# Patient Record
Sex: FEMALE | Race: WHITE | NOT HISPANIC OR LATINO | Employment: UNEMPLOYED | URBAN - NONMETROPOLITAN AREA
[De-identification: names, ages, dates, MRNs, and addresses within clinical notes are randomized per-mention and may not be internally consistent; named-entity substitution may affect disease eponyms.]

---

## 2017-07-26 ENCOUNTER — TRANSCRIBE ORDERS (OUTPATIENT)
Dept: ADMINISTRATIVE | Facility: HOSPITAL | Age: 37
End: 2017-07-26

## 2017-07-26 DIAGNOSIS — E04.1 NONTOXIC SINGLE THYROID NODULE: Primary | ICD-10-CM

## 2017-08-07 ENCOUNTER — HOSPITAL ENCOUNTER (OUTPATIENT)
Dept: ULTRASOUND IMAGING | Facility: HOSPITAL | Age: 37
Discharge: HOME | End: 2017-08-07
Admitting: NURSE PRACTITIONER

## 2017-08-07 DIAGNOSIS — E04.1 NONTOXIC SINGLE THYROID NODULE: ICD-10-CM

## 2017-08-07 PROCEDURE — 76536 US EXAM OF HEAD AND NECK: CPT | Performed by: RADIOLOGY

## 2017-08-07 PROCEDURE — 76536 US EXAM OF HEAD AND NECK: CPT

## 2018-01-26 ENCOUNTER — TRANSCRIBE ORDERS (OUTPATIENT)
Dept: ADMINISTRATIVE | Facility: HOSPITAL | Age: 38
End: 2018-01-26

## 2018-01-26 DIAGNOSIS — R10.12 ABDOMINAL PAIN, LEFT UPPER QUADRANT: Primary | ICD-10-CM

## 2018-01-26 DIAGNOSIS — R10.12 LEFT UPPER QUADRANT ABDOMINAL PAIN OF UNKNOWN ETIOLOGY: ICD-10-CM

## 2018-02-05 ENCOUNTER — HOSPITAL ENCOUNTER (OUTPATIENT)
Dept: ULTRASOUND IMAGING | Facility: HOSPITAL | Age: 38
Discharge: HOME | End: 2018-02-05
Admitting: NURSE PRACTITIONER

## 2018-02-05 DIAGNOSIS — R10.12 LEFT UPPER QUADRANT ABDOMINAL PAIN OF UNKNOWN ETIOLOGY: ICD-10-CM

## 2018-02-05 PROCEDURE — 76705 ECHO EXAM OF ABDOMEN: CPT | Performed by: RADIOLOGY

## 2018-02-05 PROCEDURE — 76700 US EXAM ABDOM COMPLETE: CPT

## 2018-02-09 ENCOUNTER — TRANSCRIBE ORDERS (OUTPATIENT)
Dept: ADMINISTRATIVE | Facility: HOSPITAL | Age: 38
End: 2018-02-09

## 2018-02-09 DIAGNOSIS — R10.12 LEFT UPPER QUADRANT ABDOMINAL PAIN OF UNKNOWN ETIOLOGY: Primary | ICD-10-CM

## 2018-02-16 ENCOUNTER — HOSPITAL ENCOUNTER (OUTPATIENT)
Dept: CT IMAGING | Facility: HOSPITAL | Age: 38
Discharge: HOME | End: 2018-02-16
Admitting: NURSE PRACTITIONER

## 2018-02-16 DIAGNOSIS — R10.12 LEFT UPPER QUADRANT ABDOMINAL PAIN OF UNKNOWN ETIOLOGY: ICD-10-CM

## 2018-02-16 LAB — CREAT BLDA-MCNC: 0.5 MG/DL (ref 0.6–1.3)

## 2018-02-16 PROCEDURE — 74160 CT ABDOMEN W/CONTRAST: CPT

## 2018-02-16 PROCEDURE — 82565 ASSAY OF CREATININE: CPT

## 2018-02-16 PROCEDURE — 74160 CT ABDOMEN W/CONTRAST: CPT | Performed by: RADIOLOGY

## 2018-02-16 PROCEDURE — 0 IOPAMIDOL 61 % SOLUTION: Performed by: NURSE PRACTITIONER

## 2018-02-16 RX ADMIN — IOPAMIDOL 100 ML: 612 INJECTION, SOLUTION INTRAVENOUS at 12:15

## 2018-06-20 ENCOUNTER — TRANSCRIBE ORDERS (OUTPATIENT)
Dept: ADMINISTRATIVE | Facility: HOSPITAL | Age: 38
End: 2018-06-20

## 2018-06-20 DIAGNOSIS — R74.8 ELEVATED LIVER ENZYMES: Primary | ICD-10-CM

## 2018-07-11 ENCOUNTER — APPOINTMENT (OUTPATIENT)
Dept: ULTRASOUND IMAGING | Facility: HOSPITAL | Age: 38
End: 2018-07-11

## 2018-07-19 ENCOUNTER — TRANSCRIBE ORDERS (OUTPATIENT)
Dept: ADMINISTRATIVE | Facility: HOSPITAL | Age: 38
End: 2018-07-19

## 2018-07-19 DIAGNOSIS — E04.2 MULTINODULAR NON-TOXIC GOITER: Primary | ICD-10-CM

## 2018-08-01 ENCOUNTER — HOSPITAL ENCOUNTER (OUTPATIENT)
Dept: ULTRASOUND IMAGING | Facility: HOSPITAL | Age: 38
Discharge: HOME | End: 2018-08-01

## 2018-08-01 ENCOUNTER — HOSPITAL ENCOUNTER (OUTPATIENT)
Dept: ULTRASOUND IMAGING | Facility: HOSPITAL | Age: 38
Discharge: HOME | End: 2018-08-01
Admitting: NURSE PRACTITIONER

## 2018-08-01 DIAGNOSIS — R74.8 ELEVATED LIVER ENZYMES: ICD-10-CM

## 2018-08-01 DIAGNOSIS — E04.2 MULTINODULAR NON-TOXIC GOITER: ICD-10-CM

## 2018-08-01 PROCEDURE — 76700 US EXAM ABDOM COMPLETE: CPT | Performed by: RADIOLOGY

## 2018-08-01 PROCEDURE — 76700 US EXAM ABDOM COMPLETE: CPT

## 2018-08-01 PROCEDURE — 76536 US EXAM OF HEAD AND NECK: CPT

## 2018-08-01 PROCEDURE — 76536 US EXAM OF HEAD AND NECK: CPT | Performed by: RADIOLOGY

## 2018-09-18 ENCOUNTER — OFFICE VISIT (OUTPATIENT)
Dept: FAMILY MEDICINE CLINIC | Facility: CLINIC | Age: 38
End: 2018-09-18

## 2018-09-18 VITALS
OXYGEN SATURATION: 96 % | HEIGHT: 55 IN | WEIGHT: 293 LBS | BODY MASS INDEX: 67.81 KG/M2 | DIASTOLIC BLOOD PRESSURE: 90 MMHG | TEMPERATURE: 98.5 F | SYSTOLIC BLOOD PRESSURE: 140 MMHG | HEART RATE: 79 BPM

## 2018-09-18 DIAGNOSIS — R12 HEARTBURN: ICD-10-CM

## 2018-09-18 DIAGNOSIS — F33.1 MODERATE EPISODE OF RECURRENT MAJOR DEPRESSIVE DISORDER (HCC): Primary | ICD-10-CM

## 2018-09-18 DIAGNOSIS — E04.9 GOITER: ICD-10-CM

## 2018-09-18 DIAGNOSIS — I10 ESSENTIAL HYPERTENSION: ICD-10-CM

## 2018-09-18 DIAGNOSIS — K21.00 GASTROESOPHAGEAL REFLUX DISEASE WITH ESOPHAGITIS: ICD-10-CM

## 2018-09-18 DIAGNOSIS — J30.1 CHRONIC SEASONAL ALLERGIC RHINITIS DUE TO POLLEN: ICD-10-CM

## 2018-09-18 DIAGNOSIS — E28.2 PCOS (POLYCYSTIC OVARIAN SYNDROME): ICD-10-CM

## 2018-09-18 PROBLEM — K21.9 GASTROESOPHAGEAL REFLUX DISEASE WITHOUT ESOPHAGITIS: Status: ACTIVE | Noted: 2018-09-18

## 2018-09-18 LAB
ALBUMIN SERPL-MCNC: 4.4 G/DL (ref 3.5–5)
ALBUMIN/GLOB SERPL: 1.6 G/DL (ref 1.5–2.5)
ALP SERPL-CCNC: 103 U/L (ref 35–104)
ALT SERPL W P-5'-P-CCNC: 73 U/L (ref 10–36)
ANION GAP SERPL CALCULATED.3IONS-SCNC: 6.7 MMOL/L (ref 3.6–11.2)
AST SERPL-CCNC: 43 U/L (ref 10–30)
BILIRUB SERPL-MCNC: 0.5 MG/DL (ref 0.2–1.8)
BUN BLD-MCNC: 10 MG/DL (ref 7–21)
BUN/CREAT SERPL: 16.9 (ref 7–25)
CALCIUM SPEC-SCNC: 8.9 MG/DL (ref 7.7–10)
CHLORIDE SERPL-SCNC: 108 MMOL/L (ref 99–112)
CHOLEST SERPL-MCNC: 173 MG/DL (ref 0–200)
CO2 SERPL-SCNC: 28.3 MMOL/L (ref 24.3–31.9)
CREAT BLD-MCNC: 0.59 MG/DL (ref 0.43–1.29)
GFR SERPL CREATININE-BSD FRML MDRD: 113 ML/MIN/1.73
GLOBULIN UR ELPH-MCNC: 2.8 GM/DL
GLUCOSE BLD-MCNC: 106 MG/DL (ref 70–110)
HBA1C MFR BLD: 6.4 % (ref 4.5–5.7)
HDLC SERPL-MCNC: 47 MG/DL (ref 60–100)
LDLC SERPL CALC-MCNC: 90 MG/DL (ref 0–100)
LDLC/HDLC SERPL: 1.92 {RATIO}
OSMOLALITY SERPL CALC.SUM OF ELEC: 284.4 MOSM/KG (ref 273–305)
POTASSIUM BLD-SCNC: 3.7 MMOL/L (ref 3.5–5.3)
PROT SERPL-MCNC: 7.2 G/DL (ref 6–8)
SODIUM BLD-SCNC: 143 MMOL/L (ref 135–153)
T3FREE SERPL-MCNC: 3.7 PG/ML (ref 2.3–4.2)
T4 FREE SERPL-MCNC: 1.17 NG/DL (ref 0.89–1.76)
TRIGL SERPL-MCNC: 179 MG/DL (ref 0–150)
TSH SERPL DL<=0.05 MIU/L-ACNC: 1.12 MIU/ML (ref 0.55–4.78)
VLDLC SERPL-MCNC: 35.8 MG/DL

## 2018-09-18 PROCEDURE — 84481 FREE ASSAY (FT-3): CPT | Performed by: PHYSICIAN ASSISTANT

## 2018-09-18 PROCEDURE — 83036 HEMOGLOBIN GLYCOSYLATED A1C: CPT | Performed by: PHYSICIAN ASSISTANT

## 2018-09-18 PROCEDURE — 99204 OFFICE O/P NEW MOD 45 MIN: CPT | Performed by: PHYSICIAN ASSISTANT

## 2018-09-18 PROCEDURE — 84443 ASSAY THYROID STIM HORMONE: CPT | Performed by: PHYSICIAN ASSISTANT

## 2018-09-18 PROCEDURE — 36415 COLL VENOUS BLD VENIPUNCTURE: CPT | Performed by: PHYSICIAN ASSISTANT

## 2018-09-18 PROCEDURE — 83013 H PYLORI (C-13) BREATH: CPT | Performed by: PHYSICIAN ASSISTANT

## 2018-09-18 PROCEDURE — 84439 ASSAY OF FREE THYROXINE: CPT | Performed by: PHYSICIAN ASSISTANT

## 2018-09-18 PROCEDURE — 80053 COMPREHEN METABOLIC PANEL: CPT | Performed by: PHYSICIAN ASSISTANT

## 2018-09-18 PROCEDURE — 90686 IIV4 VACC NO PRSV 0.5 ML IM: CPT | Performed by: PHYSICIAN ASSISTANT

## 2018-09-18 PROCEDURE — 90471 IMMUNIZATION ADMIN: CPT | Performed by: PHYSICIAN ASSISTANT

## 2018-09-18 PROCEDURE — 80061 LIPID PANEL: CPT | Performed by: PHYSICIAN ASSISTANT

## 2018-09-18 RX ORDER — LOSARTAN POTASSIUM 50 MG/1
50 TABLET ORAL DAILY
Qty: 30 TABLET | Refills: 5 | Status: SHIPPED | OUTPATIENT
Start: 2018-09-18 | End: 2019-01-17 | Stop reason: SDUPTHER

## 2018-09-18 RX ORDER — LOSARTAN POTASSIUM 25 MG/1
25 TABLET ORAL DAILY
COMMUNITY
End: 2018-09-18

## 2018-09-18 RX ORDER — FLUTICASONE PROPIONATE 50 MCG
SPRAY, SUSPENSION (ML) NASAL
COMMUNITY
Start: 2018-06-19 | End: 2018-09-18 | Stop reason: SDUPTHER

## 2018-09-18 RX ORDER — METFORMIN HYDROCHLORIDE EXTENDED-RELEASE TABLETS 500 MG/1
TABLET, FILM COATED, EXTENDED RELEASE ORAL
COMMUNITY
Start: 2018-06-19 | End: 2018-09-18 | Stop reason: SDUPTHER

## 2018-09-18 RX ORDER — METFORMIN HYDROCHLORIDE EXTENDED-RELEASE TABLETS 500 MG/1
500 TABLET, FILM COATED, EXTENDED RELEASE ORAL 2 TIMES DAILY WITH MEALS
Qty: 60 TABLET | Refills: 5 | Status: SHIPPED | OUTPATIENT
Start: 2018-09-18 | End: 2019-01-17 | Stop reason: SDUPTHER

## 2018-09-18 RX ORDER — FLUTICASONE PROPIONATE 50 MCG
2 SPRAY, SUSPENSION (ML) NASAL DAILY
Qty: 18.2 ML | Refills: 5 | Status: SHIPPED | OUTPATIENT
Start: 2018-09-18 | End: 2019-01-17 | Stop reason: SDUPTHER

## 2018-09-18 RX ORDER — IBUPROFEN 800 MG/1
1 TABLET ORAL EVERY 8 HOURS
COMMUNITY
Start: 2018-07-20 | End: 2018-09-18 | Stop reason: SDUPTHER

## 2018-09-18 RX ORDER — SERTRALINE HYDROCHLORIDE 25 MG/1
25 TABLET, FILM COATED ORAL DAILY
COMMUNITY
End: 2018-09-18

## 2018-09-18 RX ORDER — PANTOPRAZOLE SODIUM 20 MG/1
20 TABLET, DELAYED RELEASE ORAL DAILY
Qty: 30 TABLET | Refills: 5 | Status: SHIPPED | OUTPATIENT
Start: 2018-09-18 | End: 2019-01-17 | Stop reason: SDUPTHER

## 2018-09-18 RX ORDER — IBUPROFEN 800 MG/1
800 TABLET ORAL EVERY 8 HOURS
Qty: 90 TABLET | Refills: 3 | Status: SHIPPED | OUTPATIENT
Start: 2018-09-18 | End: 2019-01-17 | Stop reason: SDUPTHER

## 2018-09-18 RX ORDER — PANTOPRAZOLE SODIUM 20 MG/1
20 TABLET, DELAYED RELEASE ORAL DAILY
COMMUNITY
End: 2018-09-18 | Stop reason: SDUPTHER

## 2018-09-18 RX ORDER — FLUOXETINE HYDROCHLORIDE 20 MG/1
20 CAPSULE ORAL DAILY
Qty: 30 CAPSULE | Refills: 5 | Status: SHIPPED | OUTPATIENT
Start: 2018-09-18 | End: 2019-01-17 | Stop reason: SDUPTHER

## 2018-09-20 LAB
UREA BREATH TEST QL: NEGATIVE
UREA BREATH TEST QL: NORMAL

## 2018-09-23 NOTE — PROGRESS NOTES
"Sravanthi Alegria is a 38 y.o. female.     CC- depression    History of Present Illness     Depression-   Not at goal with zoloft.  She complains of sadness and labile moods.  X 6 months.    Hypertension-  Uncontrolled with cozaar 50mg qd.  She has associated headaches.    Chronic allergic rhinitis- stable with flonase     GERD/ flatulence- uncontrolled.  She complains of belching persistently x 3 months.  She has not taken her pantoprazaole in 3 weeks so she can be tested for h pylori.  She has had h pylori twice int he  Past    Goiter- stable     The following portions of the patient's history were reviewed and updated as appropriate: allergies, current medications, past family history, past medical history, past social history, past surgical history and problem list.    Review of Systems   Constitutional: Negative for activity change, appetite change and fever.   HENT: Negative for ear pain, sinus pressure and sore throat.    Eyes: Negative for pain and visual disturbance.   Respiratory: Negative for cough and chest tightness.    Cardiovascular: Negative for chest pain and palpitations.   Gastrointestinal: Negative for abdominal pain, constipation, diarrhea, nausea and vomiting.        Belching, heartburn   Endocrine: Negative for polydipsia and polyuria.   Genitourinary: Negative for dysuria and frequency.   Musculoskeletal: Negative for back pain and myalgias.   Skin: Negative for color change and rash.   Allergic/Immunologic: Negative for food allergies and immunocompromised state.   Neurological: Negative for dizziness, syncope and headaches.   Hematological: Negative for adenopathy. Does not bruise/bleed easily.   Psychiatric/Behavioral: Positive for dysphoric mood. Negative for hallucinations, self-injury, sleep disturbance and suicidal ideas. The patient is not nervous/anxious.        /90   Pulse 79   Temp 98.5 °F (36.9 °C) (Oral)   Ht 66 cm (25.98\")   Wt (!) 162 kg (358 lb)   LMP  " (Approximate) Comment: post menopause  SpO2 96%   .80 kg/m²   Hospital Outpatient Visit on 02/16/2018   Component Date Value Ref Range Status   • Creatinine 02/16/2018 0.50* 0.60 - 1.30 mg/dL Final       Physical Exam   Constitutional: She is oriented to person, place, and time. She appears well-developed and well-nourished.   HENT:   Head: Normocephalic and atraumatic.   Nose: Nose normal.   Mouth/Throat: Oropharynx is clear and moist.   Eyes: Pupils are equal, round, and reactive to light. Conjunctivae and EOM are normal.   Neck: Normal range of motion. Neck supple. No tracheal deviation present. No thyromegaly present.   Cardiovascular: Normal rate, regular rhythm, normal heart sounds and intact distal pulses.    No murmur heard.  Pulmonary/Chest: Effort normal and breath sounds normal. No respiratory distress. She has no wheezes.   Abdominal: Soft. Bowel sounds are normal. There is no tenderness. There is no guarding.   Musculoskeletal: Normal range of motion. She exhibits no edema or tenderness.   Lymphadenopathy:     She has no cervical adenopathy.   Neurological: She is alert and oriented to person, place, and time.   Skin: Skin is warm and dry. No rash noted.   Psychiatric: She has a normal mood and affect. Her behavior is normal.   Nursing note and vitals reviewed.      Assessment/Plan     Diagnoses and all orders for this visit:    Moderate episode of recurrent major depressive disorder (CMS/HCC)  Comments:  discontinue zoloft  start prozac  Orders:  -     FLUoxetine (PROzac) 20 MG capsule; Take 1 capsule by mouth Daily.    Essential hypertension  Comments:  increase Cozaar 100mg qd  Orders:  -     losartan (COZAAR) 50 MG tablet; Take 1 tablet by mouth Daily.  -     Comprehensive Metabolic Panel; Future  -     Lipid Panel; Future  -     Comprehensive Metabolic Panel  -     Lipid Panel  -     Osmolality, Calculated; Future  -     Osmolality, Calculated    Chronic seasonal allergic rhinitis due to  pollen  -     fluticasone (FLONASE) 50 MCG/ACT nasal spray; 2 sprays into the nostril(s) as directed by provider Daily.    Gastroesophageal reflux disease with esophagitis  -     pantoprazole (PROTONIX) 20 MG EC tablet; Take 1 tablet by mouth Daily.    PCOS (polycystic ovarian syndrome)  -     metFORMIN (FORTAMET) 500 MG (OSM) 24 hr tablet; Take 1 tablet by mouth 2 (Two) Times a Day With Meals.  -     Hemoglobin A1c; Future  -     Hemoglobin A1c    Goiter  -     TSH; Future  -     T4, Free; Future  -     T3, Free; Future  -     TSH  -     T4, Free  -     T3, Free    Heartburn  -     H. Pylori Breath Test - Breath, Lung  -     H. pylori Breath Test - Breath,; Future  -     H. pylori Breath Test - Breath,    Other orders  -     ibuprofen (ADVIL,MOTRIN) 800 MG tablet; Take 1 tablet by mouth Every 8 (Eight) Hours.  -     Fluarix/Fluzone/Afluria Quad>6 Months                 This document has been electronically signed by:  Rosa Quigley PA-C

## 2018-10-16 ENCOUNTER — OFFICE VISIT (OUTPATIENT)
Dept: FAMILY MEDICINE CLINIC | Facility: CLINIC | Age: 38
End: 2018-10-16

## 2018-10-16 VITALS
HEIGHT: 55 IN | HEART RATE: 76 BPM | WEIGHT: 293 LBS | TEMPERATURE: 97.4 F | BODY MASS INDEX: 67.81 KG/M2 | DIASTOLIC BLOOD PRESSURE: 70 MMHG | SYSTOLIC BLOOD PRESSURE: 120 MMHG | OXYGEN SATURATION: 99 %

## 2018-10-16 DIAGNOSIS — K76.0 NAFL (NONALCOHOLIC FATTY LIVER): ICD-10-CM

## 2018-10-16 DIAGNOSIS — E78.2 MIXED HYPERLIPIDEMIA: ICD-10-CM

## 2018-10-16 DIAGNOSIS — I10 ESSENTIAL HYPERTENSION: Primary | ICD-10-CM

## 2018-10-16 DIAGNOSIS — R73.03 PREDIABETES: ICD-10-CM

## 2018-10-16 PROCEDURE — 99214 OFFICE O/P EST MOD 30 MIN: CPT | Performed by: PHYSICIAN ASSISTANT

## 2018-10-16 NOTE — PROGRESS NOTES
"Sravanthi Alegria is a 38 y.o. female.     Chief complaint-hypertension    History of Present Illness     Hypertension-  Improved with losartan 50 mg daily    Fatty liver disease-  She continues to have elevated liver function tests which were reviewed with her today.  These have been stable and she states that she had an ultrasound in the past that revealed fatty liver changes.  She reports that she does not drink alcohol or use Tylenol regularly.    Prediabetes-  Recent lab work showed hemoglobin A1c of 6.4.  She is ready taking metformin for PCO S.  Stable    Hyperlipidemia-  She continues to have elevated triglycerides. Not at goal    The following portions of the patient's history were reviewed and updated as appropriate: allergies, current medications, past family history, past medical history, past social history, past surgical history and problem list.    Review of Systems   Constitutional: Negative for activity change, appetite change and fever.   HENT: Negative for ear pain, sinus pressure and sore throat.    Eyes: Negative for pain and visual disturbance.   Respiratory: Negative for cough and chest tightness.    Cardiovascular: Negative for chest pain and palpitations.   Gastrointestinal: Negative for abdominal pain, constipation, diarrhea, nausea and vomiting.   Endocrine: Negative for polydipsia and polyuria.   Genitourinary: Negative for dysuria and frequency.   Musculoskeletal: Negative for back pain and myalgias.   Skin: Negative for color change and rash.   Allergic/Immunologic: Negative for food allergies and immunocompromised state.   Neurological: Negative for dizziness, syncope and headaches.   Hematological: Negative for adenopathy. Does not bruise/bleed easily.   Psychiatric/Behavioral: Negative for hallucinations and suicidal ideas. The patient is not nervous/anxious.        /70   Pulse 76   Temp 97.4 °F (36.3 °C) (Oral)   Ht 66 cm (25.98\")   Wt (!) 161 kg (355 lb)   LMP  " (LMP Unknown)   SpO2 99%   .67 kg/m²   Office Visit on 09/18/2018   Component Date Value Ref Range Status   • H. pylori Breath Test 09/18/2018 Comment  Negative Final   • Glucose 09/18/2018 106  70 - 110 mg/dL Final   • BUN 09/18/2018 10  7 - 21 mg/dL Final   • Creatinine 09/18/2018 0.59  0.43 - 1.29 mg/dL Final   • Sodium 09/18/2018 143  135 - 153 mmol/L Final   • Potassium 09/18/2018 3.7  3.5 - 5.3 mmol/L Final   • Chloride 09/18/2018 108  99 - 112 mmol/L Final   • CO2 09/18/2018 28.3  24.3 - 31.9 mmol/L Final   • Calcium 09/18/2018 8.9  7.7 - 10.0 mg/dL Final   • Total Protein 09/18/2018 7.2  6.0 - 8.0 g/dL Final   • Albumin 09/18/2018 4.40  3.50 - 5.00 g/dL Final   • ALT (SGPT) 09/18/2018 73* 10 - 36 U/L Final   • AST (SGOT) 09/18/2018 43* 10 - 30 U/L Final   • Alkaline Phosphatase 09/18/2018 103  35 - 104 U/L Final   • Total Bilirubin 09/18/2018 0.5  0.2 - 1.8 mg/dL Final   • eGFR Non  Amer 09/18/2018 113  >60 mL/min/1.73 Final   • Globulin 09/18/2018 2.8  gm/dL Final   • A/G Ratio 09/18/2018 1.6  1.5 - 2.5 g/dL Final   • BUN/Creatinine Ratio 09/18/2018 16.9  7.0 - 25.0 Final   • Anion Gap 09/18/2018 6.7  3.6 - 11.2 mmol/L Final   • Total Cholesterol 09/18/2018 173  0 - 200 mg/dL Final   • Triglycerides 09/18/2018 179* 0 - 150 mg/dL Final   • HDL Cholesterol 09/18/2018 47* 60 - 100 mg/dL Final   • LDL Cholesterol  09/18/2018 90  0 - 100 mg/dL Final   • VLDL Cholesterol 09/18/2018 35.8  mg/dL Final   • LDL/HDL Ratio 09/18/2018 1.92   Final   • TSH 09/18/2018 1.121  0.550 - 4.780 mIU/mL Final   • Hemoglobin A1C 09/18/2018 6.40* 4.50 - 5.70 % Final   • Free T4 09/18/2018 1.17  0.89 - 1.76 ng/dL Final   • T3, Free 09/18/2018 3.70  2.30 - 4.20 pg/mL Final   • Osmolality Calc 09/18/2018 284.4  273.0 - 305.0 mOsm/kg Final   • H. pylori Breath Test 09/18/2018 Negative  Negative Final       Physical Exam   Constitutional: She is oriented to person, place, and time. She appears well-developed and  well-nourished.   HENT:   Head: Normocephalic and atraumatic.   Nose: Nose normal.   Mouth/Throat: Oropharynx is clear and moist.   Eyes: Pupils are equal, round, and reactive to light. Conjunctivae and EOM are normal.   Neck: Normal range of motion. Neck supple. No tracheal deviation present. No thyromegaly present.   Cardiovascular: Normal rate, regular rhythm, normal heart sounds and intact distal pulses.    No murmur heard.  Pulmonary/Chest: Effort normal and breath sounds normal. No respiratory distress. She has no wheezes.   Abdominal: Soft. Bowel sounds are normal. There is no tenderness. There is no guarding.   Musculoskeletal: Normal range of motion. She exhibits no edema or tenderness.   Lymphadenopathy:     She has no cervical adenopathy.   Neurological: She is alert and oriented to person, place, and time.   Skin: Skin is warm and dry. No rash noted.   Psychiatric: She has a normal mood and affect. Her behavior is normal.   Nursing note and vitals reviewed.      Assessment/Plan     Diagnoses and all orders for this visit:    Essential hypertension    NAFL (nonalcoholic fatty liver)    Prediabetes    Mixed hyperlipidemia  Comments:  She was advised to follow low carb/low cholesterol diet.                 This document has been electronically signed by:  Rosa Quigley PA-C

## 2018-11-26 ENCOUNTER — OFFICE VISIT (OUTPATIENT)
Dept: FAMILY MEDICINE CLINIC | Facility: CLINIC | Age: 38
End: 2018-11-26

## 2018-11-26 VITALS
BODY MASS INDEX: 57.52 KG/M2 | HEART RATE: 92 BPM | TEMPERATURE: 97.7 F | SYSTOLIC BLOOD PRESSURE: 130 MMHG | WEIGHT: 293 LBS | OXYGEN SATURATION: 97 % | DIASTOLIC BLOOD PRESSURE: 82 MMHG | HEIGHT: 60 IN

## 2018-11-26 DIAGNOSIS — J30.1 CHRONIC SEASONAL ALLERGIC RHINITIS DUE TO POLLEN: ICD-10-CM

## 2018-11-26 DIAGNOSIS — I10 ESSENTIAL HYPERTENSION: ICD-10-CM

## 2018-11-26 DIAGNOSIS — J40 BRONCHITIS: Primary | ICD-10-CM

## 2018-11-26 DIAGNOSIS — E78.2 MIXED HYPERLIPIDEMIA: ICD-10-CM

## 2018-11-26 DIAGNOSIS — F33.1 MODERATE EPISODE OF RECURRENT MAJOR DEPRESSIVE DISORDER (HCC): ICD-10-CM

## 2018-11-26 PROCEDURE — 99214 OFFICE O/P EST MOD 30 MIN: CPT | Performed by: PHYSICIAN ASSISTANT

## 2018-11-26 RX ORDER — ALBUTEROL SULFATE 90 UG/1
2 AEROSOL, METERED RESPIRATORY (INHALATION) EVERY 4 HOURS PRN
Qty: 1 INHALER | Refills: 5 | Status: SHIPPED | OUTPATIENT
Start: 2018-11-26 | End: 2019-01-17 | Stop reason: SDUPTHER

## 2018-11-26 RX ORDER — DOXYCYCLINE HYCLATE 100 MG/1
100 CAPSULE ORAL 2 TIMES DAILY
Qty: 20 CAPSULE | Refills: 0 | Status: SHIPPED | OUTPATIENT
Start: 2018-11-26 | End: 2018-12-06

## 2018-11-26 NOTE — PROGRESS NOTES
"Sravanthi Alegria is a 38 y.o. female.       Chief Complaint -  cough    History of Present Illness -      Cough-  She complains of productive cough, wheezing, sore throat, earache, and fever 101F.  Onset 3 days ago.  Some relief with tylenol.    Hypertension- controlled with losartan    Hyperlipidemia- stable with diet    Chronic allergic rhinitis - uncontrolled due to acute illness    Depression- improved with prozac    The following portions of the patient's history were reviewed and updated as appropriate: allergies, current medications, past family history, past medical history, past social history, past surgical history and problem list.    Review of Systems   Constitutional: Positive for chills, fatigue and fever. Negative for activity change and appetite change.   HENT: Positive for congestion, ear pain and sore throat. Negative for sinus pressure.    Eyes: Negative for pain and visual disturbance.   Respiratory: Positive for cough and wheezing. Negative for chest tightness.    Cardiovascular: Negative for chest pain and palpitations.   Gastrointestinal: Negative for abdominal pain, constipation, diarrhea, nausea and vomiting.   Endocrine: Negative for polydipsia and polyuria.   Genitourinary: Negative for dysuria and frequency.   Musculoskeletal: Negative for back pain and myalgias.   Skin: Negative for color change and rash.   Allergic/Immunologic: Negative for food allergies and immunocompromised state.   Neurological: Negative for dizziness, syncope and headaches.   Hematological: Negative for adenopathy. Does not bruise/bleed easily.   Psychiatric/Behavioral: Negative for hallucinations and suicidal ideas. The patient is not nervous/anxious.        /82   Pulse 92   Temp 97.7 °F (36.5 °C) (Oral)   Ht 152.4 cm (60\")   Wt (!) 161 kg (355 lb)   LMP  (LMP Unknown)   SpO2 97%   BMI 69.33 kg/m²   Office Visit on 09/18/2018   Component Date Value Ref Range Status   • H. pylori Breath Test " 09/18/2018 Comment  Negative Final   • Glucose 09/18/2018 106  70 - 110 mg/dL Final   • BUN 09/18/2018 10  7 - 21 mg/dL Final   • Creatinine 09/18/2018 0.59  0.43 - 1.29 mg/dL Final   • Sodium 09/18/2018 143  135 - 153 mmol/L Final   • Potassium 09/18/2018 3.7  3.5 - 5.3 mmol/L Final   • Chloride 09/18/2018 108  99 - 112 mmol/L Final   • CO2 09/18/2018 28.3  24.3 - 31.9 mmol/L Final   • Calcium 09/18/2018 8.9  7.7 - 10.0 mg/dL Final   • Total Protein 09/18/2018 7.2  6.0 - 8.0 g/dL Final   • Albumin 09/18/2018 4.40  3.50 - 5.00 g/dL Final   • ALT (SGPT) 09/18/2018 73* 10 - 36 U/L Final   • AST (SGOT) 09/18/2018 43* 10 - 30 U/L Final   • Alkaline Phosphatase 09/18/2018 103  35 - 104 U/L Final   • Total Bilirubin 09/18/2018 0.5  0.2 - 1.8 mg/dL Final   • eGFR Non  Amer 09/18/2018 113  >60 mL/min/1.73 Final   • Globulin 09/18/2018 2.8  gm/dL Final   • A/G Ratio 09/18/2018 1.6  1.5 - 2.5 g/dL Final   • BUN/Creatinine Ratio 09/18/2018 16.9  7.0 - 25.0 Final   • Anion Gap 09/18/2018 6.7  3.6 - 11.2 mmol/L Final   • Total Cholesterol 09/18/2018 173  0 - 200 mg/dL Final   • Triglycerides 09/18/2018 179* 0 - 150 mg/dL Final   • HDL Cholesterol 09/18/2018 47* 60 - 100 mg/dL Final   • LDL Cholesterol  09/18/2018 90  0 - 100 mg/dL Final   • VLDL Cholesterol 09/18/2018 35.8  mg/dL Final   • LDL/HDL Ratio 09/18/2018 1.92   Final   • TSH 09/18/2018 1.121  0.550 - 4.780 mIU/mL Final   • Hemoglobin A1C 09/18/2018 6.40* 4.50 - 5.70 % Final   • Free T4 09/18/2018 1.17  0.89 - 1.76 ng/dL Final   • T3, Free 09/18/2018 3.70  2.30 - 4.20 pg/mL Final   • Osmolality Calc 09/18/2018 284.4  273.0 - 305.0 mOsm/kg Final   • H. pylori Breath Test 09/18/2018 Negative  Negative Final       Physical Exam   Constitutional: She is oriented to person, place, and time. She appears well-developed and well-nourished. No distress.   Obese pleasant lady   HENT:   Head: Normocephalic and atraumatic.   Nose: Nose normal.   Oropharynx erythematous  without exudates.   Eyes: Conjunctivae and EOM are normal. Pupils are equal, round, and reactive to light.   Neck: Normal range of motion. Neck supple. No tracheal deviation present. No thyromegaly present.   Cardiovascular: Normal rate, regular rhythm, normal heart sounds and intact distal pulses.   No murmur heard.  Pulmonary/Chest: Effort normal. No respiratory distress. She has wheezes (bilaterally).   Abdominal: Soft. Bowel sounds are normal. There is no tenderness. There is no guarding.   Musculoskeletal: Normal range of motion. She exhibits no edema or tenderness.   Lymphadenopathy:     She has no cervical adenopathy.   Neurological: She is alert and oriented to person, place, and time.   Skin: Skin is warm and dry. No rash noted. She is not diaphoretic.   Psychiatric: She has a normal mood and affect. Her behavior is normal.   Nursing note and vitals reviewed.      Assessment/Plan     Diagnoses and all orders for this visit:    Bronchitis  -     doxycycline (VIBRAMYCIN) 100 MG capsule; Take 1 capsule by mouth 2 (Two) Times a Day for 10 days.  -     albuterol (PROVENTIL HFA;VENTOLIN HFA) 108 (90 Base) MCG/ACT inhaler; Inhale 2 puffs Every 4 (Four) Hours As Needed for Shortness of Air.    Essential hypertension    Mixed hyperlipidemia    Chronic seasonal allergic rhinitis due to pollen    Moderate episode of recurrent major depressive disorder (CMS/[facility])                 This document has been electronically signed by:  Rosa Quigley PA-C

## 2018-12-05 ENCOUNTER — HOSPITAL ENCOUNTER (EMERGENCY)
Facility: HOSPITAL | Age: 38
Discharge: HOME | End: 2018-12-05
Attending: FAMILY MEDICINE | Admitting: FAMILY MEDICINE

## 2018-12-05 ENCOUNTER — APPOINTMENT (OUTPATIENT)
Dept: GENERAL RADIOLOGY | Facility: HOSPITAL | Age: 38
End: 2018-12-05

## 2018-12-05 VITALS
HEIGHT: 66 IN | OXYGEN SATURATION: 96 % | TEMPERATURE: 99 F | HEART RATE: 82 BPM | SYSTOLIC BLOOD PRESSURE: 110 MMHG | DIASTOLIC BLOOD PRESSURE: 53 MMHG | BODY MASS INDEX: 47.09 KG/M2 | RESPIRATION RATE: 18 BRPM | WEIGHT: 293 LBS

## 2018-12-05 DIAGNOSIS — J02.0 STREP THROAT: Primary | ICD-10-CM

## 2018-12-05 LAB
ALBUMIN SERPL-MCNC: 3.5 G/DL (ref 3.5–5)
ALBUMIN/GLOB SERPL: 1.3 G/DL (ref 1.5–2.5)
ALP SERPL-CCNC: 96 U/L (ref 35–104)
ALT SERPL W P-5'-P-CCNC: 225 U/L (ref 10–36)
ANION GAP SERPL CALCULATED.3IONS-SCNC: 3.9 MMOL/L (ref 3.6–11.2)
AST SERPL-CCNC: 146 U/L (ref 10–30)
B-HCG UR QL: NEGATIVE
BASOPHILS # BLD AUTO: 0.04 10*3/MM3 (ref 0–0.3)
BASOPHILS NFR BLD AUTO: 1.1 % (ref 0–2)
BILIRUB SERPL-MCNC: 0.5 MG/DL (ref 0.2–1.8)
BILIRUB UR QL STRIP: NEGATIVE
BUN BLD-MCNC: 10 MG/DL (ref 7–21)
BUN/CREAT SERPL: 15.4 (ref 7–25)
CALCIUM SPEC-SCNC: 7.9 MG/DL (ref 7.7–10)
CHLORIDE SERPL-SCNC: 103 MMOL/L (ref 99–112)
CLARITY UR: CLEAR
CO2 SERPL-SCNC: 28.1 MMOL/L (ref 24.3–31.9)
COLOR UR: YELLOW
CREAT BLD-MCNC: 0.65 MG/DL (ref 0.43–1.29)
DEPRECATED RDW RBC AUTO: 43.7 FL (ref 37–54)
EOSINOPHIL # BLD AUTO: 0.13 10*3/MM3 (ref 0–0.7)
EOSINOPHIL NFR BLD AUTO: 3.6 % (ref 0–5)
ERYTHROCYTE [DISTWIDTH] IN BLOOD BY AUTOMATED COUNT: 13.4 % (ref 11.5–14.5)
FLUAV AG NPH QL: NEGATIVE
FLUBV AG NPH QL IA: NEGATIVE
GFR SERPL CREATININE-BSD FRML MDRD: 101 ML/MIN/1.73
GLOBULIN UR ELPH-MCNC: 2.6 GM/DL
GLUCOSE BLD-MCNC: 145 MG/DL (ref 70–110)
GLUCOSE UR STRIP-MCNC: NEGATIVE MG/DL
HCT VFR BLD AUTO: 40.5 % (ref 37–47)
HGB BLD-MCNC: 13.5 G/DL (ref 12–16)
HGB UR QL STRIP.AUTO: NEGATIVE
IMM GRANULOCYTES # BLD: 0.01 10*3/MM3 (ref 0–0.03)
IMM GRANULOCYTES NFR BLD: 0.3 % (ref 0–0.5)
KETONES UR QL STRIP: NEGATIVE
LEUKOCYTE ESTERASE UR QL STRIP.AUTO: NEGATIVE
LYMPHOCYTES # BLD AUTO: 1.01 10*3/MM3 (ref 1–3)
LYMPHOCYTES NFR BLD AUTO: 28.1 % (ref 21–51)
MCH RBC QN AUTO: 30.3 PG (ref 27–33)
MCHC RBC AUTO-ENTMCNC: 33.3 G/DL (ref 33–37)
MCV RBC AUTO: 91 FL (ref 80–94)
MONOCYTES # BLD AUTO: 0.51 10*3/MM3 (ref 0.1–0.9)
MONOCYTES NFR BLD AUTO: 14.2 % (ref 0–10)
NEUTROPHILS # BLD AUTO: 1.89 10*3/MM3 (ref 1.4–6.5)
NEUTROPHILS NFR BLD AUTO: 52.7 % (ref 30–70)
NITRITE UR QL STRIP: NEGATIVE
OSMOLALITY SERPL CALC.SUM OF ELEC: 271.7 MOSM/KG (ref 273–305)
PH UR STRIP.AUTO: 7 [PH] (ref 5–8)
PLATELET # BLD AUTO: 133 10*3/MM3 (ref 130–400)
PMV BLD AUTO: 9.7 FL (ref 6–10)
POTASSIUM BLD-SCNC: 3.8 MMOL/L (ref 3.5–5.3)
PROT SERPL-MCNC: 6.1 G/DL (ref 6–8)
PROT UR QL STRIP: NEGATIVE
RBC # BLD AUTO: 4.45 10*6/MM3 (ref 4.2–5.4)
SODIUM BLD-SCNC: 135 MMOL/L (ref 135–153)
SP GR UR STRIP: 1.02 (ref 1–1.03)
UROBILINOGEN UR QL STRIP: NORMAL
WBC NRBC COR # BLD: 3.59 10*3/MM3 (ref 4.5–12.5)

## 2018-12-05 PROCEDURE — 85025 COMPLETE CBC W/AUTO DIFF WBC: CPT | Performed by: PHYSICIAN ASSISTANT

## 2018-12-05 PROCEDURE — 71046 X-RAY EXAM CHEST 2 VIEWS: CPT

## 2018-12-05 PROCEDURE — 81003 URINALYSIS AUTO W/O SCOPE: CPT | Performed by: PHYSICIAN ASSISTANT

## 2018-12-05 PROCEDURE — 80053 COMPREHEN METABOLIC PANEL: CPT | Performed by: PHYSICIAN ASSISTANT

## 2018-12-05 PROCEDURE — 81025 URINE PREGNANCY TEST: CPT | Performed by: PHYSICIAN ASSISTANT

## 2018-12-05 PROCEDURE — 96374 THER/PROPH/DIAG INJ IV PUSH: CPT

## 2018-12-05 PROCEDURE — 25010000002 METHYLPREDNISOLONE PER 125 MG: Performed by: PHYSICIAN ASSISTANT

## 2018-12-05 PROCEDURE — 71046 X-RAY EXAM CHEST 2 VIEWS: CPT | Performed by: RADIOLOGY

## 2018-12-05 PROCEDURE — 87804 INFLUENZA ASSAY W/OPTIC: CPT | Performed by: PHYSICIAN ASSISTANT

## 2018-12-05 PROCEDURE — 99284 EMERGENCY DEPT VISIT MOD MDM: CPT

## 2018-12-05 PROCEDURE — 94799 UNLISTED PULMONARY SVC/PX: CPT

## 2018-12-05 PROCEDURE — 94640 AIRWAY INHALATION TREATMENT: CPT

## 2018-12-05 RX ORDER — ACETAMINOPHEN 500 MG
1000 TABLET ORAL ONCE
Status: COMPLETED | OUTPATIENT
Start: 2018-12-05 | End: 2018-12-05

## 2018-12-05 RX ORDER — CETIRIZINE HYDROCHLORIDE 10 MG/1
10 TABLET ORAL DAILY
Qty: 30 TABLET | Refills: 0 | Status: SHIPPED | OUTPATIENT
Start: 2018-12-05 | End: 2019-01-17 | Stop reason: SDUPTHER

## 2018-12-05 RX ORDER — METHYLPREDNISOLONE SODIUM SUCCINATE 125 MG/2ML
125 INJECTION, POWDER, LYOPHILIZED, FOR SOLUTION INTRAMUSCULAR; INTRAVENOUS ONCE
Status: COMPLETED | OUTPATIENT
Start: 2018-12-05 | End: 2018-12-05

## 2018-12-05 RX ORDER — IPRATROPIUM BROMIDE AND ALBUTEROL SULFATE 2.5; .5 MG/3ML; MG/3ML
3 SOLUTION RESPIRATORY (INHALATION) ONCE
Status: COMPLETED | OUTPATIENT
Start: 2018-12-05 | End: 2018-12-05

## 2018-12-05 RX ORDER — SODIUM CHLORIDE 0.9 % (FLUSH) 0.9 %
10 SYRINGE (ML) INJECTION AS NEEDED
Status: DISCONTINUED | OUTPATIENT
Start: 2018-12-05 | End: 2018-12-06 | Stop reason: HOSPADM

## 2018-12-05 RX ADMIN — SODIUM CHLORIDE 1000 ML: 9 INJECTION, SOLUTION INTRAVENOUS at 21:47

## 2018-12-05 RX ADMIN — ACETAMINOPHEN 1000 MG: 500 TABLET ORAL at 21:46

## 2018-12-05 RX ADMIN — METHYLPREDNISOLONE SODIUM SUCCINATE 125 MG: 125 INJECTION, POWDER, FOR SOLUTION INTRAMUSCULAR; INTRAVENOUS at 21:47

## 2018-12-05 RX ADMIN — IPRATROPIUM BROMIDE AND ALBUTEROL SULFATE 3 ML: .5; 3 SOLUTION RESPIRATORY (INHALATION) at 21:01

## 2018-12-06 NOTE — ED PROVIDER NOTES
Subjective   39 y/o female presents to the ED with generalized body aches starting 2 days ago. Patient states last week she was tx for bronchitis with Doxycycline and 3 days ago she was tx for strep throat and started on Amoxicillin. Patient states she has finished the Doxy and is still taking the Amoxicillin. Patient states that tonight she started running a fever and now thinks she has the flu.          History provided by:  Patient   used: No        Review of Systems   Constitutional: Positive for fatigue and fever.   HENT: Positive for sore throat.    Eyes: Negative.    Respiratory: Negative.    Cardiovascular: Negative.    Gastrointestinal: Negative.    Endocrine: Negative.    Genitourinary: Negative.    Musculoskeletal: Positive for myalgias.   Skin: Negative.    Allergic/Immunologic: Negative.    Neurological: Negative.    Hematological: Negative.    Psychiatric/Behavioral: Negative.    All other systems reviewed and are negative.      Past Medical History:   Diagnosis Date   • Hypertension    • Thyroid disease        Allergies   Allergen Reactions   • Ultram [Tramadol] Swelling       Past Surgical History:   Procedure Laterality Date   •  SECTION     • TUBAL ABDOMINAL LIGATION         Family History   Problem Relation Age of Onset   • No Known Problems Mother    • No Known Problems Father        Social History     Socioeconomic History   • Marital status:      Spouse name: bea   • Number of children: 2   • Years of education: 12   • Highest education level: Not on file   Social Needs   • Financial resource strain: Not hard at all   • Food insecurity - worry: Never true   • Food insecurity - inability: Never true   • Transportation needs - medical: No   • Transportation needs - non-medical: No   Occupational History   • Occupation: unemployed   Tobacco Use   • Smoking status: Former Smoker   • Smokeless tobacco: Never Used   Substance and Sexual Activity   • Alcohol use:  No   • Drug use: No   • Sexual activity: Defer           Objective   Physical Exam   Constitutional: She is oriented to person, place, and time. She appears well-developed and well-nourished.   HENT:   Head: Normocephalic and atraumatic.   Right Ear: External ear normal.   Left Ear: External ear normal.   Nose: Nose normal.   Mouth/Throat: Oropharynx is clear and moist.   Eyes: Conjunctivae and EOM are normal. Pupils are equal, round, and reactive to light.   Neck: Normal range of motion. Neck supple.   Cardiovascular: Normal rate, regular rhythm, normal heart sounds and intact distal pulses.   Pulmonary/Chest: Effort normal and breath sounds normal.   Abdominal: Soft. Bowel sounds are normal.   Musculoskeletal: Normal range of motion.   Neurological: She is alert and oriented to person, place, and time.   Skin: Skin is warm and dry.   Nursing note and vitals reviewed.      Procedures           ED Course  ED Course as of Dec 05 2213   Wed Dec 05, 2018   2134 Negative per Dr. Paige  XR Chest 2 View [ML]   2210 Temp improved with Tylenol. Reviewed labs and imaging with patient. Patient instructed to continue her abx and follow upwith her PCP    [ML]      ED Course User Index  [ML] Kristin Rehman PA                  [facility]      Final diagnoses:   Strep throat            Kristin Rehman PA  12/05/18 [id number]

## 2018-12-14 ENCOUNTER — OFFICE VISIT (OUTPATIENT)
Dept: FAMILY MEDICINE CLINIC | Facility: CLINIC | Age: 38
End: 2018-12-14

## 2018-12-14 VITALS
DIASTOLIC BLOOD PRESSURE: 80 MMHG | WEIGHT: 293 LBS | HEIGHT: 66 IN | TEMPERATURE: 96.1 F | BODY MASS INDEX: 47.09 KG/M2 | HEART RATE: 107 BPM | SYSTOLIC BLOOD PRESSURE: 128 MMHG | OXYGEN SATURATION: 96 %

## 2018-12-14 DIAGNOSIS — B15.9 VIRAL HEPATITIS A WITHOUT HEPATIC COMA: Primary | ICD-10-CM

## 2018-12-14 LAB
ALBUMIN SERPL-MCNC: 3.6 G/DL (ref 3.5–5)
ALBUMIN/GLOB SERPL: 1 G/DL (ref 1.5–2.5)
ALP SERPL-CCNC: 180 U/L (ref 35–104)
ALT SERPL W P-5'-P-CCNC: 3869 U/L (ref 10–36)
ANION GAP SERPL CALCULATED.3IONS-SCNC: 5.7 MMOL/L (ref 3.6–11.2)
AST SERPL-CCNC: 2059 U/L (ref 10–30)
BILIRUB SERPL-MCNC: 5.1 MG/DL (ref 0.2–1.8)
BILIRUB SERPL-MCNC: 5.1 MG/DL (ref 0.2–1.8)
BUN BLD-MCNC: 10 MG/DL (ref 7–21)
BUN/CREAT SERPL: 13.5 (ref 7–25)
CALCIUM SPEC-SCNC: 8.1 MG/DL (ref 7.7–10)
CHLORIDE SERPL-SCNC: 101 MMOL/L (ref 99–112)
CO2 SERPL-SCNC: 27.3 MMOL/L (ref 24.3–31.9)
CREAT BLD-MCNC: 0.74 MG/DL (ref 0.43–1.29)
GFR SERPL CREATININE-BSD FRML MDRD: 87 ML/MIN/1.73
GLOBULIN UR ELPH-MCNC: 3.7 GM/DL
GLUCOSE BLD-MCNC: 114 MG/DL (ref 70–110)
INR PPP: 1.69 (ref 0.9–1.1)
OSMOLALITY SERPL CALC.SUM OF ELEC: 268.1 MOSM/KG (ref 273–305)
POTASSIUM BLD-SCNC: 3.8 MMOL/L (ref 3.5–5.3)
PROT SERPL-MCNC: 7.3 G/DL (ref 6–8)
PROTHROMBIN TIME: 20.1 SECONDS (ref 11–15.4)
SODIUM BLD-SCNC: 134 MMOL/L (ref 135–153)

## 2018-12-14 PROCEDURE — 96372 THER/PROPH/DIAG INJ SC/IM: CPT | Performed by: PHYSICIAN ASSISTANT

## 2018-12-14 PROCEDURE — 85610 PROTHROMBIN TIME: CPT | Performed by: PHYSICIAN ASSISTANT

## 2018-12-14 PROCEDURE — 80053 COMPREHEN METABOLIC PANEL: CPT | Performed by: PHYSICIAN ASSISTANT

## 2018-12-14 PROCEDURE — 99214 OFFICE O/P EST MOD 30 MIN: CPT | Performed by: PHYSICIAN ASSISTANT

## 2018-12-14 PROCEDURE — 82247 BILIRUBIN TOTAL: CPT | Performed by: PHYSICIAN ASSISTANT

## 2018-12-14 RX ORDER — ONDANSETRON 4 MG/1
4 TABLET, FILM COATED ORAL EVERY 8 HOURS PRN
Qty: 90 TABLET | Refills: 3 | Status: SHIPPED | OUTPATIENT
Start: 2018-12-14 | End: 2019-01-17 | Stop reason: SDUPTHER

## 2018-12-14 RX ORDER — KETOROLAC TROMETHAMINE 30 MG/ML
60 INJECTION, SOLUTION INTRAMUSCULAR; INTRAVENOUS ONCE
Status: COMPLETED | OUTPATIENT
Start: 2018-12-14 | End: 2018-12-14

## 2018-12-14 RX ADMIN — KETOROLAC TROMETHAMINE 60 MG: 30 INJECTION, SOLUTION INTRAMUSCULAR; INTRAVENOUS at 14:54

## 2018-12-14 NOTE — PATIENT INSTRUCTIONS
Hepatitis A  Hepatitis A is a liver infection caused by a virus. Most cases of hepatitis A are fairly mild. Hepatitis A can spread from person to person by:  · Drinking or eating unclean water or food.  · Having sex with someone who is infected.  · Coming into contact with the poop (feces) of a person who is infected. You may then pass the virus from your hands to your mouth.    Follow these instructions at home:  · Rest. Make sure you:  ? Get enough sleep. Do not stay up late.  ? Keep the same bedtime hours on weekends and weekdays.  ? Take naps or rest breaks when you feel tired.  · Wash your hands:  ? After using the bathroom.  ? Before touching food or water.  · Do not take any medicines or supplements unless your doctor says it is okay.  · Tell your doctor about all of the people you live with or with whom you have close contact. Your doctor may suggest that they get the hepatitis A vaccine.  · Follow your doctor's instructions on how to avoid spreading the virus.  · Do not drink alcohol until your doctor says it is okay.  · Ask your doctor when you may go back to school or work.  Contact a doctor if:  You have a fever.  Get help right away if:  · You cannot eat or drink.  · You feel sick to your stomach (nauseous) or throw up (vomit).  · You get confused.  · Your yellowing of the skin and the whites of your eyes (jaundice) gets worse.  · You are very sleepy or have trouble waking up.  This information is not intended to replace advice given to you by your health care provider. Make sure you discuss any questions you have with your health care provider.  Document Released: 01/20/2012 Document Revised: 05/25/2017 Document Reviewed: 03/25/2015  [employer] Interactive Patient Education © 2017 [employer]

## 2018-12-15 DIAGNOSIS — B15.9 VIRAL HEPATITIS A WITHOUT HEPATIC COMA: Primary | ICD-10-CM

## 2018-12-16 NOTE — PROGRESS NOTES
"Sravanthi Alegria is a 38 y.o. female.       Chief Complaint -  nausea    History of Present Illness -      Nausea-  She complains of nausea and vomiting with associated fever and epigastric pain x 3 weeks.  She went to urgent care and labwork confirmed hepatitis A IgM positive.  She and her sister in law ate at [employer] in [place] beside [place] and they both now have Hep A.  She reports moderate sharp epigastric pain without radiation.  She has been drinking more water to stay hydrated.  Some relief of nausea with zofran from urgent care Rx.    The following portions of the patient's history were reviewed and updated as appropriate: allergies, current medications, past family history, past medical history, past social history, past surgical history and problem list.    Review of Systems   Constitutional: Positive for fatigue and fever. Negative for activity change and appetite change.   HENT: Negative for ear pain, sinus pressure and sore throat.    Eyes: Negative for pain and visual disturbance.   Respiratory: Negative for cough and chest tightness.    Cardiovascular: Negative for chest pain and palpitations.   Gastrointestinal: Positive for abdominal pain, nausea and vomiting. Negative for constipation and diarrhea.   Endocrine: Negative for polydipsia and polyuria.   Genitourinary: Negative for dysuria and frequency.   Musculoskeletal: Negative for back pain and myalgias.   Skin: Negative for color change and rash.   Allergic/Immunologic: Negative for food allergies and immunocompromised state.   Neurological: Negative for dizziness, syncope and headaches.   Hematological: Negative for adenopathy. Does not bruise/bleed easily.   Psychiatric/Behavioral: Negative for hallucinations and suicidal ideas. The patient is not nervous/anxious.        /80 (BP Location: Right arm, Patient Position: Sitting, Cuff Size: Adult)   Pulse 107   Temp 96.1 °F (35.6 °C) (Temporal)   Ht 167.6 cm (66\")   Wt (!) 157 " kg (347 lb 3.2 oz)   LMP  (LMP Unknown)   SpO2 96%   BMI 56.04 kg/m²   Admission on 12/05/2018, Discharged on 12/05/2018   Component Date Value Ref Range Status   • Glucose 12/05/2018 145* 70 - 110 mg/dL Final   • BUN 12/05/2018 10  7 - 21 mg/dL Final   • Creatinine 12/05/2018 0.65  0.43 - 1.29 mg/dL Final   • Sodium 12/05/2018 135  135 - 153 mmol/L Final   • Potassium 12/05/2018 3.8  3.5 - 5.3 mmol/L Final   • Chloride 12/05/2018 103  99 - 112 mmol/L Final   • CO2 12/05/2018 28.1  24.3 - 31.9 mmol/L Final   • Calcium 12/05/2018 7.9  7.7 - 10.0 mg/dL Final   • Total Protein 12/05/2018 6.1  6.0 - 8.0 g/dL Final   • Albumin 12/05/2018 3.50  3.50 - 5.00 g/dL Final   • ALT (SGPT) 12/05/2018 225* 10 - 36 U/L Final   • AST (SGOT) 12/05/2018 146* 10 - 30 U/L Final   • Alkaline Phosphatase 12/05/2018 96  35 - 104 U/L Final   • Total Bilirubin 12/05/2018 0.5  0.2 - 1.8 mg/dL Final   • eGFR Non African Amer 12/05/2018 101  >60 mL/min/1.73 Final   • Globulin 12/05/2018 2.6  gm/dL Final   • A/G Ratio 12/05/2018 1.3* 1.5 - 2.5 g/dL Final   • BUN/Creatinine Ratio 12/05/2018 15.4  7.0 - 25.0 Final   • Anion Gap 12/05/2018 3.9  3.6 - 11.2 mmol/L Final   • Color, UA 12/05/2018 Yellow  Yellow, Straw Final   • Appearance, UA 12/05/2018 Clear  Clear Final   • pH, UA 12/05/2018 7.0  5.0 - 8.0 Final   • Specific Gravity, UA 12/05/2018 1.017  1.005 - 1.030 Final   • Glucose, UA 12/05/2018 Negative  Negative Final   • Ketones, UA 12/05/2018 Negative  Negative Final   • Bilirubin, UA 12/05/2018 Negative  Negative Final   • Blood, UA 12/05/2018 Negative  Negative Final   • Protein, UA 12/05/2018 Negative  Negative Final   • Leuk Esterase, UA 12/05/2018 Negative  Negative Final   • Nitrite, UA 12/05/2018 Negative  Negative Final   • Urobilinogen, UA 12/05/2018 1.0 E.U./dL  0.2 - 1.0 E.U./dL Final   • HCG, Urine QL 12/05/2018 Negative  Negative Final   • Influenza A Ag, EIA 12/05/2018 Negative  Negative Final   • Influenza B Ag, EIA  12/05/2018 Negative  Negative Final   • WBC 12/05/2018 3.59* 4.50 - 12.50 10*3/mm3 Final   • RBC 12/05/2018 4.45  4.20 - 5.40 10*6/mm3 Final   • Hemoglobin 12/05/2018 13.5  12.0 - 16.0 g/dL Final   • Hematocrit 12/05/2018 40.5  37.0 - 47.0 % Final   • MCV 12/05/2018 91.0  80.0 - 94.0 fL Final   • MCH 12/05/2018 30.3  27.0 - 33.0 pg Final   • MCHC 12/05/2018 33.3  33.0 - 37.0 g/dL Final   • RDW 12/05/2018 13.4  11.5 - 14.5 % Final   • RDW-SD 12/05/2018 43.7  37.0 - 54.0 fl Final   • MPV 12/05/2018 9.7  6.0 - 10.0 fL Final   • Platelets 12/05/2018 133  130 - 400 10*3/mm3 Final   • Neutrophil % 12/05/2018 52.7  30.0 - 70.0 % Final   • Lymphocyte % 12/05/2018 28.1  21.0 - 51.0 % Final   • Monocyte % 12/05/2018 14.2* 0.0 - 10.0 % Final   • Eosinophil % 12/05/2018 3.6  0.0 - 5.0 % Final   • Basophil % 12/05/2018 1.1  0.0 - 2.0 % Final   • Immature Grans % 12/05/2018 0.3  0.0 - 0.5 % Final   • Neutrophils, Absolute 12/05/2018 1.89  1.40 - 6.50 10*3/mm3 Final   • Lymphocytes, Absolute 12/05/2018 1.01  1.00 - 3.00 10*3/mm3 Final   • Monocytes, Absolute 12/05/2018 0.51  0.10 - 0.90 10*3/mm3 Final   • Eosinophils, Absolute 12/05/2018 0.13  0.00 - 0.70 10*3/mm3 Final   • Basophils, Absolute 12/05/2018 0.04  0.00 - 0.30 10*3/mm3 Final   • Immature Grans, Absolute 12/05/2018 0.01  0.00 - 0.03 10*3/mm3 Final   • Osmolality Calc 12/05/2018 271.7* 273.0 - 305.0 mOsm/kg Final       Physical Exam   Constitutional: She is oriented to person, place, and time. She appears well-developed and well-nourished.   HENT:   Head: Normocephalic and atraumatic.   Nose: Nose normal.   Mouth/Throat: Oropharynx is clear and moist.   Eyes: Conjunctivae and EOM are normal. Pupils are equal, round, and reactive to light.   Neck: Normal range of motion. Neck supple. No tracheal deviation present. No thyromegaly present.   Cardiovascular: Normal rate, regular rhythm, normal heart sounds and intact distal pulses.   No murmur heard.  Pulmonary/Chest: Effort  normal and breath sounds normal. No respiratory distress. She has no wheezes.   Abdominal: Soft. Bowel sounds are normal. There is tenderness. There is no guarding.   Epigastric tenderness   Musculoskeletal: Normal range of motion. She exhibits no edema or tenderness.   Lymphadenopathy:     She has no cervical adenopathy.   Neurological: She is alert and oriented to person, place, and time.   Skin: Skin is warm and dry. No rash noted.   Psychiatric: She has a normal mood and affect. Her behavior is normal.   Nursing note and vitals reviewed.      Assessment/Plan     Diagnoses and all orders for this visit:    Viral hepatitis A without hepatic coma  -     Comprehensive metabolic panel; Future  -     Bilirubin, Total; Future  -     Protime-INR; Future  -     ondansetron (ZOFRAN) 4 MG tablet; Take 1 tablet by mouth Every 8 (Eight) Hours As Needed for Nausea or Vomiting.  -     ketorolac (TORADOL) injection 60 mg; Inject 2 mL into the appropriate muscle as directed by prescriber 1 (One) Time.  -     Comprehensive metabolic panel  -     Bilirubin, Total  -     Protime-INR  -     Osmolality, Calculated; Future  -     Osmolality, Calculated    Symptomatic care advised.    15 minutes of 25 minute face to face visit spent counseling pt on Hep A, treatment options, and prognosis.           This document has been electronically signed by:  Rosa Quigley PA-C

## 2018-12-17 ENCOUNTER — TELEPHONE (OUTPATIENT)
Dept: FAMILY MEDICINE CLINIC | Facility: CLINIC | Age: 38
End: 2018-12-17

## 2018-12-17 ENCOUNTER — LAB (OUTPATIENT)
Dept: FAMILY MEDICINE CLINIC | Facility: CLINIC | Age: 38
End: 2018-12-17

## 2018-12-17 DIAGNOSIS — B15.9 VIRAL HEPATITIS A WITHOUT HEPATIC COMA: Primary | ICD-10-CM

## 2018-12-17 DIAGNOSIS — B15.9 VIRAL HEPATITIS A WITHOUT HEPATIC COMA: ICD-10-CM

## 2018-12-17 LAB
ALBUMIN SERPL-MCNC: 2.8 G/DL (ref 3.5–5)
ALBUMIN/GLOB SERPL: 0.8 G/DL (ref 1.5–2.5)
ALP SERPL-CCNC: 165 U/L (ref 35–104)
ALT SERPL W P-5'-P-CCNC: 3606 U/L (ref 10–36)
ANION GAP SERPL CALCULATED.3IONS-SCNC: 0.7 MMOL/L (ref 3.6–11.2)
AST SERPL-CCNC: 1748 U/L (ref 10–30)
BILIRUB CONJ SERPL-MCNC: 7.2 MG/DL (ref 0–0.2)
BILIRUB INDIRECT SERPL-MCNC: 2.9 MG/DL
BILIRUB SERPL-MCNC: 10.1 MG/DL (ref 0.2–1.8)
BILIRUB SERPL-MCNC: 10.1 MG/DL (ref 0.2–1.8)
BUN BLD-MCNC: 7 MG/DL (ref 7–21)
BUN/CREAT SERPL: 11.3 (ref 7–25)
CALCIUM SPEC-SCNC: 7.9 MG/DL (ref 7.7–10)
CHLORIDE SERPL-SCNC: 103 MMOL/L (ref 99–112)
CO2 SERPL-SCNC: 28.3 MMOL/L (ref 24.3–31.9)
CREAT BLD-MCNC: 0.62 MG/DL (ref 0.43–1.29)
GFR SERPL CREATININE-BSD FRML MDRD: 107 ML/MIN/1.73
GLOBULIN UR ELPH-MCNC: 3.7 GM/DL
GLUCOSE BLD-MCNC: 108 MG/DL (ref 70–110)
INR PPP: 2.04 (ref 0.9–1.1)
OSMOLALITY SERPL CALC.SUM OF ELEC: 263 MOSM/KG (ref 273–305)
POTASSIUM BLD-SCNC: 4.5 MMOL/L (ref 3.5–5.3)
PROT SERPL-MCNC: 6.5 G/DL (ref 6–8)
PROTHROMBIN TIME: 23.3 SECONDS (ref 11–15.4)
SODIUM BLD-SCNC: 132 MMOL/L (ref 135–153)

## 2018-12-17 PROCEDURE — 80053 COMPREHEN METABOLIC PANEL: CPT | Performed by: PHYSICIAN ASSISTANT

## 2018-12-17 PROCEDURE — 36415 COLL VENOUS BLD VENIPUNCTURE: CPT | Performed by: PHYSICIAN ASSISTANT

## 2018-12-17 PROCEDURE — 85610 PROTHROMBIN TIME: CPT | Performed by: PHYSICIAN ASSISTANT

## 2018-12-17 PROCEDURE — 82247 BILIRUBIN TOTAL: CPT | Performed by: PHYSICIAN ASSISTANT

## 2018-12-17 PROCEDURE — 82248 BILIRUBIN DIRECT: CPT | Performed by: PHYSICIAN ASSISTANT

## 2018-12-17 NOTE — TELEPHONE ENCOUNTER
I called neville informed her that liver enzymes has gone down some but nicol wants her to come back in Thursday for more labs.

## 2018-12-17 NOTE — TELEPHONE ENCOUNTER
---I called neville ask her to come in to day and have more labs drawn.                -- Message from KALI Armstrong sent at 12/15/2018  9:41 AM EST -----  Have her repeat labs on Monday as they are abnormal.  Symptomatic care is still advised but labs need to be monitored.

## 2018-12-19 ENCOUNTER — TELEPHONE (OUTPATIENT)
Dept: FAMILY MEDICINE CLINIC | Facility: CLINIC | Age: 38
End: 2018-12-19

## 2018-12-19 NOTE — TELEPHONE ENCOUNTER
I called neville informed her liver enzyme are improved, come in for recheck labs on thursday        ----- Message from KALI Armstrong sent at 12/17/2018  5:20 PM EST -----  Please inform her that her liver enzyme are slightly improved.  Recheck labs on Thursday.

## 2018-12-20 ENCOUNTER — LAB (OUTPATIENT)
Dept: FAMILY MEDICINE CLINIC | Facility: CLINIC | Age: 38
End: 2018-12-20

## 2018-12-20 DIAGNOSIS — B15.9 VIRAL HEPATITIS A WITHOUT HEPATIC COMA: Primary | ICD-10-CM

## 2018-12-20 DIAGNOSIS — B15.9 VIRAL HEPATITIS A WITHOUT HEPATIC COMA: ICD-10-CM

## 2018-12-20 LAB
ALBUMIN SERPL-MCNC: 2.6 G/DL (ref 3.5–5)
ALBUMIN/GLOB SERPL: 0.6 G/DL (ref 1.5–2.5)
ALP SERPL-CCNC: 130 U/L (ref 35–104)
ALT SERPL W P-5'-P-CCNC: 1039 U/L (ref 10–36)
ANION GAP SERPL CALCULATED.3IONS-SCNC: 5.7 MMOL/L (ref 3.6–11.2)
AST SERPL-CCNC: 305 U/L (ref 10–30)
BILIRUB CONJ SERPL-MCNC: 9.2 MG/DL (ref 0–0.2)
BILIRUB INDIRECT SERPL-MCNC: 3.8 MG/DL
BILIRUB SERPL-MCNC: 13 MG/DL (ref 0.2–1.8)
BILIRUB SERPL-MCNC: 13 MG/DL (ref 0.2–1.8)
BUN BLD-MCNC: 9 MG/DL (ref 7–21)
BUN/CREAT SERPL: 13.6 (ref 7–25)
CALCIUM SPEC-SCNC: 8 MG/DL (ref 7.7–10)
CHLORIDE SERPL-SCNC: 102 MMOL/L (ref 99–112)
CO2 SERPL-SCNC: 25.3 MMOL/L (ref 24.3–31.9)
CREAT BLD-MCNC: 0.66 MG/DL (ref 0.43–1.29)
GFR SERPL CREATININE-BSD FRML MDRD: 100 ML/MIN/1.73
GLOBULIN UR ELPH-MCNC: 4.1 GM/DL
GLUCOSE BLD-MCNC: 121 MG/DL (ref 70–110)
INR PPP: 1.41 (ref 0.9–1.1)
OSMOLALITY SERPL CALC.SUM OF ELEC: 266.3 MOSM/KG (ref 273–305)
POTASSIUM BLD-SCNC: 4.6 MMOL/L (ref 3.5–5.3)
PROT SERPL-MCNC: 6.7 G/DL (ref 6–8)
PROTHROMBIN TIME: 17.5 SECONDS (ref 11–15.4)
SODIUM BLD-SCNC: 133 MMOL/L (ref 135–153)

## 2018-12-20 PROCEDURE — 82247 BILIRUBIN TOTAL: CPT | Performed by: PHYSICIAN ASSISTANT

## 2018-12-20 PROCEDURE — 80053 COMPREHEN METABOLIC PANEL: CPT | Performed by: PHYSICIAN ASSISTANT

## 2018-12-20 PROCEDURE — 36415 COLL VENOUS BLD VENIPUNCTURE: CPT | Performed by: PHYSICIAN ASSISTANT

## 2018-12-20 PROCEDURE — 82248 BILIRUBIN DIRECT: CPT | Performed by: PHYSICIAN ASSISTANT

## 2018-12-20 PROCEDURE — 85610 PROTHROMBIN TIME: CPT | Performed by: PHYSICIAN ASSISTANT

## 2019-01-02 ENCOUNTER — LAB (OUTPATIENT)
Dept: FAMILY MEDICINE CLINIC | Facility: CLINIC | Age: 39
End: 2019-01-02

## 2019-01-02 DIAGNOSIS — B15.9 VIRAL HEPATITIS A WITHOUT HEPATIC COMA: ICD-10-CM

## 2019-01-02 LAB
ALBUMIN SERPL-MCNC: 3.1 G/DL (ref 3.5–5)
ALBUMIN/GLOB SERPL: 0.9 G/DL (ref 1.5–2.5)
ALP SERPL-CCNC: 113 U/L (ref 35–104)
ALT SERPL W P-5'-P-CCNC: 155 U/L (ref 10–36)
ANION GAP SERPL CALCULATED.3IONS-SCNC: 5.1 MMOL/L (ref 3.6–11.2)
AST SERPL-CCNC: 169 U/L (ref 10–30)
BILIRUB CONJ SERPL-MCNC: 2.5 MG/DL (ref 0–0.2)
BILIRUB INDIRECT SERPL-MCNC: 1 MG/DL
BILIRUB SERPL-MCNC: 3.5 MG/DL (ref 0.2–1.8)
BILIRUB SERPL-MCNC: 3.5 MG/DL (ref 0.2–1.8)
BUN BLD-MCNC: 10 MG/DL (ref 7–21)
BUN/CREAT SERPL: 18.2 (ref 7–25)
CALCIUM SPEC-SCNC: 8.1 MG/DL (ref 7.7–10)
CHLORIDE SERPL-SCNC: 105 MMOL/L (ref 99–112)
CO2 SERPL-SCNC: 24.9 MMOL/L (ref 24.3–31.9)
CREAT BLD-MCNC: 0.55 MG/DL (ref 0.43–1.29)
GFR SERPL CREATININE-BSD FRML MDRD: 123 ML/MIN/1.73
GLOBULIN UR ELPH-MCNC: 3.5 GM/DL
GLUCOSE BLD-MCNC: 114 MG/DL (ref 70–110)
INR PPP: 1.05 (ref 0.9–1.1)
OSMOLALITY SERPL CALC.SUM OF ELEC: 270 MOSM/KG (ref 273–305)
POTASSIUM BLD-SCNC: 4.2 MMOL/L (ref 3.5–5.3)
PROT SERPL-MCNC: 6.6 G/DL (ref 6–8)
PROTHROMBIN TIME: 13.9 SECONDS (ref 11–15.4)
SODIUM BLD-SCNC: 135 MMOL/L (ref 135–153)

## 2019-01-02 PROCEDURE — 80053 COMPREHEN METABOLIC PANEL: CPT | Performed by: PHYSICIAN ASSISTANT

## 2019-01-02 PROCEDURE — 82247 BILIRUBIN TOTAL: CPT | Performed by: PHYSICIAN ASSISTANT

## 2019-01-02 PROCEDURE — 36415 COLL VENOUS BLD VENIPUNCTURE: CPT | Performed by: PHYSICIAN ASSISTANT

## 2019-01-02 PROCEDURE — 82248 BILIRUBIN DIRECT: CPT | Performed by: PHYSICIAN ASSISTANT

## 2019-01-02 PROCEDURE — 85610 PROTHROMBIN TIME: CPT | Performed by: PHYSICIAN ASSISTANT

## 2019-01-03 ENCOUNTER — TELEPHONE (OUTPATIENT)
Dept: FAMILY MEDICINE CLINIC | Facility: CLINIC | Age: 39
End: 2019-01-03

## 2019-01-09 ENCOUNTER — TELEPHONE (OUTPATIENT)
Dept: FAMILY MEDICINE CLINIC | Facility: CLINIC | Age: 39
End: 2019-01-09

## 2019-01-09 NOTE — TELEPHONE ENCOUNTER
I called anika informed her labs has improved . Anika said she was feeling better.                          ----- Message from KALI Armstrong sent at 1/7/2019  7:11 PM EST -----  Inform the patient that her lab work is much improved.  Please ask her how she feels and confirm that her symptoms from the hepatitis A infection are improving.

## 2019-01-17 ENCOUNTER — OFFICE VISIT (OUTPATIENT)
Dept: FAMILY MEDICINE CLINIC | Facility: CLINIC | Age: 39
End: 2019-01-17

## 2019-01-17 VITALS
HEART RATE: 104 BPM | HEIGHT: 66 IN | DIASTOLIC BLOOD PRESSURE: 80 MMHG | WEIGHT: 293 LBS | OXYGEN SATURATION: 98 % | BODY MASS INDEX: 47.09 KG/M2 | SYSTOLIC BLOOD PRESSURE: 120 MMHG | TEMPERATURE: 98.5 F

## 2019-01-17 DIAGNOSIS — I10 ESSENTIAL HYPERTENSION: ICD-10-CM

## 2019-01-17 DIAGNOSIS — R06.02 SHORTNESS OF BREATH: ICD-10-CM

## 2019-01-17 DIAGNOSIS — K21.00 GASTROESOPHAGEAL REFLUX DISEASE WITH ESOPHAGITIS: ICD-10-CM

## 2019-01-17 DIAGNOSIS — F33.1 MODERATE EPISODE OF RECURRENT MAJOR DEPRESSIVE DISORDER (HCC): ICD-10-CM

## 2019-01-17 DIAGNOSIS — J30.1 CHRONIC SEASONAL ALLERGIC RHINITIS DUE TO POLLEN: ICD-10-CM

## 2019-01-17 DIAGNOSIS — B15.9 VIRAL HEPATITIS A WITHOUT HEPATIC COMA: Primary | ICD-10-CM

## 2019-01-17 DIAGNOSIS — E28.2 PCOS (POLYCYSTIC OVARIAN SYNDROME): ICD-10-CM

## 2019-01-17 PROCEDURE — 99214 OFFICE O/P EST MOD 30 MIN: CPT | Performed by: PHYSICIAN ASSISTANT

## 2019-01-17 RX ORDER — ONDANSETRON 4 MG/1
4 TABLET, FILM COATED ORAL EVERY 8 HOURS PRN
Qty: 90 TABLET | Refills: 3 | Status: SHIPPED | OUTPATIENT
Start: 2019-01-17 | End: 2019-12-16

## 2019-01-17 RX ORDER — PANTOPRAZOLE SODIUM 20 MG/1
20 TABLET, DELAYED RELEASE ORAL DAILY
Qty: 30 TABLET | Refills: 5 | Status: SHIPPED | OUTPATIENT
Start: 2019-01-17 | End: 2019-05-31 | Stop reason: SDUPTHER

## 2019-01-17 RX ORDER — FLUTICASONE PROPIONATE 50 MCG
2 SPRAY, SUSPENSION (ML) NASAL DAILY
Qty: 18.2 ML | Refills: 5 | Status: SHIPPED | OUTPATIENT
Start: 2019-01-17 | End: 2019-05-31 | Stop reason: SDUPTHER

## 2019-01-17 RX ORDER — CETIRIZINE HYDROCHLORIDE 10 MG/1
10 TABLET ORAL DAILY
Qty: 30 TABLET | Refills: 0 | Status: SHIPPED | OUTPATIENT
Start: 2019-01-17 | End: 2019-05-31 | Stop reason: SDUPTHER

## 2019-01-17 RX ORDER — FLUOXETINE HYDROCHLORIDE 20 MG/1
20 CAPSULE ORAL DAILY
Qty: 30 CAPSULE | Refills: 5 | Status: SHIPPED | OUTPATIENT
Start: 2019-01-17 | End: 2019-04-18

## 2019-01-17 RX ORDER — LOSARTAN POTASSIUM 50 MG/1
50 TABLET ORAL DAILY
Qty: 30 TABLET | Refills: 5 | Status: SHIPPED | OUTPATIENT
Start: 2019-01-17 | End: 2019-03-21

## 2019-01-17 RX ORDER — METFORMIN HYDROCHLORIDE EXTENDED-RELEASE TABLETS 500 MG/1
500 TABLET, FILM COATED, EXTENDED RELEASE ORAL 2 TIMES DAILY WITH MEALS
Qty: 60 TABLET | Refills: 5 | Status: SHIPPED | OUTPATIENT
Start: 2019-01-17 | End: 2019-05-31 | Stop reason: SDUPTHER

## 2019-01-17 RX ORDER — IBUPROFEN 800 MG/1
800 TABLET ORAL EVERY 8 HOURS
Qty: 90 TABLET | Refills: 3 | Status: SHIPPED | OUTPATIENT
Start: 2019-01-17 | End: 2019-03-04

## 2019-01-17 RX ORDER — ALBUTEROL SULFATE 90 UG/1
2 AEROSOL, METERED RESPIRATORY (INHALATION) EVERY 4 HOURS PRN
Qty: 1 INHALER | Refills: 5 | Status: SHIPPED | OUTPATIENT
Start: 2019-01-17 | End: 2019-11-27 | Stop reason: SDUPTHER

## 2019-01-20 NOTE — PROGRESS NOTES
"Sravanthi Alegria is a 38 y.o. female.       Chief Complaint - Hepatitis A    History of Present Illness -      Hepatitis A-   Improved with stabilization of lliver enzymes, bilirubin, and aptt.  Symptoms much improved with some nausea but resolution of abdominal pain and jaundice.  Lab Results   Component Value Date     (H) 01/02/2019     Depression-   Stable with prozac.  She denies SI or HI.    Shortness of breath- exertional asthma suspected.  Controlled with albuterol inhaler prn    Allergic rhinitis- stable with zyrtec    Hypertension- stable with losartan    PCOS- stable with metformin    GERD- improved with protonix    The following portions of the patient's history were reviewed and updated as appropriate: allergies, current medications, past family history, past medical history, past social history, past surgical history and problem list.    Review of Systems   Constitutional: Negative for activity change, appetite change and fever.   HENT: Negative for ear pain, sinus pressure and sore throat.    Eyes: Negative for pain and visual disturbance.   Respiratory: Negative for cough and chest tightness.    Cardiovascular: Negative for chest pain and palpitations.   Gastrointestinal: Positive for nausea. Negative for abdominal pain, constipation, diarrhea and vomiting.   Endocrine: Negative for polydipsia and polyuria.   Genitourinary: Negative for dysuria, frequency and menstrual problem.   Musculoskeletal: Negative for back pain and myalgias.   Skin: Negative for color change and rash.   Allergic/Immunologic: Negative for food allergies and immunocompromised state.   Neurological: Negative for dizziness, syncope and headaches.   Hematological: Negative for adenopathy. Does not bruise/bleed easily.   Psychiatric/Behavioral: Negative for hallucinations and suicidal ideas. The patient is not nervous/anxious.        /80   Pulse 104   Temp 98.5 °F (36.9 °C) (Oral)   Ht 167.6 cm (65.98\")   " Wt (!) 159 kg (350 lb)   LMP  (LMP Unknown)   SpO2 98%   BMI 56.52 kg/m²   Lab on 01/02/2019   Component Date Value Ref Range Status   • Glucose 01/02/2019 114* 70 - 110 mg/dL Final   • BUN 01/02/2019 10  7 - 21 mg/dL Final   • Creatinine 01/02/2019 0.55  0.43 - 1.29 mg/dL Final   • Sodium 01/02/2019 135  135 - 153 mmol/L Final   • Potassium 01/02/2019 4.2  3.5 - 5.3 mmol/L Final   • Chloride 01/02/2019 105  99 - 112 mmol/L Final   • CO2 01/02/2019 24.9  24.3 - 31.9 mmol/L Final   • Calcium 01/02/2019 8.1  7.7 - 10.0 mg/dL Final   • Total Protein 01/02/2019 6.6  6.0 - 8.0 g/dL Final   • Albumin 01/02/2019 3.10* 3.50 - 5.00 g/dL Final   • ALT (SGPT) 01/02/2019 155* 10 - 36 U/L Final   • AST (SGOT) 01/02/2019 169* 10 - 30 U/L Final   • Alkaline Phosphatase 01/02/2019 113* 35 - 104 U/L Final   • Total Bilirubin 01/02/2019 3.5* 0.2 - 1.8 mg/dL Final   • eGFR Non African Amer 01/02/2019 123  >60 mL/min/1.73 Final   • Globulin 01/02/2019 3.5  gm/dL Final   • A/G Ratio 01/02/2019 0.9* 1.5 - 2.5 g/dL Final   • BUN/Creatinine Ratio 01/02/2019 18.2  7.0 - 25.0 Final   • Anion Gap 01/02/2019 5.1  3.6 - 11.2 mmol/L Final   • Protime 01/02/2019 13.9  11.0 - 15.4 Seconds Final   • INR 01/02/2019 1.05  0.90 - 1.10 Final   • Total Bilirubin 01/02/2019 3.5* 0.2 - 1.8 mg/dL Final   • Bilirubin, Direct 01/02/2019 2.5* 0.0 - 0.2 mg/dL Final   • Bilirubin, Indirect 01/02/2019 1.0  mg/dL Final   • Osmolality Calc 01/02/2019 270.0* 273.0 - 305.0 mOsm/kg Final       Physical Exam   Constitutional: She is oriented to person, place, and time. She appears well-developed and well-nourished.   HENT:   Head: Normocephalic and atraumatic.   Nose: Nose normal.   Mouth/Throat: Oropharynx is clear and moist.   Eyes: Conjunctivae and EOM are normal. Pupils are equal, round, and reactive to light.   Neck: Normal range of motion. Neck supple. No tracheal deviation present. No thyromegaly present.   Cardiovascular: Normal rate, regular rhythm, normal  heart sounds and intact distal pulses.   No murmur heard.  Pulmonary/Chest: Effort normal and breath sounds normal. No respiratory distress. She has no wheezes.   Abdominal: Soft. Bowel sounds are normal. There is no tenderness. There is no guarding.   Liver enlargement noted approx 2 cm below rib border Nontender   Musculoskeletal: Normal range of motion. She exhibits no edema or tenderness.   Lymphadenopathy:     She has no cervical adenopathy.   Neurological: She is alert and oriented to person, place, and time.   Skin: Skin is warm and dry. No rash noted.   Psychiatric: She has a normal mood and affect. Her behavior is normal.   Nursing note and vitals reviewed.      Assessment/Plan     Diagnoses and all orders for this visit:    Viral hepatitis A without hepatic coma  -     ondansetron (ZOFRAN) 4 MG tablet; Take 1 tablet by mouth Every 8 (Eight) Hours As Needed for Nausea or Vomiting.    Shortness of breath  -     albuterol sulfate  (90 Base) MCG/ACT inhaler; Inhale 2 puffs Every 4 (Four) Hours As Needed for Shortness of Air.    Moderate episode of recurrent major depressive disorder (CMS/HCC)  Comments:  continue prozac  Orders:  -     FLUoxetine (PROzac) 20 MG capsule; Take 1 capsule by mouth Daily.    Chronic seasonal allergic rhinitis due to pollen  -     fluticasone (FLONASE) 50 MCG/ACT nasal spray; 2 sprays into the nostril(s) as directed by provider Daily.    Essential hypertension  -     Comprehensive Metabolic Panel; Future  -     Lipid Panel; Future  -     losartan (COZAAR) 50 MG tablet; Take 1 tablet by mouth Daily.    PCOS (polycystic ovarian syndrome)  -     metFORMIN (FORTAMET) 500 MG (OSM) 24 hr tablet; Take 1 tablet by mouth 2 (Two) Times a Day With Meals.    Gastroesophageal reflux disease with esophagitis  -     pantoprazole (PROTONIX) 20 MG EC tablet; Take 1 tablet by mouth Daily.    Other orders  -     cetirizine (zyrTEC) 10 MG tablet; Take 1 tablet by mouth Daily.  -     ibuprofen  (ADVIL,MOTRIN) 800 MG tablet; Take 1 tablet by mouth Every 8 (Eight) Hours.                 This document has been electronically signed by:  Rosa Quigley PA-C

## 2019-01-29 ENCOUNTER — TELEPHONE (OUTPATIENT)
Dept: FAMILY MEDICINE CLINIC | Facility: CLINIC | Age: 39
End: 2019-01-29

## 2019-03-04 RX ORDER — IBUPROFEN 800 MG/1
TABLET ORAL
Qty: 90 TABLET | Refills: 0 | Status: SHIPPED | OUTPATIENT
Start: 2019-03-04 | End: 2019-04-03 | Stop reason: SDUPTHER

## 2019-03-21 DIAGNOSIS — I10 ESSENTIAL HYPERTENSION: ICD-10-CM

## 2019-03-21 DIAGNOSIS — F33.1 MODERATE EPISODE OF RECURRENT MAJOR DEPRESSIVE DISORDER (HCC): ICD-10-CM

## 2019-03-21 RX ORDER — FLUOXETINE HYDROCHLORIDE 20 MG/1
20 CAPSULE ORAL DAILY
Qty: 30 CAPSULE | Refills: 0 | Status: SHIPPED | OUTPATIENT
Start: 2019-03-21 | End: 2019-04-18

## 2019-03-21 RX ORDER — LOSARTAN POTASSIUM 50 MG/1
50 TABLET ORAL DAILY
Qty: 30 TABLET | Refills: 0 | Status: SHIPPED | OUTPATIENT
Start: 2019-03-21 | End: 2019-05-31 | Stop reason: SDUPTHER

## 2019-04-03 RX ORDER — IBUPROFEN 800 MG/1
TABLET ORAL
Qty: 90 TABLET | Refills: 0 | Status: SHIPPED | OUTPATIENT
Start: 2019-04-03 | End: 2019-12-16 | Stop reason: SDUPTHER

## 2019-04-12 ENCOUNTER — LAB (OUTPATIENT)
Dept: FAMILY MEDICINE CLINIC | Facility: CLINIC | Age: 39
End: 2019-04-12

## 2019-04-12 DIAGNOSIS — I10 ESSENTIAL HYPERTENSION: ICD-10-CM

## 2019-04-12 DIAGNOSIS — B15.9 VIRAL HEPATITIS A WITHOUT HEPATIC COMA: ICD-10-CM

## 2019-04-12 LAB
ALBUMIN SERPL-MCNC: 4 G/DL (ref 3.5–5.2)
ALBUMIN/GLOB SERPL: 1.1 G/DL
ALP SERPL-CCNC: 79 U/L (ref 39–117)
ALT SERPL W P-5'-P-CCNC: 45 U/L (ref 1–33)
ANION GAP SERPL CALCULATED.3IONS-SCNC: 10.9 MMOL/L
AST SERPL-CCNC: 41 U/L (ref 1–32)
BASOPHILS # BLD AUTO: 0.04 10*3/MM3 (ref 0–0.2)
BASOPHILS NFR BLD AUTO: 0.6 % (ref 0–1.5)
BILIRUB SERPL-MCNC: 0.7 MG/DL (ref 0.2–1.2)
BUN BLD-MCNC: 9 MG/DL (ref 6–20)
BUN/CREAT SERPL: 13.8 (ref 7–25)
CALCIUM SPEC-SCNC: 8.6 MG/DL (ref 8.6–10.5)
CHLORIDE SERPL-SCNC: 100 MMOL/L (ref 98–107)
CHOLEST SERPL-MCNC: 169 MG/DL (ref 0–200)
CO2 SERPL-SCNC: 26.1 MMOL/L (ref 22–29)
CREAT BLD-MCNC: 0.65 MG/DL (ref 0.57–1)
DEPRECATED RDW RBC AUTO: 43.7 FL (ref 37–54)
EOSINOPHIL # BLD AUTO: 0.12 10*3/MM3 (ref 0–0.4)
EOSINOPHIL NFR BLD AUTO: 1.7 % (ref 0.3–6.2)
ERYTHROCYTE [DISTWIDTH] IN BLOOD BY AUTOMATED COUNT: 12.5 % (ref 12.3–15.4)
GFR SERPL CREATININE-BSD FRML MDRD: 101 ML/MIN/1.73
GLOBULIN UR ELPH-MCNC: 3.7 GM/DL
GLUCOSE BLD-MCNC: 121 MG/DL (ref 65–99)
HCT VFR BLD AUTO: 42.6 % (ref 34–46.6)
HDLC SERPL-MCNC: 44 MG/DL (ref 40–60)
HGB BLD-MCNC: 14 G/DL (ref 12–15.9)
IMM GRANULOCYTES # BLD AUTO: 0.02 10*3/MM3 (ref 0–0.05)
IMM GRANULOCYTES NFR BLD AUTO: 0.3 % (ref 0–0.5)
LDLC SERPL CALC-MCNC: 98 MG/DL (ref 0–100)
LDLC/HDLC SERPL: 2.22 {RATIO}
LYMPHOCYTES # BLD AUTO: 2.9 10*3/MM3 (ref 0.7–3.1)
LYMPHOCYTES NFR BLD AUTO: 41.5 % (ref 19.6–45.3)
MCH RBC QN AUTO: 31.5 PG (ref 26.6–33)
MCHC RBC AUTO-ENTMCNC: 32.9 G/DL (ref 31.5–35.7)
MCV RBC AUTO: 95.9 FL (ref 79–97)
MONOCYTES # BLD AUTO: 0.6 10*3/MM3 (ref 0.1–0.9)
MONOCYTES NFR BLD AUTO: 8.6 % (ref 5–12)
NEUTROPHILS # BLD AUTO: 3.31 10*3/MM3 (ref 1.4–7)
NEUTROPHILS NFR BLD AUTO: 47.3 % (ref 42.7–76)
NRBC BLD AUTO-RTO: 0 /100 WBC (ref 0–0)
PLATELET # BLD AUTO: 179 10*3/MM3 (ref 140–450)
PMV BLD AUTO: 9.7 FL (ref 6–12)
POTASSIUM BLD-SCNC: 4.2 MMOL/L (ref 3.5–5.2)
PROT SERPL-MCNC: 7.7 G/DL (ref 6–8.5)
RBC # BLD AUTO: 4.44 10*6/MM3 (ref 3.77–5.28)
SODIUM BLD-SCNC: 137 MMOL/L (ref 136–145)
TRIGL SERPL-MCNC: 136 MG/DL (ref 0–150)
VLDLC SERPL-MCNC: 27.2 MG/DL (ref 5–40)
WBC NRBC COR # BLD: 6.99 10*3/MM3 (ref 3.4–10.8)

## 2019-04-12 PROCEDURE — 80061 LIPID PANEL: CPT | Performed by: PHYSICIAN ASSISTANT

## 2019-04-12 PROCEDURE — 85025 COMPLETE CBC W/AUTO DIFF WBC: CPT | Performed by: PHYSICIAN ASSISTANT

## 2019-04-12 PROCEDURE — 80053 COMPREHEN METABOLIC PANEL: CPT | Performed by: PHYSICIAN ASSISTANT

## 2019-04-18 ENCOUNTER — OFFICE VISIT (OUTPATIENT)
Dept: FAMILY MEDICINE CLINIC | Facility: CLINIC | Age: 39
End: 2019-04-18

## 2019-04-18 VITALS
WEIGHT: 293 LBS | HEIGHT: 66 IN | HEART RATE: 93 BPM | DIASTOLIC BLOOD PRESSURE: 74 MMHG | TEMPERATURE: 98.7 F | OXYGEN SATURATION: 95 % | SYSTOLIC BLOOD PRESSURE: 120 MMHG | BODY MASS INDEX: 47.09 KG/M2

## 2019-04-18 DIAGNOSIS — L65.9 HAIR LOSS: Primary | ICD-10-CM

## 2019-04-18 DIAGNOSIS — E78.2 MIXED HYPERLIPIDEMIA: ICD-10-CM

## 2019-04-18 DIAGNOSIS — F33.1 MODERATE EPISODE OF RECURRENT MAJOR DEPRESSIVE DISORDER (HCC): ICD-10-CM

## 2019-04-18 DIAGNOSIS — I10 ESSENTIAL HYPERTENSION: ICD-10-CM

## 2019-04-18 PROCEDURE — 99214 OFFICE O/P EST MOD 30 MIN: CPT | Performed by: PHYSICIAN ASSISTANT

## 2019-04-18 RX ORDER — FLUOXETINE HYDROCHLORIDE 40 MG/1
40 CAPSULE ORAL DAILY
Qty: 30 CAPSULE | Refills: 5 | Status: SHIPPED | OUTPATIENT
Start: 2019-04-18 | End: 2019-05-31

## 2019-04-18 RX ORDER — KETOCONAZOLE 20 MG/ML
SHAMPOO TOPICAL 2 TIMES WEEKLY
Qty: 120 ML | Refills: 5 | Status: SHIPPED | OUTPATIENT
Start: 2019-04-18 | End: 2019-07-20 | Stop reason: SDUPTHER

## 2019-04-18 NOTE — PROGRESS NOTES
"Subjective   Anika Alegria is a 38 y.o. female.       Chief Complaint -  Hair loss    History of Present Illness -      Hair loss-  She complains of hair loss x 3 weeks.  Worse with stress.    Hyperlipidemia-improved with low cholesterol diet  Lab Results   Component Value Date    CHOL 169 04/12/2019    TRIG 136 04/12/2019    HDL 44 04/12/2019    LDL 98 04/12/2019     Hypertension-  Stable with losartan    Depression- worse with family stress after  had a stroke despite use of prozac 20mg qd    The following portions of the patient's history were reviewed and updated as appropriate: allergies, current medications, past family history, past medical history, past social history, past surgical history and problem list.    Review of Systems   Constitutional: Negative for activity change, appetite change and fever.   HENT: Negative for ear pain, sinus pressure and sore throat.    Eyes: Negative for pain and visual disturbance.   Respiratory: Negative for cough and chest tightness.    Cardiovascular: Negative for chest pain and palpitations.   Gastrointestinal: Negative for abdominal pain, constipation, diarrhea, nausea and vomiting.   Endocrine: Negative for polydipsia and polyuria.   Genitourinary: Negative for dysuria and frequency.   Musculoskeletal: Negative for back pain and myalgias.   Skin: Negative for color change and rash.        Hair loss   Allergic/Immunologic: Negative for food allergies and immunocompromised state.   Neurological: Negative for dizziness, syncope and headaches.   Hematological: Negative for adenopathy. Does not bruise/bleed easily.   Psychiatric/Behavioral: Positive for hallucinations. Negative for sleep disturbance and suicidal ideas. The patient is nervous/anxious.        /74   Pulse 93   Temp 98.7 °F (37.1 °C) (Oral)   Ht 167.6 cm (65.98\")   Wt (!) 161 kg (355 lb)   LMP  (LMP Unknown)   SpO2 95%   BMI 57.33 kg/m²   Lab on 04/12/2019   Component Date Value Ref Range " Status   • Glucose 04/12/2019 121* 65 - 99 mg/dL Final   • BUN 04/12/2019 9  6 - 20 mg/dL Final   • Creatinine 04/12/2019 0.65  0.57 - 1.00 mg/dL Final   • Sodium 04/12/2019 137  136 - 145 mmol/L Final   • Potassium 04/12/2019 4.2  3.5 - 5.2 mmol/L Final   • Chloride 04/12/2019 100  98 - 107 mmol/L Final   • CO2 04/12/2019 26.1  22.0 - 29.0 mmol/L Final   • Calcium 04/12/2019 8.6  8.6 - 10.5 mg/dL Final   • Total Protein 04/12/2019 7.7  6.0 - 8.5 g/dL Final   • Albumin 04/12/2019 4.00  3.50 - 5.20 g/dL Final   • ALT (SGPT) 04/12/2019 45* 1 - 33 U/L Final   • AST (SGOT) 04/12/2019 41* 1 - 32 U/L Final   • Alkaline Phosphatase 04/12/2019 79  39 - 117 U/L Final   • Total Bilirubin 04/12/2019 0.7  0.2 - 1.2 mg/dL Final   • eGFR Non African Amer 04/12/2019 101  >60 mL/min/1.73 Final   • Globulin 04/12/2019 3.7  gm/dL Final   • A/G Ratio 04/12/2019 1.1  g/dL Final   • BUN/Creatinine Ratio 04/12/2019 13.8  7.0 - 25.0 Final   • Anion Gap 04/12/2019 10.9  mmol/L Final   • Total Cholesterol 04/12/2019 169  0 - 200 mg/dL Final   • Triglycerides 04/12/2019 136  0 - 150 mg/dL Final   • HDL Cholesterol 04/12/2019 44  40 - 60 mg/dL Final   • LDL Cholesterol  04/12/2019 98  0 - 100 mg/dL Final   • VLDL Cholesterol 04/12/2019 27.2  5 - 40 mg/dL Final   • LDL/HDL Ratio 04/12/2019 2.22   Final   • WBC 04/12/2019 6.99  3.40 - 10.80 10*3/mm3 Final   • RBC 04/12/2019 4.44  3.77 - 5.28 10*6/mm3 Final   • Hemoglobin 04/12/2019 14.0  12.0 - 15.9 g/dL Final   • Hematocrit 04/12/2019 42.6  34.0 - 46.6 % Final   • MCV 04/12/2019 95.9  79.0 - 97.0 fL Final   • MCH 04/12/2019 31.5  26.6 - 33.0 pg Final   • MCHC 04/12/2019 32.9  31.5 - 35.7 g/dL Final   • RDW 04/12/2019 12.5  12.3 - 15.4 % Final   • RDW-SD 04/12/2019 43.7  37.0 - 54.0 fl Final   • MPV 04/12/2019 9.7  6.0 - 12.0 fL Final   • Platelets 04/12/2019 179  140 - 450 10*3/mm3 Final   • Neutrophil % 04/12/2019 47.3  42.7 - 76.0 % Final   • Lymphocyte % 04/12/2019 41.5  19.6 - 45.3 % Final    • Monocyte % 04/12/2019 8.6  5.0 - 12.0 % Final   • Eosinophil % 04/12/2019 1.7  0.3 - 6.2 % Final   • Basophil % 04/12/2019 0.6  0.0 - 1.5 % Final   • Immature Grans % 04/12/2019 0.3  0.0 - 0.5 % Final   • Neutrophils, Absolute 04/12/2019 3.31  1.40 - 7.00 10*3/mm3 Final   • Lymphocytes, Absolute 04/12/2019 2.90  0.70 - 3.10 10*3/mm3 Final   • Monocytes, Absolute 04/12/2019 0.60  0.10 - 0.90 10*3/mm3 Final   • Eosinophils, Absolute 04/12/2019 0.12  0.00 - 0.40 10*3/mm3 Final   • Basophils, Absolute 04/12/2019 0.04  0.00 - 0.20 10*3/mm3 Final   • Immature Grans, Absolute 04/12/2019 0.02  0.00 - 0.05 10*3/mm3 Final   • nRBC 04/12/2019 0.0  0.0 - 0.0 /100 WBC Final       Physical Exam   Constitutional: She is oriented to person, place, and time. She appears well-developed and well-nourished.   HENT:   Head: Normocephalic and atraumatic.   Nose: Nose normal.   Mouth/Throat: Oropharynx is clear and moist.   Eyes: Conjunctivae and EOM are normal. Pupils are equal, round, and reactive to light.   Neck: Normal range of motion. Neck supple. No tracheal deviation present. No thyromegaly present.   Cardiovascular: Normal rate, regular rhythm, normal heart sounds and intact distal pulses.   No murmur heard.  Pulmonary/Chest: Effort normal and breath sounds normal. No respiratory distress. She has no wheezes.   Abdominal: Soft. Bowel sounds are normal. There is no tenderness. There is no guarding.   Musculoskeletal: Normal range of motion. She exhibits no edema or tenderness.   Lymphadenopathy:     She has no cervical adenopathy.   Neurological: She is alert and oriented to person, place, and time.   Skin: Skin is warm and dry. No rash noted.   Hair thinning on top   Psychiatric: She has a normal mood and affect. Her behavior is normal.   Nursing note and vitals reviewed.      Assessment/Plan     Diagnoses and all orders for this visit:    Hair loss  -     ketoconazole (NIZORAL) 2 % shampoo; Apply  topically to the  appropriate area as directed 2 (Two) Times a Week.    Mixed hyperlipidemia  Comments:  continue low cholesterol diet    Essential hypertension  Comments:  continue losartan    Moderate episode of recurrent major depressive disorder (CMS/HCC)  Comments:  increase prozac 40mg qd  Orders:  -     FLUoxetine (PROzac) 40 MG capsule; Take 1 capsule by mouth Daily.                 This document has been electronically signed by:  Rosa Quigley PA-C

## 2019-05-31 ENCOUNTER — OFFICE VISIT (OUTPATIENT)
Dept: FAMILY MEDICINE CLINIC | Facility: CLINIC | Age: 39
End: 2019-05-31

## 2019-05-31 VITALS
HEART RATE: 90 BPM | WEIGHT: 293 LBS | OXYGEN SATURATION: 96 % | SYSTOLIC BLOOD PRESSURE: 124 MMHG | HEIGHT: 66 IN | TEMPERATURE: 98.3 F | BODY MASS INDEX: 47.09 KG/M2 | DIASTOLIC BLOOD PRESSURE: 80 MMHG

## 2019-05-31 DIAGNOSIS — E28.2 PCOS (POLYCYSTIC OVARIAN SYNDROME): ICD-10-CM

## 2019-05-31 DIAGNOSIS — F41.1 GAD (GENERALIZED ANXIETY DISORDER): Primary | ICD-10-CM

## 2019-05-31 DIAGNOSIS — K21.00 GASTROESOPHAGEAL REFLUX DISEASE WITH ESOPHAGITIS: ICD-10-CM

## 2019-05-31 DIAGNOSIS — J30.1 CHRONIC SEASONAL ALLERGIC RHINITIS DUE TO POLLEN: ICD-10-CM

## 2019-05-31 DIAGNOSIS — J30.1 ALLERGIC RHINITIS DUE TO POLLEN, UNSPECIFIED SEASONALITY: ICD-10-CM

## 2019-05-31 DIAGNOSIS — I10 ESSENTIAL HYPERTENSION: ICD-10-CM

## 2019-05-31 PROCEDURE — 99214 OFFICE O/P EST MOD 30 MIN: CPT | Performed by: PHYSICIAN ASSISTANT

## 2019-05-31 RX ORDER — LOSARTAN POTASSIUM 50 MG/1
50 TABLET ORAL DAILY
Qty: 30 TABLET | Refills: 5 | Status: SHIPPED | OUTPATIENT
Start: 2019-05-31 | End: 2019-05-31

## 2019-05-31 RX ORDER — FLUTICASONE PROPIONATE 50 MCG
2 SPRAY, SUSPENSION (ML) NASAL DAILY
Qty: 18.2 ML | Refills: 5 | Status: SHIPPED | OUTPATIENT
Start: 2019-05-31 | End: 2019-12-09 | Stop reason: SDUPTHER

## 2019-05-31 RX ORDER — METFORMIN HYDROCHLORIDE EXTENDED-RELEASE TABLETS 500 MG/1
500 TABLET, FILM COATED, EXTENDED RELEASE ORAL 2 TIMES DAILY WITH MEALS
Qty: 60 TABLET | Refills: 5 | Status: SHIPPED | OUTPATIENT
Start: 2019-05-31 | End: 2019-06-10

## 2019-05-31 RX ORDER — CETIRIZINE HYDROCHLORIDE 10 MG/1
10 TABLET ORAL DAILY
Qty: 30 TABLET | Refills: 5 | Status: SHIPPED | OUTPATIENT
Start: 2019-05-31 | End: 2019-12-16 | Stop reason: SDUPTHER

## 2019-05-31 RX ORDER — PANTOPRAZOLE SODIUM 20 MG/1
20 TABLET, DELAYED RELEASE ORAL DAILY
Qty: 30 TABLET | Refills: 5 | Status: SHIPPED | OUTPATIENT
Start: 2019-05-31 | End: 2019-07-11 | Stop reason: SDUPTHER

## 2019-05-31 RX ORDER — VENLAFAXINE HYDROCHLORIDE 75 MG/1
75 CAPSULE, EXTENDED RELEASE ORAL DAILY
Qty: 30 CAPSULE | Refills: 5 | Status: SHIPPED | OUTPATIENT
Start: 2019-05-31 | End: 2019-11-20 | Stop reason: SDUPTHER

## 2019-05-31 RX ORDER — LOSARTAN POTASSIUM 100 MG/1
100 TABLET ORAL DAILY
Qty: 30 TABLET | Refills: 5 | Status: SHIPPED | OUTPATIENT
Start: 2019-05-31 | End: 2019-12-16 | Stop reason: SDUPTHER

## 2019-05-31 NOTE — PROGRESS NOTES
"Sravanthi Alegria is a 38 y.o. female.       Chief Complaint -anxiety    History of Present Illness -      Anxiety-  She complains of uncontrolled anxiety.  She states that with her increased stress at home due to family health issues.  She reports that she has had some panic episodes with feelings of being overwhelmed and shortness of breath.  Not at goal with Prozac 40 mg daily.  She denies any SI or HI.    Chronic allergic rhinitis-stable with Flonase and Zyrtec    Hypertension-controlled with Cozaar    PCOS-stable with metformin    Gastroesophageal reflux disease-stable with pantoprazole    The following portions of the patient's history were reviewed and updated as appropriate: allergies, current medications, past family history, past medical history, past social history, past surgical history and problem list.    Review of Systems   Constitutional: Negative for activity change, appetite change and fever.   HENT: Negative for ear pain, sinus pressure and sore throat.    Eyes: Negative for pain and visual disturbance.   Respiratory: Negative for cough and chest tightness.    Cardiovascular: Negative for chest pain and palpitations.   Gastrointestinal: Negative for abdominal pain, constipation, diarrhea, nausea and vomiting.   Endocrine: Negative for polydipsia and polyuria.   Genitourinary: Negative for dysuria and frequency.   Musculoskeletal: Negative for back pain and myalgias.   Skin: Negative for color change and rash.   Allergic/Immunologic: Negative for food allergies and immunocompromised state.   Neurological: Negative for dizziness, syncope and headaches.   Hematological: Negative for adenopathy. Does not bruise/bleed easily.   Psychiatric/Behavioral: Positive for dysphoric mood. Negative for hallucinations, self-injury, sleep disturbance and suicidal ideas. The patient is nervous/anxious.        /80   Pulse 90   Temp 98.3 °F (36.8 °C) (Tympanic)   Ht 167.6 cm (65.98\")   Wt (!) 159 " kg (351 lb)   LMP  (LMP Unknown)   SpO2 96%   BMI 56.69 kg/m²   Lab on 04/12/2019   Component Date Value Ref Range Status   • Glucose 04/12/2019 121* 65 - 99 mg/dL Final   • BUN 04/12/2019 9  6 - 20 mg/dL Final   • Creatinine 04/12/2019 0.65  0.57 - 1.00 mg/dL Final   • Sodium 04/12/2019 137  136 - 145 mmol/L Final   • Potassium 04/12/2019 4.2  3.5 - 5.2 mmol/L Final   • Chloride 04/12/2019 100  98 - 107 mmol/L Final   • CO2 04/12/2019 26.1  22.0 - 29.0 mmol/L Final   • Calcium 04/12/2019 8.6  8.6 - 10.5 mg/dL Final   • Total Protein 04/12/2019 7.7  6.0 - 8.5 g/dL Final   • Albumin 04/12/2019 4.00  3.50 - 5.20 g/dL Final   • ALT (SGPT) 04/12/2019 45* 1 - 33 U/L Final   • AST (SGOT) 04/12/2019 41* 1 - 32 U/L Final   • Alkaline Phosphatase 04/12/2019 79  39 - 117 U/L Final   • Total Bilirubin 04/12/2019 0.7  0.2 - 1.2 mg/dL Final   • eGFR Non African Amer 04/12/2019 101  >60 mL/min/1.73 Final   • Globulin 04/12/2019 3.7  gm/dL Final   • A/G Ratio 04/12/2019 1.1  g/dL Final   • BUN/Creatinine Ratio 04/12/2019 13.8  7.0 - 25.0 Final   • Anion Gap 04/12/2019 10.9  mmol/L Final   • Total Cholesterol 04/12/2019 169  0 - 200 mg/dL Final   • Triglycerides 04/12/2019 136  0 - 150 mg/dL Final   • HDL Cholesterol 04/12/2019 44  40 - 60 mg/dL Final   • LDL Cholesterol  04/12/2019 98  0 - 100 mg/dL Final   • VLDL Cholesterol 04/12/2019 27.2  5 - 40 mg/dL Final   • LDL/HDL Ratio 04/12/2019 2.22   Final   • WBC 04/12/2019 6.99  3.40 - 10.80 10*3/mm3 Final   • RBC 04/12/2019 4.44  3.77 - 5.28 10*6/mm3 Final   • Hemoglobin 04/12/2019 14.0  12.0 - 15.9 g/dL Final   • Hematocrit 04/12/2019 42.6  34.0 - 46.6 % Final   • MCV 04/12/2019 95.9  79.0 - 97.0 fL Final   • MCH 04/12/2019 31.5  26.6 - 33.0 pg Final   • MCHC 04/12/2019 32.9  31.5 - 35.7 g/dL Final   • RDW 04/12/2019 12.5  12.3 - 15.4 % Final   • RDW-SD 04/12/2019 43.7  37.0 - 54.0 fl Final   • MPV 04/12/2019 9.7  6.0 - 12.0 fL Final   • Platelets 04/12/2019 179  140 - 450  10*3/mm3 Final   • Neutrophil % 04/12/2019 47.3  42.7 - 76.0 % Final   • Lymphocyte % 04/12/2019 41.5  19.6 - 45.3 % Final   • Monocyte % 04/12/2019 8.6  5.0 - 12.0 % Final   • Eosinophil % 04/12/2019 1.7  0.3 - 6.2 % Final   • Basophil % 04/12/2019 0.6  0.0 - 1.5 % Final   • Immature Grans % 04/12/2019 0.3  0.0 - 0.5 % Final   • Neutrophils, Absolute 04/12/2019 3.31  1.40 - 7.00 10*3/mm3 Final   • Lymphocytes, Absolute 04/12/2019 2.90  0.70 - 3.10 10*3/mm3 Final   • Monocytes, Absolute 04/12/2019 0.60  0.10 - 0.90 10*3/mm3 Final   • Eosinophils, Absolute 04/12/2019 0.12  0.00 - 0.40 10*3/mm3 Final   • Basophils, Absolute 04/12/2019 0.04  0.00 - 0.20 10*3/mm3 Final   • Immature Grans, Absolute 04/12/2019 0.02  0.00 - 0.05 10*3/mm3 Final   • nRBC 04/12/2019 0.0  0.0 - 0.0 /100 WBC Final       Physical Exam   Constitutional: She is oriented to person, place, and time. She appears well-developed and well-nourished.   HENT:   Head: Normocephalic and atraumatic.   Nose: Nose normal.   Mouth/Throat: Oropharynx is clear and moist.   Eyes: Conjunctivae and EOM are normal. Pupils are equal, round, and reactive to light.   Neck: Normal range of motion. Neck supple. No tracheal deviation present. No thyromegaly present.   Cardiovascular: Normal rate, regular rhythm, normal heart sounds and intact distal pulses.   No murmur heard.  Pulmonary/Chest: Effort normal and breath sounds normal. No respiratory distress. She has no wheezes.   Abdominal: Soft. Bowel sounds are normal. There is no tenderness. There is no guarding.   Musculoskeletal: Normal range of motion. She exhibits no edema or tenderness.   Lymphadenopathy:     She has no cervical adenopathy.   Neurological: She is alert and oriented to person, place, and time.   Skin: Skin is warm and dry. No rash noted.   Psychiatric: She has a normal mood and affect. Her behavior is normal.   Nursing note and vitals reviewed.      Assessment/Plan     Diagnoses and all orders for  this visit:    FLORI (generalized anxiety disorder)  Comments:  discontinue prozac since not working to prevent panic attacks  start effexor xr 75mg qd  Orders:  -     venlafaxine XR (EFFEXOR XR) 75 MG 24 hr capsule; Take 1 capsule by mouth Daily.    Chronic seasonal allergic rhinitis due to pollen  -     fluticasone (FLONASE) 50 MCG/ACT nasal spray; 2 sprays into the nostril(s) as directed by provider Daily.    Essential hypertension  Comments:  Continue Cozaar 100mg qd  Orders:  -     Discontinue: losartan (COZAAR) 50 MG tablet; Take 1 tablet by mouth Daily.  -     losartan (COZAAR) 100 MG tablet; Take 1 tablet by mouth Daily.    PCOS (polycystic ovarian syndrome)  -     metFORMIN (FORTAMET) 500 MG (OSM) 24 hr tablet; Take 1 tablet by mouth 2 (Two) Times a Day With Meals.    Gastroesophageal reflux disease with esophagitis  -     pantoprazole (PROTONIX) 20 MG EC tablet; Take 1 tablet by mouth Daily.    Allergic rhinitis due to pollen, unspecified seasonality  -     cetirizine (zyrTEC) 10 MG tablet; Take 1 tablet by mouth Daily.                 This document has been electronically signed by:  Rosa Quigley PA-C

## 2019-06-10 RX ORDER — METFORMIN HYDROCHLORIDE 500 MG/1
500 TABLET, EXTENDED RELEASE ORAL
Qty: 60 TABLET | Refills: 5 | Status: SHIPPED | OUTPATIENT
Start: 2019-06-10 | End: 2019-12-16

## 2019-06-28 ENCOUNTER — OFFICE VISIT (OUTPATIENT)
Dept: FAMILY MEDICINE CLINIC | Facility: CLINIC | Age: 39
End: 2019-06-28

## 2019-06-28 VITALS
TEMPERATURE: 98.3 F | BODY MASS INDEX: 47.09 KG/M2 | OXYGEN SATURATION: 98 % | WEIGHT: 293 LBS | HEART RATE: 67 BPM | SYSTOLIC BLOOD PRESSURE: 110 MMHG | DIASTOLIC BLOOD PRESSURE: 74 MMHG | HEIGHT: 66 IN

## 2019-06-28 DIAGNOSIS — K21.9 GASTROESOPHAGEAL REFLUX DISEASE WITHOUT ESOPHAGITIS: ICD-10-CM

## 2019-06-28 DIAGNOSIS — F33.1 MODERATE EPISODE OF RECURRENT MAJOR DEPRESSIVE DISORDER (HCC): Primary | ICD-10-CM

## 2019-06-28 DIAGNOSIS — I10 ESSENTIAL HYPERTENSION: ICD-10-CM

## 2019-06-28 PROCEDURE — 99214 OFFICE O/P EST MOD 30 MIN: CPT | Performed by: PHYSICIAN ASSISTANT

## 2019-06-28 NOTE — PROGRESS NOTES
"Sravanthi Alegria is a 39 y.o. female.       Chief Complaint -depression    History of Present Illness -      Depression-  She reports a significant improvement and irritability and depressive symptoms since starting Effexor.  She denies any unwanted side effects with the use of Effexor.  She denies any SI or HI.    Hypertension-controlled with losartan    Gastroesophageal reflux disease-controlled with pantoprazole    The following portions of the patient's history were reviewed and updated as appropriate: allergies, current medications, past family history, past medical history, past social history, past surgical history and problem list.    Review of Systems   Constitutional: Negative for activity change, appetite change and fever.   HENT: Negative for ear pain, sinus pressure and sore throat.    Eyes: Negative for pain and visual disturbance.   Respiratory: Negative for cough and chest tightness.    Cardiovascular: Negative for chest pain and palpitations.   Gastrointestinal: Negative for abdominal pain, constipation, diarrhea, nausea and vomiting.   Endocrine: Negative for polydipsia and polyuria.   Genitourinary: Negative for dysuria and frequency.   Musculoskeletal: Negative for back pain and myalgias.   Skin: Negative for color change and rash.   Allergic/Immunologic: Negative for food allergies and immunocompromised state.   Neurological: Negative for dizziness, syncope and headaches.   Hematological: Negative for adenopathy. Does not bruise/bleed easily.   Psychiatric/Behavioral: Positive for dysphoric mood. Negative for hallucinations, self-injury, sleep disturbance and suicidal ideas. The patient is not nervous/anxious.        /74   Pulse 67   Temp 98.3 °F (36.8 °C) (Oral)   Ht 167.6 cm (65.98\")   Wt (!) 160 kg (352 lb)   LMP  (LMP Unknown)   SpO2 98%   BMI 56.84 kg/m²   Lab on 04/12/2019   Component Date Value Ref Range Status   • Glucose 04/12/2019 121* 65 - 99 mg/dL Final   • BUN " 04/12/2019 9  6 - 20 mg/dL Final   • Creatinine 04/12/2019 0.65  0.57 - 1.00 mg/dL Final   • Sodium 04/12/2019 137  136 - 145 mmol/L Final   • Potassium 04/12/2019 4.2  3.5 - 5.2 mmol/L Final   • Chloride 04/12/2019 100  98 - 107 mmol/L Final   • CO2 04/12/2019 26.1  22.0 - 29.0 mmol/L Final   • Calcium 04/12/2019 8.6  8.6 - 10.5 mg/dL Final   • Total Protein 04/12/2019 7.7  6.0 - 8.5 g/dL Final   • Albumin 04/12/2019 4.00  3.50 - 5.20 g/dL Final   • ALT (SGPT) 04/12/2019 45* 1 - 33 U/L Final   • AST (SGOT) 04/12/2019 41* 1 - 32 U/L Final   • Alkaline Phosphatase 04/12/2019 79  39 - 117 U/L Final   • Total Bilirubin 04/12/2019 0.7  0.2 - 1.2 mg/dL Final   • eGFR Non African Amer 04/12/2019 101  >60 mL/min/1.73 Final   • Globulin 04/12/2019 3.7  gm/dL Final   • A/G Ratio 04/12/2019 1.1  g/dL Final   • BUN/Creatinine Ratio 04/12/2019 13.8  7.0 - 25.0 Final   • Anion Gap 04/12/2019 10.9  mmol/L Final   • Total Cholesterol 04/12/2019 169  0 - 200 mg/dL Final   • Triglycerides 04/12/2019 136  0 - 150 mg/dL Final   • HDL Cholesterol 04/12/2019 44  40 - 60 mg/dL Final   • LDL Cholesterol  04/12/2019 98  0 - 100 mg/dL Final   • VLDL Cholesterol 04/12/2019 27.2  5 - 40 mg/dL Final   • LDL/HDL Ratio 04/12/2019 2.22   Final   • WBC 04/12/2019 6.99  3.40 - 10.80 10*3/mm3 Final   • RBC 04/12/2019 4.44  3.77 - 5.28 10*6/mm3 Final   • Hemoglobin 04/12/2019 14.0  12.0 - 15.9 g/dL Final   • Hematocrit 04/12/2019 42.6  34.0 - 46.6 % Final   • MCV 04/12/2019 95.9  79.0 - 97.0 fL Final   • MCH 04/12/2019 31.5  26.6 - 33.0 pg Final   • MCHC 04/12/2019 32.9  31.5 - 35.7 g/dL Final   • RDW 04/12/2019 12.5  12.3 - 15.4 % Final   • RDW-SD 04/12/2019 43.7  37.0 - 54.0 fl Final   • MPV 04/12/2019 9.7  6.0 - 12.0 fL Final   • Platelets 04/12/2019 179  140 - 450 10*3/mm3 Final   • Neutrophil % 04/12/2019 47.3  42.7 - 76.0 % Final   • Lymphocyte % 04/12/2019 41.5  19.6 - 45.3 % Final   • Monocyte % 04/12/2019 8.6  5.0 - 12.0 % Final   •  Eosinophil % 04/12/2019 1.7  0.3 - 6.2 % Final   • Basophil % 04/12/2019 0.6  0.0 - 1.5 % Final   • Immature Grans % 04/12/2019 0.3  0.0 - 0.5 % Final   • Neutrophils, Absolute 04/12/2019 3.31  1.40 - 7.00 10*3/mm3 Final   • Lymphocytes, Absolute 04/12/2019 2.90  0.70 - 3.10 10*3/mm3 Final   • Monocytes, Absolute 04/12/2019 0.60  0.10 - 0.90 10*3/mm3 Final   • Eosinophils, Absolute 04/12/2019 0.12  0.00 - 0.40 10*3/mm3 Final   • Basophils, Absolute 04/12/2019 0.04  0.00 - 0.20 10*3/mm3 Final   • Immature Grans, Absolute 04/12/2019 0.02  0.00 - 0.05 10*3/mm3 Final   • nRBC 04/12/2019 0.0  0.0 - 0.0 /100 WBC Final       Physical Exam   Constitutional: She is oriented to person, place, and time. She appears well-developed and well-nourished.   HENT:   Head: Normocephalic and atraumatic.   Nose: Nose normal.   Mouth/Throat: Oropharynx is clear and moist.   Eyes: Conjunctivae and EOM are normal. Pupils are equal, round, and reactive to light.   Neck: Normal range of motion. Neck supple. No tracheal deviation present. No thyromegaly present.   Cardiovascular: Normal rate, regular rhythm, normal heart sounds and intact distal pulses.   No murmur heard.  Pulmonary/Chest: Effort normal and breath sounds normal. No respiratory distress. She has no wheezes.   Abdominal: Soft. Bowel sounds are normal. There is no tenderness. There is no guarding.   Musculoskeletal: Normal range of motion. She exhibits no edema or tenderness.   Lymphadenopathy:     She has no cervical adenopathy.   Neurological: She is alert and oriented to person, place, and time.   Skin: Skin is warm and dry. No rash noted.   Psychiatric: She has a normal mood and affect. Her behavior is normal.   Nursing note and vitals reviewed.      Assessment/Plan     Diagnoses and all orders for this visit:    Moderate episode of recurrent major depressive disorder (CMS/[facility])  Comments:  Continue Effexor and advised coping techniques for dealing with stress including deep  breathing    Essential hypertension  Comments:  Continue losartan    Gastroesophageal reflux disease without esophagitis  Comments:  Continue pantoprazole    15 minutes of total 25-minute face-to-face office visit spent counseling patient on coping mechanisms and dealing with stress             This document has been electronically signed by:  Rosa Quigley PA-C

## 2019-07-11 DIAGNOSIS — I10 ESSENTIAL HYPERTENSION: ICD-10-CM

## 2019-07-11 DIAGNOSIS — K21.00 GASTROESOPHAGEAL REFLUX DISEASE WITH ESOPHAGITIS: ICD-10-CM

## 2019-07-11 RX ORDER — PANTOPRAZOLE SODIUM 20 MG/1
20 TABLET, DELAYED RELEASE ORAL DAILY
Qty: 30 TABLET | Refills: 0 | Status: SHIPPED | OUTPATIENT
Start: 2019-07-11 | End: 2019-12-16 | Stop reason: SDUPTHER

## 2019-07-11 RX ORDER — LOSARTAN POTASSIUM 50 MG/1
50 TABLET ORAL DAILY
Qty: 30 TABLET | Refills: 0 | OUTPATIENT
Start: 2019-07-11

## 2019-07-20 DIAGNOSIS — L65.9 HAIR LOSS: ICD-10-CM

## 2019-07-22 RX ORDER — KETOCONAZOLE 20 MG/ML
SHAMPOO TOPICAL
Qty: 120 ML | Refills: 0 | Status: SHIPPED | OUTPATIENT
Start: 2019-07-22 | End: 2019-08-06 | Stop reason: SDUPTHER

## 2019-08-06 DIAGNOSIS — L65.9 HAIR LOSS: ICD-10-CM

## 2019-08-06 RX ORDER — KETOCONAZOLE 20 MG/ML
SHAMPOO TOPICAL
Qty: 120 ML | Refills: 0 | Status: SHIPPED | OUTPATIENT
Start: 2019-08-06 | End: 2019-08-15 | Stop reason: SDUPTHER

## 2019-08-15 ENCOUNTER — OFFICE VISIT (OUTPATIENT)
Dept: FAMILY MEDICINE CLINIC | Facility: CLINIC | Age: 39
End: 2019-08-15

## 2019-08-15 VITALS
DIASTOLIC BLOOD PRESSURE: 78 MMHG | HEIGHT: 66 IN | OXYGEN SATURATION: 95 % | BODY MASS INDEX: 47.09 KG/M2 | HEART RATE: 66 BPM | WEIGHT: 293 LBS | SYSTOLIC BLOOD PRESSURE: 126 MMHG | TEMPERATURE: 98.4 F

## 2019-08-15 DIAGNOSIS — R20.0 NUMBNESS AND TINGLING IN RIGHT HAND: ICD-10-CM

## 2019-08-15 DIAGNOSIS — E78.2 MIXED HYPERLIPIDEMIA: ICD-10-CM

## 2019-08-15 DIAGNOSIS — I10 ESSENTIAL HYPERTENSION: ICD-10-CM

## 2019-08-15 DIAGNOSIS — M54.31 RIGHT SIDED SCIATICA: Primary | ICD-10-CM

## 2019-08-15 DIAGNOSIS — L65.9 HAIR LOSS: ICD-10-CM

## 2019-08-15 DIAGNOSIS — R20.2 NUMBNESS AND TINGLING IN RIGHT HAND: ICD-10-CM

## 2019-08-15 PROCEDURE — 96372 THER/PROPH/DIAG INJ SC/IM: CPT | Performed by: PHYSICIAN ASSISTANT

## 2019-08-15 PROCEDURE — 99214 OFFICE O/P EST MOD 30 MIN: CPT | Performed by: PHYSICIAN ASSISTANT

## 2019-08-15 RX ORDER — METHYLPREDNISOLONE ACETATE 80 MG/ML
80 INJECTION, SUSPENSION INTRA-ARTICULAR; INTRALESIONAL; INTRAMUSCULAR; SOFT TISSUE ONCE
Status: COMPLETED | OUTPATIENT
Start: 2019-08-15 | End: 2019-08-15

## 2019-08-15 RX ORDER — KETOCONAZOLE 20 MG/ML
SHAMPOO TOPICAL 2 TIMES WEEKLY
Qty: 120 ML | Refills: 5 | Status: SHIPPED | OUTPATIENT
Start: 2019-08-15 | End: 2019-12-16 | Stop reason: SDUPTHER

## 2019-08-15 RX ORDER — KETOROLAC TROMETHAMINE 30 MG/ML
60 INJECTION, SOLUTION INTRAMUSCULAR; INTRAVENOUS ONCE
Status: COMPLETED | OUTPATIENT
Start: 2019-08-15 | End: 2019-08-15

## 2019-08-15 RX ADMIN — METHYLPREDNISOLONE ACETATE 80 MG: 80 INJECTION, SUSPENSION INTRA-ARTICULAR; INTRALESIONAL; INTRAMUSCULAR; SOFT TISSUE at 12:06

## 2019-08-15 RX ADMIN — KETOROLAC TROMETHAMINE 60 MG: 30 INJECTION, SOLUTION INTRAMUSCULAR; INTRAVENOUS at 12:05

## 2019-08-18 NOTE — PROGRESS NOTES
Sravanthi Alegria is a 39 y.o. female.       Chief Complaint -back pain    History of Present Illness -      Back pain-  She complains of right sided lower back pain that radiates to the right mid buttock and hip for the past week.  Worse with sitting.  Some relief with ibuprofen.  No known injury.    Alopecia-  She complains of hair loss that is worse with stress.  Onset greater than 1 year.  Some relief with ketoconazole shampoo.    Numbness in right hand-  She complains of intermittent numbness and tingling in the right first second and third digits for the past 2 weeks.  No known injury.    Hypertension-well-controlled with losartan    Hyperlipidemia- stable with diet  Lab Results   Component Value Date    CHOL 169 04/12/2019    CHOL 173 09/18/2018     Lab Results   Component Value Date    TRIG 136 04/12/2019    TRIG 179 (H) 09/18/2018     Lab Results   Component Value Date    HDL 44 04/12/2019    HDL 47 (L) 09/18/2018     Lab Results   Component Value Date    LDL 98 04/12/2019    LDL 90 09/18/2018         The following portions of the patient's history were reviewed and updated as appropriate: allergies, current medications, past family history, past medical history, past social history, past surgical history and problem list.    Review of Systems   Constitutional: Negative for activity change, appetite change and fever.   HENT: Negative for ear pain, sinus pressure and sore throat.    Eyes: Negative for pain and visual disturbance.   Respiratory: Negative for cough and chest tightness.    Cardiovascular: Negative for chest pain and palpitations.   Gastrointestinal: Negative for abdominal pain, constipation, diarrhea, nausea and vomiting.   Endocrine: Negative for polydipsia and polyuria.   Genitourinary: Negative for dysuria and frequency.   Musculoskeletal: Positive for back pain. Negative for myalgias.        Buttock and leg pain   Skin: Negative for color change and rash.   Allergic/Immunologic:  "Negative for food allergies and immunocompromised state.   Neurological: Positive for numbness (right hand numbness). Negative for dizziness, syncope and headaches.   Hematological: Negative for adenopathy. Does not bruise/bleed easily.   Psychiatric/Behavioral: Negative for hallucinations and suicidal ideas. The patient is not nervous/anxious.        /78   Pulse 66   Temp 98.4 °F (36.9 °C) (Oral)   Ht 167.6 cm (66\")   Wt (!) 160 kg (353 lb)   LMP  (LMP Unknown)   SpO2 95%   BMI 56.98 kg/m²   Lab on 04/12/2019   Component Date Value Ref Range Status   • Glucose 04/12/2019 121* 65 - 99 mg/dL Final   • BUN 04/12/2019 9  6 - 20 mg/dL Final   • Creatinine 04/12/2019 0.65  0.57 - 1.00 mg/dL Final   • Sodium 04/12/2019 137  136 - 145 mmol/L Final   • Potassium 04/12/2019 4.2  3.5 - 5.2 mmol/L Final   • Chloride 04/12/2019 100  98 - 107 mmol/L Final   • CO2 04/12/2019 26.1  22.0 - 29.0 mmol/L Final   • Calcium 04/12/2019 8.6  8.6 - 10.5 mg/dL Final   • Total Protein 04/12/2019 7.7  6.0 - 8.5 g/dL Final   • Albumin 04/12/2019 4.00  3.50 - 5.20 g/dL Final   • ALT (SGPT) 04/12/2019 45* 1 - 33 U/L Final   • AST (SGOT) 04/12/2019 41* 1 - 32 U/L Final   • Alkaline Phosphatase 04/12/2019 79  39 - 117 U/L Final   • Total Bilirubin 04/12/2019 0.7  0.2 - 1.2 mg/dL Final   • eGFR Non African Amer 04/12/2019 101  >60 mL/min/1.73 Final   • Globulin 04/12/2019 3.7  gm/dL Final   • A/G Ratio 04/12/2019 1.1  g/dL Final   • BUN/Creatinine Ratio 04/12/2019 13.8  7.0 - 25.0 Final   • Anion Gap 04/12/2019 10.9  mmol/L Final   • Total Cholesterol 04/12/2019 169  0 - 200 mg/dL Final   • Triglycerides 04/12/2019 136  0 - 150 mg/dL Final   • HDL Cholesterol 04/12/2019 44  40 - 60 mg/dL Final   • LDL Cholesterol  04/12/2019 98  0 - 100 mg/dL Final   • VLDL Cholesterol 04/12/2019 27.2  5 - 40 mg/dL Final   • LDL/HDL Ratio 04/12/2019 2.22   Final   • WBC 04/12/2019 6.99  3.40 - 10.80 10*3/mm3 Final   • RBC 04/12/2019 4.44  3.77 - 5.28 " 10*6/mm3 Final   • Hemoglobin 04/12/2019 14.0  12.0 - 15.9 g/dL Final   • Hematocrit 04/12/2019 42.6  34.0 - 46.6 % Final   • MCV 04/12/2019 95.9  79.0 - 97.0 fL Final   • MCH 04/12/2019 31.5  26.6 - 33.0 pg Final   • MCHC 04/12/2019 32.9  31.5 - 35.7 g/dL Final   • RDW 04/12/2019 12.5  12.3 - 15.4 % Final   • RDW-SD 04/12/2019 43.7  37.0 - 54.0 fl Final   • MPV 04/12/2019 9.7  6.0 - 12.0 fL Final   • Platelets 04/12/2019 179  140 - 450 10*3/mm3 Final   • Neutrophil % 04/12/2019 47.3  42.7 - 76.0 % Final   • Lymphocyte % 04/12/2019 41.5  19.6 - 45.3 % Final   • Monocyte % 04/12/2019 8.6  5.0 - 12.0 % Final   • Eosinophil % 04/12/2019 1.7  0.3 - 6.2 % Final   • Basophil % 04/12/2019 0.6  0.0 - 1.5 % Final   • Immature Grans % 04/12/2019 0.3  0.0 - 0.5 % Final   • Neutrophils, Absolute 04/12/2019 3.31  1.40 - 7.00 10*3/mm3 Final   • Lymphocytes, Absolute 04/12/2019 2.90  0.70 - 3.10 10*3/mm3 Final   • Monocytes, Absolute 04/12/2019 0.60  0.10 - 0.90 10*3/mm3 Final   • Eosinophils, Absolute 04/12/2019 0.12  0.00 - 0.40 10*3/mm3 Final   • Basophils, Absolute 04/12/2019 0.04  0.00 - 0.20 10*3/mm3 Final   • Immature Grans, Absolute 04/12/2019 0.02  0.00 - 0.05 10*3/mm3 Final   • nRBC 04/12/2019 0.0  0.0 - 0.0 /100 WBC Final       Physical Exam   Constitutional: She is oriented to person, place, and time. She appears well-developed and well-nourished.   HENT:   Head: Normocephalic and atraumatic.   Nose: Nose normal.   Mouth/Throat: Oropharynx is clear and moist.   Eyes: Conjunctivae and EOM are normal. Pupils are equal, round, and reactive to light.   Neck: Normal range of motion. Neck supple. No tracheal deviation present. No thyromegaly present.   Cardiovascular: Normal rate, regular rhythm, normal heart sounds and intact distal pulses.   No murmur heard.  Pulmonary/Chest: Effort normal and breath sounds normal. No respiratory distress. She has no wheezes.   Abdominal: Soft. Bowel sounds are normal. There is no  tenderness. There is no guarding.   Musculoskeletal: Normal range of motion. She exhibits tenderness. She exhibits no edema.   Tenderness noted right mid buttock to deep palpation   Lymphadenopathy:     She has no cervical adenopathy.   Neurological: She is alert and oriented to person, place, and time.   Negative Phalen sign   Skin: Skin is warm and dry. No rash noted.   Psychiatric: She has a normal mood and affect. Her behavior is normal.   Nursing note and vitals reviewed.      Assessment/Plan     Diagnoses and all orders for this visit:    Right sided sciatica  -     methylPREDNISolone acetate (DEPO-medrol) injection 80 mg  -     ketorolac (TORADOL) injection 60 mg    Hair loss  -     ketoconazole (NIZORAL) 2 % shampoo; Apply  topically to the appropriate area as directed 2 (Two) Times a Week. to affected area    Numbness and tingling in right hand  -     EMG & Nerve Conduction Test; Future    Essential hypertension  Comments:  Continue losartan    Mixed hyperlipidemia  Comments:  She was advised to continue low-cholesterol diet               This document has been electronically signed by:  Rosa Quigley PA-C

## 2019-08-23 DIAGNOSIS — L65.9 HAIR LOSS: ICD-10-CM

## 2019-08-23 RX ORDER — KETOCONAZOLE 20 MG/ML
SHAMPOO TOPICAL
Qty: 120 ML | Refills: 0 | Status: SHIPPED | OUTPATIENT
Start: 2019-08-23 | End: 2019-09-09 | Stop reason: SDUPTHER

## 2019-09-03 RX ORDER — AZITHROMYCIN 250 MG/1
TABLET, FILM COATED ORAL
Qty: 6 TABLET | Refills: 0 | Status: SHIPPED | OUTPATIENT
Start: 2019-09-03 | End: 2019-10-11

## 2019-09-09 DIAGNOSIS — L65.9 HAIR LOSS: ICD-10-CM

## 2019-09-09 RX ORDER — KETOCONAZOLE 20 MG/ML
SHAMPOO TOPICAL
Qty: 120 ML | Refills: 0 | Status: SHIPPED | OUTPATIENT
Start: 2019-09-09 | End: 2019-09-22 | Stop reason: SDUPTHER

## 2019-09-10 ENCOUNTER — TELEPHONE (OUTPATIENT)
Dept: FAMILY MEDICINE CLINIC | Facility: CLINIC | Age: 39
End: 2019-09-10

## 2019-09-10 DIAGNOSIS — G56.03 BILATERAL CARPAL TUNNEL SYNDROME: Primary | ICD-10-CM

## 2019-09-10 NOTE — TELEPHONE ENCOUNTER
Pt is aware of this information.   ----- Message from KALI Armstrong sent at 9/10/2019  8:35 AM EDT -----   Please inform the patient that her EMG study was positive for bilateral carpal tunnel syndrome.  Referral will be made to orthopedic to further evaluate and treat this issue.

## 2019-09-16 ENCOUNTER — OFFICE VISIT (OUTPATIENT)
Dept: FAMILY MEDICINE CLINIC | Facility: CLINIC | Age: 39
End: 2019-09-16

## 2019-09-16 VITALS
WEIGHT: 293 LBS | SYSTOLIC BLOOD PRESSURE: 110 MMHG | HEART RATE: 70 BPM | TEMPERATURE: 97.6 F | OXYGEN SATURATION: 96 % | DIASTOLIC BLOOD PRESSURE: 68 MMHG | BODY MASS INDEX: 47.09 KG/M2 | HEIGHT: 66 IN

## 2019-09-16 DIAGNOSIS — G56.01 CARPAL TUNNEL SYNDROME, RIGHT: ICD-10-CM

## 2019-09-16 DIAGNOSIS — R73.9 HYPERGLYCEMIA: ICD-10-CM

## 2019-09-16 DIAGNOSIS — R73.03 PREDIABETES: ICD-10-CM

## 2019-09-16 DIAGNOSIS — L91.8 SKIN TAG: ICD-10-CM

## 2019-09-16 DIAGNOSIS — R09.89 CHEST CONGESTION: Primary | ICD-10-CM

## 2019-09-16 DIAGNOSIS — K21.9 GASTROESOPHAGEAL REFLUX DISEASE WITHOUT ESOPHAGITIS: ICD-10-CM

## 2019-09-16 DIAGNOSIS — F33.1 MODERATE EPISODE OF RECURRENT MAJOR DEPRESSIVE DISORDER (HCC): ICD-10-CM

## 2019-09-16 DIAGNOSIS — I10 ESSENTIAL HYPERTENSION: ICD-10-CM

## 2019-09-16 PROCEDURE — 90674 CCIIV4 VAC NO PRSV 0.5 ML IM: CPT | Performed by: PHYSICIAN ASSISTANT

## 2019-09-16 PROCEDURE — 90471 IMMUNIZATION ADMIN: CPT | Performed by: PHYSICIAN ASSISTANT

## 2019-09-16 PROCEDURE — 11200 RMVL SKIN TAGS UP TO&INC 15: CPT | Performed by: PHYSICIAN ASSISTANT

## 2019-09-16 PROCEDURE — 99214 OFFICE O/P EST MOD 30 MIN: CPT | Performed by: PHYSICIAN ASSISTANT

## 2019-09-16 NOTE — PROGRESS NOTES
Sravanthi Alegria is a 39 y.o. female.       Chief Complaint -chest congestion    History of Present Illness -      Chest congestion-  She complains of continued chest congestion feeling like she cannot take a deep breath.  Onset 1 month.    Skin tag-  She complains of multiple skin tags on her left and right neck that are inflamed and irritated by her necklaces and shirts causing bleeding.  Onset greater than 1 month.    Right hand numbness-  She complains of right hand numbness.  Nerve conduction study revealed carpal tunnel syndrome.  She has some difficulty using the cock-up wrist splint due to body habitus causing improper fit. Not at goal    8/21/2019 EMG/nerve conduction study revealed evidence of bilateral carpal tunnel syndrome affecting both motor and the sensory portions with no myelopathy found in the nerves tested.      Prediabetes-currently stable with metformin  Lab Results   Component Value Date    HGBA1C 6.40 (H) 09/18/2018     Depression- controlled with Effexor.  She denies any SI or HI.    Gastroesophageal reflux disease-stable with pantoprazole    Hypertension-controlled with losartan    The following portions of the patient's history were reviewed and updated as appropriate: allergies, current medications, past family history, past medical history, past social history, past surgical history and problem list.    Review of Systems   Constitutional: Negative for activity change, appetite change and fever.   HENT: Positive for congestion. Negative for ear pain, sinus pressure and sore throat.    Eyes: Negative for pain and visual disturbance.   Respiratory: Positive for cough (dry). Negative for chest tightness.    Cardiovascular: Negative for chest pain and palpitations.   Gastrointestinal: Negative for abdominal pain, constipation, diarrhea, nausea and vomiting.   Endocrine: Negative for polydipsia and polyuria.   Genitourinary: Negative for dysuria and frequency.   Musculoskeletal:  "Negative for back pain and myalgias.   Skin: Negative for color change and rash.        Skin tag   Allergic/Immunologic: Negative for food allergies and immunocompromised state.   Neurological: Positive for numbness. Negative for dizziness, syncope and headaches.   Hematological: Negative for adenopathy. Does not bruise/bleed easily.   Psychiatric/Behavioral: Positive for dysphoric mood. Negative for hallucinations, self-injury, sleep disturbance and suicidal ideas. The patient is not nervous/anxious.        /68 (BP Location: Left arm, Patient Position: Sitting, Cuff Size: Adult)   Pulse 70   Temp 97.6 °F (36.4 °C) (Temporal)   Ht 167.6 cm (65.98\")   Wt (!) 158 kg (348 lb)   LMP  (LMP Unknown)   SpO2 96%   BMI 56.20 kg/m²   Lab on 04/12/2019   Component Date Value Ref Range Status   • Glucose 04/12/2019 121* 65 - 99 mg/dL Final   • BUN 04/12/2019 9  6 - 20 mg/dL Final   • Creatinine 04/12/2019 0.65  0.57 - 1.00 mg/dL Final   • Sodium 04/12/2019 137  136 - 145 mmol/L Final   • Potassium 04/12/2019 4.2  3.5 - 5.2 mmol/L Final   • Chloride 04/12/2019 100  98 - 107 mmol/L Final   • CO2 04/12/2019 26.1  22.0 - 29.0 mmol/L Final   • Calcium 04/12/2019 8.6  8.6 - 10.5 mg/dL Final   • Total Protein 04/12/2019 7.7  6.0 - 8.5 g/dL Final   • Albumin 04/12/2019 4.00  3.50 - 5.20 g/dL Final   • ALT (SGPT) 04/12/2019 45* 1 - 33 U/L Final   • AST (SGOT) 04/12/2019 41* 1 - 32 U/L Final   • Alkaline Phosphatase 04/12/2019 79  39 - 117 U/L Final   • Total Bilirubin 04/12/2019 0.7  0.2 - 1.2 mg/dL Final   • eGFR Non African Amer 04/12/2019 101  >60 mL/min/1.73 Final   • Globulin 04/12/2019 3.7  gm/dL Final   • A/G Ratio 04/12/2019 1.1  g/dL Final   • BUN/Creatinine Ratio 04/12/2019 13.8  7.0 - 25.0 Final   • Anion Gap 04/12/2019 10.9  mmol/L Final   • Total Cholesterol 04/12/2019 169  0 - 200 mg/dL Final   • Triglycerides 04/12/2019 136  0 - 150 mg/dL Final   • HDL Cholesterol 04/12/2019 44  40 - 60 mg/dL Final   • LDL " Cholesterol  04/12/2019 98  0 - 100 mg/dL Final   • VLDL Cholesterol 04/12/2019 27.2  5 - 40 mg/dL Final   • LDL/HDL Ratio 04/12/2019 2.22   Final   • WBC 04/12/2019 6.99  3.40 - 10.80 10*3/mm3 Final   • RBC 04/12/2019 4.44  3.77 - 5.28 10*6/mm3 Final   • Hemoglobin 04/12/2019 14.0  12.0 - 15.9 g/dL Final   • Hematocrit 04/12/2019 42.6  34.0 - 46.6 % Final   • MCV 04/12/2019 95.9  79.0 - 97.0 fL Final   • MCH 04/12/2019 31.5  26.6 - 33.0 pg Final   • MCHC 04/12/2019 32.9  31.5 - 35.7 g/dL Final   • RDW 04/12/2019 12.5  12.3 - 15.4 % Final   • RDW-SD 04/12/2019 43.7  37.0 - 54.0 fl Final   • MPV 04/12/2019 9.7  6.0 - 12.0 fL Final   • Platelets 04/12/2019 179  140 - 450 10*3/mm3 Final   • Neutrophil % 04/12/2019 47.3  42.7 - 76.0 % Final   • Lymphocyte % 04/12/2019 41.5  19.6 - 45.3 % Final   • Monocyte % 04/12/2019 8.6  5.0 - 12.0 % Final   • Eosinophil % 04/12/2019 1.7  0.3 - 6.2 % Final   • Basophil % 04/12/2019 0.6  0.0 - 1.5 % Final   • Immature Grans % 04/12/2019 0.3  0.0 - 0.5 % Final   • Neutrophils, Absolute 04/12/2019 3.31  1.40 - 7.00 10*3/mm3 Final   • Lymphocytes, Absolute 04/12/2019 2.90  0.70 - 3.10 10*3/mm3 Final   • Monocytes, Absolute 04/12/2019 0.60  0.10 - 0.90 10*3/mm3 Final   • Eosinophils, Absolute 04/12/2019 0.12  0.00 - 0.40 10*3/mm3 Final   • Basophils, Absolute 04/12/2019 0.04  0.00 - 0.20 10*3/mm3 Final   • Immature Grans, Absolute 04/12/2019 0.02  0.00 - 0.05 10*3/mm3 Final   • nRBC 04/12/2019 0.0  0.0 - 0.0 /100 WBC Final       Physical Exam   Constitutional: She is oriented to person, place, and time. She appears well-developed and well-nourished.   HENT:   Head: Normocephalic and atraumatic.   Nose: Nose normal.   Mouth/Throat: Oropharynx is clear and moist.   Eyes: Conjunctivae and EOM are normal. Pupils are equal, round, and reactive to light.   Neck: Normal range of motion. Neck supple. No tracheal deviation present. No thyromegaly present.   Cardiovascular: Normal rate, regular  rhythm, normal heart sounds and intact distal pulses.   No murmur heard.  Pulmonary/Chest: Effort normal. No respiratory distress. She has no wheezes.   Bronchovesicular breath sounds noted bilaterally   Abdominal: Soft. Bowel sounds are normal. There is no tenderness. There is no guarding.   Musculoskeletal: Normal range of motion. She exhibits no edema or tenderness.   Lymphadenopathy:     She has no cervical adenopathy.   Neurological: She is alert and oriented to person, place, and time.   Positive Phalen sign   Skin: Skin is warm and dry. No rash noted.   Multiple skin tags noted on left neck with dried blood surrounding and inflammation   Psychiatric: She has a normal mood and affect. Her behavior is normal.   Nursing note and vitals reviewed.      Assessment/Plan     Diagnoses and all orders for this visit:    Chest congestion  Comments:  Use Breo for the next 2 weeks and advised to rinse mouth after use  Orders:  -     Fluticasone Furoate-Vilanterol (BREO ELLIPTA) 100-25 MCG/INH inhaler; 1 puff every morning    Carpal tunnel syndrome, right  Comments:  advised use of cock up wrist split  Referral to orthopedic for further evaluation and treatment    Prediabetes  Comments:  Plan to continue to monitor and advised low carbohydrate diet  Continue metformin    Moderate episode of recurrent major depressive disorder (CMS/HCC)  Comments:  Continue Effexor    Gastroesophageal reflux disease without esophagitis  Comments:  Continue pantoprazole    Essential hypertension  Comments:  Continue losartan  Orders:  -     Comprehensive Metabolic Panel; Future  -     Lipid Panel; Future    Hyperglycemia  -     Hemoglobin A1c; Future    Skin tag  Comments:  Bleeding and inflamed today on left neck  Excision today    Other orders  -     Flucelvax Quad=>4Years (PFS)      Procedure: Excision of skin tags left neck  Provider: Rosa Quigley PA-C  Indication: Inflamed and bleeding skin tags left neck  Description: The left neck was  prepped and draped in sterile fashion.  Excision of 8 skin tags with inflammation.  Pressure was held and sterile dressings were placed.  No complications  Estimate blood loss: Minimal  Patient tolerated procedure well.             This document has been electronically signed by:  Rosa Quigley PA-C

## 2019-09-22 DIAGNOSIS — L65.9 HAIR LOSS: ICD-10-CM

## 2019-09-24 RX ORDER — KETOCONAZOLE 20 MG/ML
SHAMPOO TOPICAL
Qty: 120 ML | Refills: 0 | Status: SHIPPED | OUTPATIENT
Start: 2019-09-24 | End: 2019-10-10 | Stop reason: SDUPTHER

## 2019-10-02 ENCOUNTER — HOSPITAL ENCOUNTER (OUTPATIENT)
Dept: GENERAL RADIOLOGY | Facility: HOSPITAL | Age: 39
Discharge: HOME | End: 2019-10-02
Admitting: ORTHOPAEDIC SURGERY

## 2019-10-02 ENCOUNTER — OFFICE VISIT (OUTPATIENT)
Dept: ORTHOPEDIC SURGERY | Facility: CLINIC | Age: 39
End: 2019-10-02

## 2019-10-02 VITALS — HEART RATE: 80 BPM | DIASTOLIC BLOOD PRESSURE: 87 MMHG | SYSTOLIC BLOOD PRESSURE: 135 MMHG

## 2019-10-02 DIAGNOSIS — G56.01 CARPAL TUNNEL SYNDROME OF RIGHT WRIST: Primary | ICD-10-CM

## 2019-10-02 DIAGNOSIS — R20.2 NUMBNESS AND TINGLING IN RIGHT HAND: ICD-10-CM

## 2019-10-02 DIAGNOSIS — R94.131 ABNORMAL EMG: ICD-10-CM

## 2019-10-02 DIAGNOSIS — R20.0 NUMBNESS AND TINGLING IN RIGHT HAND: ICD-10-CM

## 2019-10-02 PROCEDURE — 72050 X-RAY EXAM NECK SPINE 4/5VWS: CPT | Performed by: RADIOLOGY

## 2019-10-02 PROCEDURE — 99204 OFFICE O/P NEW MOD 45 MIN: CPT | Performed by: ORTHOPAEDIC SURGERY

## 2019-10-02 PROCEDURE — 72040 X-RAY EXAM NECK SPINE 2-3 VW: CPT

## 2019-10-02 NOTE — PROGRESS NOTES
History and Physical      Patient: Anika Alegria  YOB: 1980  Date of Encounter: 10/02/2019      Chief Complaint:   Chief Complaint   Patient presents with   • Left Wrist - Pain, Numbness, Initial Evaluation   • Right Wrist - Pain, Numbness, Initial Evaluation         HPI:   Anika Alegria, 39 y.o. female, seen today for evaluation of bilateral hand pain right much worse than the left.  She reports ongoing symptoms greater than 2 months with numbness in her first second and third fingers bilaterally.  She reports that she frequently drops things she has significant night pain and numbness that she has worn night braces without much improvement.  Right hand is much more symptomatic than the left and she has only mild numbness in the right hand but has similar pain.  He has had EMG nerve conduction studies completed.  She is nondiabetic and does not have thyroid disease.      Active Problem List:  Patient Active Problem List   Diagnosis   • Moderate episode of recurrent major depressive disorder (CMS/HCC)   • Gastroesophageal reflux disease without esophagitis   • PCOS (polycystic ovarian syndrome)   • Essential hypertension   • Chronic seasonal allergic rhinitis due to pollen   • Goiter   • NAFL (nonalcoholic fatty liver)   • Prediabetes   • Mixed hyperlipidemia   • Carpal tunnel syndrome of right wrist         Past Medical History:  Past Medical History:   Diagnosis Date   • Hepatitis A    • Hypertension    • Thyroid disease          Past Surgical History:  Past Surgical History:   Procedure Laterality Date   •  SECTION     • TUBAL ABDOMINAL LIGATION           Family History:  Family History   Problem Relation Age of Onset   • Diabetes Mother    • Hypertension Mother    • No Known Problems Father    • Gout Sister    • Osteoarthritis Maternal Grandmother    • Osteoporosis Maternal Grandmother    • Heart disease Maternal Grandmother    • Diabetes Maternal Grandmother    • Hypertension Maternal  Grandmother    • Heart disease Maternal Grandfather    • Diabetes Maternal Grandfather    • Hypertension Maternal Grandfather          Social History:  Social History     Socioeconomic History   • Marital status:      Spouse name: bea   • Number of children: 2   • Years of education: 12   • Highest education level: Not on file   Occupational History   • Occupation: unemployed   Social Needs   • Financial resource strain: Not hard at all   • Food insecurity:     Worry: Never true     Inability: Never true   • Transportation needs:     Medical: No     Non-medical: No   Tobacco Use   • Smoking status: Former Smoker   • Smokeless tobacco: Never Used   Substance and Sexual Activity   • Alcohol use: No   • Drug use: No   • Sexual activity: Defer   Lifestyle   • Physical activity:     Days per week: 0 days     Minutes per session: 0 min   • Stress: Only a little   Relationships   • Social connections:     Talks on phone: More than three times a week     Gets together: More than three times a week     Attends Jain service: More than 4 times per year     Active member of club or organization: Yes     Attends meetings of clubs or organizations: 1 to 4 times per year     Relationship status:      There is no height or weight on file to calculate BMI.      Medications:  Current Outpatient Medications   Medication Sig Dispense Refill   • albuterol sulfate  (90 Base) MCG/ACT inhaler Inhale 2 puffs Every 4 (Four) Hours As Needed for Shortness of Air. 1 inhaler 5   • cetirizine (zyrTEC) 10 MG tablet Take 1 tablet by mouth Daily. 30 tablet 5   • fluticasone (FLONASE) 50 MCG/ACT nasal spray 2 sprays into the nostril(s) as directed by provider Daily. 18.2 mL 5   • Fluticasone Furoate-Vilanterol (BREO ELLIPTA) 100-25 MCG/INH inhaler 1 puff every morning 1 each 5   • ibuprofen (ADVIL,MOTRIN) 800 MG tablet TAKE 1 TABLET BY MOUTH EVERY 8 HOURS 90 tablet 0   • ketoconazole (NIZORAL) 2 % shampoo APPLY  EXTERNALLY TO THE AFFECTED AREA TWICE DAILY 120 mL 0   • losartan (COZAAR) 100 MG tablet Take 1 tablet by mouth Daily. 30 tablet 5   • metFORMIN ER (GLUCOPHAGE-XR) 500 MG 24 hr tablet Take 1 tablet by mouth Daily With Breakfast. 60 tablet 5   • ondansetron (ZOFRAN) 4 MG tablet Take 1 tablet by mouth Every 8 (Eight) Hours As Needed for Nausea or Vomiting. 90 tablet 3   • pantoprazole (PROTONIX) 20 MG EC tablet TAKE 1 TABLET BY MOUTH DAILY 30 tablet 0   • venlafaxine XR (EFFEXOR XR) 75 MG 24 hr capsule Take 1 capsule by mouth Daily. 30 capsule 5   • azithromycin (ZITHROMAX) 250 MG tablet Take 2 tablets the first day, then 1 tablet daily for 4 days. 6 tablet 0   • ketoconazole (NIZORAL) 2 % shampoo Apply  topically to the appropriate area as directed 2 (Two) Times a Week. to affected area 120 mL 5     No current facility-administered medications for this visit.          Allergies:  Allergies   Allergen Reactions   • Ultram [Tramadol] Swelling         Review of Systems:   Review of Systems   Constitutional: Positive for activity change and fatigue.   HENT: Negative.    Eyes: Negative.    Respiratory: Negative.    Cardiovascular: Negative.    Gastrointestinal: Negative.    Endocrine: Positive for cold intolerance and heat intolerance.   Genitourinary: Negative.    Musculoskeletal: Positive for arthralgias, back pain and joint swelling.   Skin: Negative.    Allergic/Immunologic: Negative.    Neurological: Positive for numbness.   Hematological: Negative.    Psychiatric/Behavioral: Positive for dysphoric mood. The patient is nervous/anxious.          Physical Exam:   Physical Exam   Constitutional: She is oriented to person, place, and time. No distress.   HENT:   Head: Normocephalic and atraumatic.   Right Ear: External ear normal.   Left Ear: External ear normal.   Eyes: Conjunctivae and EOM are normal. Right eye exhibits no discharge. Left eye exhibits no discharge.   Neck: Normal range of motion. Neck supple.    Cardiovascular: Normal rate, regular rhythm and normal heart sounds.   No murmur heard.  Pulmonary/Chest: Effort normal and breath sounds normal. No respiratory distress. She has no wheezes.   Abdominal: Soft. She exhibits no distension. There is no guarding.   Neurological: She is alert and oriented to person, place, and time.   Skin: Skin is warm and dry. Capillary refill takes less than 2 seconds. She is not diaphoretic.   Psychiatric: She has a normal mood and affect. Her behavior is normal. Judgment and thought content normal.   Nursing note and vitals reviewed.    GENERAL: 39 y.o. female, alert and oriented X 3 in no acute distress.   Visit Vitals  /87   Pulse 80   LMP  (LMP Unknown)       Musculoskeletal:   Right hand evaluation reveals normal thenar tone.  She has symmetrical  and pinch strength.  She demonstrates decreased sensation thumb index and middle fingers with positive Phalen sign negative Tinel sign.      Left hand evaluation reveals symmetrical thenar muscle compared to the right with slight decreased sensation to the index and thumb.      Cervical evaluation reveals full mobility without discomfort.    Radiology/Labs:   EMG nerve conduction studies obtained by report describes abnormal study with notes of bilateral carpal tunnel syndrome affecting both motor and sensory portions however cervical radiculopathy cannot be excluded and recommend clinical correlation with needle EMG nerve root screen of the upper extremities and cervical spine neuro imaging.    Cervical spine radiographs AP lateral by report and by review are negative.  Xr Spine Cervical 2 Or 3 View    Result Date: 10/2/2019  No acute or destructive cervical abnormality.  This report was finalized on 10/2/2019 1:24 PM by Dr. Evan Boateng MD.        Assessment & Plan:   39 y.o. female clinical presentation today consistent with carpal tunnel syndrome right much worse than the left.  Find no cervical findings on clinical  exam or plain radiographs to suggest a cervical source of her pain.  We discussed her options today she does not wish to repeat her nerve conduction studies.  I think it is reasonable to proceed with carpal tunnel release right hand given clinical findings today.  Surgery is scheduled [facility] October 15, 2019.  She will hold her ibuprofen.      ICD-10-CM ICD-9-CM   1. Carpal tunnel syndrome of right wrist G56.01 354.0   2. Abnormal EMG R94.131 794.17   3. Numbness and tingling in right hand R20.0 782.0    R20.2            Cc:   Rosa Quigley PA              This document has been electronically signed by Rony Cruz MD   October 3, 2019 2:06 PM

## 2019-10-02 NOTE — PROGRESS NOTES
New Patient Visit      Patient: Anika Alegria  YOB: 1980  Date of Encounter: 10/02/2019        Chief Complaint:   No chief complaint on file.        HPI:   Anika Alegria, 39 y.o. female, referred by Rosa Quigley PA      Active Problem List:  Patient Active Problem List   Diagnosis   • Moderate episode of recurrent major depressive disorder (CMS/HCC)   • Gastroesophageal reflux disease without esophagitis   • PCOS (polycystic ovarian syndrome)   • Essential hypertension   • Chronic seasonal allergic rhinitis due to pollen   • Goiter   • NAFL (nonalcoholic fatty liver)   • Prediabetes   • Mixed hyperlipidemia         Past Medical History:  Past Medical History:   Diagnosis Date   • Hypertension    • Thyroid disease          Past Surgical History:  Past Surgical History:   Procedure Laterality Date   •  SECTION     • TUBAL ABDOMINAL LIGATION           Family History:  Family History   Problem Relation Age of Onset   • No Known Problems Mother    • No Known Problems Father          Social History:  Social History     Socioeconomic History   • Marital status:      Spouse name: bea   • Number of children: 2   • Years of education: 12   • Highest education level: Not on file   Occupational History   • Occupation: unemployed   Social Needs   • Financial resource strain: Not hard at all   • Food insecurity:     Worry: Never true     Inability: Never true   • Transportation needs:     Medical: No     Non-medical: No   Tobacco Use   • Smoking status: Former Smoker   • Smokeless tobacco: Never Used   Substance and Sexual Activity   • Alcohol use: No   • Drug use: No   • Sexual activity: Defer   Lifestyle   • Physical activity:     Days per week: 0 days     Minutes per session: 0 min   • Stress: Only a little   Relationships   • Social connections:     Talks on phone: More than three times a week     Gets together: More than three times a week     Attends Jainism service: More than 4  times per year     Active member of club or organization: Yes     Attends meetings of clubs or organizations: 1 to 4 times per year     Relationship status:      There is no height or weight on file to calculate BMI.      Medications:  Current Outpatient Medications   Medication Sig Dispense Refill   • albuterol sulfate  (90 Base) MCG/ACT inhaler Inhale 2 puffs Every 4 (Four) Hours As Needed for Shortness of Air. 1 inhaler 5   • azithromycin (ZITHROMAX) 250 MG tablet Take 2 tablets the first day, then 1 tablet daily for 4 days. 6 tablet 0   • cetirizine (zyrTEC) 10 MG tablet Take 1 tablet by mouth Daily. 30 tablet 5   • fluticasone (FLONASE) 50 MCG/ACT nasal spray 2 sprays into the nostril(s) as directed by provider Daily. 18.2 mL 5   • Fluticasone Furoate-Vilanterol (BREO ELLIPTA) 100-25 MCG/INH inhaler 1 puff every morning 1 each 5   • ibuprofen (ADVIL,MOTRIN) 800 MG tablet TAKE 1 TABLET BY MOUTH EVERY 8 HOURS 90 tablet 0   • ketoconazole (NIZORAL) 2 % shampoo Apply  topically to the appropriate area as directed 2 (Two) Times a Week. to affected area 120 mL 5   • ketoconazole (NIZORAL) 2 % shampoo APPLY EXTERNALLY TO THE AFFECTED AREA TWICE DAILY 120 mL 0   • losartan (COZAAR) 100 MG tablet Take 1 tablet by mouth Daily. 30 tablet 5   • metFORMIN ER (GLUCOPHAGE-XR) 500 MG 24 hr tablet Take 1 tablet by mouth Daily With Breakfast. 60 tablet 5   • ondansetron (ZOFRAN) 4 MG tablet Take 1 tablet by mouth Every 8 (Eight) Hours As Needed for Nausea or Vomiting. 90 tablet 3   • pantoprazole (PROTONIX) 20 MG EC tablet TAKE 1 TABLET BY MOUTH DAILY 30 tablet 0   • venlafaxine XR (EFFEXOR XR) 75 MG 24 hr capsule Take 1 capsule by mouth Daily. 30 capsule 5     No current facility-administered medications for this visit.          Allergies:  Allergies   Allergen Reactions   • Ultram [Tramadol] Swelling         Review of Systems:   Review of Systems      Physical Exam:   Physical Exam  GENERAL: 39 y.o. female, alert  and oriented X 3 in no acute distress.   Visit Vitals  LMP  (LMP Unknown)     Musculoskeletal:       Radiology/Labs:         Assessment & Plan:   39 y.o. female     No diagnosis found.        Cc:   Rosa Quigley PA                This document has been electronically signed by Rony Cruz MD   October 2, 2019 10:24 AM

## 2019-10-02 NOTE — H&P (VIEW-ONLY)
History and Physical      Patient: Anika Alegria  YOB: 1980  Date of Encounter: 10/02/2019      Chief Complaint:   Chief Complaint   Patient presents with   • Left Wrist - Pain, Numbness, Initial Evaluation   • Right Wrist - Pain, Numbness, Initial Evaluation         HPI:   Anika Alegria, 39 y.o. female, seen today for evaluation of bilateral hand pain right much worse than the left.  She reports ongoing symptoms greater than 2 months with numbness in her first second and third fingers bilaterally.  She reports that she frequently drops things she has significant night pain and numbness that she has worn night braces without much improvement.  Right hand is much more symptomatic than the left and she has only mild numbness in the right hand but has similar pain.  He has had EMG nerve conduction studies completed.  She is nondiabetic and does not have thyroid disease.      Active Problem List:  Patient Active Problem List   Diagnosis   • Moderate episode of recurrent major depressive disorder (CMS/HCC)   • Gastroesophageal reflux disease without esophagitis   • PCOS (polycystic ovarian syndrome)   • Essential hypertension   • Chronic seasonal allergic rhinitis due to pollen   • Goiter   • NAFL (nonalcoholic fatty liver)   • Prediabetes   • Mixed hyperlipidemia   • Carpal tunnel syndrome of right wrist         Past Medical History:  Past Medical History:   Diagnosis Date   • Hepatitis A    • Hypertension    • Thyroid disease          Past Surgical History:  Past Surgical History:   Procedure Laterality Date   •  SECTION     • TUBAL ABDOMINAL LIGATION           Family History:  Family History   Problem Relation Age of Onset   • Diabetes Mother    • Hypertension Mother    • No Known Problems Father    • Gout Sister    • Osteoarthritis Maternal Grandmother    • Osteoporosis Maternal Grandmother    • Heart disease Maternal Grandmother    • Diabetes Maternal Grandmother    • Hypertension Maternal  Grandmother    • Heart disease Maternal Grandfather    • Diabetes Maternal Grandfather    • Hypertension Maternal Grandfather          Social History:  Social History     Socioeconomic History   • Marital status:      Spouse name: bea   • Number of children: 2   • Years of education: 12   • Highest education level: Not on file   Occupational History   • Occupation: unemployed   Social Needs   • Financial resource strain: Not hard at all   • Food insecurity:     Worry: Never true     Inability: Never true   • Transportation needs:     Medical: No     Non-medical: No   Tobacco Use   • Smoking status: Former Smoker   • Smokeless tobacco: Never Used   Substance and Sexual Activity   • Alcohol use: No   • Drug use: No   • Sexual activity: Defer   Lifestyle   • Physical activity:     Days per week: 0 days     Minutes per session: 0 min   • Stress: Only a little   Relationships   • Social connections:     Talks on phone: More than three times a week     Gets together: More than three times a week     Attends [employer] service: More than 4 times per year     Active member of club or organization: Yes     Attends meetings of clubs or organizations: 1 to 4 times per year     Relationship status:      There is no height or weight on file to calculate BMI.      Medications:  Current Outpatient Medications   Medication Sig Dispense Refill   • albuterol sulfate  (90 Base) MCG/ACT inhaler Inhale 2 puffs Every 4 (Four) Hours As Needed for Shortness of Air. 1 inhaler 5   • cetirizine (zyrTEC) 10 MG tablet Take 1 tablet by mouth Daily. 30 tablet 5   • fluticasone (FLONASE) 50 MCG/ACT nasal spray 2 sprays into the nostril(s) as directed by provider Daily. 18.2 mL 5   • Fluticasone Furoate-Vilanterol (BREO ELLIPTA) 100-25 MCG/INH inhaler 1 puff every morning 1 each 5   • ibuprofen (ADVIL,MOTRIN) 800 MG tablet TAKE 1 TABLET BY MOUTH EVERY 8 HOURS 90 tablet 0   • ketoconazole (NIZORAL) 2 % shampoo APPLY  EXTERNALLY TO THE AFFECTED AREA TWICE DAILY 120 mL 0   • losartan (COZAAR) 100 MG tablet Take 1 tablet by mouth Daily. 30 tablet 5   • metFORMIN ER (GLUCOPHAGE-XR) 500 MG 24 hr tablet Take 1 tablet by mouth Daily With Breakfast. 60 tablet 5   • ondansetron (ZOFRAN) 4 MG tablet Take 1 tablet by mouth Every 8 (Eight) Hours As Needed for Nausea or Vomiting. 90 tablet 3   • pantoprazole (PROTONIX) 20 MG EC tablet TAKE 1 TABLET BY MOUTH DAILY 30 tablet 0   • venlafaxine XR (EFFEXOR XR) 75 MG 24 hr capsule Take 1 capsule by mouth Daily. 30 capsule 5   • azithromycin (ZITHROMAX) 250 MG tablet Take 2 tablets the first day, then 1 tablet daily for 4 days. 6 tablet 0   • ketoconazole (NIZORAL) 2 % shampoo Apply  topically to the appropriate area as directed 2 (Two) Times a Week. to affected area 120 mL 5     No current facility-administered medications for this visit.          Allergies:  Allergies   Allergen Reactions   • Ultram [Tramadol] Swelling         Review of Systems:   Review of Systems   Constitutional: Positive for activity change and fatigue.   HENT: Negative.    Eyes: Negative.    Respiratory: Negative.    Cardiovascular: Negative.    Gastrointestinal: Negative.    Endocrine: Positive for cold intolerance and heat intolerance.   Genitourinary: Negative.    Musculoskeletal: Positive for arthralgias, back pain and joint swelling.   Skin: Negative.    Allergic/Immunologic: Negative.    Neurological: Positive for numbness.   Hematological: Negative.    Psychiatric/Behavioral: Positive for dysphoric mood. The patient is nervous/anxious.          Physical Exam:   Physical Exam   Constitutional: She is oriented to person, place, and time. No distress.   HENT:   Head: Normocephalic and atraumatic.   Right Ear: External ear normal.   Left Ear: External ear normal.   Eyes: Conjunctivae and EOM are normal. Right eye exhibits no discharge. Left eye exhibits no discharge.   Neck: Normal range of motion. Neck supple.      Cardiovascular: Normal rate, regular rhythm and normal heart sounds.   No murmur heard.  Pulmonary/Chest: Effort normal and breath sounds normal. No respiratory distress. She has no wheezes.   Abdominal: Soft. She exhibits no distension. There is no guarding.   Neurological: She is alert and oriented to person, place, and time.   Skin: Skin is warm and dry. Capillary refill takes less than 2 seconds. She is not diaphoretic.   Psychiatric: She has a normal mood and affect. Her behavior is normal. Judgment and thought content normal.   Nursing note and vitals reviewed.    GENERAL: 39 y.o. female, alert and oriented X 3 in no acute distress.   Visit Vitals  /87   Pulse 80   LMP  (LMP Unknown)       Musculoskeletal:   Right hand evaluation reveals normal thenar tone.  She has symmetrical  and pinch strength.  She demonstrates decreased sensation thumb index and middle fingers with positive Phalen sign negative Tinel sign.      Left hand evaluation reveals symmetrical thenar muscle compared to the right with slight decreased sensation to the index and thumb.      Cervical evaluation reveals full mobility without discomfort.    Radiology/Labs:   EMG nerve conduction studies obtained by report describes abnormal study with notes of bilateral carpal tunnel syndrome affecting both motor and sensory portions however cervical radiculopathy cannot be excluded and recommend clinical correlation with needle EMG nerve root screen of the upper extremities and cervical spine neuro imaging.    Cervical spine radiographs AP lateral by report and by review are negative.  Xr Spine Cervical 2 Or 3 View    Result Date: 10/2/2019  No acute or destructive cervical abnormality.  This report was finalized on 10/2/2019 1:24 PM by Dr. Evan Boateng MD.        Assessment & Plan:   39 y.o. female clinical presentation today consistent with carpal tunnel syndrome right much worse than the left.  Find no cervical findings on clinical  exam or plain radiographs to suggest a cervical source of her pain.  We discussed her options today she does not wish to repeat her nerve conduction studies.  I think it is reasonable to proceed with carpal tunnel release right hand given clinical findings today.  Surgery is scheduled [facility] October 15, 2019.  She will hold her ibuprofen.      ICD-10-CM ICD-9-CM   1. Carpal tunnel syndrome of right wrist G56.01 354.0   2. Abnormal EMG R94.131 794.17   3. Numbness and tingling in right hand R20.0 782.0    R20.2            Cc:   Rosa Quigley PA              This document has been electronically signed by Rony Cruz MD   October 3, 2019 2:06 PM

## 2019-10-03 ENCOUNTER — TELEPHONE (OUTPATIENT)
Dept: ORTHOPEDIC SURGERY | Facility: CLINIC | Age: 39
End: 2019-10-03

## 2019-10-03 PROBLEM — G56.01 CARPAL TUNNEL SYNDROME OF RIGHT WRIST: Status: ACTIVE | Noted: 2019-10-03

## 2019-10-10 DIAGNOSIS — L65.9 HAIR LOSS: ICD-10-CM

## 2019-10-10 RX ORDER — KETOCONAZOLE 20 MG/ML
SHAMPOO TOPICAL
Qty: 120 ML | Refills: 0 | Status: SHIPPED | OUTPATIENT
Start: 2019-10-10 | End: 2019-10-11 | Stop reason: SDUPTHER

## 2019-10-11 ENCOUNTER — APPOINTMENT (OUTPATIENT)
Dept: PREADMISSION TESTING | Facility: HOSPITAL | Age: 39
End: 2019-10-11

## 2019-10-11 DIAGNOSIS — G56.01 CARPAL TUNNEL SYNDROME OF RIGHT WRIST: ICD-10-CM

## 2019-10-11 LAB
ANION GAP SERPL CALCULATED.3IONS-SCNC: 11.7 MMOL/L (ref 5–15)
BUN BLD-MCNC: 8 MG/DL (ref 6–20)
BUN/CREAT SERPL: 13.8 (ref 7–25)
CALCIUM SPEC-SCNC: 8.6 MG/DL (ref 8.6–10.5)
CHLORIDE SERPL-SCNC: 101 MMOL/L (ref 98–107)
CO2 SERPL-SCNC: 25.3 MMOL/L (ref 22–29)
CREAT BLD-MCNC: 0.58 MG/DL (ref 0.57–1)
DEPRECATED RDW RBC AUTO: 45.1 FL (ref 37–54)
ERYTHROCYTE [DISTWIDTH] IN BLOOD BY AUTOMATED COUNT: 13.2 % (ref 12.3–15.4)
GFR SERPL CREATININE-BSD FRML MDRD: 115 ML/MIN/1.73
GLUCOSE BLD-MCNC: 119 MG/DL (ref 65–99)
HCT VFR BLD AUTO: 41.3 % (ref 34–46.6)
HGB BLD-MCNC: 13.6 G/DL (ref 12–15.9)
MCH RBC QN AUTO: 30.8 PG (ref 26.6–33)
MCHC RBC AUTO-ENTMCNC: 32.9 G/DL (ref 31.5–35.7)
MCV RBC AUTO: 93.4 FL (ref 79–97)
PLATELET # BLD AUTO: 198 10*3/MM3 (ref 140–450)
PMV BLD AUTO: 10 FL (ref 6–12)
POTASSIUM BLD-SCNC: 4 MMOL/L (ref 3.5–5.2)
RBC # BLD AUTO: 4.42 10*6/MM3 (ref 3.77–5.28)
SODIUM BLD-SCNC: 138 MMOL/L (ref 136–145)
WBC NRBC COR # BLD: 8.19 10*3/MM3 (ref 3.4–10.8)

## 2019-10-11 PROCEDURE — 36415 COLL VENOUS BLD VENIPUNCTURE: CPT

## 2019-10-11 PROCEDURE — 80048 BASIC METABOLIC PNL TOTAL CA: CPT | Performed by: ORTHOPAEDIC SURGERY

## 2019-10-11 PROCEDURE — 85027 COMPLETE CBC AUTOMATED: CPT | Performed by: ORTHOPAEDIC SURGERY

## 2019-10-11 NOTE — DISCHARGE INSTRUCTIONS
TAKE the following medications the morning of surgery:    All heart or blood pressure medications    Please discontinue all blood thinners and anticoagulants (except aspirin) prior to surgery as per your surgeon and cardiologist instructions.  Aspirin may be continued up to the day prior to surgery.    HOLD all diabetic medications the morning of surgery as order by physician.    Please follow instructions on use of prep cloths provided by nurse. Return instruction sheet to pre-op nurse on day of surgery.    Arrival time for surgery on 10/15/2019 is 0800 am.    General Instructions:  • Do NOT eat or drink after midnight 10/14/2019 which includes water, mints, or gum.  • You may brush your teeth. Dental appliances that are removable must be taken out day of surgery.  • Do NOT smoke, chew tobacco, or drink alcohol within 24 hours prior to surgery.  • Bring medications in original bottles, any inhalers and if applicable your C-PAP/BI-PAP machine  • Bring any papers given to you in the doctor’s office  • Wear clean, comfortable clothes and socks  • Do NOT wear contact lenses or make-up or dark nail polish.  Bring a case for your glasses if applicable.  • Bring crutches or walker if applicable  • Leave all other valuables and jewelry at home  • If you were given a blood bank armband, continue to wear it until discharged.    Preventing a Surgical Site Infection:  • Shower the night before surgery (unless instructed otherwise) using a fresh bar of anti-bacterial soap (such as Dial) and clean washcloth.  Dry with a clean towel and dress in clean clothing.  • For 2 to 3 days before surgery, avoid shaving with a razor near where you will have surgery because the razor can irritate skin and make it easier to develop an infection.  Ask your surgeon if you will be receiving antibiotics prior to surgery.  • Make sure you, your family, and all healthcare providers clean their hands with soap and water or an alcohol-based hand   before caring for you or your wound.  • If at all possible, quit smoking as many days before surgery as you can.    Day of Surgery:  Upon arrival, a pre-op nurse and anesthesiologist will review your health history, obtain vital signs, and answer questions you may have.  The only belongings needed at this time will be your home medications and if applicable you C-PAP/BI-PAP machine.  If you are staying overnight, your family can leave the rest of your belongings in the car and bring them to your room later.  A pre-op nurse will start an IV and you may receive medication in preparation for surgery.  Due to patient privacy and limited space, only one member of your family will be able to accompany you in the pre-op area.  While you are in surgery your family should notify the waiting room  if they leave the waiting room area and provide a contact number.  Please be aware that surgery does come with discomfort.  We want to make every effort to control your discomfort so please discuss any uncontrolled symptoms with your nurse.  Your doctor will most likely have prescribed pain medications.  If you are going home after surgery you will receive individualized written care instructions before being discharged.  A responsible adult must drive you to and from the hospital on the day of surgery and stay with you for 24 hours.  If you are staying overnight following surgery, you will be transported to your hospital room following the recovery period.

## 2019-10-14 ENCOUNTER — TELEPHONE (OUTPATIENT)
Dept: ORTHOPEDIC SURGERY | Facility: CLINIC | Age: 39
End: 2019-10-14

## 2019-10-14 NOTE — TELEPHONE ENCOUNTER
Notified patient of her arrival time for surgery 10/15/19 @ 11:00 am     Patient verbalized understanding.

## 2019-10-15 ENCOUNTER — ANESTHESIA (OUTPATIENT)
Dept: PERIOP | Facility: HOSPITAL | Age: 39
End: 2019-10-15

## 2019-10-15 ENCOUNTER — ANESTHESIA EVENT (OUTPATIENT)
Dept: PERIOP | Facility: HOSPITAL | Age: 39
End: 2019-10-15

## 2019-10-15 ENCOUNTER — HOSPITAL ENCOUNTER (OUTPATIENT)
Facility: HOSPITAL | Age: 39
Setting detail: HOSPITAL OUTPATIENT SURGERY
Discharge: HOME | End: 2019-10-15
Attending: ORTHOPAEDIC SURGERY | Admitting: ORTHOPAEDIC SURGERY

## 2019-10-15 VITALS
SYSTOLIC BLOOD PRESSURE: 110 MMHG | WEIGHT: 293 LBS | TEMPERATURE: 97.6 F | RESPIRATION RATE: 16 BRPM | BODY MASS INDEX: 47.09 KG/M2 | OXYGEN SATURATION: 96 % | DIASTOLIC BLOOD PRESSURE: 74 MMHG | HEIGHT: 66 IN | HEART RATE: 68 BPM

## 2019-10-15 DIAGNOSIS — G56.01 CARPAL TUNNEL SYNDROME OF RIGHT WRIST: ICD-10-CM

## 2019-10-15 LAB
B-HCG UR QL: NEGATIVE
INTERNAL NEGATIVE CONTROL: NEGATIVE
INTERNAL POSITIVE CONTROL: POSITIVE
Lab: NORMAL

## 2019-10-15 PROCEDURE — 25010000003 LIDOCAINE 1 % SOLUTION: Performed by: ORTHOPAEDIC SURGERY

## 2019-10-15 PROCEDURE — 25010000002 MIDAZOLAM PER 1 MG: Performed by: NURSE ANESTHETIST, CERTIFIED REGISTERED

## 2019-10-15 PROCEDURE — 25010000002 PROPOFOL 10 MG/ML EMULSION: Performed by: NURSE ANESTHETIST, CERTIFIED REGISTERED

## 2019-10-15 PROCEDURE — 25010000002 ONDANSETRON PER 1 MG: Performed by: NURSE ANESTHETIST, CERTIFIED REGISTERED

## 2019-10-15 PROCEDURE — 64721 CARPAL TUNNEL SURGERY: CPT | Performed by: ORTHOPAEDIC SURGERY

## 2019-10-15 PROCEDURE — 25010000002 FENTANYL CITRATE (PF) 100 MCG/2ML SOLUTION: Performed by: NURSE ANESTHETIST, CERTIFIED REGISTERED

## 2019-10-15 PROCEDURE — 94799 UNLISTED PULMONARY SVC/PX: CPT

## 2019-10-15 PROCEDURE — 81025 URINE PREGNANCY TEST: CPT | Performed by: ANESTHESIOLOGY

## 2019-10-15 RX ORDER — MIDAZOLAM HYDROCHLORIDE 1 MG/ML
INJECTION INTRAMUSCULAR; INTRAVENOUS AS NEEDED
Status: DISCONTINUED | OUTPATIENT
Start: 2019-10-15 | End: 2019-10-15 | Stop reason: SURG

## 2019-10-15 RX ORDER — MIDAZOLAM HYDROCHLORIDE 1 MG/ML
2 INJECTION INTRAMUSCULAR; INTRAVENOUS
Status: DISCONTINUED | OUTPATIENT
Start: 2019-10-15 | End: 2019-10-15 | Stop reason: HOSPADM

## 2019-10-15 RX ORDER — SODIUM CHLORIDE, SODIUM LACTATE, POTASSIUM CHLORIDE, CALCIUM CHLORIDE 600; 310; 30; 20 MG/100ML; MG/100ML; MG/100ML; MG/100ML
125 INJECTION, SOLUTION INTRAVENOUS CONTINUOUS
Status: DISCONTINUED | OUTPATIENT
Start: 2019-10-15 | End: 2019-10-15 | Stop reason: HOSPADM

## 2019-10-15 RX ORDER — FAMOTIDINE 10 MG/ML
INJECTION, SOLUTION INTRAVENOUS AS NEEDED
Status: DISCONTINUED | OUTPATIENT
Start: 2019-10-15 | End: 2019-10-15 | Stop reason: SURG

## 2019-10-15 RX ORDER — MIDAZOLAM HYDROCHLORIDE 1 MG/ML
1 INJECTION INTRAMUSCULAR; INTRAVENOUS
Status: DISCONTINUED | OUTPATIENT
Start: 2019-10-15 | End: 2019-10-15 | Stop reason: HOSPADM

## 2019-10-15 RX ORDER — MAGNESIUM HYDROXIDE 1200 MG/15ML
LIQUID ORAL AS NEEDED
Status: DISCONTINUED | OUTPATIENT
Start: 2019-10-15 | End: 2019-10-15 | Stop reason: HOSPADM

## 2019-10-15 RX ORDER — FENTANYL CITRATE 50 UG/ML
50 INJECTION, SOLUTION INTRAMUSCULAR; INTRAVENOUS
Status: DISCONTINUED | OUTPATIENT
Start: 2019-10-15 | End: 2019-10-15 | Stop reason: HOSPADM

## 2019-10-15 RX ORDER — ONDANSETRON 2 MG/ML
INJECTION INTRAMUSCULAR; INTRAVENOUS AS NEEDED
Status: DISCONTINUED | OUTPATIENT
Start: 2019-10-15 | End: 2019-10-15 | Stop reason: SURG

## 2019-10-15 RX ORDER — SODIUM CHLORIDE 0.9 % (FLUSH) 0.9 %
3-10 SYRINGE (ML) INJECTION AS NEEDED
Status: DISCONTINUED | OUTPATIENT
Start: 2019-10-15 | End: 2019-10-15 | Stop reason: HOSPADM

## 2019-10-15 RX ORDER — BUPIVACAINE HYDROCHLORIDE 5 MG/ML
INJECTION, SOLUTION PERINEURAL AS NEEDED
Status: DISCONTINUED | OUTPATIENT
Start: 2019-10-15 | End: 2019-10-15 | Stop reason: HOSPADM

## 2019-10-15 RX ORDER — LIDOCAINE HYDROCHLORIDE 20 MG/ML
INJECTION, SOLUTION INFILTRATION; PERINEURAL AS NEEDED
Status: DISCONTINUED | OUTPATIENT
Start: 2019-10-15 | End: 2019-10-15 | Stop reason: SURG

## 2019-10-15 RX ORDER — PROPOFOL 10 MG/ML
VIAL (ML) INTRAVENOUS AS NEEDED
Status: DISCONTINUED | OUTPATIENT
Start: 2019-10-15 | End: 2019-10-15 | Stop reason: SURG

## 2019-10-15 RX ORDER — ONDANSETRON 2 MG/ML
4 INJECTION INTRAMUSCULAR; INTRAVENOUS AS NEEDED
Status: DISCONTINUED | OUTPATIENT
Start: 2019-10-15 | End: 2019-10-15 | Stop reason: HOSPADM

## 2019-10-15 RX ORDER — SODIUM CHLORIDE 0.9 % (FLUSH) 0.9 %
3 SYRINGE (ML) INJECTION EVERY 12 HOURS SCHEDULED
Status: DISCONTINUED | OUTPATIENT
Start: 2019-10-15 | End: 2019-10-15 | Stop reason: HOSPADM

## 2019-10-15 RX ORDER — MEPERIDINE HYDROCHLORIDE 25 MG/ML
12.5 INJECTION INTRAMUSCULAR; INTRAVENOUS; SUBCUTANEOUS
Status: DISCONTINUED | OUTPATIENT
Start: 2019-10-15 | End: 2019-10-15 | Stop reason: HOSPADM

## 2019-10-15 RX ORDER — FENTANYL CITRATE 50 UG/ML
INJECTION, SOLUTION INTRAMUSCULAR; INTRAVENOUS AS NEEDED
Status: DISCONTINUED | OUTPATIENT
Start: 2019-10-15 | End: 2019-10-15 | Stop reason: SURG

## 2019-10-15 RX ORDER — LIDOCAINE HYDROCHLORIDE 10 MG/ML
INJECTION, SOLUTION INFILTRATION; PERINEURAL AS NEEDED
Status: DISCONTINUED | OUTPATIENT
Start: 2019-10-15 | End: 2019-10-15 | Stop reason: HOSPADM

## 2019-10-15 RX ORDER — OXYCODONE HYDROCHLORIDE AND ACETAMINOPHEN 5; 325 MG/1; MG/1
1-2 TABLET ORAL EVERY 4 HOURS PRN
Qty: 20 TABLET | Refills: 0 | Status: SHIPPED | OUTPATIENT
Start: 2019-10-15 | End: 2019-12-16

## 2019-10-15 RX ORDER — IPRATROPIUM BROMIDE AND ALBUTEROL SULFATE 2.5; .5 MG/3ML; MG/3ML
3 SOLUTION RESPIRATORY (INHALATION) ONCE AS NEEDED
Status: DISCONTINUED | OUTPATIENT
Start: 2019-10-15 | End: 2019-10-15 | Stop reason: HOSPADM

## 2019-10-15 RX ADMIN — ONDANSETRON 4 MG: 2 INJECTION INTRAMUSCULAR; INTRAVENOUS at 14:18

## 2019-10-15 RX ADMIN — FAMOTIDINE 20 MG: 10 INJECTION INTRAVENOUS at 14:18

## 2019-10-15 RX ADMIN — FENTANYL CITRATE 50 MCG: 50 INJECTION INTRAMUSCULAR; INTRAVENOUS at 14:40

## 2019-10-15 RX ADMIN — PROPOFOL 30 MG: 10 INJECTION, EMULSION INTRAVENOUS at 14:23

## 2019-10-15 RX ADMIN — MIDAZOLAM HYDROCHLORIDE 2 MG: 1 INJECTION, SOLUTION INTRAMUSCULAR; INTRAVENOUS at 14:18

## 2019-10-15 RX ADMIN — SODIUM CHLORIDE, POTASSIUM CHLORIDE, SODIUM LACTATE AND CALCIUM CHLORIDE: 600; 310; 30; 20 INJECTION, SOLUTION INTRAVENOUS at 14:52

## 2019-10-15 RX ADMIN — LIDOCAINE HYDROCHLORIDE 60 MG: 20 INJECTION, SOLUTION INFILTRATION; PERINEURAL at 14:18

## 2019-10-15 RX ADMIN — PROPOFOL 150 MCG/KG/MIN: 10 INJECTION, EMULSION INTRAVENOUS at 14:23

## 2019-10-15 RX ADMIN — FENTANYL CITRATE 50 MCG: 50 INJECTION INTRAMUSCULAR; INTRAVENOUS at 14:52

## 2019-10-15 RX ADMIN — SODIUM CHLORIDE, POTASSIUM CHLORIDE, SODIUM LACTATE AND CALCIUM CHLORIDE 125 ML/HR: 600; 310; 30; 20 INJECTION, SOLUTION INTRAVENOUS at 13:32

## 2019-10-15 NOTE — ANESTHESIA POSTPROCEDURE EVALUATION
Patient: Anika Alegria    Procedure Summary     Date:  10/15/19 Room / Location:  [facility] OR 03 /  [facility] OR    Anesthesia Start:  1418 Anesthesia Stop:  1458    Procedure:  CARPAL TUNNEL RELEASE (Right Wrist) Diagnosis:       Carpal tunnel syndrome of right wrist      (Carpal tunnel syndrome of right wrist [G56.01])    Surgeon:  Rony Cruz MD Provider:  Hilario Isabel MD    Anesthesia Type:  MAC ASA Status:  3          Anesthesia Type: MAC  Last vitals  BP   109/72 (10/15/19 1521)   Temp   97.6 °F (36.4 °C) (10/15/19 1521)   Pulse   70 (10/15/19 1521)   Resp   16 (10/15/19 1521)     SpO2   96 % (10/15/19 1521)     Post Anesthesia Care and Evaluation    Patient location during evaluation: PHASE II  Patient participation: complete - patient participated  Level of consciousness: awake and alert  Pain score: 1  Pain management: adequate  Airway patency: patent  Anesthetic complications: No anesthetic complications  PONV Status: controlled  Cardiovascular status: acceptable  Respiratory status: acceptable  Hydration status: acceptable

## 2019-10-15 NOTE — ANESTHESIA PREPROCEDURE EVALUATION
Anesthesia Evaluation     history of anesthetic complications:  NPO Solid Status: > 8 hours  NPO Liquid Status: > 8 hours           Airway   Mallampati: II  TM distance: >3 FB  Neck ROM: full  No difficulty expected  Dental - normal exam     Pulmonary - normal exam   Cardiovascular - normal exam    (+) hypertension, hyperlipidemia,       Neuro/Psych  (+) psychiatric history,     GI/Hepatic/Renal/Endo    (+)  GERD,  hepatitis A, liver disease,     Musculoskeletal     Abdominal  - normal exam   Substance History      OB/GYN          Other                        Anesthesia Plan    ASA 3     MAC     intravenous induction   Anesthetic plan, all risks, benefits, and alternatives have been provided, discussed and informed consent has been obtained with: patient.

## 2019-10-15 NOTE — OP NOTE
CARPAL TUNNEL RELEASE  Procedure Note    Anika Alegria  10/15/2019    Pre-op Diagnosis:   Carpal tunnel syndrome of right wrist [G56.01]    Post-op Diagnosis:     Post-Op Diagnosis Codes:     * Carpal tunnel syndrome of right wrist [G56.01]    Procedure(s):  CARPAL TUNNEL RELEASE right    Surgeon(s):  Rony Cruz MD    Anesthesia: General/local    Operative technique: With patient in the operating theatre under IV sedation with the right hand and tourniquet the right upper extremity sterilely prepped and draped in usual manner tourniquet inflated to 200 mmHg.  Palmar aspect of right hand was infiltrated with 5 cc of 0.5 Marcaine.  Longitudinal incision then made from the distal wrist crease extending distally 5 cm in length.  With palmar fascia exposed and divided exposing underlying transverse carpal ligament which was then released from distal to proximal protecting underlying median nerve.  Once transverse carpal ligament completely released tourniquet was deflated median nerve noted to be severely hyperemic.  With tourniquet deflated hemostasis was obtained with electrocautery the wound closed in a single layer 3-0 nylon vertical mattress suture sterile dressing applied patient taken to recovery in stable condition.    Staff:   Circulator: Ramiro Umana RN  Scrub Person: Amisha Tse  Assistant: Trung Goodman    Estimated Blood Loss: None    Specimens:   none             * No orders in the log *    Implants/Grafts: none      Drains: None      Complications: none    Tourniquet time: 8  min    Rony Cruz MD     Date: 10/15/2019  Time: 2:59 PM    Cc: Rosa Quigley PA

## 2019-10-24 DIAGNOSIS — E28.2 PCOS (POLYCYSTIC OVARIAN SYNDROME): ICD-10-CM

## 2019-10-25 RX ORDER — METFORMIN HYDROCHLORIDE 500 MG/1
TABLET, EXTENDED RELEASE ORAL
Qty: 60 TABLET | Refills: 0 | Status: SHIPPED | OUTPATIENT
Start: 2019-10-25 | End: 2019-12-16

## 2019-10-27 DIAGNOSIS — F33.1 MODERATE EPISODE OF RECURRENT MAJOR DEPRESSIVE DISORDER (HCC): ICD-10-CM

## 2019-10-28 ENCOUNTER — OFFICE VISIT (OUTPATIENT)
Dept: ORTHOPEDIC SURGERY | Facility: CLINIC | Age: 39
End: 2019-10-28

## 2019-10-28 VITALS — BODY MASS INDEX: 47.09 KG/M2 | HEIGHT: 66 IN | WEIGHT: 293 LBS

## 2019-10-28 DIAGNOSIS — Z09 POSTOP CHECK: Primary | ICD-10-CM

## 2019-10-28 PROCEDURE — 99024 POSTOP FOLLOW-UP VISIT: CPT | Performed by: ORTHOPAEDIC SURGERY

## 2019-10-28 RX ORDER — FLUOXETINE HYDROCHLORIDE 40 MG/1
40 CAPSULE ORAL DAILY
Qty: 30 CAPSULE | Refills: 0 | Status: SHIPPED | OUTPATIENT
Start: 2019-10-28 | End: 2019-11-21

## 2019-11-20 DIAGNOSIS — F41.1 GAD (GENERALIZED ANXIETY DISORDER): ICD-10-CM

## 2019-11-21 RX ORDER — VENLAFAXINE HYDROCHLORIDE 75 MG/1
75 CAPSULE, EXTENDED RELEASE ORAL DAILY
Qty: 30 CAPSULE | Refills: 0 | Status: SHIPPED | OUTPATIENT
Start: 2019-11-21 | End: 2019-12-16 | Stop reason: SDUPTHER

## 2019-11-26 DIAGNOSIS — J40 BRONCHITIS: ICD-10-CM

## 2019-11-26 RX ORDER — ALBUTEROL SULFATE 90 UG/1
AEROSOL, METERED RESPIRATORY (INHALATION)
Qty: 18 G | Refills: 0 | Status: SHIPPED | OUTPATIENT
Start: 2019-11-26 | End: 2019-12-16 | Stop reason: SDUPTHER

## 2019-11-27 DIAGNOSIS — R06.02 SHORTNESS OF BREATH: ICD-10-CM

## 2019-12-02 RX ORDER — ALBUTEROL SULFATE 90 UG/1
AEROSOL, METERED RESPIRATORY (INHALATION)
Qty: 18 G | Refills: 0 | Status: SHIPPED | OUTPATIENT
Start: 2019-12-02 | End: 2019-12-16

## 2019-12-09 ENCOUNTER — LAB (OUTPATIENT)
Dept: FAMILY MEDICINE CLINIC | Facility: CLINIC | Age: 39
End: 2019-12-09

## 2019-12-09 DIAGNOSIS — F33.1 MODERATE EPISODE OF RECURRENT MAJOR DEPRESSIVE DISORDER (HCC): Primary | ICD-10-CM

## 2019-12-09 DIAGNOSIS — K21.9 GASTROESOPHAGEAL REFLUX DISEASE WITHOUT ESOPHAGITIS: ICD-10-CM

## 2019-12-09 DIAGNOSIS — I10 ESSENTIAL HYPERTENSION: ICD-10-CM

## 2019-12-09 DIAGNOSIS — E28.2 PCOS (POLYCYSTIC OVARIAN SYNDROME): ICD-10-CM

## 2019-12-09 DIAGNOSIS — R73.9 HYPERGLYCEMIA: ICD-10-CM

## 2019-12-09 DIAGNOSIS — L65.9 HAIR LOSS: ICD-10-CM

## 2019-12-09 DIAGNOSIS — R73.03 PREDIABETES: ICD-10-CM

## 2019-12-09 DIAGNOSIS — J30.1 CHRONIC SEASONAL ALLERGIC RHINITIS DUE TO POLLEN: ICD-10-CM

## 2019-12-09 LAB
ALBUMIN SERPL-MCNC: 3.8 G/DL (ref 3.5–5.2)
ALBUMIN/GLOB SERPL: 1 G/DL
ALP SERPL-CCNC: 89 U/L (ref 39–117)
ALT SERPL W P-5'-P-CCNC: 54 U/L (ref 1–33)
ANION GAP SERPL CALCULATED.3IONS-SCNC: 11.8 MMOL/L (ref 5–15)
AST SERPL-CCNC: 42 U/L (ref 1–32)
BASOPHILS # BLD AUTO: 0.05 10*3/MM3 (ref 0–0.2)
BASOPHILS NFR BLD AUTO: 0.6 % (ref 0–1.5)
BILIRUB SERPL-MCNC: 0.5 MG/DL (ref 0.2–1.2)
BUN BLD-MCNC: 8 MG/DL (ref 6–20)
BUN/CREAT SERPL: 13.8 (ref 7–25)
CALCIUM SPEC-SCNC: 8.9 MG/DL (ref 8.6–10.5)
CHLORIDE SERPL-SCNC: 100 MMOL/L (ref 98–107)
CHOLEST SERPL-MCNC: 169 MG/DL (ref 0–200)
CO2 SERPL-SCNC: 26.2 MMOL/L (ref 22–29)
CREAT BLD-MCNC: 0.58 MG/DL (ref 0.57–1)
DEPRECATED RDW RBC AUTO: 42.4 FL (ref 37–54)
EOSINOPHIL # BLD AUTO: 0.13 10*3/MM3 (ref 0–0.4)
EOSINOPHIL NFR BLD AUTO: 1.7 % (ref 0.3–6.2)
ERYTHROCYTE [DISTWIDTH] IN BLOOD BY AUTOMATED COUNT: 12.8 % (ref 12.3–15.4)
GFR SERPL CREATININE-BSD FRML MDRD: 115 ML/MIN/1.73
GLOBULIN UR ELPH-MCNC: 3.8 GM/DL
GLUCOSE BLD-MCNC: 123 MG/DL (ref 65–99)
HBA1C MFR BLD: 6.51 % (ref 4.8–5.6)
HCT VFR BLD AUTO: 39.1 % (ref 34–46.6)
HDLC SERPL-MCNC: 43 MG/DL (ref 40–60)
HGB BLD-MCNC: 13.3 G/DL (ref 12–15.9)
IMM GRANULOCYTES # BLD AUTO: 0.04 10*3/MM3 (ref 0–0.05)
IMM GRANULOCYTES NFR BLD AUTO: 0.5 % (ref 0–0.5)
LDLC SERPL CALC-MCNC: 81 MG/DL (ref 0–100)
LDLC/HDLC SERPL: 1.87 {RATIO}
LYMPHOCYTES # BLD AUTO: 3.41 10*3/MM3 (ref 0.7–3.1)
LYMPHOCYTES NFR BLD AUTO: 44.1 % (ref 19.6–45.3)
MCH RBC QN AUTO: 30.9 PG (ref 26.6–33)
MCHC RBC AUTO-ENTMCNC: 34 G/DL (ref 31.5–35.7)
MCV RBC AUTO: 90.7 FL (ref 79–97)
MONOCYTES # BLD AUTO: 0.68 10*3/MM3 (ref 0.1–0.9)
MONOCYTES NFR BLD AUTO: 8.8 % (ref 5–12)
NEUTROPHILS # BLD AUTO: 3.43 10*3/MM3 (ref 1.7–7)
NEUTROPHILS NFR BLD AUTO: 44.3 % (ref 42.7–76)
NRBC BLD AUTO-RTO: 0 /100 WBC (ref 0–0.2)
PLATELET # BLD AUTO: 196 10*3/MM3 (ref 140–450)
PMV BLD AUTO: 10.1 FL (ref 6–12)
POTASSIUM BLD-SCNC: 4.5 MMOL/L (ref 3.5–5.2)
PROT SERPL-MCNC: 7.6 G/DL (ref 6–8.5)
RBC # BLD AUTO: 4.31 10*6/MM3 (ref 3.77–5.28)
SODIUM BLD-SCNC: 138 MMOL/L (ref 136–145)
TRIGL SERPL-MCNC: 227 MG/DL (ref 0–150)
VLDLC SERPL-MCNC: 45.4 MG/DL (ref 5–40)
WBC NRBC COR # BLD: 7.74 10*3/MM3 (ref 3.4–10.8)

## 2019-12-09 PROCEDURE — 36415 COLL VENOUS BLD VENIPUNCTURE: CPT | Performed by: PHYSICIAN ASSISTANT

## 2019-12-09 PROCEDURE — 83036 HEMOGLOBIN GLYCOSYLATED A1C: CPT | Performed by: PHYSICIAN ASSISTANT

## 2019-12-09 PROCEDURE — 85025 COMPLETE CBC W/AUTO DIFF WBC: CPT | Performed by: PHYSICIAN ASSISTANT

## 2019-12-09 PROCEDURE — 80061 LIPID PANEL: CPT | Performed by: PHYSICIAN ASSISTANT

## 2019-12-09 PROCEDURE — 80053 COMPREHEN METABOLIC PANEL: CPT | Performed by: PHYSICIAN ASSISTANT

## 2019-12-09 RX ORDER — FLUTICASONE PROPIONATE 50 MCG
SPRAY, SUSPENSION (ML) NASAL
Qty: 16 ML | Refills: 0 | Status: SHIPPED | OUTPATIENT
Start: 2019-12-09 | End: 2019-12-16 | Stop reason: SDUPTHER

## 2019-12-15 DIAGNOSIS — J40 BRONCHITIS: ICD-10-CM

## 2019-12-16 ENCOUNTER — OFFICE VISIT (OUTPATIENT)
Dept: FAMILY MEDICINE CLINIC | Facility: CLINIC | Age: 39
End: 2019-12-16

## 2019-12-16 VITALS
SYSTOLIC BLOOD PRESSURE: 124 MMHG | WEIGHT: 293 LBS | TEMPERATURE: 98.8 F | DIASTOLIC BLOOD PRESSURE: 74 MMHG | HEART RATE: 90 BPM | OXYGEN SATURATION: 96 % | HEIGHT: 66 IN | BODY MASS INDEX: 47.09 KG/M2

## 2019-12-16 DIAGNOSIS — E28.2 PCOS (POLYCYSTIC OVARIAN SYNDROME): ICD-10-CM

## 2019-12-16 DIAGNOSIS — Z12.31 ENCOUNTER FOR SCREENING MAMMOGRAM FOR BREAST CANCER: ICD-10-CM

## 2019-12-16 DIAGNOSIS — J30.1 CHRONIC SEASONAL ALLERGIC RHINITIS DUE TO POLLEN: ICD-10-CM

## 2019-12-16 DIAGNOSIS — F41.1 GAD (GENERALIZED ANXIETY DISORDER): ICD-10-CM

## 2019-12-16 DIAGNOSIS — K21.00 GASTROESOPHAGEAL REFLUX DISEASE WITH ESOPHAGITIS: ICD-10-CM

## 2019-12-16 DIAGNOSIS — I10 ESSENTIAL HYPERTENSION: Primary | ICD-10-CM

## 2019-12-16 DIAGNOSIS — J30.1 ALLERGIC RHINITIS DUE TO POLLEN, UNSPECIFIED SEASONALITY: ICD-10-CM

## 2019-12-16 DIAGNOSIS — R06.02 SHORTNESS OF BREATH: ICD-10-CM

## 2019-12-16 DIAGNOSIS — L65.9 HAIR LOSS: ICD-10-CM

## 2019-12-16 PROCEDURE — 99214 OFFICE O/P EST MOD 30 MIN: CPT | Performed by: PHYSICIAN ASSISTANT

## 2019-12-16 RX ORDER — IBUPROFEN 800 MG/1
800 TABLET ORAL EVERY 8 HOURS
Qty: 90 TABLET | Refills: 1 | Status: SHIPPED | OUTPATIENT
Start: 2019-12-16 | End: 2020-04-30 | Stop reason: SDUPTHER

## 2019-12-16 RX ORDER — VENLAFAXINE HYDROCHLORIDE 75 MG/1
75 CAPSULE, EXTENDED RELEASE ORAL DAILY
Qty: 30 CAPSULE | Refills: 0 | Status: SHIPPED | OUTPATIENT
Start: 2019-12-16 | End: 2020-01-13

## 2019-12-16 RX ORDER — METFORMIN HYDROCHLORIDE 500 MG/1
500 TABLET, EXTENDED RELEASE ORAL 2 TIMES DAILY WITH MEALS
Qty: 60 TABLET | Refills: 5 | Status: SHIPPED | OUTPATIENT
Start: 2019-12-16 | End: 2020-04-30

## 2019-12-16 RX ORDER — ALBUTEROL SULFATE 90 UG/1
AEROSOL, METERED RESPIRATORY (INHALATION)
Qty: 18 G | Refills: 0 | Status: SHIPPED | OUTPATIENT
Start: 2019-12-16 | End: 2020-04-30 | Stop reason: SDUPTHER

## 2019-12-16 RX ORDER — CETIRIZINE HYDROCHLORIDE 10 MG/1
10 TABLET ORAL DAILY
Qty: 30 TABLET | Refills: 5 | Status: SHIPPED | OUTPATIENT
Start: 2019-12-16 | End: 2020-04-30 | Stop reason: SDUPTHER

## 2019-12-16 RX ORDER — PANTOPRAZOLE SODIUM 20 MG/1
20 TABLET, DELAYED RELEASE ORAL DAILY
Qty: 30 TABLET | Refills: 0 | Status: SHIPPED | OUTPATIENT
Start: 2019-12-16 | End: 2020-03-09

## 2019-12-16 RX ORDER — FLUTICASONE PROPIONATE 50 MCG
2 SPRAY, SUSPENSION (ML) NASAL DAILY
Qty: 16 ML | Refills: 5 | Status: SHIPPED | OUTPATIENT
Start: 2019-12-16 | End: 2020-04-30 | Stop reason: SDUPTHER

## 2019-12-16 RX ORDER — LOSARTAN POTASSIUM 100 MG/1
100 TABLET ORAL DAILY
Qty: 30 TABLET | Refills: 5 | Status: SHIPPED | OUTPATIENT
Start: 2019-12-16 | End: 2020-05-18

## 2019-12-16 RX ORDER — ALBUTEROL SULFATE 90 UG/1
2 AEROSOL, METERED RESPIRATORY (INHALATION) EVERY 4 HOURS PRN
Qty: 18 G | Refills: 5 | Status: SHIPPED | OUTPATIENT
Start: 2019-12-16 | End: 2020-03-11

## 2019-12-16 RX ORDER — KETOCONAZOLE 20 MG/ML
SHAMPOO TOPICAL 2 TIMES WEEKLY
Qty: 120 ML | Refills: 5 | Status: SHIPPED | OUTPATIENT
Start: 2019-12-16 | End: 2020-07-14 | Stop reason: SDUPTHER

## 2019-12-16 NOTE — PROGRESS NOTES
"Sravanthi Alegria is a 39 y.o. female.       Chief Complaint -hypertension    History of Present Illness -      Hypertension-  Controlled with losartan    Allergic rhinitis-stable with Zyrtec and Flonase    Alopecia-  She complains of hair loss due to stress.  Significant improvement since she started using ketoconazole shampoo as well as taking Effexor to reduce stress levels    PCOS-stable with metformin    Gastroesophageal reflux disease-stable with Protonix    Generalized anxiety disorder-  Improved with Effexor.  She states she has not had any panic attacks since she started taking Effexor.      The following portions of the patient's history were reviewed and updated as appropriate: allergies, current medications, past family history, past medical history, past social history, past surgical history and problem list.    Review of Systems   Constitutional: Negative for activity change, appetite change, fatigue and fever.   HENT: Negative for ear pain, sinus pressure and sore throat.    Eyes: Negative for pain and visual disturbance.   Respiratory: Negative for cough and chest tightness.    Cardiovascular: Negative for chest pain and palpitations.   Gastrointestinal: Negative for abdominal pain, constipation, diarrhea, nausea and vomiting.   Endocrine: Negative for polydipsia and polyuria.   Genitourinary: Negative for dysuria and frequency.   Musculoskeletal: Negative for back pain and myalgias.   Skin: Negative for color change and rash.        Hair loss   Allergic/Immunologic: Negative for food allergies and immunocompromised state.   Neurological: Negative for dizziness, syncope and headaches.   Hematological: Negative for adenopathy. Does not bruise/bleed easily.   Psychiatric/Behavioral: Negative for hallucinations and suicidal ideas. The patient is nervous/anxious.        /74   Pulse 90   Temp 98.8 °F (37.1 °C) (Oral)   Ht 167.6 cm (65.98\")   Wt (!) 167 kg (369 lb)   LMP  (LMP Unknown)  "  SpO2 96%   BMI 59.59 kg/m²   Lab on 12/09/2019   Component Date Value Ref Range Status   • Glucose 12/09/2019 123* 65 - 99 mg/dL Final   • BUN 12/09/2019 8  6 - 20 mg/dL Final   • Creatinine 12/09/2019 0.58  0.57 - 1.00 mg/dL Final   • Sodium 12/09/2019 138  136 - 145 mmol/L Final   • Potassium 12/09/2019 4.5  3.5 - 5.2 mmol/L Final   • Chloride 12/09/2019 100  98 - 107 mmol/L Final   • CO2 12/09/2019 26.2  22.0 - 29.0 mmol/L Final   • Calcium 12/09/2019 8.9  8.6 - 10.5 mg/dL Final   • Total Protein 12/09/2019 7.6  6.0 - 8.5 g/dL Final   • Albumin 12/09/2019 3.80  3.50 - 5.20 g/dL Final   • ALT (SGPT) 12/09/2019 54* 1 - 33 U/L Final   • AST (SGOT) 12/09/2019 42* 1 - 32 U/L Final   • Alkaline Phosphatase 12/09/2019 89  39 - 117 U/L Final   • Total Bilirubin 12/09/2019 0.5  0.2 - 1.2 mg/dL Final   • eGFR Non African Amer 12/09/2019 115  >60 mL/min/1.73 Final   • Globulin 12/09/2019 3.8  gm/dL Final   • A/G Ratio 12/09/2019 1.0  g/dL Final   • BUN/Creatinine Ratio 12/09/2019 13.8  7.0 - 25.0 Final   • Anion Gap 12/09/2019 11.8  5.0 - 15.0 mmol/L Final   • Total Cholesterol 12/09/2019 169  0 - 200 mg/dL Final   • Triglycerides 12/09/2019 227* 0 - 150 mg/dL Final   • HDL Cholesterol 12/09/2019 43  40 - 60 mg/dL Final   • LDL Cholesterol  12/09/2019 81  0 - 100 mg/dL Final   • VLDL Cholesterol 12/09/2019 45.4* 5 - 40 mg/dL Final   • LDL/HDL Ratio 12/09/2019 1.87   Final   • Hemoglobin A1C 12/09/2019 6.51* 4.80 - 5.60 % Final   • WBC 12/09/2019 7.74  3.40 - 10.80 10*3/mm3 Final   • RBC 12/09/2019 4.31  3.77 - 5.28 10*6/mm3 Final   • Hemoglobin 12/09/2019 13.3  12.0 - 15.9 g/dL Final   • Hematocrit 12/09/2019 39.1  34.0 - 46.6 % Final   • MCV 12/09/2019 90.7  79.0 - 97.0 fL Final   • MCH 12/09/2019 30.9  26.6 - 33.0 pg Final   • MCHC 12/09/2019 34.0  31.5 - 35.7 g/dL Final   • RDW 12/09/2019 12.8  12.3 - 15.4 % Final   • RDW-SD 12/09/2019 42.4  37.0 - 54.0 fl Final   • MPV 12/09/2019 10.1  6.0 - 12.0 fL Final   •  Platelets 12/09/2019 196  140 - 450 10*3/mm3 Final   • Neutrophil % 12/09/2019 44.3  42.7 - 76.0 % Final   • Lymphocyte % 12/09/2019 44.1  19.6 - 45.3 % Final   • Monocyte % 12/09/2019 8.8  5.0 - 12.0 % Final   • Eosinophil % 12/09/2019 1.7  0.3 - 6.2 % Final   • Basophil % 12/09/2019 0.6  0.0 - 1.5 % Final   • Immature Grans % 12/09/2019 0.5  0.0 - 0.5 % Final   • Neutrophils, Absolute 12/09/2019 3.43  1.70 - 7.00 10*3/mm3 Final   • Lymphocytes, Absolute 12/09/2019 3.41* 0.70 - 3.10 10*3/mm3 Final   • Monocytes, Absolute 12/09/2019 0.68  0.10 - 0.90 10*3/mm3 Final   • Eosinophils, Absolute 12/09/2019 0.13  0.00 - 0.40 10*3/mm3 Final   • Basophils, Absolute 12/09/2019 0.05  0.00 - 0.20 10*3/mm3 Final   • Immature Grans, Absolute 12/09/2019 0.04  0.00 - 0.05 10*3/mm3 Final   • nRBC 12/09/2019 0.0  0.0 - 0.2 /100 WBC Final       Physical Exam   Constitutional: She is oriented to person, place, and time. No distress.   Obese pleasant lady   HENT:   Head: Normocephalic and atraumatic.   Nose: Nose normal.   Mouth/Throat: Oropharynx is clear and moist.   Eyes: Pupils are equal, round, and reactive to light. Conjunctivae and EOM are normal.   Neck: Normal range of motion. Neck supple. No tracheal deviation present. No thyromegaly present.   Cardiovascular: Normal rate, regular rhythm, normal heart sounds and intact distal pulses.   No murmur heard.  Pulmonary/Chest: Effort normal and breath sounds normal. No respiratory distress. She has no wheezes.   Abdominal: Soft. Bowel sounds are normal. There is no tenderness. There is no guarding.   Musculoskeletal: Normal range of motion. She exhibits no edema or tenderness.   Lymphadenopathy:     She has no cervical adenopathy.   Neurological: She is alert and oriented to person, place, and time.   Skin: Skin is warm and dry. No rash noted. She is not diaphoretic.   Psychiatric: She has a normal mood and affect. Her behavior is normal.   Nursing note and vitals  reviewed.      Assessment/Plan     Diagnoses and all orders for this visit:    Essential hypertension  Comments:  Continue Cozaar 100mg qd  Orders:  -     losartan (COZAAR) 100 MG tablet; Take 1 tablet by mouth Daily.    Allergic rhinitis due to pollen, unspecified seasonality  Comments:  Continue Zyrtec and Flonase  Orders:  -     cetirizine (zyrTEC) 10 MG tablet; Take 1 tablet by mouth Daily.    Chronic seasonal allergic rhinitis due to pollen  Comments:  Continue Zyrtec and Flonase  Orders:  -     fluticasone (FLONASE) 50 MCG/ACT nasal spray; 2 sprays into the nostril(s) as directed by provider Daily.    Hair loss  -     ketoconazole (NIZORAL) 2 % shampoo; Apply  topically to the appropriate area as directed 2 (Two) Times a Week. to affected area    PCOS (polycystic ovarian syndrome)  -     metFORMIN ER (GLUCOPHAGE-XR) 500 MG 24 hr tablet; Take 1 tablet by mouth 2 (Two) Times a Day With Meals.    Gastroesophageal reflux disease with esophagitis  -     pantoprazole (PROTONIX) 20 MG EC tablet; Take 1 tablet by mouth Daily.    FLORI (generalized anxiety disorder)  Comments:  discontinue prozac since not working to prevent panic attacks  continue effexor xr 75mg qd  Orders:  -     venlafaxine XR (EFFEXOR-XR) 75 MG 24 hr capsule; Take 1 capsule by mouth Daily.    Shortness of breath  -     albuterol sulfate HFA (VENTOLIN HFA) 108 (90 Base) MCG/ACT inhaler; Inhale 2 puffs Every 4 (Four) Hours As Needed for Shortness of Air.    Encounter for screening mammogram for breast cancer  -     Mammo Screening Digital Tomosynthesis Bilateral With CAD; Future    Other orders  -     ibuprofen (ADVIL,MOTRIN) 800 MG tablet; Take 1 tablet by mouth Every 8 (Eight) Hours.                 This document has been electronically signed by:  Rosa Quigley PA-C

## 2019-12-16 NOTE — PATIENT INSTRUCTIONS
"Carbohydrate Counting for Diabetes Mellitus, Adult    Carbohydrate counting is a method of keeping track of how many carbohydrates you eat. Eating carbohydrates naturally increases the amount of sugar (glucose) in the blood. Counting how many carbohydrates you eat helps keep your blood glucose within normal limits, which helps you manage your diabetes (diabetes mellitus).  It is important to know how many carbohydrates you can safely have in each meal. This is different for every person. A diet and nutrition specialist (registered dietitian) can help you make a meal plan and calculate how many carbohydrates you should have at each meal and snack.  Carbohydrates are found in the following foods:  · Grains, such as breads and cereals.  · Dried beans and soy products.  · Starchy vegetables, such as potatoes, peas, and corn.  · Fruit and fruit juices.  · Milk and yogurt.  · Sweets and snack foods, such as cake, cookies, candy, chips, and soft drinks.  How do I count carbohydrates?  There are two ways to count carbohydrates in food. You can use either of the methods or a combination of both.  Reading \"Nutrition Facts\" on packaged food  The \"Nutrition Facts\" list is included on the labels of almost all packaged foods and beverages in the [place] It includes:  · The serving size.  · Information about nutrients in each serving, including the grams (g) of carbohydrate per serving.  To use the “Nutrition Facts\":  · Decide how many servings you will have.  · Multiply the number of servings by the number of carbohydrates per serving.  · The resulting number is the total amount of carbohydrates that you will be having.  Learning standard serving sizes of other foods  When you eat carbohydrate foods that are not packaged or do not include \"Nutrition Facts\" on the label, you need to measure the servings in order to count the amount of carbohydrates:  · Measure the foods that you will eat with a food scale or measuring cup, if " needed.  · Decide how many standard-size servings you will eat.  · Multiply the number of servings by 15. Most carbohydrate-rich foods have about 15 g of carbohydrates per serving.  ? For example, if you eat 8 oz (170 g) of strawberries, you will have eaten 2 servings and 30 g of carbohydrates (2 servings x 15 g = 30 g).  · For foods that have more than one food mixed, such as soups and casseroles, you must count the carbohydrates in each food that is included.  The following list contains standard serving sizes of common carbohydrate-rich foods. Each of these servings has about 15 g of carbohydrates:  · ½ hamburger bun or ½ English muffin.  · ½ oz (15 mL) syrup.  · ½ oz (14 g) jelly.  · 1 slice of bread.  · 1 six-inch tortilla.  · 3 oz (85 g) cooked rice or pasta.  · 4 oz (113 g) cooked dried beans.  · 4 oz (113 g) starchy vegetable, such as peas, corn, or potatoes.  · 4 oz (113 g) hot cereal.  · 4 oz (113 g) mashed potatoes or ¼ of a large baked potato.  · 4 oz (113 g) canned or frozen fruit.  · 4 oz (120 mL) fruit juice.  · 4-6 crackers.  · 6 chicken nuggets.  · 6 oz (170 g) unsweetened dry cereal.  · 6 oz (170 g) plain fat-free yogurt or yogurt sweetened with artificial sweeteners.  · 8 oz (240 mL) milk.  · 8 oz (170 g) fresh fruit or one small piece of fruit.  · 24 oz (680 g) popped popcorn.  Example of carbohydrate counting  Sample meal  · 3 oz (85 g) chicken breast.  · 6 oz (170 g) brown rice.  · 4 oz (113 g) corn.  · 8 oz (240 mL) milk.  · 8 oz (170 g) strawberries with sugar-free whipped topping.  Carbohydrate calculation  1. Identify the foods that contain carbohydrates:  ? Rice.  ? Corn.  ? Milk.  ? Strawberries.  2. Calculate how many servings you have of each food:  ? 2 servings rice.  ? 1 serving corn.  ? 1 serving milk.  ? 1 serving strawberries.  3. Multiply each number of servings by 15 g:  ? 2 servings rice x 15 g = 30 g.  ? 1 serving corn x 15 g = 15 g.  ? 1 serving milk x 15 g = 15 g.  ? 1  serving strawberries x 15 g = 15 g.  4. Add together all of the amounts to find the total grams of carbohydrates eaten:  ? 30 g + 15 g + 15 g + 15 g = 75 g of carbohydrates total.  Summary  · Carbohydrate counting is a method of keeping track of how many carbohydrates you eat.  · Eating carbohydrates naturally increases the amount of sugar (glucose) in the blood.  · Counting how many carbohydrates you eat helps keep your blood glucose within normal limits, which helps you manage your diabetes.  · A diet and nutrition specialist (registered dietitian) can help you make a meal plan and calculate how many carbohydrates you should have at each meal and snack.  This information is not intended to replace advice given to you by your health care provider. Make sure you discuss any questions you have with your health care provider.  Document Released: 12/18/2006 Document Revised: 06/27/2018 Document Reviewed: 05/31/2017  [employer] Interactive Patient Education © 2019 [employer]

## 2019-12-17 DIAGNOSIS — F33.1 MODERATE EPISODE OF RECURRENT MAJOR DEPRESSIVE DISORDER (HCC): ICD-10-CM

## 2019-12-19 RX ORDER — FLUOXETINE HYDROCHLORIDE 40 MG/1
40 CAPSULE ORAL DAILY
Qty: 30 CAPSULE | Refills: 0 | Status: SHIPPED | OUTPATIENT
Start: 2019-12-19 | End: 2020-01-13

## 2019-12-20 ENCOUNTER — TELEPHONE (OUTPATIENT)
Dept: FAMILY MEDICINE CLINIC | Facility: CLINIC | Age: 39
End: 2019-12-20

## 2019-12-20 NOTE — TELEPHONE ENCOUNTER
I called neville informed her she has abnormal labs and advise her to go on low car diet          ----- Message from KALI Armstrong sent at 12/13/2019  1:57 PM EST -----  Inform patient that lab work was abnormal showing a hemoglobin A1c of 6.5 which is consistent with diabetes mellitus that is controlled.  Her triglycerides were also elevated.  I recommend that she start a low carbohydrate diet which should help both issues

## 2020-01-12 DIAGNOSIS — F41.1 GAD (GENERALIZED ANXIETY DISORDER): ICD-10-CM

## 2020-01-13 RX ORDER — VENLAFAXINE HYDROCHLORIDE 75 MG/1
75 CAPSULE, EXTENDED RELEASE ORAL DAILY
Qty: 30 CAPSULE | Refills: 0 | Status: SHIPPED | OUTPATIENT
Start: 2020-01-13 | End: 2020-02-17

## 2020-01-14 ENCOUNTER — APPOINTMENT (OUTPATIENT)
Dept: MAMMOGRAPHY | Facility: HOSPITAL | Age: 40
End: 2020-01-14

## 2020-01-20 DIAGNOSIS — F33.1 MODERATE EPISODE OF RECURRENT MAJOR DEPRESSIVE DISORDER (HCC): ICD-10-CM

## 2020-01-20 RX ORDER — FLUOXETINE HYDROCHLORIDE 40 MG/1
40 CAPSULE ORAL DAILY
Qty: 30 CAPSULE | Refills: 0 | OUTPATIENT
Start: 2020-01-20

## 2020-01-20 NOTE — TELEPHONE ENCOUNTER
Please check with the patient and see if she is taking Effexor or fluoxetine.  Effexor is currently on her medication list.

## 2020-01-21 NOTE — TELEPHONE ENCOUNTER
Patient stated that she does not take the medicine anymore. That the pharmacy keeps filling it anyway. Ill call [facility] and have them DC it.

## 2020-02-11 ENCOUNTER — HOSPITAL ENCOUNTER (OUTPATIENT)
Dept: MAMMOGRAPHY | Facility: HOSPITAL | Age: 40
Discharge: HOME | End: 2020-02-11
Admitting: PHYSICIAN ASSISTANT

## 2020-02-11 DIAGNOSIS — Z12.31 ENCOUNTER FOR SCREENING MAMMOGRAM FOR BREAST CANCER: ICD-10-CM

## 2020-02-11 PROCEDURE — 77067 SCR MAMMO BI INCL CAD: CPT

## 2020-02-11 PROCEDURE — 77067 SCR MAMMO BI INCL CAD: CPT | Performed by: RADIOLOGY

## 2020-02-11 PROCEDURE — 77063 BREAST TOMOSYNTHESIS BI: CPT

## 2020-02-11 PROCEDURE — 77063 BREAST TOMOSYNTHESIS BI: CPT | Performed by: RADIOLOGY

## 2020-02-15 DIAGNOSIS — F41.1 GAD (GENERALIZED ANXIETY DISORDER): ICD-10-CM

## 2020-02-17 RX ORDER — VENLAFAXINE HYDROCHLORIDE 75 MG/1
75 CAPSULE, EXTENDED RELEASE ORAL DAILY
Qty: 30 CAPSULE | Refills: 0 | Status: SHIPPED | OUTPATIENT
Start: 2020-02-17 | End: 2020-03-09

## 2020-02-18 ENCOUNTER — TELEPHONE (OUTPATIENT)
Dept: FAMILY MEDICINE CLINIC | Facility: CLINIC | Age: 40
End: 2020-02-18

## 2020-02-18 NOTE — TELEPHONE ENCOUNTER
Anika(  In office) informed  Her mammogram was normal                      ----- Message from KALI Armstrong sent at 2/17/2020 11:14 AM EST -----  Inform the patient that her mammogram was benign

## 2020-03-06 PROCEDURE — 99284 EMERGENCY DEPT VISIT MOD MDM: CPT

## 2020-03-07 ENCOUNTER — HOSPITAL ENCOUNTER (EMERGENCY)
Facility: HOSPITAL | Age: 40
Discharge: HOME | End: 2020-03-07
Attending: EMERGENCY MEDICINE | Admitting: EMERGENCY MEDICINE

## 2020-03-07 ENCOUNTER — APPOINTMENT (OUTPATIENT)
Dept: GENERAL RADIOLOGY | Facility: HOSPITAL | Age: 40
End: 2020-03-07

## 2020-03-07 VITALS
DIASTOLIC BLOOD PRESSURE: 60 MMHG | RESPIRATION RATE: 20 BRPM | TEMPERATURE: 97.8 F | WEIGHT: 293 LBS | OXYGEN SATURATION: 96 % | HEART RATE: 90 BPM | HEIGHT: 66 IN | SYSTOLIC BLOOD PRESSURE: 148 MMHG | BODY MASS INDEX: 47.09 KG/M2

## 2020-03-07 DIAGNOSIS — J20.8 ACUTE BACTERIAL BRONCHITIS: Primary | ICD-10-CM

## 2020-03-07 DIAGNOSIS — B96.89 ACUTE BACTERIAL BRONCHITIS: Primary | ICD-10-CM

## 2020-03-07 DIAGNOSIS — R73.9 HYPERGLYCEMIA: ICD-10-CM

## 2020-03-07 LAB
ALBUMIN SERPL-MCNC: 3.58 G/DL (ref 3.5–5.2)
ALBUMIN/GLOB SERPL: 1 G/DL
ALP SERPL-CCNC: 107 U/L (ref 39–117)
ALT SERPL W P-5'-P-CCNC: 61 U/L (ref 1–33)
ANION GAP SERPL CALCULATED.3IONS-SCNC: 11.3 MMOL/L (ref 5–15)
AST SERPL-CCNC: 51 U/L (ref 1–32)
BASOPHILS # BLD AUTO: 0.04 10*3/MM3 (ref 0–0.2)
BASOPHILS NFR BLD AUTO: 0.5 % (ref 0–1.5)
BILIRUB SERPL-MCNC: 0.3 MG/DL (ref 0.2–1.2)
BUN BLD-MCNC: 7 MG/DL (ref 6–20)
BUN/CREAT SERPL: 11.7 (ref 7–25)
CALCIUM SPEC-SCNC: 8.6 MG/DL (ref 8.6–10.5)
CHLORIDE SERPL-SCNC: 100 MMOL/L (ref 98–107)
CO2 SERPL-SCNC: 25.7 MMOL/L (ref 22–29)
CREAT BLD-MCNC: 0.6 MG/DL (ref 0.57–1)
DEPRECATED RDW RBC AUTO: 45.7 FL (ref 37–54)
EOSINOPHIL # BLD AUTO: 0.26 10*3/MM3 (ref 0–0.4)
EOSINOPHIL NFR BLD AUTO: 3 % (ref 0.3–6.2)
ERYTHROCYTE [DISTWIDTH] IN BLOOD BY AUTOMATED COUNT: 13.2 % (ref 12.3–15.4)
FLUAV AG NPH QL: NEGATIVE
FLUBV AG NPH QL IA: NEGATIVE
GFR SERPL CREATININE-BSD FRML MDRD: 111 ML/MIN/1.73
GLOBULIN UR ELPH-MCNC: 3.6 GM/DL
GLUCOSE BLD-MCNC: 229 MG/DL (ref 65–99)
HCG SERPL QL: NEGATIVE
HCT VFR BLD AUTO: 40.3 % (ref 34–46.6)
HGB BLD-MCNC: 13.3 G/DL (ref 12–15.9)
IMM GRANULOCYTES # BLD AUTO: 0.03 10*3/MM3 (ref 0–0.05)
IMM GRANULOCYTES NFR BLD AUTO: 0.3 % (ref 0–0.5)
LYMPHOCYTES # BLD AUTO: 3.24 10*3/MM3 (ref 0.7–3.1)
LYMPHOCYTES NFR BLD AUTO: 37.7 % (ref 19.6–45.3)
MCH RBC QN AUTO: 30.9 PG (ref 26.6–33)
MCHC RBC AUTO-ENTMCNC: 33 G/DL (ref 31.5–35.7)
MCV RBC AUTO: 93.7 FL (ref 79–97)
MONOCYTES # BLD AUTO: 0.77 10*3/MM3 (ref 0.1–0.9)
MONOCYTES NFR BLD AUTO: 9 % (ref 5–12)
NEUTROPHILS # BLD AUTO: 4.25 10*3/MM3 (ref 1.7–7)
NEUTROPHILS NFR BLD AUTO: 49.5 % (ref 42.7–76)
NRBC BLD AUTO-RTO: 0 /100 WBC (ref 0–0.2)
PLATELET # BLD AUTO: 162 10*3/MM3 (ref 140–450)
PMV BLD AUTO: 9.6 FL (ref 6–12)
POTASSIUM BLD-SCNC: 3.8 MMOL/L (ref 3.5–5.2)
PROT SERPL-MCNC: 7.2 G/DL (ref 6–8.5)
RBC # BLD AUTO: 4.3 10*6/MM3 (ref 3.77–5.28)
SODIUM BLD-SCNC: 137 MMOL/L (ref 136–145)
WBC NRBC COR # BLD: 8.59 10*3/MM3 (ref 3.4–10.8)

## 2020-03-07 PROCEDURE — 94799 UNLISTED PULMONARY SVC/PX: CPT

## 2020-03-07 PROCEDURE — 25010000002 METHYLPREDNISOLONE PER 125 MG: Performed by: EMERGENCY MEDICINE

## 2020-03-07 PROCEDURE — 80053 COMPREHEN METABOLIC PANEL: CPT | Performed by: EMERGENCY MEDICINE

## 2020-03-07 PROCEDURE — 96372 THER/PROPH/DIAG INJ SC/IM: CPT

## 2020-03-07 PROCEDURE — 94640 AIRWAY INHALATION TREATMENT: CPT

## 2020-03-07 PROCEDURE — 87804 INFLUENZA ASSAY W/OPTIC: CPT | Performed by: EMERGENCY MEDICINE

## 2020-03-07 PROCEDURE — 85025 COMPLETE CBC W/AUTO DIFF WBC: CPT | Performed by: EMERGENCY MEDICINE

## 2020-03-07 PROCEDURE — 36415 COLL VENOUS BLD VENIPUNCTURE: CPT

## 2020-03-07 PROCEDURE — 84703 CHORIONIC GONADOTROPIN ASSAY: CPT | Performed by: EMERGENCY MEDICINE

## 2020-03-07 PROCEDURE — 71045 X-RAY EXAM CHEST 1 VIEW: CPT

## 2020-03-07 RX ORDER — AMOXICILLIN AND CLAVULANATE POTASSIUM 875; 125 MG/1; MG/1
1 TABLET, FILM COATED ORAL 2 TIMES DAILY
Qty: 20 TABLET | Refills: 0 | Status: SHIPPED | OUTPATIENT
Start: 2020-03-07 | End: 2020-03-17

## 2020-03-07 RX ORDER — AMOXICILLIN AND CLAVULANATE POTASSIUM 875; 125 MG/1; MG/1
1 TABLET, FILM COATED ORAL ONCE
Status: COMPLETED | OUTPATIENT
Start: 2020-03-07 | End: 2020-03-07

## 2020-03-07 RX ORDER — IPRATROPIUM BROMIDE AND ALBUTEROL SULFATE 2.5; .5 MG/3ML; MG/3ML
3 SOLUTION RESPIRATORY (INHALATION) ONCE
Status: COMPLETED | OUTPATIENT
Start: 2020-03-07 | End: 2020-03-07

## 2020-03-07 RX ORDER — METHYLPREDNISOLONE SODIUM SUCCINATE 125 MG/2ML
80 INJECTION, POWDER, LYOPHILIZED, FOR SOLUTION INTRAMUSCULAR; INTRAVENOUS ONCE
Status: COMPLETED | OUTPATIENT
Start: 2020-03-07 | End: 2020-03-07

## 2020-03-07 RX ADMIN — IPRATROPIUM BROMIDE AND ALBUTEROL SULFATE 3 ML: .5; 3 SOLUTION RESPIRATORY (INHALATION) at 00:58

## 2020-03-07 RX ADMIN — METHYLPREDNISOLONE SODIUM SUCCINATE 80 MG: 125 INJECTION, POWDER, FOR SOLUTION INTRAMUSCULAR; INTRAVENOUS at 01:04

## 2020-03-07 RX ADMIN — AMOXICILLIN AND CLAVULANATE POTASSIUM 1 TABLET: 875; 125 TABLET, FILM COATED ORAL at 01:57

## 2020-03-07 RX ADMIN — HYDROCODONE POLISTIREX AND CHLORPHENIRAMINE POLISTIREX 5 ML: 10; 8 SUSPENSION, EXTENDED RELEASE ORAL at 01:04

## 2020-03-07 NOTE — ED PROVIDER NOTES
Subjective   39-year-old female in the emergency department reporting a productive cough for the last 7 to 10 days.  Aggressively getting worse, so came to the emergency department for further evaluation.  Denies nausea, vomiting, diarrhea, fever, or chills.  Denies chest pain or shortness of breath.  Patient has significant past medical history of anxiety, depression, GERD, goiter, hep A, hypertension, and thyroid disease.h.      History provided by:  Patient   used: No    Cough   Cough characteristics:  Productive  Sputum characteristics:  Green and yellow  Severity:  Mild  Onset quality:  Gradual  Timing:  Constant  Progression:  Worsening  Chronicity:  New  Smoker: no    Context: upper respiratory infection    Context: not animal exposure, not exposure to allergens, not fumes, not occupational exposure, not sick contacts, not smoke exposure, not weather changes and not with activity    Relieved by:  Nothing  Worsened by:  Nothing  Ineffective treatments:  None tried  Associated symptoms: sinus congestion    Associated symptoms: no chest pain, no chills, no diaphoresis, no ear fullness, no ear pain, no eye discharge, no fever, no headaches, no myalgias, no rash, no rhinorrhea, no shortness of breath, no sore throat, no weight loss and no wheezing    Risk factors: no chemical exposure, no recent infection and no recent travel        Review of Systems   Constitutional: Negative for chills, diaphoresis, fever and weight loss.   HENT: Negative for ear pain, rhinorrhea and sore throat.    Eyes: Negative for discharge.   Respiratory: Positive for cough. Negative for shortness of breath and wheezing.    Cardiovascular: Negative for chest pain.   Musculoskeletal: Negative for myalgias.   Skin: Negative for rash.   Neurological: Negative for headaches.   All other systems reviewed and are negative.      Past Medical History:   Diagnosis Date   • Anxiety and depression    • GERD (gastroesophageal reflux  disease)    • Goiter    • Goiter    • Hepatitis A    • Hypertension    • PONV (postoperative nausea and vomiting)    • Thyroid disease        Allergies   Allergen Reactions   • Ultram [Tramadol] Swelling       Past Surgical History:   Procedure Laterality Date   • ABDOMINAL SURGERY     • CARPAL TUNNEL RELEASE Right 10/15/2019    Procedure: CARPAL TUNNEL RELEASE;  Surgeon: oRny Cruz MD;  Location: [facility];  Service: Orthopedics   •  SECTION      x2   • TUBAL ABDOMINAL LIGATION         Family History   Problem Relation Age of Onset   • Diabetes Mother    • Hypertension Mother    • No Known Problems Father    • Gout Sister    • Osteoarthritis Maternal Grandmother    • Osteoporosis Maternal Grandmother    • Heart disease Maternal Grandmother    • Diabetes Maternal Grandmother    • Hypertension Maternal Grandmother    • Heart disease Maternal Grandfather    • Diabetes Maternal Grandfather    • Hypertension Maternal Grandfather    • Breast cancer Neg Hx        Social History     Socioeconomic History   • Marital status:      Spouse name: bea   • Number of children: 2   • Years of education: 12   • Highest education level: Not on file   Occupational History   • Occupation: unemployed   Social Needs   • Financial resource strain: Not hard at all   • Food insecurity:     Worry: Never true     Inability: Never true   • Transportation needs:     Medical: No     Non-medical: No   Tobacco Use   • Smoking status: Former Smoker   • Smokeless tobacco: Never Used   Substance and Sexual Activity   • Alcohol use: No   • Drug use: No   • Sexual activity: Defer   Lifestyle   • Physical activity:     Days per week: 0 days     Minutes per session: 0 min   • Stress: Only a little   Relationships   • Social connections:     Talks on phone: More than three times a week     Gets together: More than three times a week     Attends Islam service: More than 4 times per year     Active member of club or  organization: Yes     Attends meetings of clubs or organizations: 1 to 4 times per year     Relationship status:            Objective   Physical Exam   Constitutional: She is oriented to person, place, and time. She appears well-developed and well-nourished.  Non-toxic appearance. No distress.   HENT:   Head: Normocephalic and atraumatic.   Right Ear: External ear normal.   Left Ear: External ear normal.   Nose: Nose normal.   Mouth/Throat: Oropharynx is clear and moist and mucous membranes are normal. No oropharyngeal exudate. No tonsillar exudate.   Eyes: Pupils are equal, round, and reactive to light. Conjunctivae, EOM and lids are normal.   Neck: Normal range of motion and full passive range of motion without pain. Neck supple. No thyromegaly present.   Cardiovascular: Normal rate, regular rhythm, S1 normal, S2 normal, normal heart sounds, intact distal pulses and normal pulses.   Pulmonary/Chest: Effort normal. No tachypnea. No respiratory distress. She has decreased breath sounds. She has no wheezes. She has rhonchi. She has no rales. She exhibits no tenderness.   Abdominal: Soft. Normal appearance and bowel sounds are normal. She exhibits no distension. There is no tenderness. There is no rebound and no guarding.   Musculoskeletal: Normal range of motion. She exhibits no edema, tenderness or deformity.   Lymphadenopathy:     She has no cervical adenopathy.   Neurological: She is alert and oriented to person, place, and time. She has normal strength. No cranial nerve deficit or sensory deficit. GCS eye subscore is 4. GCS verbal subscore is 5. GCS motor subscore is 6.   Skin: Skin is warm, dry and intact. No rash noted. She is not diaphoretic. No erythema. No pallor.   Psychiatric: She has a normal mood and affect. Her speech is normal and behavior is normal. Judgment and thought content normal. Cognition and memory are normal.   Nursing note and vitals reviewed.      Procedures           ED Course  ED  Course as of Mar 07 0425   Sat Mar 07, 2020   0148 IMPRESSION:  1. Negative chest.   XR Chest 1 View [ES]   0424 Patient reports feeling better with treatment in the emergency department has requested to be discharged home at this time.  Will return to the emergency department with any worsening symptoms.    [ES]      ED Course User Index  [ES] Cruz Guardado MD                                           MDM  Number of Diagnoses or Management Options  Acute bacterial bronchitis: new and requires workup  Hyperglycemia:      Amount and/or Complexity of Data Reviewed  Clinical lab tests: reviewed and ordered  Tests in the radiology section of CPT®: reviewed and ordered  Tests in the medicine section of CPT®: ordered and reviewed  Review and summarize past medical records: yes  Independent visualization of images, tracings, or specimens: yes    Risk of Complications, Morbidity, and/or Mortality  Presenting problems: moderate  Diagnostic procedures: moderate  Management options: moderate    Patient Progress  Patient progress: stable      Final diagnoses:   Acute bacterial bronchitis   Hyperglycemia            Cruz Guardado MD  03/07/20 [id number]

## 2020-03-08 DIAGNOSIS — K21.00 GASTROESOPHAGEAL REFLUX DISEASE WITH ESOPHAGITIS: ICD-10-CM

## 2020-03-09 DIAGNOSIS — F41.1 GAD (GENERALIZED ANXIETY DISORDER): ICD-10-CM

## 2020-03-09 RX ORDER — PANTOPRAZOLE SODIUM 20 MG/1
20 TABLET, DELAYED RELEASE ORAL DAILY
Qty: 30 TABLET | Refills: 0 | Status: SHIPPED | OUTPATIENT
Start: 2020-03-09 | End: 2020-04-22

## 2020-03-09 RX ORDER — VENLAFAXINE HYDROCHLORIDE 75 MG/1
75 CAPSULE, EXTENDED RELEASE ORAL DAILY
Qty: 30 CAPSULE | Refills: 0 | Status: SHIPPED | OUTPATIENT
Start: 2020-03-09 | End: 2020-04-22

## 2020-03-10 DIAGNOSIS — R06.02 SHORTNESS OF BREATH: ICD-10-CM

## 2020-03-11 RX ORDER — ALBUTEROL SULFATE 90 UG/1
AEROSOL, METERED RESPIRATORY (INHALATION)
Qty: 18 G | Refills: 5 | Status: SHIPPED | OUTPATIENT
Start: 2020-03-11 | End: 2020-04-28 | Stop reason: SDUPTHER

## 2020-03-16 ENCOUNTER — OFFICE VISIT (OUTPATIENT)
Dept: FAMILY MEDICINE CLINIC | Facility: CLINIC | Age: 40
End: 2020-03-16

## 2020-03-16 VITALS
TEMPERATURE: 98.2 F | DIASTOLIC BLOOD PRESSURE: 80 MMHG | HEIGHT: 66 IN | SYSTOLIC BLOOD PRESSURE: 110 MMHG | OXYGEN SATURATION: 97 % | BODY MASS INDEX: 47.09 KG/M2 | HEART RATE: 100 BPM | WEIGHT: 293 LBS

## 2020-03-16 DIAGNOSIS — I10 ESSENTIAL HYPERTENSION: ICD-10-CM

## 2020-03-16 DIAGNOSIS — J18.9 COMMUNITY ACQUIRED PNEUMONIA OF LEFT LOWER LOBE OF LUNG: Primary | ICD-10-CM

## 2020-03-16 DIAGNOSIS — K21.9 GASTROESOPHAGEAL REFLUX DISEASE WITHOUT ESOPHAGITIS: ICD-10-CM

## 2020-03-16 DIAGNOSIS — E78.2 MIXED HYPERLIPIDEMIA: ICD-10-CM

## 2020-03-16 PROCEDURE — 99214 OFFICE O/P EST MOD 30 MIN: CPT | Performed by: PHYSICIAN ASSISTANT

## 2020-03-16 RX ORDER — GUAIFENESIN AND CODEINE PHOSPHATE 100; 10 MG/5ML; MG/5ML
5 SOLUTION ORAL 3 TIMES DAILY PRN
Qty: 118 ML | Refills: 0 | Status: SHIPPED | OUTPATIENT
Start: 2020-03-16 | End: 2020-06-08

## 2020-03-16 RX ORDER — PREDNISONE 20 MG/1
TABLET ORAL
Qty: 10 TABLET | Refills: 0 | Status: SHIPPED | OUTPATIENT
Start: 2020-03-16 | End: 2020-03-26

## 2020-03-16 RX ORDER — LEVOFLOXACIN 500 MG/1
500 TABLET, FILM COATED ORAL DAILY
Qty: 10 TABLET | Refills: 0 | Status: SHIPPED | OUTPATIENT
Start: 2020-03-16 | End: 2020-03-26

## 2020-03-16 NOTE — PROGRESS NOTES
Sravanthi Alegria is a 39 y.o. female.       Chief Complaint -cough    History of Present Illness -      Cough-  She complains of productive cough and fever approximately 2 weeks ago.  She states that she went to the emergency room and was diagnosed with bronchitis.  She was given prescription for Tussionex and Augmentin as well as steroids.  She reports that her cough has worsened and she now has associated shortness of breath and wheezing.  She also complains of left lung pain.    Hypertension-controlled with losartan    Hyperlipidemia- currently stable with diet  Lab Results   Component Value Date    CHOL 169 12/09/2019    CHOL 169 04/12/2019    CHOL 173 09/18/2018     Lab Results   Component Value Date    TRIG 227 (H) 12/09/2019    TRIG 136 04/12/2019    TRIG 179 (H) 09/18/2018     Lab Results   Component Value Date    HDL 43 12/09/2019    HDL 44 04/12/2019    HDL 47 (L) 09/18/2018     Lab Results   Component Value Date    LDL 81 12/09/2019    LDL 98 04/12/2019    LDL 90 09/18/2018     Gastroesophageal reflux disease-stable with Protonix    The following portions of the patient's history were reviewed and updated as appropriate: allergies, current medications, past family history, past medical history, past social history, past surgical history and problem list.    Review of Systems   Constitutional: Positive for activity change, appetite change, chills, fatigue and fever.   HENT: Positive for congestion and sore throat. Negative for ear pain and sinus pressure.    Eyes: Negative for pain and visual disturbance.   Respiratory: Positive for cough, chest tightness, shortness of breath and wheezing.    Cardiovascular: Negative for chest pain and palpitations.   Gastrointestinal: Negative for abdominal pain, constipation, diarrhea, nausea and vomiting.   Endocrine: Negative for polydipsia and polyuria.   Genitourinary: Negative for dysuria and frequency.   Musculoskeletal: Negative for back pain and  "myalgias.   Skin: Negative for color change and rash.   Allergic/Immunologic: Negative for food allergies and immunocompromised state.   Neurological: Negative for dizziness, syncope and headaches.   Hematological: Negative for adenopathy. Does not bruise/bleed easily.   Psychiatric/Behavioral: Negative for hallucinations and suicidal ideas. The patient is not nervous/anxious.        /80 (BP Location: Right arm, Patient Position: Sitting, Cuff Size: Adult)   Pulse 100   Temp 98.2 °F (36.8 °C)   Ht 167.6 cm (66\")   Wt (!) 168 kg (371 lb 3.2 oz)   LMP  (LMP Unknown)   SpO2 97%   BMI 59.91 kg/m²   Admission on 03/07/2020, Discharged on 03/07/2020   Component Date Value Ref Range Status   • Glucose 03/07/2020 229* 65 - 99 mg/dL Final   • BUN 03/07/2020 7  6 - 20 mg/dL Final   • Creatinine 03/07/2020 0.60  0.57 - 1.00 mg/dL Final   • Sodium 03/07/2020 137  136 - 145 mmol/L Final   • Potassium 03/07/2020 3.8  3.5 - 5.2 mmol/L Final   • Chloride 03/07/2020 100  98 - 107 mmol/L Final   • CO2 03/07/2020 25.7  22.0 - 29.0 mmol/L Final   • Calcium 03/07/2020 8.6  8.6 - 10.5 mg/dL Final   • Total Protein 03/07/2020 7.2  6.0 - 8.5 g/dL Final   • Albumin 03/07/2020 3.58  3.50 - 5.20 g/dL Final   • ALT (SGPT) 03/07/2020 61* 1 - 33 U/L Final   • AST (SGOT) 03/07/2020 51* 1 - 32 U/L Final   • Alkaline Phosphatase 03/07/2020 107  39 - 117 U/L Final   • Total Bilirubin 03/07/2020 0.3  0.2 - 1.2 mg/dL Final   • eGFR Non African Amer 03/07/2020 111  >60 mL/min/1.73 Final   • Globulin 03/07/2020 3.6  gm/dL Final   • A/G Ratio 03/07/2020 1.0  g/dL Final   • BUN/Creatinine Ratio 03/07/2020 11.7  7.0 - 25.0 Final   • Anion Gap 03/07/2020 11.3  5.0 - 15.0 mmol/L Final   • Influenza A Ag, EIA 03/07/2020 Negative  Negative Final   • Influenza B Ag, EIA 03/07/2020 Negative  Negative Final   • HCG Qualitative 03/07/2020 Negative  Negative Final   • WBC 03/07/2020 8.59  3.40 - 10.80 10*3/mm3 Final   • RBC 03/07/2020 4.30  3.77 - 5.28 " 10*6/mm3 Final   • Hemoglobin 03/07/2020 13.3  12.0 - 15.9 g/dL Final   • Hematocrit 03/07/2020 40.3  34.0 - 46.6 % Final   • MCV 03/07/2020 93.7  79.0 - 97.0 fL Final   • MCH 03/07/2020 30.9  26.6 - 33.0 pg Final   • MCHC 03/07/2020 33.0  31.5 - 35.7 g/dL Final   • RDW 03/07/2020 13.2  12.3 - 15.4 % Final   • RDW-SD 03/07/2020 45.7  37.0 - 54.0 fl Final   • MPV 03/07/2020 9.6  6.0 - 12.0 fL Final   • Platelets 03/07/2020 162  140 - 450 10*3/mm3 Final   • Neutrophil % 03/07/2020 49.5  42.7 - 76.0 % Final   • Lymphocyte % 03/07/2020 37.7  19.6 - 45.3 % Final   • Monocyte % 03/07/2020 9.0  5.0 - 12.0 % Final   • Eosinophil % 03/07/2020 3.0  0.3 - 6.2 % Final   • Basophil % 03/07/2020 0.5  0.0 - 1.5 % Final   • Immature Grans % 03/07/2020 0.3  0.0 - 0.5 % Final   • Neutrophils, Absolute 03/07/2020 4.25  1.70 - 7.00 10*3/mm3 Final   • Lymphocytes, Absolute 03/07/2020 3.24* 0.70 - 3.10 10*3/mm3 Final   • Monocytes, Absolute 03/07/2020 0.77  0.10 - 0.90 10*3/mm3 Final   • Eosinophils, Absolute 03/07/2020 0.26  0.00 - 0.40 10*3/mm3 Final   • Basophils, Absolute 03/07/2020 0.04  0.00 - 0.20 10*3/mm3 Final   • Immature Grans, Absolute 03/07/2020 0.03  0.00 - 0.05 10*3/mm3 Final   • nRBC 03/07/2020 0.0  0.0 - 0.2 /100 WBC Final       Physical Exam   Constitutional: She is oriented to person, place, and time. She appears well-developed and well-nourished.   HENT:   Head: Normocephalic and atraumatic.   Nose: Nose normal.   Oropharynx erythematous without exudates.  TMs appear normal bilaterally without bulging or erythema.   Eyes: Pupils are equal, round, and reactive to light. Conjunctivae and EOM are normal.   Neck: Normal range of motion. Neck supple. No tracheal deviation present. No thyromegaly present.   Cardiovascular: Normal rate, regular rhythm, normal heart sounds and intact distal pulses.   No murmur heard.  Pulmonary/Chest: Effort normal. No respiratory distress. She has wheezes (Left lower lobe wheezing).    Abdominal: Soft. Bowel sounds are normal. There is no tenderness. There is no guarding.   Musculoskeletal: Normal range of motion. She exhibits no edema or tenderness.   Lymphadenopathy:     She has no cervical adenopathy.   Neurological: She is alert and oriented to person, place, and time.   Skin: Skin is warm and dry. No rash noted.   Psychiatric: She has a normal mood and affect. Her behavior is normal.   Nursing note and vitals reviewed.      Assessment/Plan     Diagnoses and all orders for this visit:    Community acquired pneumonia of left lower lobe of lung (CMS/[facility])  Comments:  Plan to treat empirically for community-acquired pneumonia  Discontinue Augmentin since not efficacious  Start Levaquin and prednisone  Orders:  -     predniSONE (DELTASONE) 20 MG tablet; 2 daily  -     levoFLOXacin (LEVAQUIN) 500 MG tablet; Take 1 tablet by mouth Daily for 10 days.    Essential hypertension  Comments:  Continue losartan    Mixed hyperlipidemia  Comments:  Advised low-cholesterol diet    Gastroesophageal reflux disease without esophagitis  Comments:  Continue Protonix      Community-acquired pneumonia left lower lobe lung  Advised regarding symptomatic treatment.  Suggested OTC remedies.  Antibiotic as per orders.  Encouraged to report if any worse or if any new symptoms.  Call in 5 days if symptoms aren't resolving.        This document has been electronically signed by:  Rosa Quigley PA-C

## 2020-03-17 PROBLEM — E04.9 GOITER: Status: RESOLVED | Noted: 2018-09-18 | Resolved: 2020-03-17

## 2020-03-17 PROBLEM — Z87.42 HISTORY OF OTHER GENITAL SYSTEM AND OBSTETRIC DISORDERS: Status: RESOLVED | Noted: 2020-03-17 | Resolved: 2020-03-17

## 2020-03-17 PROBLEM — E55.9 VITAMIN D DEFICIENCY: Status: ACTIVE | Noted: 2018-06-20

## 2020-03-17 PROBLEM — R74.8 ELEVATED LIVER ENZYMES: Status: ACTIVE | Noted: 2018-06-20

## 2020-03-17 PROBLEM — F32.A MODERATE DEPRESSIVE DISORDER: Status: ACTIVE | Noted: 2018-06-19

## 2020-03-17 PROBLEM — F32.A MODERATE DEPRESSIVE DISORDER: Status: RESOLVED | Noted: 2018-06-19 | Resolved: 2020-03-17

## 2020-03-17 PROBLEM — Z87.59 HISTORY OF OTHER GENITAL SYSTEM AND OBSTETRIC DISORDERS: Status: ACTIVE | Noted: 2020-03-17

## 2020-03-17 PROBLEM — R74.8 ELEVATED LIVER ENZYMES: Status: RESOLVED | Noted: 2018-06-20 | Resolved: 2020-03-17

## 2020-03-17 PROBLEM — Z87.59 HISTORY OF OTHER GENITAL SYSTEM AND OBSTETRIC DISORDERS: Status: RESOLVED | Noted: 2020-03-17 | Resolved: 2020-03-17

## 2020-03-17 PROBLEM — Z87.42 HISTORY OF OTHER GENITAL SYSTEM AND OBSTETRIC DISORDERS: Status: ACTIVE | Noted: 2020-03-17

## 2020-04-21 DIAGNOSIS — K21.00 GASTROESOPHAGEAL REFLUX DISEASE WITH ESOPHAGITIS: ICD-10-CM

## 2020-04-21 DIAGNOSIS — F41.1 GAD (GENERALIZED ANXIETY DISORDER): ICD-10-CM

## 2020-04-22 ENCOUNTER — E-VISIT (OUTPATIENT)
Dept: FAMILY MEDICINE CLINIC | Facility: CLINIC | Age: 40
End: 2020-04-22

## 2020-04-22 RX ORDER — VENLAFAXINE HYDROCHLORIDE 75 MG/1
75 CAPSULE, EXTENDED RELEASE ORAL DAILY
Qty: 30 CAPSULE | Refills: 0 | Status: SHIPPED | OUTPATIENT
Start: 2020-04-22 | End: 2020-05-18

## 2020-04-22 RX ORDER — PANTOPRAZOLE SODIUM 20 MG/1
20 TABLET, DELAYED RELEASE ORAL DAILY
Qty: 30 TABLET | Refills: 0 | Status: SHIPPED | OUTPATIENT
Start: 2020-04-22 | End: 2020-05-18

## 2020-04-23 ENCOUNTER — OFFICE VISIT (OUTPATIENT)
Dept: FAMILY MEDICINE CLINIC | Facility: CLINIC | Age: 40
End: 2020-04-23

## 2020-04-23 DIAGNOSIS — N30.90 CYSTITIS: Primary | ICD-10-CM

## 2020-04-23 PROCEDURE — 99441 PR PHYS/QHP TELEPHONE EVALUATION 5-10 MIN: CPT | Performed by: PHYSICIAN ASSISTANT

## 2020-04-23 RX ORDER — NITROFURANTOIN 25; 75 MG/1; MG/1
100 CAPSULE ORAL 2 TIMES DAILY
Qty: 14 CAPSULE | Refills: 0 | Status: SHIPPED | OUTPATIENT
Start: 2020-04-23 | End: 2020-04-28

## 2020-04-23 RX ORDER — PHENAZOPYRIDINE HYDROCHLORIDE 100 MG/1
100 TABLET, FILM COATED ORAL 3 TIMES DAILY PRN
Qty: 21 TABLET | Refills: 0 | Status: SHIPPED | OUTPATIENT
Start: 2020-04-23 | End: 2020-06-08

## 2020-04-23 NOTE — PROGRESS NOTES
Subjective   Anika Alegria is a 39 y.o. female.     Telephone visit    You have chosen to receive care through a telephone visit. Do you consent to use a telephone visit for your medical care today? Yes    This visit has been rescheduled as a phone visit to comply with patient safety concerns in accordance with CDC recommendations. Total time of discussion was 5 minutes.      Chief Complaint -dysuria    History of Present Illness -       Dysuria-  She complains of moderate burning pain with urination.  She has associated urinary frequency and urgency with low back pain.  Onset 2 days ago.  Minimal relief with increasing her water intake and decreasing her pop intake.    The following portions of the patient's history were reviewed and updated as appropriate: allergies, current medications, past family history, past medical history, past social history, past surgical history and problem list.    Review of Systems   Constitutional: Negative for activity change, appetite change, fatigue and fever.   HENT: Negative for ear pain, sinus pressure and sore throat.    Eyes: Negative for pain and visual disturbance.   Respiratory: Negative for cough and chest tightness.    Cardiovascular: Negative for chest pain and palpitations.   Gastrointestinal: Negative for abdominal pain, constipation, diarrhea, nausea and vomiting.   Endocrine: Negative for polydipsia and polyuria.   Genitourinary: Positive for dysuria, frequency and urgency.   Musculoskeletal: Negative for back pain and myalgias.   Skin: Negative for color change and rash.   Allergic/Immunologic: Negative for food allergies and immunocompromised state.   Neurological: Negative for dizziness, syncope and headaches.   Hematological: Negative for adenopathy. Does not bruise/bleed easily.   Psychiatric/Behavioral: Negative for hallucinations and suicidal ideas. The patient is not nervous/anxious.        LMP  (LMP Unknown)     Physical Exam   Psychiatric: She has a normal  mood and affect. Her behavior is normal. Thought content normal.       Assessment/Plan     Diagnoses and all orders for this visit:    Cystitis  -     nitrofurantoin, macrocrystal-monohydrate, (Macrobid) 100 MG capsule; Take 1 capsule by mouth 2 (Two) Times a Day.  -     phenazopyridine (Pyridium) 100 MG tablet; Take 1 tablet by mouth 3 (Three) Times a Day As Needed for Bladder Spasms.    Start Macrobid and Pyridium  She was advised that if symptoms do not resolve within 5 days she should contact the office back for further instruction.          This document has been electronically signed by:  Rosa Quigley PA-C

## 2020-04-28 ENCOUNTER — OFFICE VISIT (OUTPATIENT)
Dept: FAMILY MEDICINE CLINIC | Facility: CLINIC | Age: 40
End: 2020-04-28

## 2020-04-28 DIAGNOSIS — E78.2 MIXED HYPERLIPIDEMIA: ICD-10-CM

## 2020-04-28 DIAGNOSIS — E11.65 TYPE 2 DIABETES MELLITUS WITH HYPERGLYCEMIA, WITHOUT LONG-TERM CURRENT USE OF INSULIN (HCC): Primary | ICD-10-CM

## 2020-04-28 DIAGNOSIS — I10 ESSENTIAL HYPERTENSION: ICD-10-CM

## 2020-04-28 PROCEDURE — 99214 OFFICE O/P EST MOD 30 MIN: CPT | Performed by: PHYSICIAN ASSISTANT

## 2020-04-28 RX ORDER — METFORMIN HYDROCHLORIDE EXTENDED-RELEASE TABLETS 1000 MG/1
1000 TABLET, FILM COATED, EXTENDED RELEASE ORAL 2 TIMES DAILY WITH MEALS
Qty: 60 TABLET | Refills: 5 | Status: SHIPPED | OUTPATIENT
Start: 2020-04-28 | End: 2020-04-30

## 2020-04-30 DIAGNOSIS — J30.1 CHRONIC SEASONAL ALLERGIC RHINITIS DUE TO POLLEN: ICD-10-CM

## 2020-04-30 DIAGNOSIS — J30.1 ALLERGIC RHINITIS DUE TO POLLEN, UNSPECIFIED SEASONALITY: ICD-10-CM

## 2020-04-30 DIAGNOSIS — J40 BRONCHITIS: ICD-10-CM

## 2020-04-30 RX ORDER — CETIRIZINE HYDROCHLORIDE 10 MG/1
10 TABLET ORAL DAILY
Qty: 30 TABLET | Refills: 5 | Status: SHIPPED | OUTPATIENT
Start: 2020-04-30 | End: 2020-07-14 | Stop reason: SDUPTHER

## 2020-04-30 RX ORDER — ALBUTEROL SULFATE 90 UG/1
2 AEROSOL, METERED RESPIRATORY (INHALATION) EVERY 4 HOURS PRN
Qty: 18 G | Refills: 0 | Status: SHIPPED | OUTPATIENT
Start: 2020-04-30 | End: 2020-05-26

## 2020-04-30 RX ORDER — FLUTICASONE PROPIONATE 50 MCG
2 SPRAY, SUSPENSION (ML) NASAL DAILY
Qty: 16 ML | Refills: 5 | Status: SHIPPED | OUTPATIENT
Start: 2020-04-30 | End: 2020-07-14 | Stop reason: SDUPTHER

## 2020-04-30 RX ORDER — IBUPROFEN 800 MG/1
800 TABLET ORAL EVERY 8 HOURS
Qty: 90 TABLET | Refills: 1 | Status: SHIPPED | OUTPATIENT
Start: 2020-04-30 | End: 2020-07-14 | Stop reason: SDUPTHER

## 2020-04-30 NOTE — PROGRESS NOTES
Subjective   Anika Alegria is a 39 y.o. female.     Telephone visit    You have chosen to receive care through a telephone visit. Do you consent to use a telephone visit for your medical care today? Yes    This visit has been rescheduled as a phone visit to comply with patient safety concerns in accordance with CDC recommendations. Total time of discussion was 21 minutes.      Chief Complaint -hyperglycemia    History of Present Illness -      Hyperglycemia-  She complains of hyperglycemia due to uncontrolled diabetes mellitus.  She reports blood glucose of 326 and again 376 within the last few days.  Her fasting blood glucose this morning was 290.  Not at goal with Glucophage 500 mg twice daily.    Hypertension-stable with losartan    Hyperlipidemia-  Currently stable with diet.  Elevated triglycerides may be linked to uncontrolled diabetes mellitus.  Lab Results   Component Value Date    CHOL 169 12/09/2019    CHOL 169 04/12/2019    CHOL 173 09/18/2018     Lab Results   Component Value Date    TRIG 227 (H) 12/09/2019    TRIG 136 04/12/2019    TRIG 179 (H) 09/18/2018     Lab Results   Component Value Date    HDL 43 12/09/2019    HDL 44 04/12/2019    HDL 47 (L) 09/18/2018     Lab Results   Component Value Date    LDL 81 12/09/2019    LDL 98 04/12/2019    LDL 90 09/18/2018         The following portions of the patient's history were reviewed and updated as appropriate: allergies, current medications, past family history, past medical history, past social history, past surgical history and problem list.    Review of Systems   Constitutional: Positive for fatigue. Negative for activity change, appetite change and fever.   HENT: Negative for ear pain, sinus pressure and sore throat.    Eyes: Negative for pain and visual disturbance.   Respiratory: Negative for cough and chest tightness.    Cardiovascular: Negative for chest pain and palpitations.   Gastrointestinal: Negative for abdominal pain, constipation, diarrhea,  nausea and vomiting.   Endocrine: Negative for polydipsia and polyuria.   Genitourinary: Negative for dysuria and frequency.   Musculoskeletal: Negative for back pain and myalgias.   Skin: Negative for color change and rash.   Allergic/Immunologic: Negative for food allergies and immunocompromised state.   Neurological: Negative for dizziness, syncope and headaches.   Hematological: Negative for adenopathy. Does not bruise/bleed easily.   Psychiatric/Behavioral: Negative for hallucinations and suicidal ideas. The patient is not nervous/anxious.        LMP  (LMP Unknown)   Admission on 03/07/2020, Discharged on 03/07/2020   Component Date Value Ref Range Status   • Glucose 03/07/2020 229* 65 - 99 mg/dL Final   • BUN 03/07/2020 7  6 - 20 mg/dL Final   • Creatinine 03/07/2020 0.60  0.57 - 1.00 mg/dL Final   • Sodium 03/07/2020 137  136 - 145 mmol/L Final   • Potassium 03/07/2020 3.8  3.5 - 5.2 mmol/L Final   • Chloride 03/07/2020 100  98 - 107 mmol/L Final   • CO2 03/07/2020 25.7  22.0 - 29.0 mmol/L Final   • Calcium 03/07/2020 8.6  8.6 - 10.5 mg/dL Final   • Total Protein 03/07/2020 7.2  6.0 - 8.5 g/dL Final   • Albumin 03/07/2020 3.58  3.50 - 5.20 g/dL Final   • ALT (SGPT) 03/07/2020 61* 1 - 33 U/L Final   • AST (SGOT) 03/07/2020 51* 1 - 32 U/L Final   • Alkaline Phosphatase 03/07/2020 107  39 - 117 U/L Final   • Total Bilirubin 03/07/2020 0.3  0.2 - 1.2 mg/dL Final   • eGFR Non African Amer 03/07/2020 111  >60 mL/min/1.73 Final   • Globulin 03/07/2020 3.6  gm/dL Final   • A/G Ratio 03/07/2020 1.0  g/dL Final   • BUN/Creatinine Ratio 03/07/2020 11.7  7.0 - 25.0 Final   • Anion Gap 03/07/2020 11.3  5.0 - 15.0 mmol/L Final   • Influenza A Ag, EIA 03/07/2020 Negative  Negative Final   • Influenza B Ag, EIA 03/07/2020 Negative  Negative Final   • HCG Qualitative 03/07/2020 Negative  Negative Final   • WBC 03/07/2020 8.59  3.40 - 10.80 10*3/mm3 Final   • RBC 03/07/2020 4.30  3.77 - 5.28 10*6/mm3 Final   • Hemoglobin  03/07/2020 13.3  12.0 - 15.9 g/dL Final   • Hematocrit 03/07/2020 40.3  34.0 - 46.6 % Final   • MCV 03/07/2020 93.7  79.0 - 97.0 fL Final   • MCH 03/07/2020 30.9  26.6 - 33.0 pg Final   • MCHC 03/07/2020 33.0  31.5 - 35.7 g/dL Final   • RDW 03/07/2020 13.2  12.3 - 15.4 % Final   • RDW-SD 03/07/2020 45.7  37.0 - 54.0 fl Final   • MPV 03/07/2020 9.6  6.0 - 12.0 fL Final   • Platelets 03/07/2020 162  140 - 450 10*3/mm3 Final   • Neutrophil % 03/07/2020 49.5  42.7 - 76.0 % Final   • Lymphocyte % 03/07/2020 37.7  19.6 - 45.3 % Final   • Monocyte % 03/07/2020 9.0  5.0 - 12.0 % Final   • Eosinophil % 03/07/2020 3.0  0.3 - 6.2 % Final   • Basophil % 03/07/2020 0.5  0.0 - 1.5 % Final   • Immature Grans % 03/07/2020 0.3  0.0 - 0.5 % Final   • Neutrophils, Absolute 03/07/2020 4.25  1.70 - 7.00 10*3/mm3 Final   • Lymphocytes, Absolute 03/07/2020 3.24* 0.70 - 3.10 10*3/mm3 Final   • Monocytes, Absolute 03/07/2020 0.77  0.10 - 0.90 10*3/mm3 Final   • Eosinophils, Absolute 03/07/2020 0.26  0.00 - 0.40 10*3/mm3 Final   • Basophils, Absolute 03/07/2020 0.04  0.00 - 0.20 10*3/mm3 Final   • Immature Grans, Absolute 03/07/2020 0.03  0.00 - 0.05 10*3/mm3 Final   • nRBC 03/07/2020 0.0  0.0 - 0.2 /100 WBC Final       Physical Exam   Psychiatric: She has a normal mood and affect. Her behavior is normal. Thought content normal.       Assessment/Plan     Diagnoses and all orders for this visit:    Type 2 diabetes mellitus with hyperglycemia, without long-term current use of insulin (CMS/[facility])  Comments:  Increase metformin 1000 mg twice daily  Continue fasting and 2-hour PP blood glucose readings  Plan to follow-up in 1 month's time or sooner if needed  Orders:  -     metFORMIN (FORTAMET) 1000 MG (OSM) 24 hr tablet; Take 1 tablet by mouth 2 (Two) Times a Day With Meals.    Essential hypertension  Comments:  Continue losartan    Mixed hyperlipidemia  Comments:  Advised low carbohydrate diet    We had a long discussion regarding her diabetes  mellitus and hyperglycemia.  We discussed dietary changes that are needed as well as treatment options.  She was advised that if blood glucose remains elevated that she should contact the office back within a week for further instruction.        This document has been electronically signed by:  Rosa Quigley PA-C

## 2020-05-08 ENCOUNTER — OFFICE VISIT (OUTPATIENT)
Dept: FAMILY MEDICINE CLINIC | Facility: CLINIC | Age: 40
End: 2020-05-08

## 2020-05-08 VITALS
DIASTOLIC BLOOD PRESSURE: 80 MMHG | HEIGHT: 66 IN | SYSTOLIC BLOOD PRESSURE: 120 MMHG | WEIGHT: 293 LBS | BODY MASS INDEX: 47.09 KG/M2 | TEMPERATURE: 98.3 F | HEART RATE: 80 BPM | OXYGEN SATURATION: 96 %

## 2020-05-08 DIAGNOSIS — I10 ESSENTIAL HYPERTENSION: ICD-10-CM

## 2020-05-08 DIAGNOSIS — E11.65 TYPE 2 DIABETES MELLITUS WITH HYPERGLYCEMIA, WITHOUT LONG-TERM CURRENT USE OF INSULIN (HCC): Primary | ICD-10-CM

## 2020-05-08 DIAGNOSIS — E78.2 MIXED HYPERLIPIDEMIA: ICD-10-CM

## 2020-05-08 PROBLEM — R73.03 PREDIABETES: Status: RESOLVED | Noted: 2018-10-16 | Resolved: 2020-05-08

## 2020-05-08 PROCEDURE — 99214 OFFICE O/P EST MOD 30 MIN: CPT | Performed by: PHYSICIAN ASSISTANT

## 2020-05-08 NOTE — PROGRESS NOTES
Sravanthi Alegria is a 39 y.o. female.       Chief Complaint -  diabetes    History of Present Illness -      Diabetes-  Not at goal. Home fasting -240 and 2 hour -360.   She is taking metformin.  She does report associated fatigue due to her hyperglycemia.  Lab Results   Component Value Date    HGBA1C 6.51 (H) 12/09/2019     Hypertension-controlled with losartan 100 mg daily    Hyperlipidemia-stable with diet at this time.  Triglycerides may be elevated due to hyperglycemia.  Patient states that she has stopped drinking pop recently which should help.  Lab Results   Component Value Date    CHOL 169 12/09/2019    CHOL 169 04/12/2019    CHOL 173 09/18/2018     Lab Results   Component Value Date    TRIG 227 (H) 12/09/2019    TRIG 136 04/12/2019    TRIG 179 (H) 09/18/2018     Lab Results   Component Value Date    HDL 43 12/09/2019    HDL 44 04/12/2019    HDL 47 (L) 09/18/2018     Lab Results   Component Value Date    LDL 81 12/09/2019    LDL 98 04/12/2019    LDL 90 09/18/2018         The following portions of the patient's history were reviewed and updated as appropriate: allergies, current medications, past family history, past medical history, past social history, past surgical history and problem list.    Review of Systems   Constitutional: Positive for appetite change and fatigue. Negative for activity change and fever.   HENT: Negative for ear pain, sinus pressure and sore throat.    Eyes: Negative for pain and visual disturbance.   Respiratory: Negative for cough and chest tightness.    Cardiovascular: Negative for chest pain and palpitations.   Gastrointestinal: Negative for abdominal pain, constipation, diarrhea, nausea and vomiting.   Endocrine: Negative for polydipsia and polyuria.   Genitourinary: Negative for dysuria and frequency.   Musculoskeletal: Negative for back pain and myalgias.   Skin: Negative for color change and rash.   Allergic/Immunologic: Negative for food allergies and  "immunocompromised state.   Neurological: Negative for dizziness, syncope and headaches.   Hematological: Negative for adenopathy. Does not bruise/bleed easily.   Psychiatric/Behavioral: Negative for hallucinations and suicidal ideas. The patient is not nervous/anxious.        /80   Pulse 80   Temp 98.3 °F (36.8 °C) (Temporal)   Ht 167.6 cm (66\")   Wt (!) 167 kg (369 lb)   LMP  (LMP Unknown)   SpO2 96%   BMI 59.56 kg/m²   Admission on 03/07/2020, Discharged on 03/07/2020   Component Date Value Ref Range Status   • Glucose 03/07/2020 229* 65 - 99 mg/dL Final   • BUN 03/07/2020 7  6 - 20 mg/dL Final   • Creatinine 03/07/2020 0.60  0.57 - 1.00 mg/dL Final   • Sodium 03/07/2020 137  136 - 145 mmol/L Final   • Potassium 03/07/2020 3.8  3.5 - 5.2 mmol/L Final   • Chloride 03/07/2020 100  98 - 107 mmol/L Final   • CO2 03/07/2020 25.7  22.0 - 29.0 mmol/L Final   • Calcium 03/07/2020 8.6  8.6 - 10.5 mg/dL Final   • Total Protein 03/07/2020 7.2  6.0 - 8.5 g/dL Final   • Albumin 03/07/2020 3.58  3.50 - 5.20 g/dL Final   • ALT (SGPT) 03/07/2020 61* 1 - 33 U/L Final   • AST (SGOT) 03/07/2020 51* 1 - 32 U/L Final   • Alkaline Phosphatase 03/07/2020 107  39 - 117 U/L Final   • Total Bilirubin 03/07/2020 0.3  0.2 - 1.2 mg/dL Final   • eGFR Non African Amer 03/07/2020 111  >60 mL/min/1.73 Final   • Globulin 03/07/2020 3.6  gm/dL Final   • A/G Ratio 03/07/2020 1.0  g/dL Final   • BUN/Creatinine Ratio 03/07/2020 11.7  7.0 - 25.0 Final   • Anion Gap 03/07/2020 11.3  5.0 - 15.0 mmol/L Final   • Influenza A Ag, EIA 03/07/2020 Negative  Negative Final   • Influenza B Ag, EIA 03/07/2020 Negative  Negative Final   • HCG Qualitative 03/07/2020 Negative  Negative Final   • WBC 03/07/2020 8.59  3.40 - 10.80 10*3/mm3 Final   • RBC 03/07/2020 4.30  3.77 - 5.28 10*6/mm3 Final   • Hemoglobin 03/07/2020 13.3  12.0 - 15.9 g/dL Final   • Hematocrit 03/07/2020 40.3  34.0 - 46.6 % Final   • MCV 03/07/2020 93.7  79.0 - 97.0 fL Final   • MCH " 03/07/2020 30.9  26.6 - 33.0 pg Final   • MCHC 03/07/2020 33.0  31.5 - 35.7 g/dL Final   • RDW 03/07/2020 13.2  12.3 - 15.4 % Final   • RDW-SD 03/07/2020 45.7  37.0 - 54.0 fl Final   • MPV 03/07/2020 9.6  6.0 - 12.0 fL Final   • Platelets 03/07/2020 162  140 - 450 10*3/mm3 Final   • Neutrophil % 03/07/2020 49.5  42.7 - 76.0 % Final   • Lymphocyte % 03/07/2020 37.7  19.6 - 45.3 % Final   • Monocyte % 03/07/2020 9.0  5.0 - 12.0 % Final   • Eosinophil % 03/07/2020 3.0  0.3 - 6.2 % Final   • Basophil % 03/07/2020 0.5  0.0 - 1.5 % Final   • Immature Grans % 03/07/2020 0.3  0.0 - 0.5 % Final   • Neutrophils, Absolute 03/07/2020 4.25  1.70 - 7.00 10*3/mm3 Final   • Lymphocytes, Absolute 03/07/2020 3.24* 0.70 - 3.10 10*3/mm3 Final   • Monocytes, Absolute 03/07/2020 0.77  0.10 - 0.90 10*3/mm3 Final   • Eosinophils, Absolute 03/07/2020 0.26  0.00 - 0.40 10*3/mm3 Final   • Basophils, Absolute 03/07/2020 0.04  0.00 - 0.20 10*3/mm3 Final   • Immature Grans, Absolute 03/07/2020 0.03  0.00 - 0.05 10*3/mm3 Final   • nRBC 03/07/2020 0.0  0.0 - 0.2 /100 WBC Final       Physical Exam   Constitutional: She is oriented to person, place, and time. No distress.   Morbidly obese pleasant lady   HENT:   Head: Normocephalic and atraumatic.   Nose: Nose normal.   Mouth/Throat: Oropharynx is clear and moist.   Eyes: Pupils are equal, round, and reactive to light. Conjunctivae and EOM are normal.   Neck: Normal range of motion. Neck supple. No tracheal deviation present. No thyromegaly present.   Cardiovascular: Normal rate, regular rhythm, normal heart sounds and intact distal pulses.   No murmur heard.  Pulmonary/Chest: Effort normal and breath sounds normal. No respiratory distress. She has no wheezes.   Abdominal: Soft. Bowel sounds are normal. There is no tenderness. There is no guarding.   Musculoskeletal: Normal range of motion. She exhibits no edema or tenderness.   Lymphadenopathy:     She has no cervical adenopathy.   Neurological:  She is alert and oriented to person, place, and time.   Skin: Skin is warm and dry. No rash noted. She is not diaphoretic.   Psychiatric: She has a normal mood and affect. Her behavior is normal.   Nursing note and vitals reviewed.      Assessment/Plan     Diagnoses and all orders for this visit:    Type 2 diabetes mellitus with hyperglycemia, without long-term current use of insulin (CMS/[facility])  Comments:  Start for Farxiga 5 mg daily x2 weeks then increase to 10 mg daily  Samples given to patient for first month  Continue metformin 1000 mg twice daily    Orders:  -     Dapagliflozin Propanediol (Farxiga) 10 MG tablet; Take 10 mg by mouth Every Morning.  -     Blood Glucose Monitoring Suppl misc; 1 each 3 (Three) Times a Day.  -     glucose blood test strip; Use as instructed  -     Lancets 30G misc; 1 each Daily.    Essential hypertension  Comments:  Decrease losartan 50 mg daily as the new diabetic medication may slightly decrease blood pressure    Mixed hyperlipidemia  Comments:  Advised low carbohydrate diet      Diabetes mellitus-  We had a long discussion regarding her diabetes with regards to diet and medication.  She was advised to do fasting and 2-hour postprandial blood glucose log daily and also check her blood pressure/pulse regularly.  I will follow-up with her in 2 weeks time and review these records or sooner if needed.      This document has been electronically signed by:  Rosa Quigley PA-C

## 2020-05-12 ENCOUNTER — PRIOR AUTHORIZATION (OUTPATIENT)
Dept: FAMILY MEDICINE CLINIC | Facility: CLINIC | Age: 40
End: 2020-05-12

## 2020-05-17 DIAGNOSIS — F41.1 GAD (GENERALIZED ANXIETY DISORDER): ICD-10-CM

## 2020-05-17 DIAGNOSIS — K21.00 GASTROESOPHAGEAL REFLUX DISEASE WITH ESOPHAGITIS: ICD-10-CM

## 2020-05-17 DIAGNOSIS — I10 ESSENTIAL HYPERTENSION: ICD-10-CM

## 2020-05-18 ENCOUNTER — OFFICE VISIT (OUTPATIENT)
Dept: FAMILY MEDICINE CLINIC | Facility: CLINIC | Age: 40
End: 2020-05-18

## 2020-05-18 DIAGNOSIS — K08.89 TOOTHACHE: Primary | ICD-10-CM

## 2020-05-18 PROCEDURE — 99212 OFFICE O/P EST SF 10 MIN: CPT | Performed by: PHYSICIAN ASSISTANT

## 2020-05-18 RX ORDER — PANTOPRAZOLE SODIUM 20 MG/1
20 TABLET, DELAYED RELEASE ORAL DAILY
Qty: 30 TABLET | Refills: 0 | Status: SHIPPED | OUTPATIENT
Start: 2020-05-18 | End: 2020-06-08

## 2020-05-18 RX ORDER — ERGOCALCIFEROL 1.25 MG/1
1 CAPSULE ORAL
COMMUNITY
Start: 2018-06-20 | End: 2020-06-08 | Stop reason: SDUPTHER

## 2020-05-18 RX ORDER — VENLAFAXINE HYDROCHLORIDE 75 MG/1
75 CAPSULE, EXTENDED RELEASE ORAL DAILY
Qty: 30 CAPSULE | Refills: 0 | Status: SHIPPED | OUTPATIENT
Start: 2020-05-18 | End: 2020-06-08 | Stop reason: SDUPTHER

## 2020-05-18 RX ORDER — LOSARTAN POTASSIUM 100 MG/1
TABLET ORAL
Qty: 52 TABLET | Refills: 5 | Status: SHIPPED | OUTPATIENT
Start: 2020-05-18 | End: 2020-09-17 | Stop reason: SDUPTHER

## 2020-05-18 RX ORDER — HYDROCODONE BITARTRATE AND ACETAMINOPHEN 5; 325 MG/1; MG/1
1 TABLET ORAL EVERY 6 HOURS PRN
Qty: 12 TABLET | Refills: 0 | Status: SHIPPED | OUTPATIENT
Start: 2020-05-18 | End: 2020-06-08

## 2020-05-18 NOTE — PROGRESS NOTES
Subjective   Anika Alegria is a 39 y.o. female.     Telephone visit    You have chosen to receive care through a telephone visit. Do you consent to use a telephone visit for your medical care today? Yes    This visit has been rescheduled as a phone visit to comply with patient safety concerns in accordance with CDC recommendations. Total time of discussion was 12 minutes.      Chief Complaint -mouth pain    History of Present Illness -      Mouth pain-  She complains of pain in her mouth in the form of a toothache.  Symptoms began approximately 2 days ago on Saturday.  Patient states that she has been taking Tylenol and ibuprofen as well as using Orajel with minimal relief.  She called her dentist in [facility] in [place] and has an appointment tomorrow.  Pain is described as moderate to severe aching.    The following portions of the patient's history were reviewed and updated as appropriate: allergies, current medications, past family history, past medical history, past social history, past surgical history and problem list.    Review of Systems   Constitutional: Negative for activity change, appetite change, fatigue and fever.   HENT: Negative for ear pain, sinus pressure and sore throat.         Mouth pain/tooth pain   Eyes: Negative for pain and visual disturbance.   Respiratory: Negative for cough and chest tightness.    Cardiovascular: Negative for chest pain and palpitations.   Gastrointestinal: Negative for abdominal pain, constipation, diarrhea, nausea and vomiting.   Endocrine: Negative for polydipsia and polyuria.   Genitourinary: Negative for dysuria and frequency.   Musculoskeletal: Negative for back pain and myalgias.   Skin: Negative for color change and rash.   Allergic/Immunologic: Negative for food allergies and immunocompromised state.   Neurological: Negative for dizziness, syncope and headaches.   Hematological: Negative for adenopathy. Does not bruise/bleed easily.   Psychiatric/Behavioral:  Negative for hallucinations and suicidal ideas. The patient is not nervous/anxious.        LMP  (LMP Unknown)   Admission on 03/07/2020, Discharged on 03/07/2020   Component Date Value Ref Range Status   • Glucose 03/07/2020 229* 65 - 99 mg/dL Final   • BUN 03/07/2020 7  6 - 20 mg/dL Final   • Creatinine 03/07/2020 0.60  0.57 - 1.00 mg/dL Final   • Sodium 03/07/2020 137  136 - 145 mmol/L Final   • Potassium 03/07/2020 3.8  3.5 - 5.2 mmol/L Final   • Chloride 03/07/2020 100  98 - 107 mmol/L Final   • CO2 03/07/2020 25.7  22.0 - 29.0 mmol/L Final   • Calcium 03/07/2020 8.6  8.6 - 10.5 mg/dL Final   • Total Protein 03/07/2020 7.2  6.0 - 8.5 g/dL Final   • Albumin 03/07/2020 3.58  3.50 - 5.20 g/dL Final   • ALT (SGPT) 03/07/2020 61* 1 - 33 U/L Final   • AST (SGOT) 03/07/2020 51* 1 - 32 U/L Final   • Alkaline Phosphatase 03/07/2020 107  39 - 117 U/L Final   • Total Bilirubin 03/07/2020 0.3  0.2 - 1.2 mg/dL Final   • eGFR Non African Amer 03/07/2020 111  >60 mL/min/1.73 Final   • Globulin 03/07/2020 3.6  gm/dL Final   • A/G Ratio 03/07/2020 1.0  g/dL Final   • BUN/Creatinine Ratio 03/07/2020 11.7  7.0 - 25.0 Final   • Anion Gap 03/07/2020 11.3  5.0 - 15.0 mmol/L Final   • Influenza A Ag, EIA 03/07/2020 Negative  Negative Final   • Influenza B Ag, EIA 03/07/2020 Negative  Negative Final   • HCG Qualitative 03/07/2020 Negative  Negative Final   • WBC 03/07/2020 8.59  3.40 - 10.80 10*3/mm3 Final   • RBC 03/07/2020 4.30  3.77 - 5.28 10*6/mm3 Final   • Hemoglobin 03/07/2020 13.3  12.0 - 15.9 g/dL Final   • Hematocrit 03/07/2020 40.3  34.0 - 46.6 % Final   • MCV 03/07/2020 93.7  79.0 - 97.0 fL Final   • MCH 03/07/2020 30.9  26.6 - 33.0 pg Final   • MCHC 03/07/2020 33.0  31.5 - 35.7 g/dL Final   • RDW 03/07/2020 13.2  12.3 - 15.4 % Final   • RDW-SD 03/07/2020 45.7  37.0 - 54.0 fl Final   • MPV 03/07/2020 9.6  6.0 - 12.0 fL Final   • Platelets 03/07/2020 162  140 - 450 10*3/mm3 Final   • Neutrophil % 03/07/2020 49.5  42.7 - 76.0  % Final   • Lymphocyte % 03/07/2020 37.7  19.6 - 45.3 % Final   • Monocyte % 03/07/2020 9.0  5.0 - 12.0 % Final   • Eosinophil % 03/07/2020 3.0  0.3 - 6.2 % Final   • Basophil % 03/07/2020 0.5  0.0 - 1.5 % Final   • Immature Grans % 03/07/2020 0.3  0.0 - 0.5 % Final   • Neutrophils, Absolute 03/07/2020 4.25  1.70 - 7.00 10*3/mm3 Final   • Lymphocytes, Absolute 03/07/2020 3.24* 0.70 - 3.10 10*3/mm3 Final   • Monocytes, Absolute 03/07/2020 0.77  0.10 - 0.90 10*3/mm3 Final   • Eosinophils, Absolute 03/07/2020 0.26  0.00 - 0.40 10*3/mm3 Final   • Basophils, Absolute 03/07/2020 0.04  0.00 - 0.20 10*3/mm3 Final   • Immature Grans, Absolute 03/07/2020 0.03  0.00 - 0.05 10*3/mm3 Final   • nRBC 03/07/2020 0.0  0.0 - 0.2 /100 WBC Final       Physical Exam   Psychiatric: She has a normal mood and affect. Her behavior is normal. Thought content normal.       Assessment/Plan     Diagnoses and all orders for this visit:    Toothache  Comments:  RX Norco 5 mg prn pain #12   Dao requested  Patient advised to keep her appointment with dentist tomorrow to treat the underlying issue            This document has been electronically signed by:  Rosa Quigley PA-C

## 2020-05-25 DIAGNOSIS — J40 BRONCHITIS: ICD-10-CM

## 2020-05-26 RX ORDER — ALBUTEROL SULFATE 90 UG/1
AEROSOL, METERED RESPIRATORY (INHALATION)
Qty: 18 G | Refills: 0 | Status: SHIPPED | OUTPATIENT
Start: 2020-05-26 | End: 2020-09-17 | Stop reason: SDUPTHER

## 2020-06-08 ENCOUNTER — OFFICE VISIT (OUTPATIENT)
Dept: FAMILY MEDICINE CLINIC | Facility: CLINIC | Age: 40
End: 2020-06-08

## 2020-06-08 DIAGNOSIS — K21.00 GASTROESOPHAGEAL REFLUX DISEASE WITH ESOPHAGITIS: ICD-10-CM

## 2020-06-08 DIAGNOSIS — E11.65 TYPE 2 DIABETES MELLITUS WITH HYPERGLYCEMIA, WITHOUT LONG-TERM CURRENT USE OF INSULIN (HCC): ICD-10-CM

## 2020-06-08 DIAGNOSIS — F41.1 GAD (GENERALIZED ANXIETY DISORDER): ICD-10-CM

## 2020-06-08 DIAGNOSIS — E78.2 MIXED HYPERLIPIDEMIA: ICD-10-CM

## 2020-06-08 DIAGNOSIS — I10 ESSENTIAL HYPERTENSION: ICD-10-CM

## 2020-06-08 DIAGNOSIS — E55.9 VITAMIN D DEFICIENCY: ICD-10-CM

## 2020-06-08 PROCEDURE — 99443 PR PHYS/QHP TELEPHONE EVALUATION 21-30 MIN: CPT | Performed by: PHYSICIAN ASSISTANT

## 2020-06-08 RX ORDER — ERGOCALCIFEROL 1.25 MG/1
50000 CAPSULE ORAL
Qty: 4 CAPSULE | Refills: 5 | Status: SHIPPED | OUTPATIENT
Start: 2020-06-08 | End: 2020-09-17 | Stop reason: SDUPTHER

## 2020-06-08 RX ORDER — VENLAFAXINE HYDROCHLORIDE 75 MG/1
75 CAPSULE, EXTENDED RELEASE ORAL DAILY
Qty: 30 CAPSULE | Refills: 5 | Status: SHIPPED | OUTPATIENT
Start: 2020-06-08 | End: 2020-09-17

## 2020-06-08 RX ORDER — PANTOPRAZOLE SODIUM 40 MG/1
40 TABLET, DELAYED RELEASE ORAL DAILY
Qty: 30 TABLET | Refills: 5 | Status: SHIPPED | OUTPATIENT
Start: 2020-06-08 | End: 2020-06-12

## 2020-06-08 NOTE — PROGRESS NOTES
Subjective   Anika Alegria is a 39 y.o. female.     Telephone visit    You have chosen to receive care through a telephone visit. Do you consent to use a telephone visit for your medical care today? Yes    This visit has been rescheduled as a phone visit to comply with patient safety concerns in accordance with CDC recommendations. Total time of discussion was 21 minutes.      Chief Complaint -heartburn    History of Present Illness -      Heartburn-  She complains of a moderate substernal burning pain with eating due to acid reflux.  Minimal relief with Protonix 20 mg daily.  She states that she has been eating more and different types of food due to the coronavirus epidemic which could account for the increased acid.  Onset greater than 3 months.    Anxiety-  Stable with Effexor.  She denies any SI or HI.    Vitamin D deficiency-  Not at goal with low vitamin D by recent lab work    Hypertension- controlled with losartan    Hyperlipidemia-stable with diet  Lab Results   Component Value Date    CHOL 169 12/09/2019    CHOL 169 04/12/2019    CHOL 173 09/18/2018     Lab Results   Component Value Date    TRIG 227 (H) 12/09/2019    TRIG 136 04/12/2019    TRIG 179 (H) 09/18/2018     Lab Results   Component Value Date    HDL 43 12/09/2019    HDL 44 04/12/2019    HDL 47 (L) 09/18/2018     Lab Results   Component Value Date    LDL 81 12/09/2019    LDL 98 04/12/2019    LDL 90 09/18/2018     Diabetes mellitus type 2-  Improved since starting Farxiga.  She continues to use metformin and has stopped drinking soda pop.  She reports that her fasting blood glucose almost always stays below 120 now and 2-hour postprandial blood glucose does not go over 150.  Lab Results   Component Value Date    HGBA1C 6.51 (H) 12/09/2019         The following portions of the patient's history were reviewed and updated as appropriate: allergies, current medications, past family history, past medical history, past social history, past surgical  history and problem list.    Review of Systems   Constitutional: Positive for activity change and appetite change. Negative for fatigue and fever.   HENT: Negative for ear pain, sinus pressure and sore throat.    Eyes: Negative for pain and visual disturbance.   Respiratory: Negative for cough and chest tightness.    Cardiovascular: Negative for chest pain and palpitations.   Gastrointestinal: Negative for abdominal pain, constipation, diarrhea, nausea and vomiting.        Heartburn   Endocrine: Negative for polydipsia and polyuria.   Genitourinary: Negative for dysuria and frequency.   Musculoskeletal: Negative for back pain and myalgias.   Skin: Negative for color change and rash.   Allergic/Immunologic: Negative for food allergies and immunocompromised state.   Neurological: Negative for dizziness, syncope and headaches.   Hematological: Negative for adenopathy. Does not bruise/bleed easily.   Psychiatric/Behavioral: Negative for hallucinations and suicidal ideas. The patient is not nervous/anxious.        LMP  (LMP Unknown)   Admission on 03/07/2020, Discharged on 03/07/2020   Component Date Value Ref Range Status   • Glucose 03/07/2020 229* 65 - 99 mg/dL Final   • BUN 03/07/2020 7  6 - 20 mg/dL Final   • Creatinine 03/07/2020 0.60  0.57 - 1.00 mg/dL Final   • Sodium 03/07/2020 137  136 - 145 mmol/L Final   • Potassium 03/07/2020 3.8  3.5 - 5.2 mmol/L Final   • Chloride 03/07/2020 100  98 - 107 mmol/L Final   • CO2 03/07/2020 25.7  22.0 - 29.0 mmol/L Final   • Calcium 03/07/2020 8.6  8.6 - 10.5 mg/dL Final   • Total Protein 03/07/2020 7.2  6.0 - 8.5 g/dL Final   • Albumin 03/07/2020 3.58  3.50 - 5.20 g/dL Final   • ALT (SGPT) 03/07/2020 61* 1 - 33 U/L Final   • AST (SGOT) 03/07/2020 51* 1 - 32 U/L Final   • Alkaline Phosphatase 03/07/2020 107  39 - 117 U/L Final   • Total Bilirubin 03/07/2020 0.3  0.2 - 1.2 mg/dL Final   • eGFR Non African Amer 03/07/2020 111  >60 mL/min/1.73 Final   • Globulin 03/07/2020 3.6   gm/dL Final   • A/G Ratio 03/07/2020 1.0  g/dL Final   • BUN/Creatinine Ratio 03/07/2020 11.7  7.0 - 25.0 Final   • Anion Gap 03/07/2020 11.3  5.0 - 15.0 mmol/L Final   • Influenza A Ag, EIA 03/07/2020 Negative  Negative Final   • Influenza B Ag, EIA 03/07/2020 Negative  Negative Final   • HCG Qualitative 03/07/2020 Negative  Negative Final   • WBC 03/07/2020 8.59  3.40 - 10.80 10*3/mm3 Final   • RBC 03/07/2020 4.30  3.77 - 5.28 10*6/mm3 Final   • Hemoglobin 03/07/2020 13.3  12.0 - 15.9 g/dL Final   • Hematocrit 03/07/2020 40.3  34.0 - 46.6 % Final   • MCV 03/07/2020 93.7  79.0 - 97.0 fL Final   • MCH 03/07/2020 30.9  26.6 - 33.0 pg Final   • MCHC 03/07/2020 33.0  31.5 - 35.7 g/dL Final   • RDW 03/07/2020 13.2  12.3 - 15.4 % Final   • RDW-SD 03/07/2020 45.7  37.0 - 54.0 fl Final   • MPV 03/07/2020 9.6  6.0 - 12.0 fL Final   • Platelets 03/07/2020 162  140 - 450 10*3/mm3 Final   • Neutrophil % 03/07/2020 49.5  42.7 - 76.0 % Final   • Lymphocyte % 03/07/2020 37.7  19.6 - 45.3 % Final   • Monocyte % 03/07/2020 9.0  5.0 - 12.0 % Final   • Eosinophil % 03/07/2020 3.0  0.3 - 6.2 % Final   • Basophil % 03/07/2020 0.5  0.0 - 1.5 % Final   • Immature Grans % 03/07/2020 0.3  0.0 - 0.5 % Final   • Neutrophils, Absolute 03/07/2020 4.25  1.70 - 7.00 10*3/mm3 Final   • Lymphocytes, Absolute 03/07/2020 3.24* 0.70 - 3.10 10*3/mm3 Final   • Monocytes, Absolute 03/07/2020 0.77  0.10 - 0.90 10*3/mm3 Final   • Eosinophils, Absolute 03/07/2020 0.26  0.00 - 0.40 10*3/mm3 Final   • Basophils, Absolute 03/07/2020 0.04  0.00 - 0.20 10*3/mm3 Final   • Immature Grans, Absolute 03/07/2020 0.03  0.00 - 0.05 10*3/mm3 Final   • nRBC 03/07/2020 0.0  0.0 - 0.2 /100 WBC Final       Physical Exam   Psychiatric: She has a normal mood and affect. Her behavior is normal. Thought content normal.       Assessment/Plan     Diagnoses and all orders for this visit:    Gastroesophageal reflux disease with esophagitis  Comments:  Increase Protonix 40 mg  daily  Advised tips for acid reflux/GERD and advised to avoid trigger foods  Orders:  -     pantoprazole (PROTONIX) 40 MG EC tablet; Take 1 tablet by mouth Daily.    FLORI (generalized anxiety disorder)  Comments:  discontinue prozac since not working to prevent panic attacks  continue effexor xr 75mg qd  Orders:  -     venlafaxine XR (EFFEXOR-XR) 75 MG 24 hr capsule; Take 1 capsule by mouth Daily.    Vitamin D deficiency  Comments:  Start vitamin D weekly  Plan to recheck labs in 3 months  Orders:  -     vitamin D (ERGOCALCIFEROL) 1.25 MG (65759 UT) capsule capsule; Take 1 capsule by mouth Every 7 (Seven) Days.  -     Vitamin D 25 Hydroxy; Future    Essential hypertension  -     Comprehensive Metabolic Panel; Future    Mixed hyperlipidemia  -     Lipid Panel; Future    Type 2 diabetes mellitus with hyperglycemia, without long-term current use of insulin (CMS/[facility])  Comments:  We had a discussion regarding her medication and blood glucose control.  Continue Farxiga and metformin  Advised low carbohydrate diabetic diet  Orders:  -     Hemoglobin A1c; Future  -     MicroAlbumin, Urine, Random - Urine, Clean Catch; Future            This document has been electronically signed by:  Rosa Quigley PA-C

## 2020-06-08 NOTE — PATIENT INSTRUCTIONS
"Fat and Cholesterol Restricted Eating Plan  Getting too much fat and cholesterol in your diet may cause health problems. Choosing the right foods helps keep your fat and cholesterol at normal levels. This can keep you from getting certain diseases.  Your doctor may recommend an eating plan that includes:  · Total fat: ______% or less of total calories a day.  · Saturated fat: ______% or less of total calories a day.  · Cholesterol: less than _________mg a day.  · Fiber: ______g a day.  What are tips for following this plan?  Meal planning  · At meals, divide your plate into four equal parts:  ? Fill one-half of your plate with vegetables and green salads.  ? Fill one-fourth of your plate with whole grains.  ? Fill one-fourth of your plate with low-fat (lean) protein foods.  · Eat fish that is high in omega-3 fats at least two times a week. This includes mackerel, tuna, sardines, and salmon.  · Eat foods that are high in fiber, such as whole grains, beans, apples, broccoli, carrots, peas, and barley.  General tips    · Work with your doctor to lose weight if you need to.  · Avoid:  ? Foods with added sugar.  ? Fried foods.  ? Foods with partially hydrogenated oils.  · Limit alcohol intake to no more than 1 drink a day for nonpregnant women and 2 drinks a day for men. One drink equals 12 oz of beer, 5 oz of wine, or 1½ oz of hard liquor.  Reading food labels  · Check food labels for:  ? Trans fats.  ? Partially hydrogenated oils.  ? Saturated fat (g) in each serving.  ? Cholesterol (mg) in each serving.  ? Fiber (g) in each serving.  · Choose foods with healthy fats, such as:  ? Monounsaturated fats.  ? Polyunsaturated fats.  ? Omega-3 fats.  · Choose grain products that have whole grains. Look for the word \"whole\" as the first word in the ingredient list.  Cooking  · Cook foods using low-fat methods. These include baking, boiling, grilling, and broiling.  · Eat more home-cooked foods. Eat at restaurants and buffets " less often.  · Avoid cooking using saturated fats, such as butter, cream, palm oil, palm kernel oil, and coconut oil.  Recommended foods    Fruits  · All fresh, canned (in natural juice), or frozen fruits.  Vegetables  · Fresh or frozen vegetables (raw, steamed, roasted, or grilled). Green salads.  Grains  · Whole grains, such as whole wheat or whole grain breads, crackers, cereals, and pasta. Unsweetened oatmeal, bulgur, barley, quinoa, or brown rice. Corn or whole wheat flour tortillas.  Meats and other protein foods  · Ground beef (85% or leaner), grass-fed beef, or beef trimmed of fat. Skinless chicken or turkey. Ground chicken or turkey. Pork trimmed of fat. All fish and seafood. Egg whites. Dried beans, peas, or lentils. Unsalted nuts or seeds. Unsalted canned beans. Nut butters without added sugar or oil.  Dairy  · Low-fat or nonfat dairy products, such as skim or 1% milk, 2% or reduced-fat cheeses, low-fat and fat-free ricotta or cottage cheese, or plain low-fat and nonfat yogurt.  Fats and oils  · Tub margarine without trans fats. Light or reduced-fat mayonnaise and salad dressings. Avocado. Olive, canola, sesame, or safflower oils.  The items listed above may not be a complete list of foods and beverages you can eat. Contact a dietitian for more information.  Foods to avoid  Fruits  · Canned fruit in heavy syrup. Fruit in cream or butter sauce. Fried fruit.  Vegetables  · Vegetables cooked in cheese, cream, or butter sauce. Fried vegetables.  Grains  · White bread. White pasta. White rice. Cornbread. Bagels, pastries, and croissants. Crackers and snack foods that contain trans fat and hydrogenated oils.  Meats and other protein foods  · Fatty cuts of meat. Ribs, chicken wings, klein, sausage, bologna, salami, chitterlings, fatback, hot dogs, bratwurst, and packaged lunch meats. Liver and organ meats. Whole eggs and egg yolks. Chicken and turkey with skin. Fried meat.  Dairy  · Whole or 2% milk, cream,  half-and-half, and cream cheese. Whole milk cheeses. Whole-fat or sweetened yogurt. Full-fat cheeses. Nondairy creamers and whipped toppings. Processed cheese, cheese spreads, and cheese curds.  Beverages  · Alcohol. Sugar-sweetened drinks such as sodas, lemonade, and fruit drinks.  Fats and oils  · Butter, stick margarine, lard, shortening, ghee, or klein fat. Coconut, palm kernel, and palm oils.  Sweets and desserts  · Corn syrup, sugars, honey, and molasses. Candy. Jam and jelly. Syrup. Sweetened cereals. Cookies, pies, cakes, donuts, muffins, and ice cream.  The items listed above may not be a complete list of foods and beverages you should avoid. Contact a dietitian for more information.  Summary  · Choosing the right foods helps keep your fat and cholesterol at normal levels. This can keep you from getting certain diseases.  · At meals, fill one-half of your plate with vegetables and green salads.  · Eat high-fiber foods, like whole grains, beans, apples, carrots, peas, and barley.  · Limit added sugar, saturated fats, alcohol, and fried foods.  This information is not intended to replace advice given to you by your health care provider. Make sure you discuss any questions you have with your health care provider.  Document Released: 06/18/2013 Document Revised: 08/21/2019 Document Reviewed: 09/04/2018  Elsevier Patient Education © 2020 [employer]      Carbohydrate Counting for Diabetes Mellitus, Adult    Carbohydrate counting is a method of keeping track of how many carbohydrates you eat. Eating carbohydrates naturally increases the amount of sugar (glucose) in the blood. Counting how many carbohydrates you eat helps keep your blood glucose within normal limits, which helps you manage your diabetes (diabetes mellitus).  It is important to know how many carbohydrates you can safely have in each meal. This is different for every person. A diet and nutrition specialist (registered dietitian) can help you make  "a meal plan and calculate how many carbohydrates you should have at each meal and snack.  Carbohydrates are found in the following foods:  · Grains, such as breads and cereals.  · Dried beans and soy products.  · Starchy vegetables, such as potatoes, peas, and corn.  · Fruit and fruit juices.  · Milk and yogurt.  · Sweets and snack foods, such as cake, cookies, candy, chips, and soft drinks.  How do I count carbohydrates?  There are two ways to count carbohydrates in food. You can use either of the methods or a combination of both.  Reading \"Nutrition Facts\" on packaged food  The \"Nutrition Facts\" list is included on the labels of almost all packaged foods and beverages in the [place] It includes:  · The serving size.  · Information about nutrients in each serving, including the grams (g) of carbohydrate per serving.  To use the “Nutrition Facts\":  · Decide how many servings you will have.  · Multiply the number of servings by the number of carbohydrates per serving.  · The resulting number is the total amount of carbohydrates that you will be having.  Learning standard serving sizes of other foods  When you eat carbohydrate foods that are not packaged or do not include \"Nutrition Facts\" on the label, you need to measure the servings in order to count the amount of carbohydrates:  · Measure the foods that you will eat with a food scale or measuring cup, if needed.  · Decide how many standard-size servings you will eat.  · Multiply the number of servings by 15. Most carbohydrate-rich foods have about 15 g of carbohydrates per serving.  ? For example, if you eat 8 oz (170 g) of strawberries, you will have eaten 2 servings and 30 g of carbohydrates (2 servings x 15 g = 30 g).  · For foods that have more than one food mixed, such as soups and casseroles, you must count the carbohydrates in each food that is included.  The following list contains standard serving sizes of common carbohydrate-rich foods. Each of these " servings has about 15 g of carbohydrates:  · ½ hamburger bun or ½ English muffin.  · ½ oz (15 mL) syrup.  · ½ oz (14 g) jelly.  · 1 slice of bread.  · 1 six-inch tortilla.  · 3 oz (85 g) cooked rice or pasta.  · 4 oz (113 g) cooked dried beans.  · 4 oz (113 g) starchy vegetable, such as peas, corn, or potatoes.  · 4 oz (113 g) hot cereal.  · 4 oz (113 g) mashed potatoes or ¼ of a large baked potato.  · 4 oz (113 g) canned or frozen fruit.  · 4 oz (120 mL) fruit juice.  · 4-6 crackers.  · 6 chicken nuggets.  · 6 oz (170 g) unsweetened dry cereal.  · 6 oz (170 g) plain fat-free yogurt or yogurt sweetened with artificial sweeteners.  · 8 oz (240 mL) milk.  · 8 oz (170 g) fresh fruit or one small piece of fruit.  · 24 oz (680 g) popped popcorn.  Example of carbohydrate counting  Sample meal  · 3 oz (85 g) chicken breast.  · 6 oz (170 g) brown rice.  · 4 oz (113 g) corn.  · 8 oz (240 mL) milk.  · 8 oz (170 g) strawberries with sugar-free whipped topping.  Carbohydrate calculation  1. Identify the foods that contain carbohydrates:  ? Rice.  ? Corn.  ? Milk.  ? Strawberries.  2. Calculate how many servings you have of each food:  ? 2 servings rice.  ? 1 serving corn.  ? 1 serving milk.  ? 1 serving strawberries.  3. Multiply each number of servings by 15 g:  ? 2 servings rice x 15 g = 30 g.  ? 1 serving corn x 15 g = 15 g.  ? 1 serving milk x 15 g = 15 g.  ? 1 serving strawberries x 15 g = 15 g.  4. Add together all of the amounts to find the total grams of carbohydrates eaten:  ? 30 g + 15 g + 15 g + 15 g = 75 g of carbohydrates total.  Summary  · Carbohydrate counting is a method of keeping track of how many carbohydrates you eat.  · Eating carbohydrates naturally increases the amount of sugar (glucose) in the blood.  · Counting how many carbohydrates you eat helps keep your blood glucose within normal limits, which helps you manage your diabetes.  · A diet and nutrition specialist (registered dietitian) can help you  make a meal plan and calculate how many carbohydrates you should have at each meal and snack.  This information is not intended to replace advice given to you by your health care provider. Make sure you discuss any questions you have with your health care provider.  Document Released: 12/18/2006 Document Revised: 07/12/2018 Document Reviewed: 05/31/2017  Elsevier Patient Education © 2020 [employer]

## 2020-06-12 DIAGNOSIS — K21.00 GASTROESOPHAGEAL REFLUX DISEASE WITH ESOPHAGITIS: ICD-10-CM

## 2020-06-12 RX ORDER — PANTOPRAZOLE SODIUM 20 MG/1
20 TABLET, DELAYED RELEASE ORAL DAILY
Qty: 30 TABLET | Refills: 0 | Status: SHIPPED | OUTPATIENT
Start: 2020-06-12 | End: 2020-07-13

## 2020-06-12 RX ORDER — METFORMIN HYDROCHLORIDE 500 MG/1
TABLET, EXTENDED RELEASE ORAL
Qty: 90 TABLET | Refills: 1 | Status: SHIPPED | OUTPATIENT
Start: 2020-06-12 | End: 2020-07-14

## 2020-07-12 DIAGNOSIS — K21.00 GASTROESOPHAGEAL REFLUX DISEASE WITH ESOPHAGITIS: ICD-10-CM

## 2020-07-13 RX ORDER — PANTOPRAZOLE SODIUM 20 MG/1
20 TABLET, DELAYED RELEASE ORAL DAILY
Qty: 30 TABLET | Refills: 0 | Status: SHIPPED | OUTPATIENT
Start: 2020-07-13 | End: 2020-08-11

## 2020-07-14 DIAGNOSIS — J30.1 CHRONIC SEASONAL ALLERGIC RHINITIS DUE TO POLLEN: ICD-10-CM

## 2020-07-14 DIAGNOSIS — J30.1 ALLERGIC RHINITIS DUE TO POLLEN, UNSPECIFIED SEASONALITY: ICD-10-CM

## 2020-07-14 DIAGNOSIS — L65.9 HAIR LOSS: ICD-10-CM

## 2020-07-14 RX ORDER — CETIRIZINE HYDROCHLORIDE 10 MG/1
10 TABLET ORAL DAILY
Qty: 30 TABLET | Refills: 5 | Status: SHIPPED | OUTPATIENT
Start: 2020-07-14 | End: 2020-08-15 | Stop reason: SDUPTHER

## 2020-07-14 RX ORDER — FLUTICASONE PROPIONATE 50 MCG
2 SPRAY, SUSPENSION (ML) NASAL DAILY
Qty: 16 ML | Refills: 5 | Status: SHIPPED | OUTPATIENT
Start: 2020-07-14 | End: 2021-09-13 | Stop reason: SDUPTHER

## 2020-07-14 RX ORDER — KETOCONAZOLE 20 MG/ML
SHAMPOO TOPICAL 2 TIMES WEEKLY
Qty: 120 ML | Refills: 5 | Status: SHIPPED | OUTPATIENT
Start: 2020-07-16 | End: 2022-01-13 | Stop reason: SDUPTHER

## 2020-07-14 RX ORDER — IBUPROFEN 800 MG/1
800 TABLET ORAL EVERY 8 HOURS
Qty: 90 TABLET | Refills: 1 | Status: SHIPPED | OUTPATIENT
Start: 2020-07-14 | End: 2020-09-17 | Stop reason: SDUPTHER

## 2020-08-04 ENCOUNTER — OFFICE VISIT (OUTPATIENT)
Dept: FAMILY MEDICINE CLINIC | Facility: CLINIC | Age: 40
End: 2020-08-04

## 2020-08-04 VITALS
SYSTOLIC BLOOD PRESSURE: 130 MMHG | DIASTOLIC BLOOD PRESSURE: 80 MMHG | OXYGEN SATURATION: 96 % | HEART RATE: 86 BPM | HEIGHT: 66 IN | BODY MASS INDEX: 47.09 KG/M2 | WEIGHT: 293 LBS | TEMPERATURE: 97.1 F

## 2020-08-04 DIAGNOSIS — K21.9 GASTROESOPHAGEAL REFLUX DISEASE WITHOUT ESOPHAGITIS: ICD-10-CM

## 2020-08-04 DIAGNOSIS — M25.512 ACUTE PAIN OF LEFT SHOULDER: Primary | ICD-10-CM

## 2020-08-04 DIAGNOSIS — E78.2 MIXED HYPERLIPIDEMIA: ICD-10-CM

## 2020-08-04 DIAGNOSIS — I10 ESSENTIAL HYPERTENSION: ICD-10-CM

## 2020-08-04 PROCEDURE — 99214 OFFICE O/P EST MOD 30 MIN: CPT | Performed by: PHYSICIAN ASSISTANT

## 2020-08-04 PROCEDURE — 20610 DRAIN/INJ JOINT/BURSA W/O US: CPT | Performed by: PHYSICIAN ASSISTANT

## 2020-08-04 NOTE — PROGRESS NOTES
Subjective   Anika Alegria is a 40 y.o. female.       Chief Complaint -shoulder pain    History of Present Illness -      Shoulder pain-  She complains of moderate achy pain in the left shoulder that occurs at rest and with exertion.  She states that pain occurs day and night and is intermittent.  Pain is worse with left shoulder abduction.  No known injury or precipitating event.  Onset 2 weeks.  Minimal relief with heat, ice, stretching or ibuprofen.    Hypertension- controlled with losartan    Hyperlipidemia-currently stable with diet.  She has significantly changed her diet to decrease carbohydrates and stop drinking pop since her diagnosis of diabetes mellitus.  Patient has lost 12 pounds since her last visit and states that she has more energy.  Lab Results   Component Value Date    CHOL 169 12/09/2019    CHOL 169 04/12/2019    CHOL 173 09/18/2018     Lab Results   Component Value Date    TRIG 227 (H) 12/09/2019    TRIG 136 04/12/2019    TRIG 179 (H) 09/18/2018     Lab Results   Component Value Date    HDL 43 12/09/2019    HDL 44 04/12/2019    HDL 47 (L) 09/18/2018     Lab Results   Component Value Date    LDL 81 12/09/2019    LDL 98 04/12/2019    LDL 90 09/18/2018     Gastroesophageal reflux disease-stable with pantoprazole    The following portions of the patient's history were reviewed and updated as appropriate: allergies, current medications, past family history, past medical history, past social history, past surgical history and problem list.    Review of Systems   Constitutional: Negative for activity change, appetite change, fatigue and fever.   HENT: Negative for ear pain, sinus pressure and sore throat.    Eyes: Negative for pain and visual disturbance.   Respiratory: Negative for cough and chest tightness.    Cardiovascular: Negative for chest pain and palpitations.   Gastrointestinal: Negative for abdominal pain, constipation, diarrhea, nausea and vomiting.   Endocrine: Negative for polydipsia and  "polyuria.   Genitourinary: Negative for dysuria and frequency.   Musculoskeletal: Positive for arthralgias. Negative for back pain and myalgias.   Skin: Negative for color change and rash.   Allergic/Immunologic: Negative for food allergies and immunocompromised state.   Neurological: Negative for dizziness, syncope and headaches.   Hematological: Negative for adenopathy. Does not bruise/bleed easily.   Psychiatric/Behavioral: Negative for hallucinations and suicidal ideas. The patient is not nervous/anxious.        /80   Pulse 86   Temp 97.1 °F (36.2 °C) (Temporal)   Ht 167.6 cm (66\")   Wt (!) 162 kg (357 lb)   LMP  (LMP Unknown)   SpO2 96%   BMI 57.62 kg/m²   Admission on 03/07/2020, Discharged on 03/07/2020   Component Date Value Ref Range Status   • Glucose 03/07/2020 229* 65 - 99 mg/dL Final   • BUN 03/07/2020 7  6 - 20 mg/dL Final   • Creatinine 03/07/2020 0.60  0.57 - 1.00 mg/dL Final   • Sodium 03/07/2020 137  136 - 145 mmol/L Final   • Potassium 03/07/2020 3.8  3.5 - 5.2 mmol/L Final   • Chloride 03/07/2020 100  98 - 107 mmol/L Final   • CO2 03/07/2020 25.7  22.0 - 29.0 mmol/L Final   • Calcium 03/07/2020 8.6  8.6 - 10.5 mg/dL Final   • Total Protein 03/07/2020 7.2  6.0 - 8.5 g/dL Final   • Albumin 03/07/2020 3.58  3.50 - 5.20 g/dL Final   • ALT (SGPT) 03/07/2020 61* 1 - 33 U/L Final   • AST (SGOT) 03/07/2020 51* 1 - 32 U/L Final   • Alkaline Phosphatase 03/07/2020 107  39 - 117 U/L Final   • Total Bilirubin 03/07/2020 0.3  0.2 - 1.2 mg/dL Final   • eGFR Non African Amer 03/07/2020 111  >60 mL/min/1.73 Final   • Globulin 03/07/2020 3.6  gm/dL Final   • A/G Ratio 03/07/2020 1.0  g/dL Final   • BUN/Creatinine Ratio 03/07/2020 11.7  7.0 - 25.0 Final   • Anion Gap 03/07/2020 11.3  5.0 - 15.0 mmol/L Final   • Influenza A Ag, EIA 03/07/2020 Negative  Negative Final   • Influenza B Ag, EIA 03/07/2020 Negative  Negative Final   • HCG Qualitative 03/07/2020 Negative  Negative Final   • WBC 03/07/2020 " 8.59  3.40 - 10.80 10*3/mm3 Final   • RBC 03/07/2020 4.30  3.77 - 5.28 10*6/mm3 Final   • Hemoglobin 03/07/2020 13.3  12.0 - 15.9 g/dL Final   • Hematocrit 03/07/2020 40.3  34.0 - 46.6 % Final   • MCV 03/07/2020 93.7  79.0 - 97.0 fL Final   • MCH 03/07/2020 30.9  26.6 - 33.0 pg Final   • MCHC 03/07/2020 33.0  31.5 - 35.7 g/dL Final   • RDW 03/07/2020 13.2  12.3 - 15.4 % Final   • RDW-SD 03/07/2020 45.7  37.0 - 54.0 fl Final   • MPV 03/07/2020 9.6  6.0 - 12.0 fL Final   • Platelets 03/07/2020 162  140 - 450 10*3/mm3 Final   • Neutrophil % 03/07/2020 49.5  42.7 - 76.0 % Final   • Lymphocyte % 03/07/2020 37.7  19.6 - 45.3 % Final   • Monocyte % 03/07/2020 9.0  5.0 - 12.0 % Final   • Eosinophil % 03/07/2020 3.0  0.3 - 6.2 % Final   • Basophil % 03/07/2020 0.5  0.0 - 1.5 % Final   • Immature Grans % 03/07/2020 0.3  0.0 - 0.5 % Final   • Neutrophils, Absolute 03/07/2020 4.25  1.70 - 7.00 10*3/mm3 Final   • Lymphocytes, Absolute 03/07/2020 3.24* 0.70 - 3.10 10*3/mm3 Final   • Monocytes, Absolute 03/07/2020 0.77  0.10 - 0.90 10*3/mm3 Final   • Eosinophils, Absolute 03/07/2020 0.26  0.00 - 0.40 10*3/mm3 Final   • Basophils, Absolute 03/07/2020 0.04  0.00 - 0.20 10*3/mm3 Final   • Immature Grans, Absolute 03/07/2020 0.03  0.00 - 0.05 10*3/mm3 Final   • nRBC 03/07/2020 0.0  0.0 - 0.2 /100 WBC Final       Physical Exam   Constitutional: She is oriented to person, place, and time. She appears well-developed and well-nourished.   HENT:   Head: Normocephalic and atraumatic.   Nose: Nose normal.   Mouth/Throat: Oropharynx is clear and moist.   Eyes: Pupils are equal, round, and reactive to light. Conjunctivae and EOM are normal.   Neck: Normal range of motion. Neck supple. No tracheal deviation present. No thyromegaly present.   Cardiovascular: Normal rate, regular rhythm, normal heart sounds and intact distal pulses.   No murmur heard.  Pulmonary/Chest: Effort normal and breath sounds normal. No respiratory distress. She has no  wheezes.   Abdominal: Soft. Bowel sounds are normal. There is no tenderness. There is no guarding.   Musculoskeletal: She exhibits tenderness. She exhibits no edema.   Decreased range of joint motion in left shoulder with abduction and internal rotation.  She does have tenderness noted on the superior deltoid area   Lymphadenopathy:     She has no cervical adenopathy.   Neurological: She is alert and oriented to person, place, and time.   Skin: Skin is warm and dry. No rash noted.   Psychiatric: She has a normal mood and affect. Her behavior is normal.   Nursing note and vitals reviewed.      Assessment/Plan     Diagnoses and all orders for this visit:    Acute pain of left shoulder  Comments:  Left shoulder intra-articular injection will be given today  Discussed starting physical therapy if not resolved by Monday  Orders:  -     XR Shoulder 2+ View Left    Essential hypertension  Comments:  Continue to monitor  Continue losartan    Mixed hyperlipidemia  Comments:  Advised low-cholesterol diet  Encouraged continued weight loss    Gastroesophageal reflux disease without esophagitis  Comments:  Continue pantoprazole    Shoulder pain-  She was advised on conservative measures including daily stretching and use of heat or ice  Intra-articular injection will be beneficial as shoulder pain is likely due to bursitis  X-rays of the shoulder were ordered and we discussed starting physical therapy if pain persists.    Procedure: Left shoulder intra-articular injection  Provider: Rosa Quigley PA-C  Indication: Left shoulder pain likely due to bursitis  Description: The left shoulder was prepped and draped in sterile fashion.  An intra-articular injection was given using 3 cc 1% lidocaine, 1 cc of Kenalog, and 1 cc of triamcinolone.  Pressure was held and sterile dressing was placed  No complications  Estimated blood loss: Minimal  Patient tolerated the procedure well    1% lidocaine 500 mg per 50 mL NDC number  143-9577-01  Lot #[id number]  Expiration 2/2021    Triamcinolone 40 mg per 1 mL  NDC number [address]-8  Lot number [id number]  Expiration 9/2021    Dexamethasone 4 mg/mL NDC number 59376-926-91  Lot #[id number]  Expiration 12/20        This document has been electronically signed by:  Rosa Quigley PA-C

## 2020-08-11 DIAGNOSIS — K21.00 GASTROESOPHAGEAL REFLUX DISEASE WITH ESOPHAGITIS: ICD-10-CM

## 2020-08-11 RX ORDER — PANTOPRAZOLE SODIUM 20 MG/1
20 TABLET, DELAYED RELEASE ORAL DAILY
Qty: 30 TABLET | Refills: 0 | Status: SHIPPED | OUTPATIENT
Start: 2020-08-11 | End: 2020-08-28

## 2020-08-15 DIAGNOSIS — J30.1 ALLERGIC RHINITIS DUE TO POLLEN, UNSPECIFIED SEASONALITY: ICD-10-CM

## 2020-08-17 DIAGNOSIS — J30.1 ALLERGIC RHINITIS DUE TO POLLEN, UNSPECIFIED SEASONALITY: ICD-10-CM

## 2020-08-17 RX ORDER — CETIRIZINE HYDROCHLORIDE 10 MG/1
10 TABLET ORAL DAILY
Qty: 30 TABLET | Refills: 5 | Status: SHIPPED | OUTPATIENT
Start: 2020-08-17 | End: 2020-09-17 | Stop reason: SDUPTHER

## 2020-08-17 RX ORDER — CETIRIZINE HYDROCHLORIDE 10 MG/1
10 TABLET ORAL DAILY
Qty: 30 TABLET | Refills: 5 | Status: SHIPPED | OUTPATIENT
Start: 2020-08-17 | End: 2020-08-17 | Stop reason: SDUPTHER

## 2020-08-27 ENCOUNTER — OFFICE VISIT (OUTPATIENT)
Dept: FAMILY MEDICINE CLINIC | Facility: CLINIC | Age: 40
End: 2020-08-27

## 2020-08-27 VITALS
HEART RATE: 74 BPM | DIASTOLIC BLOOD PRESSURE: 80 MMHG | WEIGHT: 293 LBS | SYSTOLIC BLOOD PRESSURE: 128 MMHG | BODY MASS INDEX: 47.09 KG/M2 | HEIGHT: 66 IN | OXYGEN SATURATION: 97 % | TEMPERATURE: 97.1 F

## 2020-08-27 DIAGNOSIS — E11.65 TYPE 2 DIABETES MELLITUS WITH HYPERGLYCEMIA, WITHOUT LONG-TERM CURRENT USE OF INSULIN (HCC): ICD-10-CM

## 2020-08-27 DIAGNOSIS — S91.331A PUNCTURE WOUND OF RIGHT FOOT, INITIAL ENCOUNTER: Primary | ICD-10-CM

## 2020-08-27 DIAGNOSIS — I10 ESSENTIAL HYPERTENSION: ICD-10-CM

## 2020-08-27 DIAGNOSIS — E78.2 MIXED HYPERLIPIDEMIA: ICD-10-CM

## 2020-08-27 PROCEDURE — 90715 TDAP VACCINE 7 YRS/> IM: CPT | Performed by: PHYSICIAN ASSISTANT

## 2020-08-27 PROCEDURE — 90471 IMMUNIZATION ADMIN: CPT | Performed by: PHYSICIAN ASSISTANT

## 2020-08-27 PROCEDURE — 99214 OFFICE O/P EST MOD 30 MIN: CPT | Performed by: PHYSICIAN ASSISTANT

## 2020-08-27 RX ORDER — SULFAMETHOXAZOLE AND TRIMETHOPRIM 800; 160 MG/1; MG/1
1 TABLET ORAL 2 TIMES DAILY
Qty: 20 TABLET | Refills: 0 | Status: SHIPPED | OUTPATIENT
Start: 2020-08-27 | End: 2020-09-17

## 2020-08-27 RX ORDER — PANTOPRAZOLE SODIUM 40 MG/1
40 TABLET, DELAYED RELEASE ORAL DAILY
COMMUNITY
Start: 2020-08-07 | End: 2020-09-17 | Stop reason: SDUPTHER

## 2020-08-28 NOTE — PROGRESS NOTES
Sravanthi Alegria is a 40 y.o. female.       Chief Complaint -stepped on nail    History of Present Illness -      Stepped on nail-  She complains of stepping on a satish nail while walking in her yard yesterday.  Her right forefoot now has some localized erythema and swelling on the sole of the foot where the puncture wound occurred.  She denies any fever.  Pain is described as mild and achy right forefoot that is worse with bearing weight.    Diabetes mellitus type 2-  Stable with metformin, Farxiga, and diet.  We discussed that her blood glucose may go up due to acute issue.  Lab Results   Component Value Date    HGBA1C 6.51 (H) 12/09/2019     Hypertension- controlled with losartan    Hyperlipidemia-stable with low-cholesterol diet  Lab Results   Component Value Date    CHOL 169 12/09/2019    CHOL 169 04/12/2019    CHOL 173 09/18/2018     Lab Results   Component Value Date    TRIG 227 (H) 12/09/2019    TRIG 136 04/12/2019    TRIG 179 (H) 09/18/2018     Lab Results   Component Value Date    HDL 43 12/09/2019    HDL 44 04/12/2019    HDL 47 (L) 09/18/2018     Lab Results   Component Value Date    LDL 81 12/09/2019    LDL 98 04/12/2019    LDL 90 09/18/2018         The following portions of the patient's history were reviewed and updated as appropriate: allergies, current medications, past family history, past medical history, past social history, past surgical history and problem list.    Review of Systems   Constitutional: Negative for activity change, appetite change, fatigue and fever.   HENT: Negative for ear pain, sinus pressure and sore throat.    Eyes: Negative for pain and visual disturbance.   Respiratory: Negative for cough and chest tightness.    Cardiovascular: Negative for chest pain and palpitations.   Gastrointestinal: Negative for abdominal pain, constipation, diarrhea, nausea and vomiting.   Endocrine: Negative for polydipsia and polyuria.   Genitourinary: Negative for dysuria and frequency.  "  Musculoskeletal: Positive for myalgias (Pain and puncture wound bottom of right foot). Negative for back pain.   Skin: Negative for color change and rash.   Allergic/Immunologic: Negative for food allergies and immunocompromised state.   Neurological: Negative for dizziness, syncope and headaches.   Hematological: Negative for adenopathy. Does not bruise/bleed easily.   Psychiatric/Behavioral: Negative for hallucinations and suicidal ideas. The patient is not nervous/anxious.        /80   Pulse 74   Temp 97.1 °F (36.2 °C)   Ht 167.6 cm (66\")   Wt (!) 160 kg (353 lb 6.4 oz)   LMP  (LMP Unknown)   SpO2 97%   BMI 57.04 kg/m²   Admission on 03/07/2020, Discharged on 03/07/2020   Component Date Value Ref Range Status   • Glucose 03/07/2020 229* 65 - 99 mg/dL Final   • BUN 03/07/2020 7  6 - 20 mg/dL Final   • Creatinine 03/07/2020 0.60  0.57 - 1.00 mg/dL Final   • Sodium 03/07/2020 137  136 - 145 mmol/L Final   • Potassium 03/07/2020 3.8  3.5 - 5.2 mmol/L Final   • Chloride 03/07/2020 100  98 - 107 mmol/L Final   • CO2 03/07/2020 25.7  22.0 - 29.0 mmol/L Final   • Calcium 03/07/2020 8.6  8.6 - 10.5 mg/dL Final   • Total Protein 03/07/2020 7.2  6.0 - 8.5 g/dL Final   • Albumin 03/07/2020 3.58  3.50 - 5.20 g/dL Final   • ALT (SGPT) 03/07/2020 61* 1 - 33 U/L Final   • AST (SGOT) 03/07/2020 51* 1 - 32 U/L Final   • Alkaline Phosphatase 03/07/2020 107  39 - 117 U/L Final   • Total Bilirubin 03/07/2020 0.3  0.2 - 1.2 mg/dL Final   • eGFR Non African Amer 03/07/2020 111  >60 mL/min/1.73 Final   • Globulin 03/07/2020 3.6  gm/dL Final   • A/G Ratio 03/07/2020 1.0  g/dL Final   • BUN/Creatinine Ratio 03/07/2020 11.7  7.0 - 25.0 Final   • Anion Gap 03/07/2020 11.3  5.0 - 15.0 mmol/L Final   • Influenza A Ag, EIA 03/07/2020 Negative  Negative Final   • Influenza B Ag, EIA 03/07/2020 Negative  Negative Final   • HCG Qualitative 03/07/2020 Negative  Negative Final   • WBC 03/07/2020 8.59  3.40 - 10.80 10*3/mm3 Final   • " RBC 03/07/2020 4.30  3.77 - 5.28 10*6/mm3 Final   • Hemoglobin 03/07/2020 13.3  12.0 - 15.9 g/dL Final   • Hematocrit 03/07/2020 40.3  34.0 - 46.6 % Final   • MCV 03/07/2020 93.7  79.0 - 97.0 fL Final   • MCH 03/07/2020 30.9  26.6 - 33.0 pg Final   • MCHC 03/07/2020 33.0  31.5 - 35.7 g/dL Final   • RDW 03/07/2020 13.2  12.3 - 15.4 % Final   • RDW-SD 03/07/2020 45.7  37.0 - 54.0 fl Final   • MPV 03/07/2020 9.6  6.0 - 12.0 fL Final   • Platelets 03/07/2020 162  140 - 450 10*3/mm3 Final   • Neutrophil % 03/07/2020 49.5  42.7 - 76.0 % Final   • Lymphocyte % 03/07/2020 37.7  19.6 - 45.3 % Final   • Monocyte % 03/07/2020 9.0  5.0 - 12.0 % Final   • Eosinophil % 03/07/2020 3.0  0.3 - 6.2 % Final   • Basophil % 03/07/2020 0.5  0.0 - 1.5 % Final   • Immature Grans % 03/07/2020 0.3  0.0 - 0.5 % Final   • Neutrophils, Absolute 03/07/2020 4.25  1.70 - 7.00 10*3/mm3 Final   • Lymphocytes, Absolute 03/07/2020 3.24* 0.70 - 3.10 10*3/mm3 Final   • Monocytes, Absolute 03/07/2020 0.77  0.10 - 0.90 10*3/mm3 Final   • Eosinophils, Absolute 03/07/2020 0.26  0.00 - 0.40 10*3/mm3 Final   • Basophils, Absolute 03/07/2020 0.04  0.00 - 0.20 10*3/mm3 Final   • Immature Grans, Absolute 03/07/2020 0.03  0.00 - 0.05 10*3/mm3 Final   • nRBC 03/07/2020 0.0  0.0 - 0.2 /100 WBC Final       Physical Exam   Constitutional: She is oriented to person, place, and time. She appears well-developed. No distress.   Obese pleasant lady   HENT:   Head: Normocephalic and atraumatic.   Nose: Nose normal.   Mouth/Throat: Oropharynx is clear and moist.   Eyes: Pupils are equal, round, and reactive to light. Conjunctivae and EOM are normal.   Neck: Normal range of motion. Neck supple. No tracheal deviation present. No thyromegaly present.   Cardiovascular: Normal rate, regular rhythm, normal heart sounds and intact distal pulses.   No murmur heard.  Pulmonary/Chest: Effort normal and breath sounds normal. No respiratory distress. She has no wheezes.   Abdominal:  Soft. Bowel sounds are normal. There is no tenderness. There is no guarding.   Musculoskeletal: Normal range of motion. She exhibits edema and tenderness.   Puncture wound noted right forefoot near first PIP joint with localized erythema and swelling appreciated   Lymphadenopathy:     She has no cervical adenopathy.   Neurological: She is alert and oriented to person, place, and time.   Skin: Skin is warm and dry. No rash noted. She is not diaphoretic.   Psychiatric: She has a normal mood and affect. Her behavior is normal.   Nursing note and vitals reviewed.      Assessment/Plan     Diagnoses and all orders for this visit:    Puncture wound of right foot, initial encounter  Comments:  Tdap given today  Empiric treatment for prevention of cellulitis will be started  Advised patient to report any worsening of symptoms or cellulitis  Orders:  -     Tdap Vaccine Greater Than or Equal To 6yo IM  -     sulfamethoxazole-trimethoprim (Bactrim DS) 800-160 MG per tablet; Take 1 tablet by mouth 2 (Two) Times a Day.    Type 2 diabetes mellitus with hyperglycemia, without long-term current use of insulin (CMS/[facility])  Comments:  Continue metformin, Farxiga, and diet  Continue to monitor    Essential hypertension  Comments:  Continue losartan  Continue to monitor BP and pulse daily    Mixed hyperlipidemia  Comments:  Advised low-cholesterol diet  Continue to monitor            This document has been electronically signed by:  Rosa Quigley PA-C

## 2020-09-03 ENCOUNTER — LAB (OUTPATIENT)
Dept: FAMILY MEDICINE CLINIC | Facility: CLINIC | Age: 40
End: 2020-09-03

## 2020-09-03 DIAGNOSIS — I10 ESSENTIAL HYPERTENSION: ICD-10-CM

## 2020-09-03 DIAGNOSIS — E55.9 VITAMIN D DEFICIENCY: ICD-10-CM

## 2020-09-03 DIAGNOSIS — E11.65 TYPE 2 DIABETES MELLITUS WITH HYPERGLYCEMIA, WITHOUT LONG-TERM CURRENT USE OF INSULIN (HCC): ICD-10-CM

## 2020-09-03 DIAGNOSIS — E78.2 MIXED HYPERLIPIDEMIA: ICD-10-CM

## 2020-09-03 LAB
25(OH)D3 SERPL-MCNC: 34.2 NG/ML (ref 30–100)
ALBUMIN SERPL-MCNC: 4 G/DL (ref 3.5–5.2)
ALBUMIN UR-MCNC: 4 MG/DL
ALBUMIN/GLOB SERPL: 1.2 G/DL
ALP SERPL-CCNC: 93 U/L (ref 39–117)
ALT SERPL W P-5'-P-CCNC: 39 U/L (ref 1–33)
ANION GAP SERPL CALCULATED.3IONS-SCNC: 9.5 MMOL/L (ref 5–15)
AST SERPL-CCNC: 29 U/L (ref 1–32)
BILIRUB SERPL-MCNC: 0.6 MG/DL (ref 0–1.2)
BUN SERPL-MCNC: 11 MG/DL (ref 6–20)
BUN/CREAT SERPL: 16.7 (ref 7–25)
CALCIUM SPEC-SCNC: 8.6 MG/DL (ref 8.6–10.5)
CHLORIDE SERPL-SCNC: 104 MMOL/L (ref 98–107)
CHOLEST SERPL-MCNC: 168 MG/DL (ref 0–200)
CO2 SERPL-SCNC: 23.5 MMOL/L (ref 22–29)
CREAT SERPL-MCNC: 0.66 MG/DL (ref 0.57–1)
GFR SERPL CREATININE-BSD FRML MDRD: 99 ML/MIN/1.73
GLOBULIN UR ELPH-MCNC: 3.4 GM/DL
GLUCOSE SERPL-MCNC: 101 MG/DL (ref 65–99)
HBA1C MFR BLD: 6.66 % (ref 4.8–5.6)
HDLC SERPL-MCNC: 46 MG/DL (ref 40–60)
LDLC SERPL CALC-MCNC: 98 MG/DL (ref 0–100)
LDLC/HDLC SERPL: 2.12 {RATIO}
POTASSIUM SERPL-SCNC: 4.1 MMOL/L (ref 3.5–5.2)
PROT SERPL-MCNC: 7.4 G/DL (ref 6–8.5)
SODIUM SERPL-SCNC: 137 MMOL/L (ref 136–145)
TRIGL SERPL-MCNC: 122 MG/DL (ref 0–150)
VLDLC SERPL-MCNC: 24.4 MG/DL (ref 5–40)

## 2020-09-03 PROCEDURE — 82306 VITAMIN D 25 HYDROXY: CPT | Performed by: PHYSICIAN ASSISTANT

## 2020-09-03 PROCEDURE — 80053 COMPREHEN METABOLIC PANEL: CPT | Performed by: PHYSICIAN ASSISTANT

## 2020-09-03 PROCEDURE — 80061 LIPID PANEL: CPT | Performed by: PHYSICIAN ASSISTANT

## 2020-09-03 PROCEDURE — 82043 UR ALBUMIN QUANTITATIVE: CPT | Performed by: PHYSICIAN ASSISTANT

## 2020-09-03 PROCEDURE — 83036 HEMOGLOBIN GLYCOSYLATED A1C: CPT | Performed by: PHYSICIAN ASSISTANT

## 2020-09-17 ENCOUNTER — OFFICE VISIT (OUTPATIENT)
Dept: FAMILY MEDICINE CLINIC | Facility: CLINIC | Age: 40
End: 2020-09-17

## 2020-09-17 VITALS
WEIGHT: 293 LBS | SYSTOLIC BLOOD PRESSURE: 124 MMHG | TEMPERATURE: 97.4 F | OXYGEN SATURATION: 96 % | DIASTOLIC BLOOD PRESSURE: 82 MMHG | HEART RATE: 68 BPM | HEIGHT: 66 IN | BODY MASS INDEX: 47.09 KG/M2

## 2020-09-17 DIAGNOSIS — J30.89 ENVIRONMENTAL AND SEASONAL ALLERGIES: ICD-10-CM

## 2020-09-17 DIAGNOSIS — I10 ESSENTIAL HYPERTENSION: ICD-10-CM

## 2020-09-17 DIAGNOSIS — M15.9 PRIMARY OSTEOARTHRITIS INVOLVING MULTIPLE JOINTS: ICD-10-CM

## 2020-09-17 DIAGNOSIS — E55.9 VITAMIN D DEFICIENCY: ICD-10-CM

## 2020-09-17 DIAGNOSIS — E11.65 TYPE 2 DIABETES MELLITUS WITH HYPERGLYCEMIA, WITHOUT LONG-TERM CURRENT USE OF INSULIN (HCC): ICD-10-CM

## 2020-09-17 DIAGNOSIS — F33.1 MODERATE EPISODE OF RECURRENT MAJOR DEPRESSIVE DISORDER (HCC): Primary | ICD-10-CM

## 2020-09-17 DIAGNOSIS — K21.9 GASTROESOPHAGEAL REFLUX DISEASE WITHOUT ESOPHAGITIS: ICD-10-CM

## 2020-09-17 PROCEDURE — 90686 IIV4 VACC NO PRSV 0.5 ML IM: CPT | Performed by: PHYSICIAN ASSISTANT

## 2020-09-17 PROCEDURE — 90471 IMMUNIZATION ADMIN: CPT | Performed by: PHYSICIAN ASSISTANT

## 2020-09-17 PROCEDURE — 99214 OFFICE O/P EST MOD 30 MIN: CPT | Performed by: PHYSICIAN ASSISTANT

## 2020-09-17 RX ORDER — PANTOPRAZOLE SODIUM 40 MG/1
40 TABLET, DELAYED RELEASE ORAL DAILY
Qty: 30 TABLET | Refills: 5 | Status: SHIPPED | OUTPATIENT
Start: 2020-09-17 | End: 2021-09-13 | Stop reason: SDUPTHER

## 2020-09-17 RX ORDER — LOSARTAN POTASSIUM 100 MG/1
100 TABLET ORAL DAILY
Qty: 30 TABLET | Refills: 5 | Status: SHIPPED | OUTPATIENT
Start: 2020-09-17 | End: 2021-09-13 | Stop reason: SDUPTHER

## 2020-09-17 RX ORDER — DAPAGLIFLOZIN 10 MG/1
10 TABLET, FILM COATED ORAL EVERY MORNING
Qty: 30 TABLET | Refills: 5 | Status: SHIPPED | OUTPATIENT
Start: 2020-09-17 | End: 2021-05-11

## 2020-09-17 RX ORDER — ALBUTEROL SULFATE 90 UG/1
2 AEROSOL, METERED RESPIRATORY (INHALATION) EVERY 6 HOURS PRN
Qty: 18 G | Refills: 5 | Status: SHIPPED | OUTPATIENT
Start: 2020-09-17 | End: 2021-03-01

## 2020-09-17 RX ORDER — CETIRIZINE HYDROCHLORIDE 10 MG/1
10 TABLET ORAL DAILY
Qty: 30 TABLET | Refills: 5 | Status: SHIPPED | OUTPATIENT
Start: 2020-09-17 | End: 2021-03-19

## 2020-09-17 RX ORDER — IBUPROFEN 800 MG/1
800 TABLET ORAL EVERY 8 HOURS
Qty: 90 TABLET | Refills: 2 | Status: SHIPPED | OUTPATIENT
Start: 2020-09-17 | End: 2021-02-08

## 2020-09-17 RX ORDER — VENLAFAXINE HYDROCHLORIDE 150 MG/1
150 CAPSULE, EXTENDED RELEASE ORAL DAILY
Qty: 30 CAPSULE | Refills: 5 | Status: SHIPPED | OUTPATIENT
Start: 2020-09-17 | End: 2020-11-03

## 2020-09-17 RX ORDER — ERGOCALCIFEROL 1.25 MG/1
50000 CAPSULE ORAL
Qty: 4 CAPSULE | Refills: 5 | Status: SHIPPED | OUTPATIENT
Start: 2020-09-17 | End: 2020-12-10

## 2020-09-17 NOTE — PROGRESS NOTES
"Sravanthi Alegria is a 40 y.o. female.       Chief Complaint -depression    History of Present Illness -      Depression-  She complains of labile moods and states that she is \"hateful\" despite the use of Effexor 75 mg daily.  She denies any SI or HI.  Not at goal    Environmental allergens-stable with Flonase, Zyrtec and albuterol    Diabetes mellitus type 2-  Significantly improved since she has decreased the carbohydrates in her diet and started eating healthier food options.    Hypertension-controlled with losartan    Vitamin D deficiency-stable with vitamin D supplementation    Gastroesophageal reflux disease-stable with pantoprazole    Osteoarthritis-  Not at goal with ibuprofen.  She states she still has some problems with her left shoulder.  Pain is described as intermittent moderate aching due to osteoarthritis.  No known new injury.    The following portions of the patient's history were reviewed and updated as appropriate: allergies, current medications, past family history, past medical history, past social history, past surgical history and problem list.    Review of Systems   Constitutional: Negative for activity change, appetite change, fatigue and fever.   HENT: Negative for ear pain, sinus pressure and sore throat.    Eyes: Negative for pain and visual disturbance.   Respiratory: Negative for cough and chest tightness.    Cardiovascular: Negative for chest pain and palpitations.   Gastrointestinal: Negative for abdominal pain, constipation, diarrhea, nausea and vomiting.   Endocrine: Negative for polydipsia and polyuria.   Genitourinary: Negative for dysuria and frequency.   Musculoskeletal: Positive for arthralgias. Negative for back pain and myalgias.   Skin: Negative for color change and rash.   Allergic/Immunologic: Negative for food allergies and immunocompromised state.   Neurological: Negative for dizziness, syncope and headaches.   Hematological: Negative for adenopathy. Does not " "bruise/bleed easily.   Psychiatric/Behavioral: Negative for hallucinations and suicidal ideas. The patient is not nervous/anxious.        /82   Pulse 68   Temp 97.4 °F (36.3 °C)   Ht 167.6 cm (66\")   Wt (!) 161 kg (355 lb)   LMP  (LMP Unknown)   SpO2 96%   BMI 57.30 kg/m²   Lab on 09/03/2020   Component Date Value Ref Range Status   • Glucose 09/03/2020 101* 65 - 99 mg/dL Final   • BUN 09/03/2020 11  6 - 20 mg/dL Final   • Creatinine 09/03/2020 0.66  0.57 - 1.00 mg/dL Final   • Sodium 09/03/2020 137  136 - 145 mmol/L Final   • Potassium 09/03/2020 4.1  3.5 - 5.2 mmol/L Final   • Chloride 09/03/2020 104  98 - 107 mmol/L Final   • CO2 09/03/2020 23.5  22.0 - 29.0 mmol/L Final   • Calcium 09/03/2020 8.6  8.6 - 10.5 mg/dL Final   • Total Protein 09/03/2020 7.4  6.0 - 8.5 g/dL Final   • Albumin 09/03/2020 4.00  3.50 - 5.20 g/dL Final   • ALT (SGPT) 09/03/2020 39* 1 - 33 U/L Final   • AST (SGOT) 09/03/2020 29  1 - 32 U/L Final   • Alkaline Phosphatase 09/03/2020 93  39 - 117 U/L Final   • Total Bilirubin 09/03/2020 0.6  0.0 - 1.2 mg/dL Final   • eGFR Non African Amer 09/03/2020 99  >60 mL/min/1.73 Final   • Globulin 09/03/2020 3.4  gm/dL Final   • A/G Ratio 09/03/2020 1.2  g/dL Final   • BUN/Creatinine Ratio 09/03/2020 16.7  7.0 - 25.0 Final   • Anion Gap 09/03/2020 9.5  5.0 - 15.0 mmol/L Final   • Total Cholesterol 09/03/2020 168  0 - 200 mg/dL Final   • Triglycerides 09/03/2020 122  0 - 150 mg/dL Final   • HDL Cholesterol 09/03/2020 46  40 - 60 mg/dL Final   • LDL Cholesterol  09/03/2020 98  0 - 100 mg/dL Final   • VLDL Cholesterol 09/03/2020 24.4  5 - 40 mg/dL Final   • LDL/HDL Ratio 09/03/2020 2.12   Final   • 25 Hydroxy, Vitamin D 09/03/2020 34.2  30.0 - 100.0 ng/ml Final   • Hemoglobin A1C 09/03/2020 6.66* 4.80 - 5.60 % Final   • Microalbumin, Urine 09/03/2020 4.0  mg/dL Final       Physical Exam  Vitals signs and nursing note reviewed.   Constitutional:       Appearance: She is well-developed. She is " obese.   HENT:      Head: Normocephalic and atraumatic.      Right Ear: Tympanic membrane normal.      Left Ear: Tympanic membrane normal.      Nose: Nose normal.      Mouth/Throat:      Mouth: Mucous membranes are moist.      Pharynx: No oropharyngeal exudate or posterior oropharyngeal erythema.   Eyes:      Extraocular Movements: Extraocular movements intact.      Conjunctiva/sclera: Conjunctivae normal.   Neck:      Musculoskeletal: Normal range of motion and neck supple.      Thyroid: No thyromegaly.      Trachea: No tracheal deviation.   Cardiovascular:      Rate and Rhythm: Normal rate and regular rhythm.      Heart sounds: Normal heart sounds. No murmur.   Pulmonary:      Effort: Pulmonary effort is normal. No respiratory distress.      Breath sounds: Normal breath sounds. No wheezing.   Abdominal:      General: Bowel sounds are normal.      Palpations: Abdomen is soft.      Tenderness: There is no abdominal tenderness. There is no guarding.   Musculoskeletal: Normal range of motion.         General: No tenderness.      Comments: Mild coarse crepitus noted with manipulation of left shoulder joint without erythema or swelling noted   Lymphadenopathy:      Cervical: No cervical adenopathy.   Skin:     General: Skin is warm and dry.      Findings: No rash.   Neurological:      General: No focal deficit present.      Mental Status: She is alert and oriented to person, place, and time.   Psychiatric:         Behavior: Behavior normal.         Thought Content: Thought content normal.      Comments: Mood is concerned today         Assessment/Plan     Diagnoses and all orders for this visit:    Moderate episode of recurrent major depressive disorder (CMS/HCC)  Comments:  Increase Effexor 150 mg daily  Orders:  -     venlafaxine XR (Effexor XR) 150 MG 24 hr capsule; Take 1 capsule by mouth Daily.    Environmental and seasonal allergies  -     albuterol sulfate  (90 Base) MCG/ACT inhaler; Inhale 2 puffs Every 6  (Six) Hours As Needed for Wheezing.  -     cetirizine (zyrTEC) 10 MG tablet; Take 1 tablet by mouth Daily.    Type 2 diabetes mellitus with hyperglycemia, without long-term current use of insulin (CMS/[facility])  Comments:  Continue Farxiga and metformin  Encouraged to continue low-carb diabetic diet    Orders:  -     Dapagliflozin Propanediol (Farxiga) 10 MG tablet; Take 10 mg by mouth Every Morning.  -     metFORMIN (Glucophage) 1000 MG tablet; Take 1 tablet by mouth 2 (Two) Times a Day With Meals.    Essential hypertension  Comments:  Continue Cozaar 100mg qd  Orders:  -     losartan (COZAAR) 100 MG tablet; Take 1 tablet by mouth Daily.    Vitamin D deficiency  Comments:  Continue vitamin D  Orders:  -     vitamin D (ERGOCALCIFEROL) 1.25 MG (89829 UT) capsule capsule; Take 1 capsule by mouth Every 7 (Seven) Days.    Gastroesophageal reflux disease without esophagitis  Comments:  Continue pantoprazole  Orders:  -     pantoprazole (PROTONIX) 40 MG EC tablet; Take 1 tablet by mouth Daily.    Primary osteoarthritis involving multiple joints  Comments:  Start Voltaren gel as needed for shoulder pain  Continue ibuprofen as needed  Orders:  -     ibuprofen (ADVIL,MOTRIN) 800 MG tablet; Take 1 tablet by mouth Every 8 (Eight) Hours.  -     diclofenac (VOLTAREN) 1 % gel gel; Apply 4 g topically to the appropriate area as directed 4 (Four) Times a Day As Needed (joint paint).    Other orders  -     Fluarix Quad >6 Months (3438-1821)            This document has been electronically signed by:  Rosa Quigley PA-C

## 2020-11-03 ENCOUNTER — OFFICE VISIT (OUTPATIENT)
Dept: FAMILY MEDICINE CLINIC | Facility: CLINIC | Age: 40
End: 2020-11-03

## 2020-11-03 VITALS — HEIGHT: 66 IN | BODY MASS INDEX: 47.09 KG/M2 | WEIGHT: 293 LBS

## 2020-11-03 DIAGNOSIS — F33.1 MODERATE EPISODE OF RECURRENT MAJOR DEPRESSIVE DISORDER (HCC): ICD-10-CM

## 2020-11-03 DIAGNOSIS — E11.65 TYPE 2 DIABETES MELLITUS WITH HYPERGLYCEMIA, WITHOUT LONG-TERM CURRENT USE OF INSULIN (HCC): ICD-10-CM

## 2020-11-03 DIAGNOSIS — F51.5 NIGHTMARES: Primary | ICD-10-CM

## 2020-11-03 PROCEDURE — 99213 OFFICE O/P EST LOW 20 MIN: CPT | Performed by: PHYSICIAN ASSISTANT

## 2020-11-03 RX ORDER — LANCETS 33 GAUGE
EACH MISCELLANEOUS
Qty: 100 EACH | Refills: 5 | Status: SHIPPED | OUTPATIENT
Start: 2020-11-03 | End: 2021-05-12

## 2020-11-03 RX ORDER — DULOXETIN HYDROCHLORIDE 60 MG/1
60 CAPSULE, DELAYED RELEASE ORAL DAILY
Qty: 30 CAPSULE | Refills: 5 | Status: SHIPPED | OUTPATIENT
Start: 2020-11-03 | End: 2021-04-19

## 2020-11-03 NOTE — PROGRESS NOTES
"Sravanthi Alegria is a 40 y.o. female.     Video visit     You have chosen to receive care through a telehealth visit.  Do you consent to use a video/audio connection for your medical care today? Yes    Chief Complaint -nightmares    History of Present Illness -       Nightmares-  She complains of vivid nightmares that are not related to any reality based content.  She reports that this is caused her trouble sleeping and when she wakes up she feels like the vivid dream was real.  Onset approximately 3 weeks.  She does take Effexor regularly.    Depression-  Stable with Effexor.  She denies any SI and HI.  She does have potential side effect of vivid dreams/nightmares.    The following portions of the patient's history were reviewed and updated as appropriate: allergies, current medications, past family history, past medical history, past social history, past surgical history and problem list.    Review of Systems   Constitutional: Negative for activity change, appetite change, fatigue and fever.   HENT: Negative for ear pain, sinus pressure and sore throat.    Eyes: Negative for pain and visual disturbance.   Respiratory: Negative for cough and chest tightness.    Cardiovascular: Negative for chest pain and palpitations.   Gastrointestinal: Negative for abdominal pain, constipation, diarrhea, nausea and vomiting.   Endocrine: Negative for polydipsia and polyuria.   Genitourinary: Negative for dysuria and frequency.   Musculoskeletal: Negative for back pain and myalgias.   Skin: Negative for color change and rash.   Allergic/Immunologic: Negative for food allergies and immunocompromised state.   Neurological: Negative for dizziness, syncope and headaches.   Hematological: Negative for adenopathy. Does not bruise/bleed easily.   Psychiatric/Behavioral: Positive for dysphoric mood and sleep disturbance. Negative for hallucinations and suicidal ideas. The patient is not nervous/anxious.        Ht 167.6 cm (66\") "   Wt (!) 161 kg (355 lb) Comment: pt reported vitals today  LMP  (LMP Unknown)   BMI 57.30 kg/m²     Physical Exam  Vitals signs and nursing note reviewed.   Constitutional:       Appearance: Normal appearance. She is well-developed. She is obese.   Eyes:      Extraocular Movements: Extraocular movements intact.      Conjunctiva/sclera: Conjunctivae normal.   Pulmonary:      Effort: Pulmonary effort is normal. No respiratory distress.      Breath sounds: Normal breath sounds. No wheezing.      Comments: Breath sounds appear normal without use of accessory muscles or wheezing appreciated today  Musculoskeletal:         General: No tenderness.   Skin:     General: Skin is dry.      Findings: No rash.   Neurological:      Mental Status: She is alert and oriented to person, place, and time.   Psychiatric:         Mood and Affect: Mood normal.         Behavior: Behavior normal.         Thought Content: Thought content normal.         Assessment/Plan     Diagnoses and all orders for this visit:    1. Nightmares (Primary)  Comments:  Likely a side effect of venlafaxine  Discontinue venlafaxine    2. Moderate episode of recurrent major depressive disorder (CMS/[facility])  Comments:  Discontinue venlafaxine (Effexor) due to nightmares  Start Cymbalta  Orders:  -     DULoxetine (CYMBALTA) 60 MG capsule; Take 1 capsule by mouth Daily.  Dispense: 30 capsule; Refill: 5      I will follow-up with her in 1 month's time or sooner if needed        This document has been electronically signed by:  Rosa Quigley PA-C

## 2020-11-24 RX ORDER — ACETAMINOPHEN 500 MG
1000 TABLET ORAL EVERY 6 HOURS PRN
Qty: 240 TABLET | Refills: 0 | Status: SHIPPED | OUTPATIENT
Start: 2020-11-24 | End: 2021-02-18

## 2020-12-03 ENCOUNTER — OFFICE VISIT (OUTPATIENT)
Dept: FAMILY MEDICINE CLINIC | Facility: CLINIC | Age: 40
End: 2020-12-03

## 2020-12-03 VITALS — BODY MASS INDEX: 47.09 KG/M2 | WEIGHT: 293 LBS | HEIGHT: 66 IN

## 2020-12-03 DIAGNOSIS — F33.1 MODERATE EPISODE OF RECURRENT MAJOR DEPRESSIVE DISORDER (HCC): ICD-10-CM

## 2020-12-03 DIAGNOSIS — G25.81 RLS (RESTLESS LEGS SYNDROME): Primary | ICD-10-CM

## 2020-12-03 PROCEDURE — 99213 OFFICE O/P EST LOW 20 MIN: CPT | Performed by: PHYSICIAN ASSISTANT

## 2020-12-03 RX ORDER — ROPINIROLE 0.25 MG/1
0.25 TABLET, FILM COATED ORAL SEE ADMIN INSTRUCTIONS
Qty: 120 TABLET | Refills: 0 | Status: SHIPPED | OUTPATIENT
Start: 2020-12-03 | End: 2021-01-05

## 2020-12-03 NOTE — PATIENT INSTRUCTIONS

## 2020-12-03 NOTE — PROGRESS NOTES
Sravanthi Alegria is a 40 y.o. female.     VIDEO Visit    You have chosen to receive care through a telehealth visit.  Do you consent to use a video/audio connection for your medical care today? Yes    Chief Complaint -involuntary leg movements    History of Present Illness -       Involuntary leg movements-  She complains of involuntary leg movements that are worse in her right leg.  Movements occur nightly for greater than 3 months.  She also complains of intermittent moderate cramping in bilateral lower extremities.  Most recent potassium was within normal limits.  Lab Results   Component Value Date    GLUCOSE 101 (H) 09/03/2020    BUN 11 09/03/2020    CREATININE 0.66 09/03/2020    EGFRIFNONA 99 09/03/2020    BCR 16.7 09/03/2020    K 4.1 09/03/2020    CO2 23.5 09/03/2020    CALCIUM 8.6 09/03/2020    ALBUMIN 4.00 09/03/2020    AST 29 09/03/2020    ALT 39 (H) 09/03/2020     Depression-  Improved with Cymbalta.  She states that her moods have improved.  She states that nightmares resolved when she stopped taking the venlafaxine.  She denies any SI or HI.    The following portions of the patient's history were reviewed and updated as appropriate: allergies, current medications, past family history, past medical history, past social history, past surgical history and problem list.    Review of Systems   Constitutional: Negative for activity change, appetite change, fatigue and fever.   HENT: Negative for ear pain, sinus pressure and sore throat.    Eyes: Negative for pain and visual disturbance.   Respiratory: Negative for cough and chest tightness.    Cardiovascular: Negative for chest pain and palpitations.   Gastrointestinal: Negative for abdominal pain, constipation, diarrhea, nausea and vomiting.   Endocrine: Negative for polydipsia and polyuria.   Genitourinary: Negative for dysuria and frequency.   Musculoskeletal: Positive for myalgias (leg cramps). Negative for back pain.   Skin: Negative for color  "change and rash.   Allergic/Immunologic: Negative for food allergies and immunocompromised state.   Neurological: Negative for dizziness, syncope and headaches.   Hematological: Negative for adenopathy. Does not bruise/bleed easily.   Psychiatric/Behavioral: Positive for dysphoric mood and sleep disturbance. Negative for hallucinations and suicidal ideas. The patient is not nervous/anxious.        Ht 167.6 cm (66\")   Wt (!) 155 kg (342 lb)   LMP  (LMP Unknown)   BMI 55.20 kg/m²     Physical Exam  Vitals signs and nursing note reviewed.   Constitutional:       Appearance: Normal appearance. She is well-developed. She is obese.   Eyes:      Extraocular Movements: Extraocular movements intact.      Conjunctiva/sclera: Conjunctivae normal.   Pulmonary:      Effort: Pulmonary effort is normal. No respiratory distress.      Breath sounds: Normal breath sounds. No wheezing.   Musculoskeletal:         General: No tenderness.   Skin:     General: Skin is dry.      Findings: No rash.   Neurological:      Mental Status: She is alert and oriented to person, place, and time.   Psychiatric:         Mood and Affect: Mood normal.         Behavior: Behavior normal.         Thought Content: Thought content normal.         Assessment/Plan     Diagnoses and all orders for this visit:    1. RLS (restless legs syndrome) (Primary)  Comments:  Start Requip and titrate up to 1 mg nightly    Orders:  -     rOPINIRole (Requip) 0.25 MG tablet; Take 1 tablet by mouth See Admin Instructions. 1 tab qhs x 3 nights then 2 tab qhs x 3 nights then 4 tabs qhs therafter  Dispense: 120 tablet; Refill: 0    2. Moderate episode of recurrent major depressive disorder (CMS/HCC)  Comments:  Discontinued venlafaxine secondary to nightmares  Continue Cymbalta    I will follow-up with her in 1 month's time or sooner if needed          This document has been electronically signed by:  Rosa Quigley PA-C  "

## 2020-12-10 DIAGNOSIS — E55.9 VITAMIN D DEFICIENCY: ICD-10-CM

## 2020-12-10 RX ORDER — ERGOCALCIFEROL 1.25 MG/1
CAPSULE ORAL
Qty: 4 CAPSULE | Refills: 5 | Status: SHIPPED | OUTPATIENT
Start: 2020-12-10 | End: 2021-06-01

## 2020-12-14 ENCOUNTER — OFFICE VISIT (OUTPATIENT)
Dept: FAMILY MEDICINE CLINIC | Facility: CLINIC | Age: 40
End: 2020-12-14

## 2020-12-14 VITALS
OXYGEN SATURATION: 97 % | WEIGHT: 293 LBS | TEMPERATURE: 97.1 F | HEART RATE: 79 BPM | BODY MASS INDEX: 47.09 KG/M2 | HEIGHT: 66 IN | DIASTOLIC BLOOD PRESSURE: 90 MMHG | SYSTOLIC BLOOD PRESSURE: 130 MMHG

## 2020-12-14 DIAGNOSIS — H65.02 NON-RECURRENT ACUTE SEROUS OTITIS MEDIA OF LEFT EAR: Primary | ICD-10-CM

## 2020-12-14 DIAGNOSIS — E04.9 GOITER: ICD-10-CM

## 2020-12-14 DIAGNOSIS — N39.41 URGE INCONTINENCE OF URINE: ICD-10-CM

## 2020-12-14 DIAGNOSIS — I10 ESSENTIAL HYPERTENSION: ICD-10-CM

## 2020-12-14 LAB
BILIRUB BLD-MCNC: NEGATIVE MG/DL
CLARITY, POC: CLEAR
COLOR UR: YELLOW
GLUCOSE UR STRIP-MCNC: ABNORMAL MG/DL
KETONES UR QL: NEGATIVE
LEUKOCYTE EST, POC: NEGATIVE
NITRITE UR-MCNC: NEGATIVE MG/ML
PH UR: 6 [PH] (ref 5–8)
PROT UR STRIP-MCNC: NEGATIVE MG/DL
RBC # UR STRIP: NEGATIVE /UL
SP GR UR: 1.03 (ref 1–1.03)
UROBILINOGEN UR QL: NORMAL

## 2020-12-14 PROCEDURE — 99214 OFFICE O/P EST MOD 30 MIN: CPT | Performed by: PHYSICIAN ASSISTANT

## 2020-12-14 RX ORDER — AMOXICILLIN AND CLAVULANATE POTASSIUM 875; 125 MG/1; MG/1
1 TABLET, FILM COATED ORAL 2 TIMES DAILY
Qty: 20 TABLET | Refills: 0 | Status: SHIPPED | OUTPATIENT
Start: 2020-12-14 | End: 2021-04-19

## 2020-12-14 RX ORDER — OXYBUTYNIN CHLORIDE 5 MG/1
5 TABLET, EXTENDED RELEASE ORAL DAILY
Qty: 30 TABLET | Refills: 5 | Status: SHIPPED | OUTPATIENT
Start: 2020-12-14 | End: 2021-06-10

## 2020-12-14 NOTE — PROGRESS NOTES
Subjective   Anika Alegria is a 40 y.o. female.       Chief Complaint -earache    History of Present Illness -      Earache-  She complains of moderate sharp pain in the left ear worsening for the last 2 days.  She was diagnosed with COVID-19 positive at [facility] 3 weeks ago.  She is reports right ear pain has occurred intermittently since that time.  Minimal relief with azithromycin that she finished 2 weeks ago.    Incontinence-  She complains of increased urinary urgency.  She states that if she cannot get to the restroom immediately when she feels the sensation to go she will often release her urine.  Onset 3 months.  She denies any hematuria or burning with urination.    Goiter -  She complains of goiter that is getting larger.  She now has associated dysphagia.  She states that she choked on a cracker recently.  She also had some trouble swallowing chicken although it was a small finely chewed bite.  Not at goal    Hypertension-stable with losartan    The following portions of the patient's history were reviewed and updated as appropriate: allergies, current medications, past family history, past medical history, past social history, past surgical history and problem list.    Review of Systems   Constitutional: Positive for fatigue. Negative for activity change, appetite change and fever.   HENT: Positive for ear pain. Negative for sinus pressure and sore throat.    Eyes: Negative for pain and visual disturbance.   Respiratory: Negative for cough and chest tightness.    Cardiovascular: Negative for chest pain and palpitations.   Gastrointestinal: Negative for abdominal pain, constipation, diarrhea, nausea and vomiting.   Endocrine: Negative for polydipsia and polyuria.        Dysphagia   Genitourinary: Positive for urgency. Negative for dysuria and frequency.        Urinary incontinence   Musculoskeletal: Negative for back pain and myalgias.   Skin: Negative for color change and rash.  "  Allergic/Immunologic: Negative for food allergies and immunocompromised state.   Neurological: Negative for dizziness, syncope and headaches.   Hematological: Negative for adenopathy. Does not bruise/bleed easily.   Psychiatric/Behavioral: Negative for hallucinations and suicidal ideas. The patient is not nervous/anxious.        /90   Pulse 79   Temp 97.1 °F (36.2 °C) (Tympanic)   Ht 167.6 cm (66\")   Wt (!) 163 kg (360 lb)   LMP  (LMP Unknown)   SpO2 97%   BMI 58.11 kg/m²   Lab on 09/03/2020   Component Date Value Ref Range Status   • Glucose 09/03/2020 101* 65 - 99 mg/dL Final   • BUN 09/03/2020 11  6 - 20 mg/dL Final   • Creatinine 09/03/2020 0.66  0.57 - 1.00 mg/dL Final   • Sodium 09/03/2020 137  136 - 145 mmol/L Final   • Potassium 09/03/2020 4.1  3.5 - 5.2 mmol/L Final   • Chloride 09/03/2020 104  98 - 107 mmol/L Final   • CO2 09/03/2020 23.5  22.0 - 29.0 mmol/L Final   • Calcium 09/03/2020 8.6  8.6 - 10.5 mg/dL Final   • Total Protein 09/03/2020 7.4  6.0 - 8.5 g/dL Final   • Albumin 09/03/2020 4.00  3.50 - 5.20 g/dL Final   • ALT (SGPT) 09/03/2020 39* 1 - 33 U/L Final   • AST (SGOT) 09/03/2020 29  1 - 32 U/L Final   • Alkaline Phosphatase 09/03/2020 93  39 - 117 U/L Final   • Total Bilirubin 09/03/2020 0.6  0.0 - 1.2 mg/dL Final   • eGFR Non African Amer 09/03/2020 99  >60 mL/min/1.73 Final   • Globulin 09/03/2020 3.4  gm/dL Final   • A/G Ratio 09/03/2020 1.2  g/dL Final   • BUN/Creatinine Ratio 09/03/2020 16.7  7.0 - 25.0 Final   • Anion Gap 09/03/2020 9.5  5.0 - 15.0 mmol/L Final   • Total Cholesterol 09/03/2020 168  0 - 200 mg/dL Final   • Triglycerides 09/03/2020 122  0 - 150 mg/dL Final   • HDL Cholesterol 09/03/2020 46  40 - 60 mg/dL Final   • LDL Cholesterol  09/03/2020 98  0 - 100 mg/dL Final   • VLDL Cholesterol 09/03/2020 24.4  5 - 40 mg/dL Final   • LDL/HDL Ratio 09/03/2020 2.12   Final   • 25 Hydroxy, Vitamin D 09/03/2020 34.2  30.0 - 100.0 ng/ml Final   • Hemoglobin A1C 09/03/2020 " 6.66* 4.80 - 5.60 % Final   • Microalbumin, Urine 09/03/2020 4.0  mg/dL Final       Physical Exam  Vitals signs and nursing note reviewed.   Constitutional:       Appearance: Normal appearance. She is well-developed. She is obese.   HENT:      Head: Normocephalic and atraumatic.      Right Ear: Tympanic membrane normal.      Left Ear: Tympanic membrane normal.      Nose: Nose normal.      Mouth/Throat:      Mouth: Mucous membranes are moist.      Pharynx: No oropharyngeal exudate or posterior oropharyngeal erythema.   Eyes:      Extraocular Movements: Extraocular movements intact.      Conjunctiva/sclera: Conjunctivae normal.   Neck:      Musculoskeletal: Normal range of motion and neck supple.      Thyroid: No thyromegaly.      Trachea: No tracheal deviation.      Comments: Left preauricular lymph node enlarged with localized swelling.  Bilateral enlarged thyroid nontender nonerythematous appreciated today.  Cardiovascular:      Rate and Rhythm: Normal rate and regular rhythm.      Heart sounds: Normal heart sounds. No murmur.   Pulmonary:      Effort: Pulmonary effort is normal. No respiratory distress.      Breath sounds: Normal breath sounds. No wheezing.   Abdominal:      General: Bowel sounds are normal.      Palpations: Abdomen is soft.      Tenderness: There is no abdominal tenderness. There is no guarding.   Musculoskeletal: Normal range of motion.         General: No tenderness.   Lymphadenopathy:      Cervical: Cervical adenopathy present.   Skin:     General: Skin is warm and dry.      Findings: No rash.   Neurological:      General: No focal deficit present.      Mental Status: She is alert and oriented to person, place, and time.   Psychiatric:         Mood and Affect: Mood normal.         Behavior: Behavior normal.         Thought Content: Thought content normal.         Assessment/Plan     Diagnoses and all orders for this visit:    1. Non-recurrent acute serous otitis media of left ear  (Primary)  Comments:  Start Augmentin  Conservative measures advised including heat and Tylenol  Orders:  -     amoxicillin-clavulanate (Augmentin) 875-125 MG per tablet; Take 1 tablet by mouth 2 (Two) Times a Day.  Dispense: 20 tablet; Refill: 0    2. Urge incontinence of urine  Comments:  Start oxybutynin  Advised increased water intake  Orders:  -     oxybutynin XL (Ditropan XL) 5 MG 24 hr tablet; Take 1 tablet by mouth Daily.  Dispense: 30 tablet; Refill: 5  -     POCT urinalysis dipstick, automated    3. Goiter  Comments:  Order ultrasound of thyroid  Refer surgeon for further evaluation and treatment after ultrasound thyroid   Orders:  -     US Thyroid; Future  -     Ambulatory Referral to General Surgery    4. Essential hypertension  Comments:  Continue to monitor  Continue losartan            This document has been electronically signed by:  Rosa Quigley PA-C

## 2021-01-04 ENCOUNTER — HOSPITAL ENCOUNTER (OUTPATIENT)
Dept: ULTRASOUND IMAGING | Facility: HOSPITAL | Age: 41
Discharge: HOME | End: 2021-01-04
Admitting: PHYSICIAN ASSISTANT

## 2021-01-04 DIAGNOSIS — E04.9 GOITER: ICD-10-CM

## 2021-01-04 PROCEDURE — 76536 US EXAM OF HEAD AND NECK: CPT

## 2021-01-04 PROCEDURE — 76536 US EXAM OF HEAD AND NECK: CPT | Performed by: RADIOLOGY

## 2021-01-05 ENCOUNTER — OFFICE VISIT (OUTPATIENT)
Dept: FAMILY MEDICINE CLINIC | Facility: CLINIC | Age: 41
End: 2021-01-05

## 2021-01-05 VITALS — BODY MASS INDEX: 47.09 KG/M2 | HEIGHT: 66 IN | WEIGHT: 293 LBS

## 2021-01-05 DIAGNOSIS — E04.2 MULTINODULAR GOITER: Primary | ICD-10-CM

## 2021-01-05 DIAGNOSIS — G25.81 RLS (RESTLESS LEGS SYNDROME): ICD-10-CM

## 2021-01-05 PROCEDURE — 99213 OFFICE O/P EST LOW 20 MIN: CPT | Performed by: PHYSICIAN ASSISTANT

## 2021-01-05 RX ORDER — ROPINIROLE 1 MG/1
1 TABLET, FILM COATED ORAL NIGHTLY
Qty: 30 TABLET | Refills: 5 | Status: SHIPPED | OUTPATIENT
Start: 2021-01-05 | End: 2021-07-08

## 2021-01-05 NOTE — PROGRESS NOTES
Subjective   Anika Alegria is a 40 y.o. female.     Video visit    You have chosen to receive care through a telehealth visit.  Do you consent to use a video/audio connection for your medical care today? Yes    Chief Complaint -dysphagia    History of Present Illness -      Dysphagia-  Patient complains of trouble swallowing for greater than 1 month.  She had ultrasound of the thyroid on 1/4/2021 which revealed multinodular goiter.  This was discussed with the patient today.    Restless leg syndrome-  Improved with Requip 1 mg nightly.  Patient states that she has been taking for up to 0.25 mg tablets at night.  She reports that she sleeps more soundly and that her leg involuntary movements have decreased.    The following portions of the patient's history were reviewed and updated as appropriate: allergies, current medications, past family history, past medical history, past social history, past surgical history and problem list.    Review of Systems   Constitutional: Negative for activity change, appetite change, fatigue and fever.   HENT: Negative for ear pain, sinus pressure and sore throat.    Eyes: Negative for pain and visual disturbance.   Respiratory: Negative for cough and chest tightness.    Cardiovascular: Negative for chest pain and palpitations.   Gastrointestinal: Negative for abdominal pain, constipation, diarrhea, nausea and vomiting.        Dysphagia   Endocrine: Negative for polydipsia and polyuria.   Genitourinary: Negative for dysuria and frequency.   Musculoskeletal: Negative for back pain and myalgias.   Skin: Negative for color change and rash.   Allergic/Immunologic: Negative for food allergies and immunocompromised state.   Neurological: Negative for dizziness, syncope and headaches.   Hematological: Negative for adenopathy. Does not bruise/bleed easily.   Psychiatric/Behavioral: Positive for sleep disturbance. Negative for hallucinations and suicidal ideas. The patient is not  "nervous/anxious.        Ht 167.6 cm (66\")   Wt (!) 163 kg (360 lb) Comment: Patient reported all vitals today  LMP  (LMP Unknown)   BMI 58.11 kg/m²     Physical Exam  Vitals signs and nursing note reviewed.   Constitutional:       Appearance: Normal appearance. She is well-developed. She is obese.   Eyes:      Extraocular Movements: Extraocular movements intact.      Conjunctiva/sclera: Conjunctivae normal.   Neck:      Musculoskeletal: Normal range of motion.      Comments: Goiter notable on video today  Pulmonary:      Effort: Pulmonary effort is normal. No respiratory distress.      Breath sounds: Normal breath sounds. No wheezing.      Comments: Breath sounds appear normal without use of accessory muscles or wheezing appreciated on video today.  Musculoskeletal:         General: No tenderness.   Skin:     General: Skin is dry.      Findings: No rash.   Neurological:      Mental Status: She is alert and oriented to person, place, and time.   Psychiatric:         Mood and Affect: Mood normal.         Behavior: Behavior normal.         Thought Content: Thought content normal.         Assessment/Plan     Diagnoses and all orders for this visit:    1. Multinodular goiter (Primary)  Comments:  discussed ultrasound results 1/4/21  Refer surgeon for further eval and treatment    2. RLS (restless legs syndrome)  Comments:  Discontinue Requip 0.25 mg dose  Start Requip 1 mg nightly to decrease pill burden  Orders:  -     rOPINIRole (Requip) 1 MG tablet; Take 1 tablet by mouth Every Night. Take 1 hour before bedtime.  Dispense: 30 tablet; Refill: 5            This document has been electronically signed by:  Rosa Quigley PA-C  "

## 2021-01-12 ENCOUNTER — OFFICE VISIT (OUTPATIENT)
Dept: SURGERY | Facility: CLINIC | Age: 41
End: 2021-01-12

## 2021-01-12 VITALS
BODY MASS INDEX: 47.09 KG/M2 | SYSTOLIC BLOOD PRESSURE: 143 MMHG | WEIGHT: 293 LBS | HEIGHT: 66 IN | DIASTOLIC BLOOD PRESSURE: 99 MMHG | HEART RATE: 77 BPM

## 2021-01-12 DIAGNOSIS — E66.01 MORBID OBESITY (HCC): ICD-10-CM

## 2021-01-12 DIAGNOSIS — E04.1 NONTOXIC UNINODULAR GOITER: Primary | ICD-10-CM

## 2021-01-12 PROCEDURE — 99204 OFFICE O/P NEW MOD 45 MIN: CPT | Performed by: SURGERY

## 2021-01-12 RX ORDER — LEVOTHYROXINE SODIUM 0.1 MG/1
100 TABLET ORAL DAILY
Qty: 30 TABLET | Refills: 11 | Status: SHIPPED | OUTPATIENT
Start: 2021-01-12 | End: 2021-09-13 | Stop reason: SDUPTHER

## 2021-01-17 DIAGNOSIS — F41.1 GAD (GENERALIZED ANXIETY DISORDER): ICD-10-CM

## 2021-01-17 DIAGNOSIS — G25.81 RLS (RESTLESS LEGS SYNDROME): ICD-10-CM

## 2021-01-18 RX ORDER — ROPINIROLE 0.25 MG/1
TABLET, FILM COATED ORAL
Qty: 120 TABLET | Refills: 0 | OUTPATIENT
Start: 2021-01-18

## 2021-01-18 RX ORDER — VENLAFAXINE HYDROCHLORIDE 75 MG/1
75 CAPSULE, EXTENDED RELEASE ORAL DAILY
Qty: 30 CAPSULE | Refills: 5 | OUTPATIENT
Start: 2021-01-18

## 2021-02-07 DIAGNOSIS — M15.9 PRIMARY OSTEOARTHRITIS INVOLVING MULTIPLE JOINTS: ICD-10-CM

## 2021-02-08 RX ORDER — IBUPROFEN 800 MG/1
TABLET ORAL
Qty: 90 TABLET | Refills: 2 | Status: SHIPPED | OUTPATIENT
Start: 2021-02-08 | End: 2021-05-11

## 2021-02-18 RX ORDER — ACETAMINOPHEN 500 MG
TABLET ORAL
Qty: 100 TABLET | Refills: 1 | Status: SHIPPED | OUTPATIENT
Start: 2021-02-18 | End: 2021-04-09

## 2021-03-01 DIAGNOSIS — J30.89 ENVIRONMENTAL AND SEASONAL ALLERGIES: ICD-10-CM

## 2021-03-01 RX ORDER — ALBUTEROL SULFATE 90 UG/1
AEROSOL, METERED RESPIRATORY (INHALATION)
Qty: 18 G | Refills: 5 | Status: SHIPPED | OUTPATIENT
Start: 2021-03-01 | End: 2021-05-05

## 2021-03-18 ENCOUNTER — BULK ORDERING (OUTPATIENT)
Dept: CASE MANAGEMENT | Facility: OTHER | Age: 41
End: 2021-03-18

## 2021-03-18 DIAGNOSIS — F33.1 MODERATE EPISODE OF RECURRENT MAJOR DEPRESSIVE DISORDER (HCC): ICD-10-CM

## 2021-03-18 DIAGNOSIS — J30.89 ENVIRONMENTAL AND SEASONAL ALLERGIES: ICD-10-CM

## 2021-03-18 DIAGNOSIS — Z23 IMMUNIZATION DUE: ICD-10-CM

## 2021-03-18 NOTE — TELEPHONE ENCOUNTER
If she taking Cymbalta or venlafaxine?  She should not be taking both and both are on her medicine list.  Please clarify before I refill the venlafaxine.

## 2021-03-19 RX ORDER — CETIRIZINE HYDROCHLORIDE 10 MG/1
TABLET ORAL
Qty: 30 TABLET | Refills: 5 | Status: SHIPPED | OUTPATIENT
Start: 2021-03-19 | End: 2021-09-13 | Stop reason: SDUPTHER

## 2021-03-19 RX ORDER — VENLAFAXINE HYDROCHLORIDE 150 MG/1
150 CAPSULE, EXTENDED RELEASE ORAL DAILY
Qty: 30 CAPSULE | Refills: 5 | OUTPATIENT
Start: 2021-03-19

## 2021-03-19 NOTE — TELEPHONE ENCOUNTER
Please see above note and check with patient to see if she is taking Cymbalta or venlafaxine.  She should not be taking both.  Please let me know

## 2021-04-08 PROCEDURE — 96374 THER/PROPH/DIAG INJ IV PUSH: CPT

## 2021-04-08 PROCEDURE — 99283 EMERGENCY DEPT VISIT LOW MDM: CPT

## 2021-04-08 PROCEDURE — 93005 ELECTROCARDIOGRAM TRACING: CPT | Performed by: EMERGENCY MEDICINE

## 2021-04-08 PROCEDURE — 36415 COLL VENOUS BLD VENIPUNCTURE: CPT

## 2021-04-08 PROCEDURE — 93010 ELECTROCARDIOGRAM REPORT: CPT | Performed by: INTERNAL MEDICINE

## 2021-04-09 ENCOUNTER — HOSPITAL ENCOUNTER (EMERGENCY)
Facility: HOSPITAL | Age: 41
Discharge: HOME | End: 2021-04-09
Attending: EMERGENCY MEDICINE | Admitting: EMERGENCY MEDICINE

## 2021-04-09 ENCOUNTER — APPOINTMENT (OUTPATIENT)
Dept: CT IMAGING | Facility: HOSPITAL | Age: 41
End: 2021-04-09

## 2021-04-09 VITALS
RESPIRATION RATE: 16 BRPM | HEIGHT: 66 IN | SYSTOLIC BLOOD PRESSURE: 125 MMHG | OXYGEN SATURATION: 100 % | WEIGHT: 293 LBS | BODY MASS INDEX: 47.09 KG/M2 | HEART RATE: 87 BPM | TEMPERATURE: 98.7 F | DIASTOLIC BLOOD PRESSURE: 60 MMHG

## 2021-04-09 DIAGNOSIS — K85.90 ACUTE PANCREATITIS, UNSPECIFIED COMPLICATION STATUS, UNSPECIFIED PANCREATITIS TYPE: ICD-10-CM

## 2021-04-09 DIAGNOSIS — R10.13 EPIGASTRIC PAIN: Primary | ICD-10-CM

## 2021-04-09 LAB
ALBUMIN SERPL-MCNC: 3.54 G/DL (ref 3.5–5.2)
ALBUMIN/GLOB SERPL: 1.1 G/DL
ALP SERPL-CCNC: 109 U/L (ref 39–117)
ALT SERPL W P-5'-P-CCNC: 50 U/L (ref 1–33)
AMYLASE SERPL-CCNC: 63 U/L (ref 28–100)
ANION GAP SERPL CALCULATED.3IONS-SCNC: 9.4 MMOL/L (ref 5–15)
AST SERPL-CCNC: 37 U/L (ref 1–32)
BASOPHILS # BLD AUTO: 0.04 10*3/MM3 (ref 0–0.2)
BASOPHILS NFR BLD AUTO: 0.4 % (ref 0–1.5)
BILIRUB SERPL-MCNC: 0.3 MG/DL (ref 0–1.2)
BUN SERPL-MCNC: 10 MG/DL (ref 6–20)
BUN/CREAT SERPL: 16.4 (ref 7–25)
CALCIUM SPEC-SCNC: 9 MG/DL (ref 8.6–10.5)
CHLORIDE SERPL-SCNC: 102 MMOL/L (ref 98–107)
CK SERPL-CCNC: 285 U/L (ref 20–180)
CO2 SERPL-SCNC: 27.6 MMOL/L (ref 22–29)
CREAT SERPL-MCNC: 0.61 MG/DL (ref 0.57–1)
CRP SERPL-MCNC: 0.92 MG/DL (ref 0–0.5)
DEPRECATED RDW RBC AUTO: 44.9 FL (ref 37–54)
EOSINOPHIL # BLD AUTO: 0.2 10*3/MM3 (ref 0–0.4)
EOSINOPHIL NFR BLD AUTO: 2.2 % (ref 0.3–6.2)
ERYTHROCYTE [DISTWIDTH] IN BLOOD BY AUTOMATED COUNT: 13.3 % (ref 12.3–15.4)
ERYTHROCYTE [SEDIMENTATION RATE] IN BLOOD: 18 MM/HR (ref 0–20)
GFR SERPL CREATININE-BSD FRML MDRD: 108 ML/MIN/1.73
GLOBULIN UR ELPH-MCNC: 3.4 GM/DL
GLUCOSE SERPL-MCNC: 188 MG/DL (ref 65–99)
HCG SERPL QL: NEGATIVE
HCT VFR BLD AUTO: 40.9 % (ref 34–46.6)
HGB BLD-MCNC: 13.7 G/DL (ref 12–15.9)
IMM GRANULOCYTES # BLD AUTO: 0.03 10*3/MM3 (ref 0–0.05)
IMM GRANULOCYTES NFR BLD AUTO: 0.3 % (ref 0–0.5)
LIPASE SERPL-CCNC: 87 U/L (ref 13–60)
LYMPHOCYTES # BLD AUTO: 3.41 10*3/MM3 (ref 0.7–3.1)
LYMPHOCYTES NFR BLD AUTO: 37.3 % (ref 19.6–45.3)
MAGNESIUM SERPL-MCNC: 1.9 MG/DL (ref 1.6–2.6)
MCH RBC QN AUTO: 30.7 PG (ref 26.6–33)
MCHC RBC AUTO-ENTMCNC: 33.5 G/DL (ref 31.5–35.7)
MCV RBC AUTO: 91.7 FL (ref 79–97)
MONOCYTES # BLD AUTO: 0.69 10*3/MM3 (ref 0.1–0.9)
MONOCYTES NFR BLD AUTO: 7.6 % (ref 5–12)
NEUTROPHILS NFR BLD AUTO: 4.76 10*3/MM3 (ref 1.7–7)
NEUTROPHILS NFR BLD AUTO: 52.2 % (ref 42.7–76)
NRBC BLD AUTO-RTO: 0 /100 WBC (ref 0–0.2)
NT-PROBNP SERPL-MCNC: 43.8 PG/ML (ref 0–450)
PLATELET # BLD AUTO: 158 10*3/MM3 (ref 140–450)
PMV BLD AUTO: 9.6 FL (ref 6–12)
POTASSIUM SERPL-SCNC: 3.8 MMOL/L (ref 3.5–5.2)
PROT SERPL-MCNC: 6.9 G/DL (ref 6–8.5)
RBC # BLD AUTO: 4.46 10*6/MM3 (ref 3.77–5.28)
SODIUM SERPL-SCNC: 139 MMOL/L (ref 136–145)
T4 FREE SERPL-MCNC: 1.02 NG/DL (ref 0.93–1.7)
TROPONIN T SERPL-MCNC: <0.01 NG/ML (ref 0–0.03)
TROPONIN T SERPL-MCNC: <0.01 NG/ML (ref 0–0.03)
TSH SERPL DL<=0.05 MIU/L-ACNC: 1.68 UIU/ML (ref 0.27–4.2)
WBC # BLD AUTO: 9.13 10*3/MM3 (ref 3.4–10.8)

## 2021-04-09 PROCEDURE — 83880 ASSAY OF NATRIURETIC PEPTIDE: CPT | Performed by: EMERGENCY MEDICINE

## 2021-04-09 PROCEDURE — 86140 C-REACTIVE PROTEIN: CPT | Performed by: EMERGENCY MEDICINE

## 2021-04-09 PROCEDURE — 85652 RBC SED RATE AUTOMATED: CPT | Performed by: EMERGENCY MEDICINE

## 2021-04-09 PROCEDURE — 80050 GENERAL HEALTH PANEL: CPT | Performed by: EMERGENCY MEDICINE

## 2021-04-09 PROCEDURE — 96374 THER/PROPH/DIAG INJ IV PUSH: CPT

## 2021-04-09 PROCEDURE — 83735 ASSAY OF MAGNESIUM: CPT | Performed by: EMERGENCY MEDICINE

## 2021-04-09 PROCEDURE — 74176 CT ABD & PELVIS W/O CONTRAST: CPT

## 2021-04-09 PROCEDURE — 25010000002 DROPERIDOL PER 5 MG: Performed by: EMERGENCY MEDICINE

## 2021-04-09 PROCEDURE — 84703 CHORIONIC GONADOTROPIN ASSAY: CPT | Performed by: EMERGENCY MEDICINE

## 2021-04-09 PROCEDURE — 36415 COLL VENOUS BLD VENIPUNCTURE: CPT

## 2021-04-09 PROCEDURE — 82550 ASSAY OF CK (CPK): CPT | Performed by: EMERGENCY MEDICINE

## 2021-04-09 PROCEDURE — 83690 ASSAY OF LIPASE: CPT | Performed by: EMERGENCY MEDICINE

## 2021-04-09 PROCEDURE — 84484 ASSAY OF TROPONIN QUANT: CPT | Performed by: EMERGENCY MEDICINE

## 2021-04-09 PROCEDURE — 82150 ASSAY OF AMYLASE: CPT | Performed by: EMERGENCY MEDICINE

## 2021-04-09 PROCEDURE — 84439 ASSAY OF FREE THYROXINE: CPT | Performed by: EMERGENCY MEDICINE

## 2021-04-09 RX ORDER — LIDOCAINE HYDROCHLORIDE 20 MG/ML
15 SOLUTION OROPHARYNGEAL ONCE
Status: COMPLETED | OUTPATIENT
Start: 2021-04-09 | End: 2021-04-09

## 2021-04-09 RX ORDER — DROPERIDOL 2.5 MG/ML
1.25 INJECTION, SOLUTION INTRAMUSCULAR; INTRAVENOUS ONCE
Status: COMPLETED | OUTPATIENT
Start: 2021-04-09 | End: 2021-04-09

## 2021-04-09 RX ORDER — SODIUM CHLORIDE 0.9 % (FLUSH) 0.9 %
10 SYRINGE (ML) INJECTION AS NEEDED
Status: DISCONTINUED | OUTPATIENT
Start: 2021-04-09 | End: 2021-04-09 | Stop reason: HOSPADM

## 2021-04-09 RX ORDER — HYDROCODONE BITARTRATE AND ACETAMINOPHEN 5; 325 MG/1; MG/1
1 TABLET ORAL EVERY 6 HOURS PRN
Qty: 10 TABLET | Refills: 0 | Status: SHIPPED | OUTPATIENT
Start: 2021-04-09 | End: 2021-05-13

## 2021-04-09 RX ORDER — ALUMINA, MAGNESIA, AND SIMETHICONE 2400; 2400; 240 MG/30ML; MG/30ML; MG/30ML
15 SUSPENSION ORAL ONCE
Status: COMPLETED | OUTPATIENT
Start: 2021-04-09 | End: 2021-04-09

## 2021-04-09 RX ORDER — PHENOBARBITAL, HYOSCYAMINE SULFATE, ATROPINE SULFATE AND SCOPOLAMINE HYDROBROMIDE .0194; .1037; 16.2; .0065 MG/1; MG/1; MG/1; MG/1
2 TABLET ORAL ONCE
Status: COMPLETED | OUTPATIENT
Start: 2021-04-09 | End: 2021-04-09

## 2021-04-09 RX ORDER — SUCRALFATE ORAL 1 G/10ML
1 SUSPENSION ORAL
Qty: 400 ML | Refills: 0 | Status: SHIPPED | OUTPATIENT
Start: 2021-04-09 | End: 2021-04-19

## 2021-04-09 RX ORDER — PSEUDOEPHED/ACETAMINOPH/DIPHEN 30MG-500MG
TABLET ORAL
Qty: 100 TABLET | Refills: 1 | Status: SHIPPED | OUTPATIENT
Start: 2021-04-09 | End: 2021-04-30

## 2021-04-09 RX ADMIN — LIDOCAINE HYDROCHLORIDE 15 ML: 20 SOLUTION ORAL; TOPICAL at 01:34

## 2021-04-09 RX ADMIN — PHENOBARBITAL, HYOSCYAMINE SULFATE, ATROPINE SULFATE, SCOPOLAMINE HYDROBROMIDE 32.4 MG: 16.2; .1037; .0194; .0065 TABLET ORAL at 01:34

## 2021-04-09 RX ADMIN — DROPERIDOL 1.25 MG: 2.5 INJECTION, SOLUTION INTRAMUSCULAR; INTRAVENOUS at 01:40

## 2021-04-09 RX ADMIN — ALUMINUM HYDROXIDE, MAGNESIUM HYDROXIDE, AND DIMETHICONE 15 ML: 400; 400; 40 SUSPENSION ORAL at 01:34

## 2021-04-09 RX ADMIN — SODIUM CHLORIDE 1000 ML: 9 INJECTION, SOLUTION INTRAVENOUS at 01:32

## 2021-04-09 NOTE — ED PROVIDER NOTES
Subjective     History provided by:  Patient   used: No    Abdominal Pain  Pain location:  Generalized  Pain quality: aching, cramping and dull    Pain radiates to:  Does not radiate  Pain severity:  Mild  Onset quality:  Gradual  Timing:  Constant  Progression:  Worsening  Chronicity:  Chronic  Context: not alcohol use, not awakening from sleep, not diet changes, not eating, not laxative use, not medication withdrawal, not previous surgeries, not recent illness, not recent sexual activity, not recent travel, not retching, not sick contacts, not suspicious food intake and not trauma    Relieved by:  Nothing  Worsened by:  Nothing  Ineffective treatments:  None tried  Associated symptoms: nausea    Associated symptoms: no anorexia, no belching, no chest pain, no chills, no constipation, no cough, no diarrhea, no dysuria, no fatigue, no fever, no flatus, no hematemesis, no hematochezia, no hematuria, no melena, no shortness of breath, no sore throat, no vaginal bleeding, no vaginal discharge and no vomiting    Risk factors: obesity    Risk factors: no alcohol abuse, no aspirin use, not elderly, has not had multiple surgeries, no NSAID use, not pregnant and no recent hospitalization        Review of Systems   Constitutional: Negative for activity change, appetite change, chills, diaphoresis, fatigue and fever.   HENT: Negative for congestion, ear pain and sore throat.    Eyes: Negative for redness.   Respiratory: Negative for cough, chest tightness, shortness of breath and wheezing.    Cardiovascular: Negative for chest pain, palpitations and leg swelling.   Gastrointestinal: Positive for abdominal pain and nausea. Negative for anorexia, constipation, diarrhea, flatus, hematemesis, hematochezia, melena and vomiting.   Genitourinary: Negative for dysuria, hematuria, urgency, vaginal bleeding and vaginal discharge.   Musculoskeletal: Negative for arthralgias, back pain, myalgias and neck pain.   Skin:  Negative for pallor, rash and wound.   Neurological: Negative for dizziness, speech difficulty, weakness and headaches.   Psychiatric/Behavioral: Negative for agitation, behavioral problems, confusion and decreased concentration.   All other systems reviewed and are negative.      Past Medical History:   Diagnosis Date   • Anxiety and depression    • GERD (gastroesophageal reflux disease)    • Goiter    • Goiter    • Hepatitis A    • Hypertension    • PONV (postoperative nausea and vomiting)    • Thyroid disease    • Type 2 diabetes mellitus with hyperglycemia, without long-term current use of insulin (CMS/[facility]) 2020       Allergies   Allergen Reactions   • Ultram [Tramadol] Swelling   • Venlafaxine Other (See Comments)     Nightmares       Past Surgical History:   Procedure Laterality Date   • ABDOMINAL SURGERY     • CARPAL TUNNEL RELEASE Right 10/15/2019    Procedure: CARPAL TUNNEL RELEASE;  Surgeon: Rony Cruz MD;  Location: [facility];  Service: Orthopedics   •  SECTION      x2   • TUBAL ABDOMINAL LIGATION         Family History   Problem Relation Age of Onset   • Diabetes Mother    • Hypertension Mother    • No Known Problems Father    • Gout Sister    • Osteoarthritis Maternal Grandmother    • Osteoporosis Maternal Grandmother    • Heart disease Maternal Grandmother    • Diabetes Maternal Grandmother    • Hypertension Maternal Grandmother    • Heart disease Maternal Grandfather    • Diabetes Maternal Grandfather    • Hypertension Maternal Grandfather    • Breast cancer Neg Hx        Social History     Socioeconomic History   • Marital status:      Spouse name: bea   • Number of children: 2   • Years of education: 12   • Highest education level: Not on file   Tobacco Use   • Smoking status: Former Smoker   • Smokeless tobacco: Never Used   Substance and Sexual Activity   • Alcohol use: No   • Drug use: No   • Sexual activity: Defer           Objective   Physical Exam  Vitals  and nursing note reviewed.   Constitutional:       General: She is not in acute distress.     Appearance: Normal appearance. She is well-developed. She is not toxic-appearing or diaphoretic.   HENT:      Head: Normocephalic and atraumatic.      Right Ear: External ear normal.      Left Ear: External ear normal.      Nose: Nose normal.      Mouth/Throat:      Pharynx: No oropharyngeal exudate.      Tonsils: No tonsillar exudate.   Eyes:      General: Lids are normal.      Conjunctiva/sclera: Conjunctivae normal.      Pupils: Pupils are equal, round, and reactive to light.   Neck:      Thyroid: No thyromegaly.   Cardiovascular:      Rate and Rhythm: Normal rate and regular rhythm.      Pulses: Normal pulses.      Heart sounds: Normal heart sounds, S1 normal and S2 normal.   Pulmonary:      Effort: Pulmonary effort is normal. No tachypnea or respiratory distress.      Breath sounds: Normal breath sounds. No decreased breath sounds, wheezing or rales.   Chest:      Chest wall: No tenderness.   Abdominal:      General: Bowel sounds are normal. There is no distension.      Palpations: Abdomen is soft.      Tenderness: There is generalized abdominal tenderness. There is no guarding or rebound.   Musculoskeletal:         General: No tenderness or deformity. Normal range of motion.      Cervical back: Full passive range of motion without pain, normal range of motion and neck supple.   Lymphadenopathy:      Cervical: No cervical adenopathy.   Skin:     General: Skin is warm and dry.      Coloration: Skin is not pale.      Findings: No erythema or rash.   Neurological:      Mental Status: She is alert and oriented to person, place, and time.      GCS: GCS eye subscore is 4. GCS verbal subscore is 5. GCS motor subscore is 6.      Cranial Nerves: No cranial nerve deficit.      Sensory: No sensory deficit.   Psychiatric:         Speech: Speech normal.         Behavior: Behavior normal.         Thought Content: Thought content  normal.         Judgment: Judgment normal.         Procedures           ED Course  ED Course as of Apr 09 0611 Fri Apr 09, 2021   0302 IMPRESSION:     1. Limited exam due to body habitus, particularly with respect to the pelvis.  2. Cholelithiasis without biliary obstruction.  3. No renal or ureteral stones. No hydronephrosis.  4. Grossly normal unopacified GI tract, including the appendix.  5. Hepatic steatosis.   CT Abdomen Pelvis Without Contrast [ES]   0610 Normal sinus rhythm  Cannot rule out Anterior infarct , age undetermined  Abnormal ECG  No previous ECGs available  Vent. rate 85 BPM  DC interval 172 ms  QRS duration 76 ms  QT/QTc 362/430 ms  P-R-T axes 37 5 40   ECG 12 Lead [ES]      ED Course User Index  [ES] Cruz Guardado MD                                           MDM  Number of Diagnoses or Management Options  Acute pancreatitis, unspecified complication status, unspecified pancreatitis type: new and requires workup  Epigastric pain: new and requires workup     Amount and/or Complexity of Data Reviewed  Clinical lab tests: reviewed and ordered  Tests in the radiology section of CPT®: reviewed and ordered  Tests in the medicine section of CPT®: reviewed and ordered  Review and summarize past medical records: yes  Discuss the patient with other providers: yes  Independent visualization of images, tracings, or specimens: yes    Risk of Complications, Morbidity, and/or Mortality  Presenting problems: moderate  Diagnostic procedures: moderate  Management options: moderate    Patient Progress  Patient progress: stable      Final diagnoses:   Epigastric pain   Acute pancreatitis, unspecified complication status, unspecified pancreatitis type       ED Disposition  ED Disposition     ED Disposition Condition Comment    Discharge Stable           Chris Parra MD  [address] [place] [address]  [phone]    Schedule an appointment as soon as possible for a visit in 1 day  EVALUATE          Medication List      New Prescriptions    HYDROcodone-acetaminophen 5-325 MG per tablet  Commonly known as: NORCO  Take 1 tablet by mouth Every 6 (Six) Hours As Needed for Severe Pain .     sucralfate 1 GM/10ML suspension  Commonly known as: CARAFATE  Take 10 mL by mouth 4 (Four) Times a Day With Meals & at Bedtime for 10 days.           Where to Get Your Medications      You can get these medications from any pharmacy    Bring a paper prescription for each of these medications  · HYDROcodone-acetaminophen 5-325 MG per tablet  · sucralfate 1 GM/10ML suspension          Cruz Guardado MD  04/09/21 0611

## 2021-04-13 LAB
QT INTERVAL: 362 MS
QTC INTERVAL: 430 MS

## 2021-04-17 DIAGNOSIS — F33.1 MODERATE EPISODE OF RECURRENT MAJOR DEPRESSIVE DISORDER (HCC): ICD-10-CM

## 2021-04-19 ENCOUNTER — E-VISIT (OUTPATIENT)
Dept: FAMILY MEDICINE CLINIC | Facility: CLINIC | Age: 41
End: 2021-04-19

## 2021-04-19 DIAGNOSIS — J01.90 ACUTE NON-RECURRENT SINUSITIS, UNSPECIFIED LOCATION: Primary | ICD-10-CM

## 2021-04-19 RX ORDER — CEFDINIR 300 MG/1
600 CAPSULE ORAL DAILY
Qty: 20 CAPSULE | Refills: 0 | Status: SHIPPED | OUTPATIENT
Start: 2021-04-19 | End: 2021-04-29

## 2021-04-19 RX ORDER — DULOXETIN HYDROCHLORIDE 60 MG/1
60 CAPSULE, DELAYED RELEASE ORAL DAILY
Qty: 30 CAPSULE | Refills: 5 | Status: SHIPPED | OUTPATIENT
Start: 2021-04-19 | End: 2021-09-13 | Stop reason: SDUPTHER

## 2021-04-20 ENCOUNTER — TELEPHONE (OUTPATIENT)
Dept: FAMILY MEDICINE CLINIC | Facility: CLINIC | Age: 41
End: 2021-04-20

## 2021-04-20 NOTE — TELEPHONE ENCOUNTER
Pt is aware of this information.       ----- Message from KALI Armstrong sent at 4/19/2021  4:57 PM EDT -----  Inform the patient I sent a prescription for Omnicef antibiotic to her pharmacy.  If symptoms persist or worsen she should make a visit for further evaluation  ----- Message -----  From: Gabby Johnson MA  Sent: 4/19/2021   3:11 PM EDT  To: KALI Armstrong    Pt has a sinus infection, she wanted to know if you could send her in some antibiotics?

## 2021-04-30 RX ORDER — PSEUDOEPHED/ACETAMINOPH/DIPHEN 30MG-500MG
TABLET ORAL
Qty: 100 TABLET | Refills: 1 | Status: SHIPPED | OUTPATIENT
Start: 2021-04-30 | End: 2021-09-13

## 2021-05-05 DIAGNOSIS — J30.89 ENVIRONMENTAL AND SEASONAL ALLERGIES: ICD-10-CM

## 2021-05-05 RX ORDER — ALBUTEROL SULFATE 90 UG/1
AEROSOL, METERED RESPIRATORY (INHALATION)
Qty: 18 G | Refills: 5 | Status: SHIPPED | OUTPATIENT
Start: 2021-05-05 | End: 2021-11-05

## 2021-05-09 DIAGNOSIS — M15.9 PRIMARY OSTEOARTHRITIS INVOLVING MULTIPLE JOINTS: ICD-10-CM

## 2021-05-11 DIAGNOSIS — E11.65 TYPE 2 DIABETES MELLITUS WITH HYPERGLYCEMIA, WITHOUT LONG-TERM CURRENT USE OF INSULIN (HCC): ICD-10-CM

## 2021-05-11 RX ORDER — DAPAGLIFLOZIN 10 MG/1
TABLET, FILM COATED ORAL
Qty: 30 TABLET | Refills: 5 | Status: SHIPPED | OUTPATIENT
Start: 2021-05-11 | End: 2021-09-13 | Stop reason: SDUPTHER

## 2021-05-11 RX ORDER — IBUPROFEN 800 MG/1
TABLET ORAL
Qty: 90 TABLET | Refills: 2 | Status: SHIPPED | OUTPATIENT
Start: 2021-05-11 | End: 2021-08-16

## 2021-05-12 DIAGNOSIS — E11.65 TYPE 2 DIABETES MELLITUS WITH HYPERGLYCEMIA, WITHOUT LONG-TERM CURRENT USE OF INSULIN (HCC): ICD-10-CM

## 2021-05-12 RX ORDER — LANCETS 33 GAUGE
EACH MISCELLANEOUS
Qty: 100 EACH | Refills: 5 | Status: SHIPPED | OUTPATIENT
Start: 2021-05-12 | End: 2021-12-08

## 2021-05-12 RX ORDER — BLOOD SUGAR DIAGNOSTIC
STRIP MISCELLANEOUS
Qty: 90 EACH | Refills: 5 | Status: SHIPPED | OUTPATIENT
Start: 2021-05-12 | End: 2022-04-05 | Stop reason: SDUPTHER

## 2021-05-13 ENCOUNTER — OFFICE VISIT (OUTPATIENT)
Dept: FAMILY MEDICINE CLINIC | Facility: CLINIC | Age: 41
End: 2021-05-13

## 2021-05-13 VITALS
WEIGHT: 293 LBS | SYSTOLIC BLOOD PRESSURE: 146 MMHG | DIASTOLIC BLOOD PRESSURE: 90 MMHG | HEIGHT: 66 IN | OXYGEN SATURATION: 96 % | BODY MASS INDEX: 47.09 KG/M2 | TEMPERATURE: 96.9 F | HEART RATE: 90 BPM

## 2021-05-13 DIAGNOSIS — E78.2 MIXED HYPERLIPIDEMIA: Chronic | ICD-10-CM

## 2021-05-13 DIAGNOSIS — K21.9 GASTROESOPHAGEAL REFLUX DISEASE WITHOUT ESOPHAGITIS: Chronic | ICD-10-CM

## 2021-05-13 DIAGNOSIS — K80.20 CALCULUS OF GALLBLADDER WITHOUT CHOLECYSTITIS WITHOUT OBSTRUCTION: Primary | Chronic | ICD-10-CM

## 2021-05-13 DIAGNOSIS — I10 ESSENTIAL HYPERTENSION: Chronic | ICD-10-CM

## 2021-05-13 PROCEDURE — 99214 OFFICE O/P EST MOD 30 MIN: CPT | Performed by: PHYSICIAN ASSISTANT

## 2021-05-13 RX ORDER — DICLOFENAC SODIUM 75 MG/1
75 TABLET, DELAYED RELEASE ORAL 2 TIMES DAILY
COMMUNITY
Start: 2021-02-04 | End: 2021-05-13 | Stop reason: SDUPTHER

## 2021-05-13 NOTE — PROGRESS NOTES
Subjective   Anika Alegria is a 40 y.o. female.       Chief Complaint -cholelithiasis    History of Present Illness -    ROS    Cholelithiasis-  She was admitted on 4/9/2021 for abdominal pain.  She was diagnosed with pancreatitis and cholelithiasis.  She was treated n.p.o. with dietary restrictions.  She states symptoms have significantly improved with the use of Carafate and avoidance of fried foods and greens.  She states that she has had intermittent flareups but not as bad as the episode requiring hospitalization in April.    Gastroesophageal reflux disease-  Patient reports that abdominal pain resolved with the use of Carafate 4 times daily.  She states that she now continues to take Carafate twice a day as well as her Protonix.  Stable    Hypertension-  May be elevated today due to rushing to get to the office for her appointment.  She reports home blood pressure less than 140/80 consistently with the use of losartan    Hyperlipidemia-stable with low-cholesterol diet.    The following portions of the patient's history were reviewed and updated as appropriate: allergies, current medications, past family history, past medical history, past social history, past surgical history and problem list.    Review of Systems   Constitutional: Negative for activity change, appetite change, fatigue and fever.   HENT: Negative for ear pain, sinus pressure and sore throat.    Eyes: Negative for pain and visual disturbance.   Respiratory: Negative for cough and chest tightness.    Cardiovascular: Negative for chest pain and palpitations.   Gastrointestinal: Negative for abdominal pain (none today), constipation, diarrhea, nausea and vomiting.   Endocrine: Negative for polydipsia and polyuria.   Genitourinary: Negative for dysuria and frequency.   Musculoskeletal: Negative for back pain and myalgias.   Skin: Negative for color change and rash.   Allergic/Immunologic: Negative for food allergies and immunocompromised state.  "  Neurological: Negative for dizziness, syncope and headaches.   Hematological: Negative for adenopathy. Does not bruise/bleed easily.   Psychiatric/Behavioral: Negative for hallucinations and suicidal ideas. The patient is not nervous/anxious.        Objective  Vital signs:  /90   Pulse 90   Temp 96.9 °F (36.1 °C) (Temporal)   Ht 167.6 cm (66\")   Wt (!) 168 kg (370 lb)   LMP  (LMP Unknown)   SpO2 96%   BMI 59.72 kg/m²     Physical Exam  Vitals and nursing note reviewed.   Constitutional:       Appearance: Normal appearance. She is well-developed. She is obese.   HENT:      Head: Normocephalic and atraumatic.      Right Ear: Tympanic membrane normal.      Left Ear: Tympanic membrane normal.      Nose: Nose normal.      Mouth/Throat:      Mouth: Mucous membranes are moist.      Pharynx: No oropharyngeal exudate or posterior oropharyngeal erythema.   Eyes:      Extraocular Movements: Extraocular movements intact.      Conjunctiva/sclera: Conjunctivae normal.   Neck:      Thyroid: No thyromegaly.      Trachea: No tracheal deviation.   Cardiovascular:      Rate and Rhythm: Normal rate and regular rhythm.      Heart sounds: Normal heart sounds. No murmur heard.     Pulmonary:      Effort: Pulmonary effort is normal. No respiratory distress.      Breath sounds: Normal breath sounds. No wheezing.   Abdominal:      General: Bowel sounds are normal.      Palpations: Abdomen is soft.      Tenderness: There is no abdominal tenderness. There is no guarding.   Musculoskeletal:         General: No tenderness. Normal range of motion.      Cervical back: Normal range of motion and neck supple.   Lymphadenopathy:      Cervical: No cervical adenopathy.   Skin:     General: Skin is warm and dry.      Findings: No rash.   Neurological:      General: No focal deficit present.      Mental Status: She is alert and oriented to person, place, and time.   Psychiatric:         Mood and Affect: Mood normal.         Behavior: " Behavior normal.         Thought Content: Thought content normal.         The following data was reviewed by: KALI Armstrong on 05/13/2021:  CMP    CMP 9/3/20 4/9/21   Glucose 101 (A) 188 (A)   BUN 11 10   Creatinine 0.66 0.61   eGFR Non African Am 99 108   Sodium 137 139   Potassium 4.1 3.8   Chloride 104 102   Calcium 8.6 9.0   Albumin 4.00 3.54   Total Bilirubin 0.6 0.3   Alkaline Phosphatase 93 109   AST (SGOT) 29 37 (A)   ALT (SGPT) 39 (A) 50 (A)   (A) Abnormal value       Comments are available for some flowsheets but are not being displayed.           CBC w/diff    CBC w/Diff 4/9/21   WBC 9.13   RBC 4.46   Hemoglobin 13.7   Hematocrit 40.9   MCV 91.7   MCH 30.7   MCHC 33.5   RDW 13.3   Platelets 158   Neutrophil Rel % 52.2   Immature Granulocyte Rel % 0.3   Lymphocyte Rel % 37.3   Monocyte Rel % 7.6   Eosinophil Rel % 2.2   Basophil Rel % 0.4           Lipid Panel    Lipid Panel 9/3/20   Total Cholesterol 168   Triglycerides 122   HDL Cholesterol 46   VLDL Cholesterol 24.4   LDL Cholesterol  98   LDL/HDL Ratio 2.12           TSH    TSH 4/9/21   TSH 1.680           Most Recent A1C    HGBA1C Most Recent 9/3/20   Hemoglobin A1C 6.66 (A)   (A) Abnormal value            Data reviewed: Radiologic studies CT abdomen and Recent hospitalization notes 4/9/2021 [facility] records reviewed       Assessment/Plan     Diagnoses and all orders for this visit:    1. Calculus of gallbladder without cholecystitis without obstruction (Primary)  Comments:  Patient declines referral surgery at this time  Advise dietary restrictions  Continue to monitor    2. Gastroesophageal reflux disease without esophagitis  Comments:  Discontinue Carafate  Continue pantoprazole    3. Essential hypertension  Comments:  Continue to monitor  Continue losartan  Advised to report any consistent readings greater than 140/90    4. Mixed hyperlipidemia  Comments:  Advise low-cholesterol diet            Patient was given  instructions and counseling regarding his condition or for health maintenance advice. Please see specific information pulled into the AVS if appropriate      This document has been electronically signed by:  Rosa Quigley PA-C

## 2021-05-13 NOTE — PATIENT INSTRUCTIONS
Cholelithiasis    Cholelithiasis happens when gallstones form in the gallbladder. The gallbladder stores bile. Bile is a fluid that helps digest fats. Bile can harden and form into gallstones. If they cause a blockage, they can cause pain (gallbladder attack).  What are the causes?  This condition may be caused by:  · Some blood diseases, such as sickle cell anemia.  · Too much of a fat-like substance (cholesterol) in your bile.  · Not enough bile salts in your bile. These salts help the body absorb and digest fats.  · The gallbladder not emptying fully or often enough. This is common in pregnant women.  What increases the risk?  The following factors may make you more likely to develop this condition:  · Being female.  · Being pregnant many times.  · Eating a lot of fried foods, fat, and refined carbs (refined carbohydrates).  · Being very overweight (obese).  · Being older than age 40.  · Using medicines with female hormones in them for a long time.  · Losing weight fast.  · Having gallstones in your family.  · Having some health problems, such as diabetes, Crohn's disease, or liver disease.  What are the signs or symptoms?  Often, there may be gallstones but no symptoms. These gallstones are called silent gallstones. If a gallstone causes a blockage, you may get sudden pain. The pain:  · Can be in the upper right part of your belly (abdomen).  · Normally comes at night or after you eat.  · Can last an hour or more.  · Can spread to your right shoulder, back, or chest.  · Can feel like discomfort, burning, or fullness in the upper part of your belly (indigestion).  If the blockage lasts more than a few hours, you can get an infection or swelling. You may:  · Feel like you may vomit.  · Vomit.  · Feel bloated.  · Have belly pain for 5 hours or more.  · Feel tender in your belly, often in the upper right part and under your ribs.  · Have fever or chills.  · Have skin or the white parts of your eyes turn yellow  (jaundice).  · Have dark pee (urine) or pale poop (stool).  How is this treated?  Treatment for this condition depends on how bad you feel. If you have symptoms, you may need:  · Home care, if symptoms are not very bad.  ? Do not eat for 12-24 hours. Drink only water and clear liquids.  ? Start to eat simple or clear foods after 1 or 2 days. Try broths and crackers.  ? You may need medicines for pain or stomach upset or both.  ? If you have an infection, you will need antibiotics.  · A hospital stay, if you have very bad pain or a very bad infection.  · Surgery to remove your gallbladder. You may need this if:  ? Gallstones keep coming back.  ? You have very bad symptoms.  · Medicines to break up gallstones. Medicines:  ? Are best for small gallstones.  ? May be used for up to 6-12 months.  · A procedure to find and take out gallstones or to break up gallstones.  Follow these instructions at home:  Medicines  · Take over-the-counter and prescription medicines only as told by your doctor.  · If you were prescribed an antibiotic medicine, take it as told by your doctor. Do not stop taking the antibiotic even if you start to feel better.  · Ask your doctor if the medicine prescribed to you requires you to avoid driving or using machinery.  Eating and drinking  · Drink enough fluid to keep your urine pale yellow. Drink water or clear fluids. This is important when you have pain.  · Eat healthy foods. Choose:  ? Fewer fatty foods, such as fried foods.  ? Fewer refined carbs. Avoid breads and grains that are highly processed, such as white bread and white rice. Choose whole grains, such as whole-wheat bread and brown rice.  ? More fiber. Almonds, fresh fruit, and beans are healthy sources.  General instructions  · Keep a healthy weight.  · Keep all follow-up visits as told by your doctor. This is important.  Where to find more information  · [employer] [place] [employer] and [employer]est[employer]:  www.niddk.nih.gov  Contact a doctor if:  · You have sudden pain in the upper right part of your belly. Pain might spread to your right shoulder, back, or chest.  · You have been diagnosed with gallstones that have no symptoms and you get:  ? Belly pain.  ? Discomfort, burning, or fullness in the upper part of your abdomen.  · You have dark urine or pale stools.  Get help right away if:  · You have sudden pain in the upper right part of your abdomen, and the pain lasts more than 2 hours.  · You have pain in your abdomen, and:  ? It lasts more than 5 hours.  ? It keeps getting worse.  · You have a fever or chills.  · You keep feeling like you may vomit.  · You keep vomiting.  · Your skin or the white parts of your eyes turn yellow.  Summary  · Cholelithiasis happens when gallstones form in the gallbladder.  · This condition may be caused by a blood disease, too much of a fat-like substance in the bile, or not enough bile salts in bile.  · Treatment for this condition depends on how bad you feel.  · If you have symptoms, do not eat or drink. You may need medicines. You may need a hospital stay for very bad pain or a very bad infection.  · You may need surgery if gallstones keep coming back or if you have very bad symptoms.  This information is not intended to replace advice given to you by your health care provider. Make sure you discuss any questions you have with your health care provider.  Document Revised: 11/09/2020 Document Reviewed: 11/09/2020  [employer] Patient Education © 2021 [employer]

## 2021-05-31 DIAGNOSIS — E55.9 VITAMIN D DEFICIENCY: ICD-10-CM

## 2021-06-01 RX ORDER — ERGOCALCIFEROL 1.25 MG/1
CAPSULE ORAL
Qty: 4 CAPSULE | Refills: 5 | Status: SHIPPED | OUTPATIENT
Start: 2021-06-01 | End: 2022-01-13 | Stop reason: SDUPTHER

## 2021-06-09 DIAGNOSIS — N39.41 URGE INCONTINENCE OF URINE: ICD-10-CM

## 2021-06-10 RX ORDER — OXYBUTYNIN CHLORIDE 5 MG/1
5 TABLET, EXTENDED RELEASE ORAL DAILY
Qty: 30 TABLET | Refills: 5 | Status: SHIPPED | OUTPATIENT
Start: 2021-06-10 | End: 2021-12-08

## 2021-07-08 DIAGNOSIS — G25.81 RLS (RESTLESS LEGS SYNDROME): ICD-10-CM

## 2021-07-08 RX ORDER — ROPINIROLE 1 MG/1
TABLET, FILM COATED ORAL
Qty: 30 TABLET | Refills: 0 | Status: SHIPPED | OUTPATIENT
Start: 2021-07-08 | End: 2021-08-16

## 2021-07-10 DIAGNOSIS — M15.9 PRIMARY OSTEOARTHRITIS INVOLVING MULTIPLE JOINTS: ICD-10-CM

## 2021-08-15 DIAGNOSIS — G25.81 RLS (RESTLESS LEGS SYNDROME): ICD-10-CM

## 2021-08-15 DIAGNOSIS — M15.9 PRIMARY OSTEOARTHRITIS INVOLVING MULTIPLE JOINTS: ICD-10-CM

## 2021-08-16 RX ORDER — ROPINIROLE 1 MG/1
TABLET, FILM COATED ORAL
Qty: 30 TABLET | Refills: 0 | Status: SHIPPED | OUTPATIENT
Start: 2021-08-16 | End: 2021-09-13 | Stop reason: SDUPTHER

## 2021-08-16 RX ORDER — IBUPROFEN 800 MG/1
TABLET ORAL
Qty: 90 TABLET | Refills: 2 | Status: SHIPPED | OUTPATIENT
Start: 2021-08-16 | End: 2021-11-08

## 2021-09-10 ENCOUNTER — PRIOR AUTHORIZATION (OUTPATIENT)
Dept: FAMILY MEDICINE CLINIC | Facility: CLINIC | Age: 41
End: 2021-09-10

## 2021-09-13 ENCOUNTER — OFFICE VISIT (OUTPATIENT)
Dept: FAMILY MEDICINE CLINIC | Facility: CLINIC | Age: 41
End: 2021-09-13

## 2021-09-13 VITALS
WEIGHT: 293 LBS | SYSTOLIC BLOOD PRESSURE: 130 MMHG | BODY MASS INDEX: 47.09 KG/M2 | HEIGHT: 66 IN | DIASTOLIC BLOOD PRESSURE: 82 MMHG

## 2021-09-13 DIAGNOSIS — I10 ESSENTIAL HYPERTENSION: ICD-10-CM

## 2021-09-13 DIAGNOSIS — J30.1 CHRONIC SEASONAL ALLERGIC RHINITIS DUE TO POLLEN: Chronic | ICD-10-CM

## 2021-09-13 DIAGNOSIS — E03.9 ACQUIRED HYPOTHYROIDISM: ICD-10-CM

## 2021-09-13 DIAGNOSIS — G25.81 RLS (RESTLESS LEGS SYNDROME): Chronic | ICD-10-CM

## 2021-09-13 DIAGNOSIS — J01.00 ACUTE NON-RECURRENT MAXILLARY SINUSITIS: Primary | ICD-10-CM

## 2021-09-13 DIAGNOSIS — J30.89 ENVIRONMENTAL AND SEASONAL ALLERGIES: Chronic | ICD-10-CM

## 2021-09-13 DIAGNOSIS — K21.9 GASTROESOPHAGEAL REFLUX DISEASE WITHOUT ESOPHAGITIS: ICD-10-CM

## 2021-09-13 DIAGNOSIS — F33.1 MODERATE EPISODE OF RECURRENT MAJOR DEPRESSIVE DISORDER (HCC): Chronic | ICD-10-CM

## 2021-09-13 DIAGNOSIS — E55.9 VITAMIN D DEFICIENCY: ICD-10-CM

## 2021-09-13 DIAGNOSIS — E11.65 TYPE 2 DIABETES MELLITUS WITH HYPERGLYCEMIA, WITHOUT LONG-TERM CURRENT USE OF INSULIN (HCC): ICD-10-CM

## 2021-09-13 PROCEDURE — 99214 OFFICE O/P EST MOD 30 MIN: CPT | Performed by: PHYSICIAN ASSISTANT

## 2021-09-13 RX ORDER — LEVOTHYROXINE SODIUM 0.1 MG/1
100 TABLET ORAL DAILY
Qty: 30 TABLET | Refills: 11 | Status: SHIPPED | OUTPATIENT
Start: 2021-09-13 | End: 2022-04-05 | Stop reason: SDUPTHER

## 2021-09-13 RX ORDER — PANTOPRAZOLE SODIUM 40 MG/1
40 TABLET, DELAYED RELEASE ORAL DAILY
Qty: 30 TABLET | Refills: 5 | Status: SHIPPED | OUTPATIENT
Start: 2021-09-13 | End: 2022-01-13 | Stop reason: SDUPTHER

## 2021-09-13 RX ORDER — CETIRIZINE HYDROCHLORIDE 10 MG/1
10 TABLET ORAL DAILY
Qty: 30 TABLET | Refills: 5 | Status: SHIPPED | OUTPATIENT
Start: 2021-09-13 | End: 2021-09-14

## 2021-09-13 RX ORDER — CEFDINIR 300 MG/1
600 CAPSULE ORAL DAILY
Qty: 20 CAPSULE | Refills: 0 | Status: SHIPPED | OUTPATIENT
Start: 2021-09-13 | End: 2021-09-23

## 2021-09-13 RX ORDER — LOSARTAN POTASSIUM 100 MG/1
100 TABLET ORAL DAILY
Qty: 30 TABLET | Refills: 5 | Status: SHIPPED | OUTPATIENT
Start: 2021-09-13 | End: 2022-01-13 | Stop reason: SDUPTHER

## 2021-09-13 RX ORDER — FLUTICASONE PROPIONATE 50 MCG
2 SPRAY, SUSPENSION (ML) NASAL DAILY
Qty: 16 ML | Refills: 5 | Status: SHIPPED | OUTPATIENT
Start: 2021-09-13 | End: 2022-04-05 | Stop reason: SDUPTHER

## 2021-09-13 RX ORDER — DULOXETIN HYDROCHLORIDE 60 MG/1
60 CAPSULE, DELAYED RELEASE ORAL DAILY
Qty: 30 CAPSULE | Refills: 5 | Status: SHIPPED | OUTPATIENT
Start: 2021-09-13 | End: 2021-11-08

## 2021-09-13 RX ORDER — DAPAGLIFLOZIN 10 MG/1
1 TABLET, FILM COATED ORAL EVERY MORNING
Qty: 30 TABLET | Refills: 5 | Status: SHIPPED | OUTPATIENT
Start: 2021-09-13 | End: 2022-01-13 | Stop reason: SDUPTHER

## 2021-09-13 RX ORDER — ROPINIROLE 1 MG/1
1 TABLET, FILM COATED ORAL NIGHTLY
Qty: 30 TABLET | Refills: 5 | Status: SHIPPED | OUTPATIENT
Start: 2021-09-13 | End: 2021-09-14

## 2021-09-13 NOTE — PATIENT INSTRUCTIONS
"https://www.diabeteseducator.org/docs/default-source/living-with-diabetes/conquering-the-grocery-store-v1.pdf?sfvrsn=4\">   Carbohydrate Counting for Diabetes Mellitus, Adult  Carbohydrate counting is a method of keeping track of how many carbohydrates you eat. Eating carbohydrates naturally increases the amount of sugar (glucose) in the blood. Counting how many carbohydrates you eat improves your blood glucose control, which helps you manage your diabetes.  It is important to know how many carbohydrates you can safely have in each meal. This is different for every person. A dietitian can help you make a meal plan and calculate how many carbohydrates you should have at each meal and snack.  What foods contain carbohydrates?  Carbohydrates are found in the following foods:  · Grains, such as breads and cereals.  · Dried beans and soy products.  · Starchy vegetables, such as potatoes, peas, and corn.  · Fruit and fruit juices.  · Milk and yogurt.  · Sweets and snack foods, such as cake, cookies, candy, chips, and soft drinks.  How do I count carbohydrates in foods?  There are two ways to count carbohydrates in food. You can read food labels or learn standard serving sizes of foods. You can use either of the methods or a combination of both.  Using the Nutrition Facts label  The Nutrition Facts list is included on the labels of almost all packaged foods and beverages in the [place] It includes:  · The serving size.  · Information about nutrients in each serving, including the grams (g) of carbohydrate per serving.  To use the Nutrition Facts:  · Decide how many servings you will have.  · Multiply the number of servings by the number of carbohydrates per serving.  · The resulting number is the total amount of carbohydrates that you will be having.  Learning the standard serving sizes of foods  When you eat carbohydrate foods that are not packaged or do not include Nutrition Facts on the label, you need to measure the " servings in order to count the amount of carbohydrates.  · Measure the foods that you will eat with a food scale or measuring cup, if needed.  · Decide how many standard-size servings you will eat.  · Multiply the number of servings by 15. For foods that contain carbohydrates, one serving equals 15 g of carbohydrates.  ? For example, if you eat 2 cups or 10 oz (300 g) of strawberries, you will have eaten 2 servings and 30 g of carbohydrates (2 servings x 15 g = 30 g).  · For foods that have more than one food mixed, such as soups and casseroles, you must count the carbohydrates in each food that is included.  The following list contains standard serving sizes of common carbohydrate-rich foods. Each of these servings has about 15 g of carbohydrates:  · 1 slice of bread.  · 1 six-inch (15 cm) tortilla.  · ? cup or 2 oz (53 g) cooked rice or pasta.  · ½ cup or 3 oz (85 g) cooked or canned, drained and rinsed beans or lentils.  · ½ cup or 3 oz (85 g) starchy vegetable, such as peas, corn, or squash.  · ½ cup or 4 oz (120 g) hot cereal.  · ½ cup or 3 oz (85 g) boiled or mashed potatoes, or ¼ or 3 oz (85 g) of a large baked potato.  · ½ cup or 4 fl oz (118 mL) fruit juice.  · 1 cup or 8 fl oz (237 mL) milk.  · 1 small or 4 oz (106 g) apple.  · ½ or 2 oz (63 g) of a medium banana.  · 1 cup or 5 oz (150 g) strawberries.  · 3 cups or 1 oz (24 g) popped popcorn.  What is an example of carbohydrate counting?  To calculate the number of carbohydrates in this sample meal, follow the steps shown below.  Sample meal  · 3 oz (85 g) chicken breast.  · ? cup or 4 oz (106 g) brown rice.  · ½ cup or 3 oz (85 g) corn.  · 1 cup or 8 fl oz (237 mL) milk.  · 1 cup or 5 oz (150 g) strawberries with sugar-free whipped topping.  Carbohydrate calculation  1. Identify the foods that contain carbohydrates:  ? Rice.  ? Corn.  ? Milk.  ? Strawberries.  2. Calculate how many servings you have of each food:  ? 2 servings rice.  ? 1 serving  corn.  ? 1 serving milk.  ? 1 serving strawberries.  3. Multiply each number of servings by 15 g:  ? 2 servings rice x 15 g = 30 g.  ? 1 serving corn x 15 g = 15 g.  ? 1 serving milk x 15 g = 15 g.  ? 1 serving strawberries x 15 g = 15 g.  4. Add together all of the amounts to find the total grams of carbohydrates eaten:  ? 30 g + 15 g + 15 g + 15 g = 75 g of carbohydrates total.  What are tips for following this plan?  Shopping  · Develop a meal plan and then make a shopping list.  · Buy fresh and frozen vegetables, fresh and frozen fruit, dairy, eggs, beans, lentils, and whole grains.  · Look at food labels. Choose foods that have more fiber and less sugar.  · Avoid processed foods and foods with added sugars.  Meal planning  · Aim to have the same amount of carbohydrates at each meal and for each snack time.  · Plan to have regular, balanced meals and snacks.  Where to find more information  · American Diabetes Association: www.diabetes.org  · Centers for Disease Control and Prevention: www.cdc.gov  Summary  · Carbohydrate counting is a method of keeping track of how many carbohydrates you eat.  · Eating carbohydrates naturally increases the amount of sugar (glucose) in the blood.  · Counting how many carbohydrates you eat improves your blood glucose control, which helps you manage your diabetes.  · A dietitian can help you make a meal plan and calculate how many carbohydrates you should have at each meal and snack.  This information is not intended to replace advice given to you by your health care provider. Make sure you discuss any questions you have with your health care provider.  Document Revised: 12/17/2020 Document Reviewed: 12/18/2020  [employer] Patient Education © 2021 [employer]    How to Quarantine at Home  Information for Patients and Families    These instructions are for people with confirmed or suspected COVID-19 who do not need to be hospitalized and those with confirmed COVID-19 who were  hospitalized and discharged to care for themselves at home.    If you were tested through the Health Department  The Health Department will monitor your wellbeing.  If it is determined that you do not need to be hospitalized and can be isolated at home, you will be monitored by staff from your local or state health department.     If you were tested through a Commercial Lab  You will need to monitor yourself and report changes in your symptoms to your doctor.  See the section below called Monitor Your Symptoms.    Follow these steps until a healthcare provider or local or state health department says you can return to your normal activities.    Stay home except to get medical care  • Restrict activities outside your home, except for getting medical care.   • Do not go to work, school, or public areas.   • Avoid using public transportation, ride-sharing, or taxis.    Separate yourself from other people and animals in your home  People  As much as possible, you should stay in a specific room and away from other people in your home. Also, you should use a separate bathroom, if available.    Animals  You should restrict contact with pets and other animals while you are sick with COVID-19, just like you would around other people. When possible, have another member of your household care for your animals while you are sick. If you are sick with COVID-19, avoid contact with your pet, including petting, snuggling, being kissed or licked, and sharing food. If you must care for your pet or be around animals while you are sick, wash your hands before and after you interact with pets and wear a facemask. See COVID-19 and Animals for more information.    Call ahead before visiting your doctor  If you have a medical appointment, call the healthcare provider and tell them that you have or may have COVID-19. This information will help the healthcare provider’s office take steps to keep other people from getting infected or  exposed.    Wear a facemask  You should wear a facemask when you are around other people (e.g., sharing a room or vehicle) or pets and before you enter a healthcare provider’s office.     If you are not able to wear a facemask (for example, because it causes trouble breathing), then people who live with you should not stay in the same room with you, or they should wear a facemask if they enter your room.    Cover your coughs and sneezes  • Cover your mouth and nose with a tissue when you cough or sneeze.   • Throw used tissues in a lined trash can.   • Immediately wash your hands with soap and water for at least 20 seconds or, if soap and water are not available, clean your hands with an alcohol-based hand  that contains at least 60% alcohol.    Clean your hands often  • Wash your hands often with soap and water for at least 20 seconds, especially after blowing your nose, coughing, or sneezing; going to the bathroom; and before eating or preparing food.     • If soap and water are not readily available, use an alcohol-based hand  with at least 60% alcohol, covering all surfaces of your hands and rubbing them together until they feel dry.    • Soap and water are the best option if hands are visibly dirty. Avoid touching your eyes, nose, and mouth with unwashed hands.    Avoid sharing personal household items  • You should not share dishes, drinking glasses, cups, eating utensils, towels, or bedding with other people or pets in your home.   • After using these items, they should be washed thoroughly with soap and water.    Clean all “high-touch” surfaces everyday  • High touch surfaces include counters, tabletops, doorknobs, bathroom fixtures, toilets, phones, keyboards, tablets, and bedside tables.   • Also, clean any surfaces that may have blood, stool, or body fluids on them.   • Use a household cleaning spray or wipe, according to the label instructions. Labels contain instructions for safe and  effective use of the cleaning product, including precautions you should take when applying the product, such as wearing gloves and making sure you have good ventilation during use of the product.    Monitor your symptoms  • Seek prompt medical attention if your illness is worsening (e.g., difficulty breathing).   • Before seeking care, call your healthcare provider and tell them that you have, or are being evaluated for, COVID-19.   • Put on a facemask before you enter the facility.     • These steps will help the healthcare provider’s office to keep other people in the office or waiting room from getting infected or exposed.   • Persons who are placed under active monitoring or facilitated self-monitoring should follow instructions provided by their local health department or occupational health professionals, as appropriate.  • If you have a medical emergency and need to call 911, notify the dispatch personnel that you have, or are being evaluated for COVID-19. If possible, put on a facemask before emergency medical services arrive.    Discontinuing home isolation  Patients with confirmed COVID-19 should remain under home isolation precautions until the risk of secondary transmission to others is thought to be low. The decision to discontinue home isolation precautions should be made on a case-by-case basis, in consultation with healthcare providers and state and local health departments.    The below content are for household members, intimate partners, and caregivers of a patient with symptomatic laboratory-confirmed COVID-19 or a patient under investigation:    Household members, intimate partners, and caregivers may have close contact with a person with symptomatic, laboratory-confirmed COVID-19 or a person under investigation.     Close contacts should monitor their health; they should call their healthcare provider right away if they develop symptoms suggestive of COVID-19 (e.g., fever, cough, shortness of  breath)     Close contacts should also follow these recommendations:  • Make sure that you understand and can help the patient follow their healthcare provider’s instructions for medication(s) and care. You should help the patient with basic needs in the home and provide support for getting groceries, prescriptions, and other personal needs.  • Monitor the patient’s symptoms. If the patient is getting sicker, call his or her healthcare provider and tell them that the patient has laboratory-confirmed COVID-19. This will help the healthcare provider’s office take steps to keep other people in the office or waiting room from getting infected. Ask the healthcare provider to call the local or [facility] health department for additional guidance. If the patient has a medical emergency and you need to call 911, notify the dispatch personnel that the patient has, or is being evaluated for COVID-19.  • Household members should stay in another room or be  from the patient as much as possible. Household members should use a separate bedroom and bathroom, if available.  • Prohibit visitors who do not have an essential need to be in the home.  • Household members should care for any pets in the home. Do not handle pets or other animals while sick.  For more information, see COVID-19 and Animals.  • Make sure that shared spaces in the home have good air flow, such as by an air conditioner or an opened window, weather permitting.  • Perform hand hygiene frequently. Wash your hands often with soap and water for at least 20 seconds or use an alcohol-based hand  that contains 60 to 95% alcohol, covering all surfaces of your hands and rubbing them together until they feel dry. Soap and water should be used preferentially if hands are visibly dirty.  • Avoid touching your eyes, nose, and mouth with unwashed hands.  • The patient should wear a facemask when you are around other people. If the patient is not able to wear a  facemask (for example, because it causes trouble breathing), you, as the caregiver, should wear a mask when you are in the same room as the patient.  • Wear a disposable facemask and gloves when you touch or have contact with the patient’s blood, stool, or body fluids, such as saliva, sputum, nasal mucus, vomit, or urine.   o Throw out disposable facemasks and gloves after using them. Do not reuse.  o When removing personal protective equipment, first remove and dispose of gloves. Then, immediately clean your hands with soap and water or alcohol-based hand . Next, remove and dispose of facemask, and immediately clean your hands again with soap and water or alcohol-based hand .  • Avoid sharing household items with the patient. You should not share dishes, drinking glasses, cups, eating utensils, towels, bedding, or other items. After the patient uses these items, you should wash them thoroughly (see below “Wash laundry thoroughly”).  • Clean all “high-touch” surfaces, such as counters, tabletops, doorknobs, bathroom fixtures, toilets, phones, keyboards, tablets, and bedside tables, every day. Also, clean any surfaces that may have blood, stool, or body fluids on them.   o Use a household cleaning spray or wipe, according to the label instructions. Labels contain instructions for safe and effective use of the cleaning product including precautions you should take when applying the product, such as wearing gloves and making sure you have good ventilation during use of the product.  • Wash laundry thoroughly.   o Immediately remove and wash clothes or bedding that have blood, stool, or body fluids on them.  o Wear disposable gloves while handling soiled items and keep soiled items away from your body. Clean your hands (with soap and water or an alcohol-based hand ) immediately after removing your gloves.  o Read and follow directions on labels of laundry or clothing items and detergent. In  general, using a normal laundry detergent according to washing machine instructions and dry thoroughly using the warmest temperatures recommended on the clothing label.  • Place all used disposable gloves, facemasks, and other contaminated items in a lined container before disposing of them with other household waste. Clean your hands (with soap and water or an alcohol-based hand ) immediately after handling these items. Soap and water should be used preferentially if hands are visibly dirty.  • Discuss any additional questions with your state or local health department or healthcare provider.    Adapted from information provided by the Centers for Disease Control and Prevention.  For more information, visit https://www.cdc.gov/coronavirus/2019-ncov/hcp/guidance-prevent-spread.html

## 2021-09-14 DIAGNOSIS — G25.81 RLS (RESTLESS LEGS SYNDROME): Chronic | ICD-10-CM

## 2021-09-14 DIAGNOSIS — J30.89 ENVIRONMENTAL AND SEASONAL ALLERGIES: Chronic | ICD-10-CM

## 2021-09-14 RX ORDER — CETIRIZINE HYDROCHLORIDE 10 MG/1
TABLET ORAL
Qty: 30 TABLET | Refills: 5 | Status: SHIPPED | OUTPATIENT
Start: 2021-09-14 | End: 2022-01-13 | Stop reason: SDUPTHER

## 2021-09-14 RX ORDER — ROPINIROLE 1 MG/1
TABLET, FILM COATED ORAL
Qty: 30 TABLET | Refills: 5 | Status: SHIPPED | OUTPATIENT
Start: 2021-09-14 | End: 2022-01-13 | Stop reason: SDUPTHER

## 2021-11-05 RX ORDER — ALBUTEROL SULFATE 90 UG/1
2 AEROSOL, METERED RESPIRATORY (INHALATION) EVERY 4 HOURS PRN
Qty: 18 G | Refills: 5 | Status: SHIPPED | OUTPATIENT
Start: 2021-11-05 | End: 2021-11-22

## 2021-11-08 DIAGNOSIS — F33.1 MODERATE EPISODE OF RECURRENT MAJOR DEPRESSIVE DISORDER (HCC): Chronic | ICD-10-CM

## 2021-11-08 DIAGNOSIS — M15.9 PRIMARY OSTEOARTHRITIS INVOLVING MULTIPLE JOINTS: ICD-10-CM

## 2021-11-08 RX ORDER — DULOXETIN HYDROCHLORIDE 60 MG/1
60 CAPSULE, DELAYED RELEASE ORAL DAILY
Qty: 30 CAPSULE | Refills: 5 | Status: SHIPPED | OUTPATIENT
Start: 2021-11-08 | End: 2022-04-05 | Stop reason: SDUPTHER

## 2021-11-08 RX ORDER — IBUPROFEN 800 MG/1
TABLET ORAL
Qty: 90 TABLET | Refills: 2 | Status: SHIPPED | OUTPATIENT
Start: 2021-11-08 | End: 2022-02-07

## 2021-11-16 ENCOUNTER — OFFICE VISIT (OUTPATIENT)
Dept: FAMILY MEDICINE CLINIC | Facility: CLINIC | Age: 41
End: 2021-11-16

## 2021-11-16 VITALS
TEMPERATURE: 97.2 F | BODY MASS INDEX: 47.09 KG/M2 | WEIGHT: 293 LBS | HEART RATE: 81 BPM | SYSTOLIC BLOOD PRESSURE: 124 MMHG | DIASTOLIC BLOOD PRESSURE: 78 MMHG | OXYGEN SATURATION: 99 % | HEIGHT: 66 IN

## 2021-11-16 DIAGNOSIS — H65.03 NON-RECURRENT ACUTE SEROUS OTITIS MEDIA OF BOTH EARS: Primary | Chronic | ICD-10-CM

## 2021-11-16 DIAGNOSIS — E78.2 MIXED HYPERLIPIDEMIA: Chronic | ICD-10-CM

## 2021-11-16 DIAGNOSIS — I10 ESSENTIAL HYPERTENSION: Chronic | ICD-10-CM

## 2021-11-16 PROCEDURE — 99214 OFFICE O/P EST MOD 30 MIN: CPT | Performed by: PHYSICIAN ASSISTANT

## 2021-11-16 PROCEDURE — 96372 THER/PROPH/DIAG INJ SC/IM: CPT | Performed by: PHYSICIAN ASSISTANT

## 2021-11-16 RX ORDER — CEFDINIR 300 MG/1
600 CAPSULE ORAL DAILY
Qty: 20 CAPSULE | Refills: 0 | Status: SHIPPED | OUTPATIENT
Start: 2021-11-16 | End: 2021-11-22

## 2021-11-16 RX ORDER — METHYLPREDNISOLONE ACETATE 80 MG/ML
80 INJECTION, SUSPENSION INTRA-ARTICULAR; INTRALESIONAL; INTRAMUSCULAR; SOFT TISSUE ONCE
Status: COMPLETED | OUTPATIENT
Start: 2021-11-16 | End: 2021-11-16

## 2021-11-16 RX ORDER — CEFTRIAXONE 1 G/1
1 INJECTION, POWDER, FOR SOLUTION INTRAMUSCULAR; INTRAVENOUS ONCE
Status: COMPLETED | OUTPATIENT
Start: 2021-11-16 | End: 2021-11-16

## 2021-11-16 RX ORDER — DEXTROMETHORPHAN HYDROBROMIDE AND PROMETHAZINE HYDROCHLORIDE 15; 6.25 MG/5ML; MG/5ML
5 SYRUP ORAL 4 TIMES DAILY PRN
Qty: 180 ML | Refills: 1 | Status: SHIPPED | OUTPATIENT
Start: 2021-11-16 | End: 2022-01-13

## 2021-11-16 RX ADMIN — CEFTRIAXONE 1 G: 1 INJECTION, POWDER, FOR SOLUTION INTRAMUSCULAR; INTRAVENOUS at 12:52

## 2021-11-16 RX ADMIN — METHYLPREDNISOLONE ACETATE 80 MG: 80 INJECTION, SUSPENSION INTRA-ARTICULAR; INTRALESIONAL; INTRAMUSCULAR; SOFT TISSUE at 12:50

## 2021-11-16 NOTE — PROGRESS NOTES
Injection  Injection performed in Right Dorsogluteal by Betty Sumner MA. Patient tolerated the procedure well without complications.  11/16/21   Betty Sumner MA'         Injection  Injection performed in Left Dorsogluteal by Betty Sumner MA. Patient tolerated the procedure well without complications.  11/16/21   Betty Sumner MA

## 2021-11-17 NOTE — PATIENT INSTRUCTIONS

## 2021-11-17 NOTE — PROGRESS NOTES
"Subjective   Anika Alegria is a 41 y.o. female.       Chief Complaint -earache    History of Present Illness -    ROS    Ear ache-  Patient complains of bilateral earache sore throat and nasal congestion for the past 3 days.  Minimal relief with Mucinex and cough drops    Hypertension-controlled with losartan    Hyperlipidemia-stable with low-cholesterol diet      The following portions of the patient's history were reviewed and updated as appropriate: allergies, current medications, past family history, past medical history, past social history, past surgical history and problem list.    Review of Systems    Objective  Vital signs:  /78 (BP Location: Right arm, Patient Position: Sitting, Cuff Size: Adult)   Pulse 81   Temp 97.2 °F (36.2 °C) (Temporal)   Ht 167.6 cm (66\")   Wt (!) 162 kg (358 lb)   LMP  (LMP Unknown)   SpO2 99%   BMI 57.78 kg/m²     Physical Exam  Vitals and nursing note reviewed.   Constitutional:       General: She is not in acute distress.     Appearance: Normal appearance. She is well-developed. She is obese. She is not diaphoretic.   HENT:      Head: Normocephalic and atraumatic.      Right Ear: Ear canal and external ear normal.      Left Ear: Ear canal and external ear normal.      Ears:      Comments: Bilateral clear fluid behind erythematous TMs with bulging     Nose: Nose normal.      Mouth/Throat:      Mouth: Mucous membranes are moist.      Pharynx: Posterior oropharyngeal erythema present. No oropharyngeal exudate.   Neck:      Thyroid: No thyromegaly.   Cardiovascular:      Rate and Rhythm: Normal rate and regular rhythm.      Heart sounds: Normal heart sounds. No murmur heard.      Pulmonary:      Effort: Pulmonary effort is normal.      Breath sounds: Normal breath sounds. No wheezing or rales.   Musculoskeletal:      Cervical back: Normal range of motion and neck supple.   Lymphadenopathy:      Cervical: Cervical adenopathy present.   Skin:     General: Skin is warm and " dry.      Findings: No rash.   Neurological:      Mental Status: She is alert and oriented to person, place, and time.   Psychiatric:         Mood and Affect: Mood normal.         Behavior: Behavior normal.         Thought Content: Thought content normal.         The following data was reviewed by: KALI Armstrong on 11/16/2021:  CMP    CMP 4/9/21   Glucose 188 (A)   BUN 10   Creatinine 0.61   eGFR Non African Am 108   Sodium 139   Potassium 3.8   Chloride 102   Calcium 9.0   Albumin 3.54   Total Bilirubin 0.3   Alkaline Phosphatase 109   AST (SGOT) 37 (A)   ALT (SGPT) 50 (A)   (A) Abnormal value       Comments are available for some flowsheets but are not being displayed.           CBC w/diff    CBC w/Diff 4/9/21   WBC 9.13   RBC 4.46   Hemoglobin 13.7   Hematocrit 40.9   MCV 91.7   MCH 30.7   MCHC 33.5   RDW 13.3   Platelets 158   Neutrophil Rel % 52.2   Immature Granulocyte Rel % 0.3   Lymphocyte Rel % 37.3   Monocyte Rel % 7.6   Eosinophil Rel % 2.2   Basophil Rel % 0.4               TSH    TSH 4/9/21   TSH 1.680                  Assessment/Plan     Diagnoses and all orders for this visit:    1. Non-recurrent acute serous otitis media of both ears (Primary)  Comments:  Start Omnicef  Advised conservative measures including adequate hydration, Tylenol and rest  RTC if not improved or resolved within 5 days  Orders:  -     cefdinir (OMNICEF) 300 MG capsule; Take 2 capsules by mouth Daily for 10 days.  Dispense: 20 capsule; Refill: 0  -     promethazine-dextromethorphan (PROMETHAZINE-DM) 6.25-15 MG/5ML syrup; Take 5 mL by mouth 4 (Four) Times a Day As Needed for Cough.  Dispense: 180 mL; Refill: 1  -     methylPREDNISolone acetate (DEPO-medrol) injection 80 mg  -     cefTRIAXone (ROCEPHIN) injection 1 g    2. Essential hypertension  Comments:  Continue losartan    3. Mixed hyperlipidemia  Comments:  Advise low-cholesterol diet  Continue to monitor            Patient was given instructions and counseling  regarding his condition or for health maintenance advice. Please see specific information pulled into the AVS if appropriate      This document has been electronically signed by:  Rosa Quigley PA-C

## 2021-11-22 ENCOUNTER — OFFICE VISIT (OUTPATIENT)
Dept: FAMILY MEDICINE CLINIC | Facility: CLINIC | Age: 41
End: 2021-11-22

## 2021-11-22 VITALS
BODY MASS INDEX: 47.09 KG/M2 | DIASTOLIC BLOOD PRESSURE: 78 MMHG | OXYGEN SATURATION: 97 % | HEIGHT: 66 IN | SYSTOLIC BLOOD PRESSURE: 126 MMHG | TEMPERATURE: 98 F | WEIGHT: 293 LBS | HEART RATE: 83 BPM

## 2021-11-22 DIAGNOSIS — H65.03 NON-RECURRENT ACUTE SEROUS OTITIS MEDIA OF BOTH EARS: ICD-10-CM

## 2021-11-22 DIAGNOSIS — J45.20 MILD INTERMITTENT ASTHMA, UNSPECIFIED WHETHER COMPLICATED: Primary | ICD-10-CM

## 2021-11-22 PROBLEM — J45.909 MILD ASTHMA: Status: ACTIVE | Noted: 2021-11-22

## 2021-11-22 PROCEDURE — 99214 OFFICE O/P EST MOD 30 MIN: CPT | Performed by: NURSE PRACTITIONER

## 2021-11-22 RX ORDER — PREDNISONE 20 MG/1
20 TABLET ORAL DAILY
Qty: 10 TABLET | Refills: 0 | Status: SHIPPED | OUTPATIENT
Start: 2021-11-22 | End: 2021-11-29

## 2021-11-22 RX ORDER — AMOXICILLIN AND CLAVULANATE POTASSIUM 875; 125 MG/1; MG/1
1 TABLET, FILM COATED ORAL 2 TIMES DAILY
Qty: 10 TABLET | Refills: 0 | Status: SHIPPED | OUTPATIENT
Start: 2021-11-22 | End: 2021-11-27

## 2021-11-22 RX ORDER — ALBUTEROL SULFATE 90 UG/1
2 AEROSOL, METERED RESPIRATORY (INHALATION) EVERY 4 HOURS PRN
Qty: 18 G | Refills: 2 | Status: SHIPPED | OUTPATIENT
Start: 2021-11-22 | End: 2022-04-05 | Stop reason: SDUPTHER

## 2021-11-22 NOTE — PROGRESS NOTES
Chief Complaint  Cough and Nasal Congestion    Sravanthi Alegria is a 41 y.o. female who presents today to [facility] for follow up cough and congestion.    HPI:   URI   This is a recurrent problem. The current episode started 1 to 4 weeks ago. The problem has been unchanged. There has been no fever. Associated symptoms include congestion, coughing, ear pain, rhinorrhea, sinus pain, a sore throat and wheezing. Pertinent negatives include no chest pain. Treatments tried: cefdinir, rocephin shot and steroid shot. The treatment provided no relief.   Cough  This is a recurrent problem. The current episode started 1 to 4 weeks ago. The problem has been gradually worsening. The problem occurs constantly. The cough is non-productive. Associated symptoms include ear pain, rhinorrhea, a sore throat and wheezing. Pertinent negatives include no chest pain. Nothing aggravates the symptoms. She has tried steroid inhaler (albuterol inhaler) for the symptoms. The treatment provided no relief. Her past medical history is significant for asthma (as a child).         Objective     Problem List:  Patient Active Problem List   Diagnosis   • Moderate episode of recurrent major depressive disorder (HCC)   • Gastroesophageal reflux disease without esophagitis   • PCOS (polycystic ovarian syndrome)   • Essential hypertension   • Chronic seasonal allergic rhinitis due to pollen   • NAFL (nonalcoholic fatty liver)   • Mixed hyperlipidemia   • Carpal tunnel syndrome of right wrist   • Nontoxic uninodular goiter   • Vitamin D deficiency   • Type 2 diabetes mellitus with hyperglycemia, without long-term current use of insulin ([facility])   • RLS (restless legs syndrome)   • Morbid obesity ([facility])   • Mild asthma       Allergy:   Allergies   Allergen Reactions   • Ultram [Tramadol] Swelling   • Venlafaxine Other (See Comments)     Nightmares        Discontinued Medications:  Medications Discontinued  During This Encounter   Medication Reason   • albuterol sulfate HFA (ProAir HFA) 108 (90 Base) MCG/ACT inhaler    • cefdinir (OMNICEF) 300 MG capsule Not Efficacious       Current Medications:   Current Outpatient Medications   Medication Sig Dispense Refill   • cetirizine (zyrTEC) 10 MG tablet TAKE 1 TABLET BY MOUTH EVERY 30 tablet 5   • Dapagliflozin Propanediol (Farxiga) 10 MG tablet Take 10 mg by mouth Every Morning. 30 tablet 5   • Diclofenac Sodium (VOLTAREN) 1 % gel gel APPLY 4 GRAMS AS DIRECTED ONTO THE APPROPRIATE AREA FOUR TIMES DAILY AS NEEDED 300 g 5   • DULoxetine (CYMBALTA) 60 MG capsule TAKE 1 CAPSULE BY MOUTH DAILY 30 capsule 5   • fluticasone (FLONASE) 50 MCG/ACT nasal spray 2 sprays into the nostril(s) as directed by provider Daily. 16 mL 5   • ibuprofen (ADVIL,MOTRIN) 800 MG tablet TAKE 1 TABLET BY MOUTH EVERY 8 HOURS 90 tablet 2   • ketoconazole (NIZORAL) 2 % shampoo Apply  topically to the appropriate area as directed 2 (Two) Times a Week. to affected area 120 mL 5   • Lancets (OneTouch Delica Plus Tsmzdy01C) misc USE TO TEST BLOOD SUGAR THREE TIMES DAILY AS DIRECTED 100 each 5   • levothyroxine (Synthroid) 100 MCG tablet Take 1 tablet by mouth Daily. 30 tablet 11   • losartan (COZAAR) 100 MG tablet Take 1 tablet by mouth Daily. 30 tablet 5   • metFORMIN (Glucophage) 1000 MG tablet Take 1 tablet by mouth 2 (Two) Times a Day With Meals. 60 tablet 5   • OneTouch Ultra test strip USE TO TEST BLOOD SUGAR AS DIRECTED 90 each 5   • oxybutynin XL (DITROPAN-XL) 5 MG 24 hr tablet TAKE 1 TABLET BY MOUTH DAILY 30 tablet 5   • pantoprazole (PROTONIX) 40 MG EC tablet Take 1 tablet by mouth Daily. 30 tablet 5   • promethazine-dextromethorphan (PROMETHAZINE-DM) 6.25-15 MG/5ML syrup Take 5 mL by mouth 4 (Four) Times a Day As Needed for Cough. 180 mL 1   • rOPINIRole (REQUIP) 1 MG tablet TAKE 1 TABLET BY MOUTH EVERY NIGHT 1 HOUR BEFORE BEDTIME 30 tablet 5   • vitamin D (ERGOCALCIFEROL) 1.25 MG (58032 UT) capsule  capsule TAKE 1 CAPSULE BY MOUTH EVERY 7 DAYS 4 capsule 5   • albuterol sulfate HFA (Ventolin HFA) 108 (90 Base) MCG/ACT inhaler Inhale 2 puffs Every 4 (Four) Hours As Needed for Wheezing. 18 g 2   • amoxicillin-clavulanate (Augmentin) 875-125 MG per tablet Take 1 tablet by mouth 2 (Two) Times a Day for 5 days. 10 tablet 0   • predniSONE (DELTASONE) 20 MG tablet Take 1 tablet by mouth Daily. 10 tablet 0     No current facility-administered medications for this visit.       Past Medical History:  Past Medical History:   Diagnosis Date   • Anxiety and depression    • GERD (gastroesophageal reflux disease)    • Goiter    • Goiter    • Hepatitis A    • Hypertension    • Mild asthma 2021   • PONV (postoperative nausea and vomiting)    • Thyroid disease    • Type 2 diabetes mellitus with hyperglycemia, without long-term current use of insulin ([facility]) 2020       Past Surgical History:  Past Surgical History:   Procedure Laterality Date   • ABDOMINAL SURGERY     • CARPAL TUNNEL RELEASE Right 10/15/2019    Procedure: CARPAL TUNNEL RELEASE;  Surgeon: Rony Cruz MD;  Location: [facility];  Service: Orthopedics   •  SECTION      x2   • TUBAL ABDOMINAL LIGATION         Review of Systems:  Review of Systems   Constitutional: Negative.    HENT: Positive for congestion, ear pain, rhinorrhea, sinus pain and sore throat.    Respiratory: Positive for cough and wheezing.    Cardiovascular: Negative for chest pain.   Gastrointestinal: Negative.    Genitourinary: Negative.    Musculoskeletal: Negative.    Skin: Negative.    Neurological: Negative.    Psychiatric/Behavioral: Negative.    All other systems reviewed and are negative.      Physical Exam:  Physical Exam  Vitals and nursing note reviewed.   Constitutional:       General: She is not in acute distress.     Appearance: Normal appearance. She is obese. She is not ill-appearing.      Interventions: Face mask in place.   HENT:      Head: Normocephalic and  "atraumatic.      Right Ear: Tympanic membrane is erythematous and bulging.      Left Ear: Tympanic membrane is erythematous and bulging.      Nose: Congestion present.      Mouth/Throat:      Mouth: Mucous membranes are moist.      Pharynx: Oropharynx is clear.   Cardiovascular:      Rate and Rhythm: Normal rate and regular rhythm.   Pulmonary:      Effort: Pulmonary effort is normal. No respiratory distress.      Breath sounds: Examination of the left-upper field reveals wheezing. Examination of the right-lower field reveals wheezing. Wheezing present.   Abdominal:      Palpations: Abdomen is soft.   Musculoskeletal:         General: Normal range of motion.      Cervical back: Normal range of motion and neck supple.   Skin:     General: Skin is warm and dry.      Capillary Refill: Capillary refill takes less than 2 seconds.   Neurological:      Mental Status: She is alert and oriented to person, place, and time.   Psychiatric:         Mood and Affect: Mood normal.         Behavior: Behavior normal.         Vital Signs:   /78 (BP Location: Right arm, Patient Position: Sitting)   Pulse 83   Temp 98 °F (36.7 °C)   Ht 167.6 cm (66\")   Wt (!) 164 kg (362 lb)   SpO2 97%   BMI 58.43 kg/m²      Lab Results:   No visits with results within 6 Month(s) from this visit.   Latest known visit with results is:   Admission on 04/09/2021, Discharged on 04/09/2021   Component Date Value Ref Range Status   • QT Interval 04/08/2021 362  ms Final   • QTC Interval 04/08/2021 430  ms Final   • Glucose 04/09/2021 188* 65 - 99 mg/dL Final   • BUN 04/09/2021 10  6 - 20 mg/dL Final   • Creatinine 04/09/2021 0.61  0.57 - 1.00 mg/dL Final   • Sodium 04/09/2021 139  136 - 145 mmol/L Final   • Potassium 04/09/2021 3.8  3.5 - 5.2 mmol/L Final    Slight hemolysis detected by analyzer. Results may be affected.   • Chloride 04/09/2021 102  98 - 107 mmol/L Final   • CO2 04/09/2021 27.6  22.0 - 29.0 mmol/L Final   • Calcium 04/09/2021 9.0  " 8.6 - 10.5 mg/dL Final   • Total Protein 04/09/2021 6.9  6.0 - 8.5 g/dL Final   • Albumin 04/09/2021 3.54  3.50 - 5.20 g/dL Final   • ALT (SGPT) 04/09/2021 50* 1 - 33 U/L Final   • AST (SGOT) 04/09/2021 37* 1 - 32 U/L Final   • Alkaline Phosphatase 04/09/2021 109  39 - 117 U/L Final   • Total Bilirubin 04/09/2021 0.3  0.0 - 1.2 mg/dL Final   • eGFR Non African Amer 04/09/2021 108  >60 mL/min/1.73 Final   • Globulin 04/09/2021 3.4  gm/dL Final   • A/G Ratio 04/09/2021 1.1  g/dL Final   • BUN/Creatinine Ratio 04/09/2021 16.4  7.0 - 25.0 Final   • Anion Gap 04/09/2021 9.4  5.0 - 15.0 mmol/L Final   • Lipase 04/09/2021 87* 13 - 60 U/L Final   • Amylase 04/09/2021 63  28 - 100 U/L Final   • Free T4 04/09/2021 1.02  0.93 - 1.70 ng/dL Final   • TSH 04/09/2021 1.680  0.270 - 4.200 uIU/mL Final   • Magnesium 04/09/2021 1.9  1.6 - 2.6 mg/dL Final   • Creatine Kinase 04/09/2021 285* 20 - 180 U/L Final   • Sed Rate 04/09/2021 18  0 - 20 mm/hr Final   • C-Reactive Protein 04/09/2021 0.92* 0.00 - 0.50 mg/dL Final   • Troponin T 04/09/2021 <0.010  0.000 - 0.030 ng/mL Final   • Troponin T 04/09/2021 <0.010  0.000 - 0.030 ng/mL Final   • proBNP 04/09/2021 43.8  0.0 - 450.0 pg/mL Final   • WBC 04/09/2021 9.13  3.40 - 10.80 10*3/mm3 Final   • RBC 04/09/2021 4.46  3.77 - 5.28 10*6/mm3 Final   • Hemoglobin 04/09/2021 13.7  12.0 - 15.9 g/dL Final   • Hematocrit 04/09/2021 40.9  34.0 - 46.6 % Final   • MCV 04/09/2021 91.7  79.0 - 97.0 fL Final   • MCH 04/09/2021 30.7  26.6 - 33.0 pg Final   • MCHC 04/09/2021 33.5  31.5 - 35.7 g/dL Final   • RDW 04/09/2021 13.3  12.3 - 15.4 % Final   • RDW-SD 04/09/2021 44.9  37.0 - 54.0 fl Final   • MPV 04/09/2021 9.6  6.0 - 12.0 fL Final   • Platelets 04/09/2021 158  140 - 450 10*3/mm3 Final   • Neutrophil % 04/09/2021 52.2  42.7 - 76.0 % Final   • Lymphocyte % 04/09/2021 37.3  19.6 - 45.3 % Final   • Monocyte % 04/09/2021 7.6  5.0 - 12.0 % Final   • Eosinophil % 04/09/2021 2.2  0.3 - 6.2 % Final   •  Basophil % 04/09/2021 0.4  0.0 - 1.5 % Final   • Immature Grans % 04/09/2021 0.3  0.0 - 0.5 % Final   • Neutrophils, Absolute 04/09/2021 4.76  1.70 - 7.00 10*3/mm3 Final   • Lymphocytes, Absolute 04/09/2021 3.41* 0.70 - 3.10 10*3/mm3 Final   • Monocytes, Absolute 04/09/2021 0.69  0.10 - 0.90 10*3/mm3 Final   • Eosinophils, Absolute 04/09/2021 0.20  0.00 - 0.40 10*3/mm3 Final   • Basophils, Absolute 04/09/2021 0.04  0.00 - 0.20 10*3/mm3 Final   • Immature Grans, Absolute 04/09/2021 0.03  0.00 - 0.05 10*3/mm3 Final   • nRBC 04/09/2021 0.0  0.0 - 0.2 /100 WBC Final   • HCG Qualitative 04/09/2021 Negative  Negative Final       EKG Results:  No orders to display       Imaging Results:  No Images in the past 120 days found..              Assessment and Plan   Diagnoses and all orders for this visit:    1. Mild intermittent asthma, unspecified whether complicated (Primary)  -     albuterol sulfate HFA (Ventolin HFA) 108 (90 Base) MCG/ACT inhaler; Inhale 2 puffs Every 4 (Four) Hours As Needed for Wheezing.  Dispense: 18 g; Refill: 2  -     predniSONE (DELTASONE) 20 MG tablet; Take 1 tablet by mouth Daily.  Dispense: 10 tablet; Refill: 0    2. Non-recurrent acute serous otitis media of both ears  -     amoxicillin-clavulanate (Augmentin) 875-125 MG per tablet; Take 1 tablet by mouth 2 (Two) Times a Day for 5 days.  Dispense: 10 tablet; Refill: 0  -     predniSONE (DELTASONE) 20 MG tablet; Take 1 tablet by mouth Daily.  Dispense: 10 tablet; Refill: 0        Meds ordered during this visit:  New Medications Ordered This Visit   Medications   • albuterol sulfate HFA (Ventolin HFA) 108 (90 Base) MCG/ACT inhaler     Sig: Inhale 2 puffs Every 4 (Four) Hours As Needed for Wheezing.     Dispense:  18 g     Refill:  2   • amoxicillin-clavulanate (Augmentin) 875-125 MG per tablet     Sig: Take 1 tablet by mouth 2 (Two) Times a Day for 5 days.     Dispense:  10 tablet     Refill:  0   • predniSONE (DELTASONE) 20 MG tablet     Sig: Take  1 tablet by mouth Daily.     Dispense:  10 tablet     Refill:  0       Patient Instructions:  Patient instructions given for the following visit diagnosis:    ICD-10-CM ICD-9-CM   1. Mild intermittent asthma, unspecified whether complicated  J45.20 493.90   2. Non-recurrent acute serous otitis media of both ears  H65.03 381.01       Follow Up   Return for Next scheduled follow up.        This document has been electronically signed by NIKKO Piper  November 22, 2021 15:48 EST    Patient was given instructions and counseling regarding her condition or for health maintenance advice. Please see specific information pulled into the AVS if appropriate.     Part of this note may be an electronic transcription/translation of spoken language to printed text using the Dragon Dictation System.

## 2021-11-22 NOTE — PATIENT INSTRUCTIONS
Asthma, Adult    Asthma is a long-term (chronic) condition in which the airways get tight and narrow. The airways are the breathing passages that lead from the nose and mouth down into the lungs. A person with asthma will have times when symptoms get worse. These are called asthma attacks. They can cause coughing, whistling sounds when you breathe (wheezing), shortness of breath, and chest pain. They can make it hard to breathe. There is no cure for asthma, but medicines and lifestyle changes can help control it.  There are many things that can bring on an asthma attack or make asthma symptoms worse (triggers). Common triggers include:  · Mold.  · Dust.  · Cigarette smoke.  · Cockroaches.  · Things that can cause allergy symptoms (allergens). These include animal skin flakes (dander) and pollen from trees or grass.  · Things that pollute the air. These may include household , wood smoke, smog, or chemical odors.  · Cold air, weather changes, and wind.  · Crying or laughing hard.  · Stress.  · Certain medicines or drugs.  · Certain foods such as dried fruit, potato chips, and grape juice.  · Infections, such as a cold or the flu.  · Certain medical conditions or diseases.  · Exercise or tiring activities.  Asthma may be treated with medicines and by staying away from the things that cause asthma attacks. Types of medicines may include:  · Controller medicines. These help prevent asthma symptoms. They are usually taken every day.  · Fast-acting reliever or rescue medicines. These quickly relieve asthma symptoms. They are used as needed and provide short-term relief.  · Allergy medicines if your attacks are brought on by allergens.  · Medicines to help control the body's defense (immune) system.  Follow these instructions at home:  Avoiding triggers in your home  · Change your heating and air conditioning filter often.  · Limit your use of fireplaces and wood stoves.  · Get rid of pests (such as roaches and  mice) and their droppings.  · Throw away plants if you see mold on them.  · Clean your floors. Dust regularly. Use cleaning products that do not smell.  · Have someone vacuum when you are not home. Use a vacuum  with a HEPA filter if possible.  · Replace carpet with wood, tile, or vinyl jack. Carpet can trap animal skin flakes and dust.  · Use allergy-proof pillows, mattress covers, and box spring covers.  · Wash bed sheets and blankets every week in hot water. Dry them in a dryer.  · Keep your bedroom free of any triggers.  · Avoid pets and keep windows closed when things that cause allergy symptoms are in the air.  · Use blankets that are made of polyester or cotton.  · Clean bathrooms and kian with bleach. If possible, have someone repaint the walls in these rooms with mold-resistant paint. Keep out of the rooms that are being cleaned and painted.  · Wash your hands often with soap and water. If soap and water are not available, use hand .  · Do not allow anyone to smoke in your home.  General instructions  · Take over-the-counter and prescription medicines only as told by your doctor.  ? Talk with your doctor if you have questions about how or when to take your medicines.  ? Make note if you need to use your medicines more often than usual.  · Do not use any products that contain nicotine or tobacco, such as cigarettes and e-cigarettes. If you need help quitting, ask your doctor.  · Stay away from secondhand smoke.  · Avoid doing things outdoors when allergen counts are high and when air quality is low.  · Wear a ski mask when doing outdoor activities in the winter. The mask should cover your nose and mouth. Exercise indoors on cold days if you can.  · Warm up before you exercise. Take time to cool down after exercise.  · Use a peak flow meter as told by your doctor. A peak flow meter is a tool that measures how well the lungs are working.  · Keep track of the peak flow meter's readings.  Write them down.  · Follow your asthma action plan. This is a written plan for taking care of your asthma and treating your attacks.  · Make sure you get all the shots (vaccines) that your doctor recommends. Ask your doctor about a flu shot and a pneumonia shot.  · Keep all follow-up visits as told by your doctor. This is important.  Contact a doctor if:  · You have wheezing, shortness of breath, or a cough even while taking medicine to prevent attacks.  · The mucus you cough up (sputum) is thicker than usual.  · The mucus you cough up changes from clear or white to yellow, green, gray, or bloody.  · You have problems from the medicine you are taking, such as:  ? A rash.  ? Itching.  ? Swelling.  ? Trouble breathing.  · You need reliever medicines more than 2-3 times a week.  · Your peak flow reading is still at 50-79% of your personal best after following the action plan for 1 hour.  · You have a fever.  Get help right away if:  · You seem to be worse and are not responding to medicine during an asthma attack.  · You are short of breath even at rest.  · You get short of breath when doing very little activity.  · You have trouble eating, drinking, or talking.  · You have chest pain or tightness.  · You have a fast heartbeat.  · Your lips or fingernails start to turn blue.  · You are light-headed or dizzy, or you faint.  · Your peak flow is less than 50% of your personal best.  · You feel too tired to breathe normally.  Summary  · Asthma is a long-term (chronic) condition in which the airways get tight and narrow. An asthma attack can make it hard to breathe.  · Asthma cannot be cured, but medicines and lifestyle changes can help control it.  · Make sure you understand how to avoid triggers and how and when to use your medicines.  This information is not intended to replace advice given to you by your health care provider. Make sure you discuss any questions you have with your health care provider.  Document Revised:  04/21/2021 Document Reviewed: 04/21/2021  Elsevier Patient Education © 2021 [employer]  Otitis Media, Adult    Otitis media occurs when there is inflammation and fluid in the middle ear space with signs and symptoms of an acute infection. The middle ear is a part of the ear that contains bones for hearing as well as air that helps send sounds to the brain. When infected fluid builds up in this space, it causes pressure and results in symptoms of acute otitis media. The eustachian tube connects the middle ear to the back of the nose (nasopharynx) and normally allows air into the middle ear space. If the eustachian tube becomes blocked, fluid can build up and become infected.  What are the causes?  This condition is caused by a blockage in the eustachian tube. This can be caused by an object like mucus, or by swelling (edema) of the tube. Problems that can cause a blockage include:  · A cold or other upper respiratory infection.  · Allergies.  · An irritant, such as tobacco smoke.  · Enlarged adenoids. The adenoids are areas of soft tissue located high in the back of the throat, behind the nose and the roof of the mouth. They are part of the body's defense system (immune system).  · A mass in the nasopharynx.  · Damage to the ear caused by pressure changes (barotrauma).  What are the signs or symptoms?  Symptoms of this condition include:  · Ear pain.  · Fever.  · Decreased hearing.  · Tiredness (lethargy).  · Fluid leaking from the ear, if the eardrum is ruptured or has burst.  · Ringing in the ear.  How is this diagnosed?    This condition is diagnosed with a physical exam. During the exam, your health care provider will use an instrument called an otoscope to look in your ear and check for redness, swelling, and fluid. He or she will also ask about your symptoms.  Your health care provider may also order tests, such as:  · A pneumatic otoscopy. This is a test to check the movement of the eardrum. It is done by  squeezing a small amount of air into the ear.  · A tympanogram is a test that shows how well the eardrum moves in response to air pressure in the ear canal. It provides a graph for your health care provider to review.  How is this treated?  This condition can go away on its own within 3-5 days. But if the condition is caused by a bacterial infection and does not go away on its own, or if it keeps coming back, your health care provider may:  · Prescribe antibiotic medicine to treat the infection.  · Prescribe or recommend medicines to control pain.  Follow these instructions at home:  · Take over-the-counter and prescription medicines only as told by your health care provider.  · If you were prescribed an antibiotic medicine, take it as told by your health care provider. Do not stop taking the antibiotic even if you start to feel better.  · Keep all follow-up visits as told by your health care provider. This is important.  Contact a health care provider if:  · You have bleeding from your nose.  · There is a lump on your neck.  · You are not feeling better in 5 days.  · You feel worse instead of better.  Get help right away if:  · You have severe pain that is not controlled with medicine.  · You have swelling, redness, or pain around your ear.  · You have stiffness in your neck.  · A part of your face is not moving (paralyzed).  · The bone behind your ear (mastoid) is tender when you touch it.  · You develop a severe headache.  Summary  · Otitis media is redness, soreness, and swelling of the middle ear, usually resulting in pain.  · This condition can go away on its own within 3-5 days.  · If the problem does not go away in 3-5 days, your health care provider may prescribe or recommend medicines to treat the infection or your symptoms.  · If you were prescribed an antibiotic medicine, take it as told by your health care provider.  · Follow all instructions you were given by your health care provider.  This  information is not intended to replace advice given to you by your health care provider. Make sure you discuss any questions you have with your health care provider.  Document Revised: 11/19/2020 Document Reviewed: 11/19/2020  Elsevier Patient Education © 2021 [employer]

## 2021-11-27 DIAGNOSIS — M15.9 PRIMARY OSTEOARTHRITIS INVOLVING MULTIPLE JOINTS: ICD-10-CM

## 2021-11-29 ENCOUNTER — OFFICE VISIT (OUTPATIENT)
Dept: FAMILY MEDICINE CLINIC | Facility: CLINIC | Age: 41
End: 2021-11-29

## 2021-11-29 VITALS — HEIGHT: 66 IN | BODY MASS INDEX: 47.09 KG/M2 | WEIGHT: 293 LBS

## 2021-11-29 DIAGNOSIS — J45.21 MILD INTERMITTENT ASTHMA WITH ACUTE EXACERBATION: Primary | ICD-10-CM

## 2021-11-29 PROCEDURE — 99213 OFFICE O/P EST LOW 20 MIN: CPT | Performed by: PHYSICIAN ASSISTANT

## 2021-11-29 RX ORDER — GUAIFENESIN 600 MG/1
1200 TABLET, EXTENDED RELEASE ORAL 2 TIMES DAILY
Qty: 40 TABLET | Refills: 0 | Status: SHIPPED | OUTPATIENT
Start: 2021-11-29 | End: 2021-12-20

## 2021-11-29 RX ORDER — AMOXICILLIN AND CLAVULANATE POTASSIUM 875; 125 MG/1; MG/1
1 TABLET, FILM COATED ORAL 2 TIMES DAILY
Qty: 14 TABLET | Refills: 0 | Status: SHIPPED | OUTPATIENT
Start: 2021-11-29 | End: 2022-01-13

## 2021-11-29 RX ORDER — MONTELUKAST SODIUM 10 MG/1
10 TABLET ORAL NIGHTLY
Qty: 30 TABLET | Refills: 5 | Status: SHIPPED | OUTPATIENT
Start: 2021-11-29 | End: 2022-01-13 | Stop reason: SDUPTHER

## 2021-11-29 NOTE — PATIENT INSTRUCTIONS
Asthma, Adult    Asthma is a long-term (chronic) condition in which the airways get tight and narrow. The airways are the breathing passages that lead from the nose and mouth down into the lungs. A person with asthma will have times when symptoms get worse. These are called asthma attacks. They can cause coughing, whistling sounds when you breathe (wheezing), shortness of breath, and chest pain. They can make it hard to breathe. There is no cure for asthma, but medicines and lifestyle changes can help control it.  There are many things that can bring on an asthma attack or make asthma symptoms worse (triggers). Common triggers include:  · Mold.  · Dust.  · Cigarette smoke.  · Cockroaches.  · Things that can cause allergy symptoms (allergens). These include animal skin flakes (dander) and pollen from trees or grass.  · Things that pollute the air. These may include household , wood smoke, smog, or chemical odors.  · Cold air, weather changes, and wind.  · Crying or laughing hard.  · Stress.  · Certain medicines or drugs.  · Certain foods such as dried fruit, potato chips, and grape juice.  · Infections, such as a cold or the flu.  · Certain medical conditions or diseases.  · Exercise or tiring activities.  Asthma may be treated with medicines and by staying away from the things that cause asthma attacks. Types of medicines may include:  · Controller medicines. These help prevent asthma symptoms. They are usually taken every day.  · Fast-acting reliever or rescue medicines. These quickly relieve asthma symptoms. They are used as needed and provide short-term relief.  · Allergy medicines if your attacks are brought on by allergens.  · Medicines to help control the body's defense (immune) system.  Follow these instructions at home:  Avoiding triggers in your home  · Change your heating and air conditioning filter often.  · Limit your use of fireplaces and wood stoves.  · Get rid of pests (such as roaches and  mice) and their droppings.  · Throw away plants if you see mold on them.  · Clean your floors. Dust regularly. Use cleaning products that do not smell.  · Have someone vacuum when you are not home. Use a vacuum  with a HEPA filter if possible.  · Replace carpet with wood, tile, or vinyl jack. Carpet can trap animal skin flakes and dust.  · Use allergy-proof pillows, mattress covers, and box spring covers.  · Wash bed sheets and blankets every week in hot water. Dry them in a dryer.  · Keep your bedroom free of any triggers.  · Avoid pets and keep windows closed when things that cause allergy symptoms are in the air.  · Use blankets that are made of polyester or cotton.  · Clean bathrooms and kian with bleach. If possible, have someone repaint the walls in these rooms with mold-resistant paint. Keep out of the rooms that are being cleaned and painted.  · Wash your hands often with soap and water. If soap and water are not available, use hand .  · Do not allow anyone to smoke in your home.  General instructions  · Take over-the-counter and prescription medicines only as told by your doctor.  ? Talk with your doctor if you have questions about how or when to take your medicines.  ? Make note if you need to use your medicines more often than usual.  · Do not use any products that contain nicotine or tobacco, such as cigarettes and e-cigarettes. If you need help quitting, ask your doctor.  · Stay away from secondhand smoke.  · Avoid doing things outdoors when allergen counts are high and when air quality is low.  · Wear a ski mask when doing outdoor activities in the winter. The mask should cover your nose and mouth. Exercise indoors on cold days if you can.  · Warm up before you exercise. Take time to cool down after exercise.  · Use a peak flow meter as told by your doctor. A peak flow meter is a tool that measures how well the lungs are working.  · Keep track of the peak flow meter's readings.  Write them down.  · Follow your asthma action plan. This is a written plan for taking care of your asthma and treating your attacks.  · Make sure you get all the shots (vaccines) that your doctor recommends. Ask your doctor about a flu shot and a pneumonia shot.  · Keep all follow-up visits as told by your doctor. This is important.  Contact a doctor if:  · You have wheezing, shortness of breath, or a cough even while taking medicine to prevent attacks.  · The mucus you cough up (sputum) is thicker than usual.  · The mucus you cough up changes from clear or white to yellow, green, gray, or bloody.  · You have problems from the medicine you are taking, such as:  ? A rash.  ? Itching.  ? Swelling.  ? Trouble breathing.  · You need reliever medicines more than 2-3 times a week.  · Your peak flow reading is still at 50-79% of your personal best after following the action plan for 1 hour.  · You have a fever.  Get help right away if:  · You seem to be worse and are not responding to medicine during an asthma attack.  · You are short of breath even at rest.  · You get short of breath when doing very little activity.  · You have trouble eating, drinking, or talking.  · You have chest pain or tightness.  · You have a fast heartbeat.  · Your lips or fingernails start to turn blue.  · You are light-headed or dizzy, or you faint.  · Your peak flow is less than 50% of your personal best.  · You feel too tired to breathe normally.  Summary  · Asthma is a long-term (chronic) condition in which the airways get tight and narrow. An asthma attack can make it hard to breathe.  · Asthma cannot be cured, but medicines and lifestyle changes can help control it.  · Make sure you understand how to avoid triggers and how and when to use your medicines.  This information is not intended to replace advice given to you by your health care provider. Make sure you discuss any questions you have with your health care provider.  Document Revised:  04/21/2021 Document Reviewed: 04/21/2021  Elsevier Patient Education © 2021 [employer]

## 2021-11-29 NOTE — PROGRESS NOTES
"Sravanthi Alegria is a 41 y.o. female.     Video Visit     You have chosen to receive care through a telehealth visit.  Do you consent to use a video/audio connection for your medical care today? Yes    Chief Complaint -      History of Present Illness -    ROS        The following portions of the patient's history were reviewed and updated as appropriate: allergies, current medications, past family history, past medical history, past social history, past surgical history and problem list.    Review of Systems    Objective  Vital signs:  Ht 167.6 cm (66\")   Wt (!) 164 kg (362 lb)   LMP  (LMP Unknown)   BMI 58.43 kg/m²     Physical Exam    The following data was reviewed by: KALI Armstrong on 11/29/2021:  CMP    CMP 4/9/21   Glucose 188 (A)   BUN 10   Creatinine 0.61   eGFR Non African Am 108   Sodium 139   Potassium 3.8   Chloride 102   Calcium 9.0   Albumin 3.54   Total Bilirubin 0.3   Alkaline Phosphatase 109   AST (SGOT) 37 (A)   ALT (SGPT) 50 (A)   (A) Abnormal value       Comments are available for some flowsheets but are not being displayed.           CBC w/diff    CBC w/Diff 4/9/21   WBC 9.13   RBC 4.46   Hemoglobin 13.7   Hematocrit 40.9   MCV 91.7   MCH 30.7   MCHC 33.5   RDW 13.3   Platelets 158   Neutrophil Rel % 52.2   Immature Granulocyte Rel % 0.3   Lymphocyte Rel % 37.3   Monocyte Rel % 7.6   Eosinophil Rel % 2.2   Basophil Rel % 0.4                      Assessment/Plan     Diagnoses and all orders for this visit:    1. Mild intermittent asthma with acute exacerbation (Primary)  -     amoxicillin-clavulanate (Augmentin) 875-125 MG per tablet; Take 1 tablet by mouth 2 (Two) Times a Day.  Dispense: 14 tablet; Refill: 0  -     guaiFENesin (Mucinex) 600 MG 12 hr tablet; Take 2 tablets by mouth 2 (Two) Times a Day.  Dispense: 40 tablet; Refill: 0  -     montelukast (Singulair) 10 MG tablet; Take 1 tablet by mouth Every Night.  Dispense: 30 tablet; Refill: 5    Asthma   Advised regarding " symptomatic treatment.  Suggested OTC remedies.  Antibiotic as per orders.  Encouraged to report if any worse or if any new symptoms.  Call in 5 days if symptoms aren't resolving.          Patient was given instructions and counseling regarding his condition or for health maintenance advice. Please see specific information pulled into the AVS if appropriate      This document has been electronically signed by:  Rosa Quigley PA-C

## 2021-12-08 DIAGNOSIS — E11.65 TYPE 2 DIABETES MELLITUS WITH HYPERGLYCEMIA, WITHOUT LONG-TERM CURRENT USE OF INSULIN (HCC): ICD-10-CM

## 2021-12-08 DIAGNOSIS — N39.41 URGE INCONTINENCE OF URINE: ICD-10-CM

## 2021-12-08 RX ORDER — LANCETS 33 GAUGE
EACH MISCELLANEOUS
Qty: 100 EACH | Refills: 5 | Status: SHIPPED | OUTPATIENT
Start: 2021-12-08 | End: 2022-04-05 | Stop reason: SDUPTHER

## 2021-12-08 RX ORDER — OXYBUTYNIN CHLORIDE 5 MG/1
5 TABLET, EXTENDED RELEASE ORAL DAILY
Qty: 30 TABLET | Refills: 5 | Status: SHIPPED | OUTPATIENT
Start: 2021-12-08 | End: 2022-04-05 | Stop reason: SDUPTHER

## 2021-12-20 DIAGNOSIS — J45.21 MILD INTERMITTENT ASTHMA WITH ACUTE EXACERBATION: ICD-10-CM

## 2021-12-20 RX ORDER — ASPIRIN 325 MG
TABLET ORAL
Qty: 40 TABLET | Refills: 0 | Status: SHIPPED | OUTPATIENT
Start: 2021-12-20 | End: 2022-01-13

## 2022-01-04 ENCOUNTER — LAB (OUTPATIENT)
Dept: FAMILY MEDICINE CLINIC | Facility: CLINIC | Age: 42
End: 2022-01-04

## 2022-01-04 DIAGNOSIS — E03.9 ACQUIRED HYPOTHYROIDISM: ICD-10-CM

## 2022-01-04 DIAGNOSIS — I10 ESSENTIAL HYPERTENSION: ICD-10-CM

## 2022-01-04 DIAGNOSIS — F33.1 MODERATE EPISODE OF RECURRENT MAJOR DEPRESSIVE DISORDER: Chronic | ICD-10-CM

## 2022-01-04 DIAGNOSIS — E11.65 TYPE 2 DIABETES MELLITUS WITH HYPERGLYCEMIA, WITHOUT LONG-TERM CURRENT USE OF INSULIN: ICD-10-CM

## 2022-01-04 DIAGNOSIS — E55.9 VITAMIN D DEFICIENCY: ICD-10-CM

## 2022-01-04 LAB
25(OH)D3 SERPL-MCNC: 21.3 NG/ML (ref 30–100)
ALBUMIN SERPL-MCNC: 4.2 G/DL (ref 3.5–5.2)
ALBUMIN UR-MCNC: 4.3 MG/DL
ALBUMIN/GLOB SERPL: 1.2 G/DL
ALP SERPL-CCNC: 106 U/L (ref 39–117)
ALT SERPL W P-5'-P-CCNC: 57 U/L (ref 1–33)
ANION GAP SERPL CALCULATED.3IONS-SCNC: 9.5 MMOL/L (ref 5–15)
AST SERPL-CCNC: 37 U/L (ref 1–32)
BILIRUB SERPL-MCNC: 0.6 MG/DL (ref 0–1.2)
BUN SERPL-MCNC: 9 MG/DL (ref 6–20)
BUN/CREAT SERPL: 15.5 (ref 7–25)
CALCIUM SPEC-SCNC: 9.5 MG/DL (ref 8.6–10.5)
CHLORIDE SERPL-SCNC: 101 MMOL/L (ref 98–107)
CHOLEST SERPL-MCNC: 182 MG/DL (ref 0–200)
CO2 SERPL-SCNC: 27.5 MMOL/L (ref 22–29)
CREAT SERPL-MCNC: 0.58 MG/DL (ref 0.57–1)
GFR SERPL CREATININE-BSD FRML MDRD: 114 ML/MIN/1.73
GLOBULIN UR ELPH-MCNC: 3.4 GM/DL
GLUCOSE SERPL-MCNC: 159 MG/DL (ref 65–99)
HBA1C MFR BLD: 7.57 % (ref 4.8–5.6)
HDLC SERPL-MCNC: 52 MG/DL (ref 40–60)
LDLC SERPL CALC-MCNC: 106 MG/DL (ref 0–100)
LDLC/HDLC SERPL: 1.97 {RATIO}
POTASSIUM SERPL-SCNC: 3.9 MMOL/L (ref 3.5–5.2)
PROT SERPL-MCNC: 7.6 G/DL (ref 6–8.5)
SODIUM SERPL-SCNC: 138 MMOL/L (ref 136–145)
T4 FREE SERPL-MCNC: 1.05 NG/DL (ref 0.93–1.7)
TRIGL SERPL-MCNC: 137 MG/DL (ref 0–150)
TSH SERPL DL<=0.05 MIU/L-ACNC: 1.27 UIU/ML (ref 0.27–4.2)
VLDLC SERPL-MCNC: 24 MG/DL (ref 5–40)

## 2022-01-04 PROCEDURE — 80053 COMPREHEN METABOLIC PANEL: CPT | Performed by: PHYSICIAN ASSISTANT

## 2022-01-04 PROCEDURE — 83036 HEMOGLOBIN GLYCOSYLATED A1C: CPT | Performed by: PHYSICIAN ASSISTANT

## 2022-01-04 PROCEDURE — 84439 ASSAY OF FREE THYROXINE: CPT | Performed by: PHYSICIAN ASSISTANT

## 2022-01-04 PROCEDURE — 82043 UR ALBUMIN QUANTITATIVE: CPT | Performed by: PHYSICIAN ASSISTANT

## 2022-01-04 PROCEDURE — 82306 VITAMIN D 25 HYDROXY: CPT | Performed by: PHYSICIAN ASSISTANT

## 2022-01-04 PROCEDURE — 84443 ASSAY THYROID STIM HORMONE: CPT | Performed by: PHYSICIAN ASSISTANT

## 2022-01-04 PROCEDURE — 80061 LIPID PANEL: CPT | Performed by: PHYSICIAN ASSISTANT

## 2022-01-13 ENCOUNTER — OFFICE VISIT (OUTPATIENT)
Dept: FAMILY MEDICINE CLINIC | Facility: CLINIC | Age: 42
End: 2022-01-13

## 2022-01-13 VITALS
SYSTOLIC BLOOD PRESSURE: 134 MMHG | TEMPERATURE: 97.3 F | HEIGHT: 66 IN | OXYGEN SATURATION: 98 % | HEART RATE: 83 BPM | BODY MASS INDEX: 47.09 KG/M2 | WEIGHT: 293 LBS | DIASTOLIC BLOOD PRESSURE: 86 MMHG

## 2022-01-13 DIAGNOSIS — E78.2 MIXED HYPERLIPIDEMIA: Chronic | ICD-10-CM

## 2022-01-13 DIAGNOSIS — I10 ESSENTIAL HYPERTENSION: ICD-10-CM

## 2022-01-13 DIAGNOSIS — K21.9 GASTROESOPHAGEAL REFLUX DISEASE WITHOUT ESOPHAGITIS: ICD-10-CM

## 2022-01-13 DIAGNOSIS — E11.65 TYPE 2 DIABETES MELLITUS WITH HYPERGLYCEMIA, WITHOUT LONG-TERM CURRENT USE OF INSULIN: Primary | ICD-10-CM

## 2022-01-13 DIAGNOSIS — G25.81 RLS (RESTLESS LEGS SYNDROME): Chronic | ICD-10-CM

## 2022-01-13 DIAGNOSIS — J45.20 MILD INTERMITTENT ASTHMA WITHOUT COMPLICATION: Chronic | ICD-10-CM

## 2022-01-13 DIAGNOSIS — J30.89 ENVIRONMENTAL AND SEASONAL ALLERGIES: Chronic | ICD-10-CM

## 2022-01-13 DIAGNOSIS — E55.9 VITAMIN D DEFICIENCY: ICD-10-CM

## 2022-01-13 DIAGNOSIS — L65.9 HAIR LOSS: ICD-10-CM

## 2022-01-13 PROCEDURE — 99214 OFFICE O/P EST MOD 30 MIN: CPT | Performed by: PHYSICIAN ASSISTANT

## 2022-01-13 RX ORDER — ATORVASTATIN CALCIUM 10 MG/1
10 TABLET, FILM COATED ORAL NIGHTLY
Qty: 30 TABLET | Refills: 5 | Status: SHIPPED | OUTPATIENT
Start: 2022-01-13 | End: 2022-04-05 | Stop reason: SDUPTHER

## 2022-01-13 RX ORDER — PANTOPRAZOLE SODIUM 40 MG/1
40 TABLET, DELAYED RELEASE ORAL DAILY
Qty: 30 TABLET | Refills: 5 | Status: SHIPPED | OUTPATIENT
Start: 2022-01-13 | End: 2022-04-05 | Stop reason: SDUPTHER

## 2022-01-13 RX ORDER — KETOCONAZOLE 20 MG/ML
SHAMPOO TOPICAL 2 TIMES WEEKLY
Qty: 120 ML | Refills: 5 | Status: SHIPPED | OUTPATIENT
Start: 2022-01-13 | End: 2022-04-05 | Stop reason: SDUPTHER

## 2022-01-13 RX ORDER — MONTELUKAST SODIUM 10 MG/1
10 TABLET ORAL NIGHTLY
Qty: 30 TABLET | Refills: 5 | Status: SHIPPED | OUTPATIENT
Start: 2022-01-13 | End: 2022-04-05 | Stop reason: SDUPTHER

## 2022-01-13 RX ORDER — DAPAGLIFLOZIN 10 MG/1
1 TABLET, FILM COATED ORAL EVERY MORNING
Qty: 30 TABLET | Refills: 5 | Status: SHIPPED | OUTPATIENT
Start: 2022-01-13 | End: 2022-04-05 | Stop reason: SDUPTHER

## 2022-01-13 RX ORDER — LOSARTAN POTASSIUM 100 MG/1
100 TABLET ORAL DAILY
Qty: 30 TABLET | Refills: 5 | Status: SHIPPED | OUTPATIENT
Start: 2022-01-13 | End: 2022-04-05 | Stop reason: SDUPTHER

## 2022-01-13 RX ORDER — ROPINIROLE 1 MG/1
1 TABLET, FILM COATED ORAL NIGHTLY
Qty: 30 TABLET | Refills: 5 | Status: SHIPPED | OUTPATIENT
Start: 2022-01-13 | End: 2022-04-05 | Stop reason: SDUPTHER

## 2022-01-13 RX ORDER — ERGOCALCIFEROL 1.25 MG/1
50000 CAPSULE ORAL
Qty: 5 CAPSULE | Refills: 5 | Status: SHIPPED | OUTPATIENT
Start: 2022-01-13 | End: 2022-04-05 | Stop reason: SDUPTHER

## 2022-01-13 RX ORDER — CETIRIZINE HYDROCHLORIDE 10 MG/1
10 TABLET ORAL DAILY
Qty: 30 TABLET | Refills: 5 | Status: SHIPPED | OUTPATIENT
Start: 2022-01-13 | End: 2022-04-05 | Stop reason: SDUPTHER

## 2022-01-13 NOTE — PATIENT INSTRUCTIONS
"https://www.diabeteseducator.org/docs/default-source/living-with-diabetes/conquering-the-grocery-store-v1.pdf?sfvrsn=4\">   Carbohydrate Counting for Diabetes Mellitus, Adult  Carbohydrate counting is a method of keeping track of how many carbohydrates you eat. Eating carbohydrates naturally increases the amount of sugar (glucose) in the blood. Counting how many carbohydrates you eat improves your blood glucose control, which helps you manage your diabetes.  It is important to know how many carbohydrates you can safely have in each meal. This is different for every person. A dietitian can help you make a meal plan and calculate how many carbohydrates you should have at each meal and snack.  What foods contain carbohydrates?  Carbohydrates are found in the following foods:  · Grains, such as breads and cereals.  · Dried beans and soy products.  · Starchy vegetables, such as potatoes, peas, and corn.  · Fruit and fruit juices.  · Milk and yogurt.  · Sweets and snack foods, such as cake, cookies, candy, chips, and soft drinks.  How do I count carbohydrates in foods?  There are two ways to count carbohydrates in food. You can read food labels or learn standard serving sizes of foods. You can use either of the methods or a combination of both.  Using the Nutrition Facts label  The Nutrition Facts list is included on the labels of almost all packaged foods and beverages in the [place] It includes:  · The serving size.  · Information about nutrients in each serving, including the grams (g) of carbohydrate per serving.  To use the Nutrition Facts:  · Decide how many servings you will have.  · Multiply the number of servings by the number of carbohydrates per serving.  · The resulting number is the total amount of carbohydrates that you will be having.  Learning the standard serving sizes of foods  When you eat carbohydrate foods that are not packaged or do not include Nutrition Facts on the label, you need to measure the " servings in order to count the amount of carbohydrates.  · Measure the foods that you will eat with a food scale or measuring cup, if needed.  · Decide how many standard-size servings you will eat.  · Multiply the number of servings by 15. For foods that contain carbohydrates, one serving equals 15 g of carbohydrates.  ? For example, if you eat 2 cups or 10 oz (300 g) of strawberries, you will have eaten 2 servings and 30 g of carbohydrates (2 servings x 15 g = 30 g).  · For foods that have more than one food mixed, such as soups and casseroles, you must count the carbohydrates in each food that is included.  The following list contains standard serving sizes of common carbohydrate-rich foods. Each of these servings has about 15 g of carbohydrates:  · 1 slice of bread.  · 1 six-inch (15 cm) tortilla.  · ? cup or 2 oz (53 g) cooked rice or pasta.  · ½ cup or 3 oz (85 g) cooked or canned, drained and rinsed beans or lentils.  · ½ cup or 3 oz (85 g) starchy vegetable, such as peas, corn, or squash.  · ½ cup or 4 oz (120 g) hot cereal.  · ½ cup or 3 oz (85 g) boiled or mashed potatoes, or ¼ or 3 oz (85 g) of a large baked potato.  · ½ cup or 4 fl oz (118 mL) fruit juice.  · 1 cup or 8 fl oz (237 mL) milk.  · 1 small or 4 oz (106 g) apple.  · ½ or 2 oz (63 g) of a medium banana.  · 1 cup or 5 oz (150 g) strawberries.  · 3 cups or 1 oz (24 g) popped popcorn.  What is an example of carbohydrate counting?  To calculate the number of carbohydrates in this sample meal, follow the steps shown below.  Sample meal  · 3 oz (85 g) chicken breast.  · ? cup or 4 oz (106 g) brown rice.  · ½ cup or 3 oz (85 g) corn.  · 1 cup or 8 fl oz (237 mL) milk.  · 1 cup or 5 oz (150 g) strawberries with sugar-free whipped topping.  Carbohydrate calculation  1. Identify the foods that contain carbohydrates:  ? Rice.  ? Corn.  ? Milk.  ? Strawberries.  2. Calculate how many servings you have of each food:  ? 2 servings rice.  ? 1 serving  corn.  ? 1 serving milk.  ? 1 serving strawberries.  3. Multiply each number of servings by 15 g:  ? 2 servings rice x 15 g = 30 g.  ? 1 serving corn x 15 g = 15 g.  ? 1 serving milk x 15 g = 15 g.  ? 1 serving strawberries x 15 g = 15 g.  4. Add together all of the amounts to find the total grams of carbohydrates eaten:  ? 30 g + 15 g + 15 g + 15 g = 75 g of carbohydrates total.  What are tips for following this plan?  Shopping  · Develop a meal plan and then make a shopping list.  · Buy fresh and frozen vegetables, fresh and frozen fruit, dairy, eggs, beans, lentils, and whole grains.  · Look at food labels. Choose foods that have more fiber and less sugar.  · Avoid processed foods and foods with added sugars.  Meal planning  · Aim to have the same amount of carbohydrates at each meal and for each snack time.  · Plan to have regular, balanced meals and snacks.  Where to find more information  · American Diabetes Association: www.diabetes.org  · Centers for Disease Control and Prevention: www.cdc.gov  Summary  · Carbohydrate counting is a method of keeping track of how many carbohydrates you eat.  · Eating carbohydrates naturally increases the amount of sugar (glucose) in the blood.  · Counting how many carbohydrates you eat improves your blood glucose control, which helps you manage your diabetes.  · A dietitian can help you make a meal plan and calculate how many carbohydrates you should have at each meal and snack.  This information is not intended to replace advice given to you by your health care provider. Make sure you discuss any questions you have with your health care provider.  Document Revised: 12/17/2020 Document Reviewed: 12/18/2020  [employer] Patient Education © 2021 [employer]      Fat and Cholesterol Restricted Eating Plan  Getting too much fat and cholesterol in your diet may cause health problems. Choosing the right foods helps keep your fat and cholesterol at normal levels. This can keep you  "from getting certain diseases.  Your doctor may recommend an eating plan that includes:  · Total fat: ______% or less of total calories a day.  · Saturated fat: ______% or less of total calories a day.  · Cholesterol: less than _________mg a day.  · Fiber: ______g a day.  What are tips for following this plan?  Meal planning  · At meals, divide your plate into four equal parts:  ? Fill one-half of your plate with vegetables and green salads.  ? Fill one-fourth of your plate with whole grains.  ? Fill one-fourth of your plate with low-fat (lean) protein foods.  · Eat fish that is high in omega-3 fats at least two times a week. This includes mackerel, tuna, sardines, and salmon.  · Eat foods that are high in fiber, such as whole grains, beans, apples, broccoli, carrots, peas, and barley.  General tips    · Work with your doctor to lose weight if you need to.  · Avoid:  ? Foods with added sugar.  ? Fried foods.  ? Foods with partially hydrogenated oils.  · Limit alcohol intake to no more than 1 drink a day for nonpregnant women and 2 drinks a day for men. One drink equals 12 oz of beer, 5 oz of wine, or 1½ oz of hard liquor.    Reading food labels  · Check food labels for:  ? Trans fats.  ? Partially hydrogenated oils.  ? Saturated fat (g) in each serving.  ? Cholesterol (mg) in each serving.  ? Fiber (g) in each serving.  · Choose foods with healthy fats, such as:  ? Monounsaturated fats.  ? Polyunsaturated fats.  ? Omega-3 fats.  · Choose grain products that have whole grains. Look for the word \"whole\" as the first word in the ingredient list.  Cooking  · Cook foods using low-fat methods. These include baking, boiling, grilling, and broiling.  · Eat more home-cooked foods. Eat at restaurants and buffets less often.  · Avoid cooking using saturated fats, such as butter, cream, palm oil, palm kernel oil, and coconut oil.  Recommended foods    Fruits  · All fresh, canned (in natural juice), or frozen " fruits.  Vegetables  · Fresh or frozen vegetables (raw, steamed, roasted, or grilled). Green salads.  Grains  · Whole grains, such as whole wheat or whole grain breads, crackers, cereals, and pasta. Unsweetened oatmeal, bulgur, barley, quinoa, or brown rice. Corn or whole wheat flour tortillas.  Meats and other protein foods  · Ground beef (85% or leaner), grass-fed beef, or beef trimmed of fat. Skinless chicken or turkey. Ground chicken or turkey. Pork trimmed of fat. All fish and seafood. Egg whites. Dried beans, peas, or lentils. Unsalted nuts or seeds. Unsalted canned beans. Nut butters without added sugar or oil.  Dairy  · Low-fat or nonfat dairy products, such as skim or 1% milk, 2% or reduced-fat cheeses, low-fat and fat-free ricotta or cottage cheese, or plain low-fat and nonfat yogurt.  Fats and oils  · Tub margarine without trans fats. Light or reduced-fat mayonnaise and salad dressings. Avocado. Olive, canola, sesame, or safflower oils.  The items listed above may not be a complete list of foods and beverages you can eat. Contact a dietitian for more information.  Foods to avoid  Fruits  · Canned fruit in heavy syrup. Fruit in cream or butter sauce. Fried fruit.  Vegetables  · Vegetables cooked in cheese, cream, or butter sauce. Fried vegetables.  Grains  · White bread. White pasta. White rice. Cornbread. Bagels, pastries, and croissants. Crackers and snack foods that contain trans fat and hydrogenated oils.  Meats and other protein foods  · Fatty cuts of meat. Ribs, chicken wings, klein, sausage, bologna, salami, chitterlings, fatback, hot dogs, bratwurst, and packaged lunch meats. Liver and organ meats. Whole eggs and egg yolks. Chicken and turkey with skin. Fried meat.  Dairy  · Whole or 2% milk, cream, half-and-half, and cream cheese. Whole milk cheeses. Whole-fat or sweetened yogurt. Full-fat cheeses. Nondairy creamers and whipped toppings. Processed cheese, cheese spreads, and cheese  curds.  Beverages  · Alcohol. Sugar-sweetened drinks such as sodas, lemonade, and fruit drinks.  Fats and oils  · Butter, stick margarine, lard, shortening, ghee, or klein fat. Coconut, palm kernel, and palm oils.  Sweets and desserts  · Corn syrup, sugars, honey, and molasses. Candy. Jam and jelly. Syrup. Sweetened cereals. Cookies, pies, cakes, donuts, muffins, and ice cream.  The items listed above may not be a complete list of foods and beverages you should avoid. Contact a dietitian for more information.  Summary  · Choosing the right foods helps keep your fat and cholesterol at normal levels. This can keep you from getting certain diseases.  · At meals, fill one-half of your plate with vegetables and green salads.  · Eat high-fiber foods, like whole grains, beans, apples, carrots, peas, and barley.  · Limit added sugar, saturated fats, alcohol, and fried foods.  This information is not intended to replace advice given to you by your health care provider. Make sure you discuss any questions you have with your health care provider.  Document Revised: 04/21/2021 Document Reviewed: 04/21/2021  [employer] Patient Education © 2021 [employer]

## 2022-01-13 NOTE — PROGRESS NOTES
"Subjective   Anika Alegria is a 41 y.o. female.       Chief Complaint -  diabetes    History of Present Illness -    ROS    Diabetes-  Not at goal with Farxiga and metformin.  Patient states that she has been following a low carbohydrate diabetic diet but she had eaten more carbohydrates than usual over the Thanksgiving and Christmas holidays.  She states that she is now back on her low carbohydrate diet.    Hyperlipidemia-  We discussed her increased CV risk due to hyperlipidemia diabetes and nonalcoholic fatty liver disease.  Plan at this point to start atorvastatin for risk factor reduction.    Vitamin D deficiency-  Stable with vitamin D supplementation    Allergies-  Stable with Zyrtec as needed and avoidance of known seasonal allergens when possible    Hair loss-  Improved with the use of ketoconazole shampoo    Hypertension-  Controlled with Cozaar and diet    Asthma-stable with albuterol as needed and Singulair    Gastroesophageal reflux disease-stable with pantoprazole    Restless leg syndrome-controlled with Requip    The following portions of the patient's history were reviewed and updated as appropriate: allergies, current medications, past family history, past medical history, past social history, past surgical history and problem list.    Review of Systems    Objective  Vital signs:  /86 Comment: recheck manually  Pulse 83   Temp 97.3 °F (36.3 °C) (Temporal)   Ht 167.6 cm (66\")   Wt (!) 166 kg (367 lb)   LMP  (LMP Unknown)   SpO2 98%   BMI 59.24 kg/m²     Physical Exam  Vitals and nursing note reviewed.   Constitutional:       Appearance: Normal appearance. She is well-developed. She is obese.   Eyes:      Extraocular Movements: Extraocular movements intact.      Conjunctiva/sclera: Conjunctivae normal.   Cardiovascular:      Rate and Rhythm: Normal rate and regular rhythm.      Heart sounds: Normal heart sounds. No murmur heard.      Pulmonary:      Effort: Pulmonary effort is normal. No " respiratory distress.      Breath sounds: Normal breath sounds. No wheezing.   Musculoskeletal:         General: No tenderness.   Skin:     General: Skin is warm and dry.      Findings: No rash.   Neurological:      Mental Status: She is alert and oriented to person, place, and time.   Psychiatric:         Mood and Affect: Mood normal.         Behavior: Behavior normal.         Thought Content: Thought content normal.         The following data was reviewed by: KALI Armstrong on 01/13/2022:  CMP    CMP 4/9/21 1/4/22   Glucose 188 (A) 159 (A)   BUN 10 9   Creatinine 0.61 0.58   eGFR Non African Am 108 114   Sodium 139 138   Potassium 3.8 3.9   Chloride 102 101   Calcium 9.0 9.5   Albumin 3.54 4.20   Total Bilirubin 0.3 0.6   Alkaline Phosphatase 109 106   AST (SGOT) 37 (A) 37 (A)   ALT (SGPT) 50 (A) 57 (A)   (A) Abnormal value       Comments are available for some flowsheets but are not being displayed.           CBC w/diff    CBC w/Diff 4/9/21   WBC 9.13   RBC 4.46   Hemoglobin 13.7   Hematocrit 40.9   MCV 91.7   MCH 30.7   MCHC 33.5   RDW 13.3   Platelets 158   Neutrophil Rel % 52.2   Immature Granulocyte Rel % 0.3   Lymphocyte Rel % 37.3   Monocyte Rel % 7.6   Eosinophil Rel % 2.2   Basophil Rel % 0.4           Lipid Panel    Lipid Panel 1/4/22   Total Cholesterol 182   Triglycerides 137   HDL Cholesterol 52   VLDL Cholesterol 24   LDL Cholesterol  106 (A)   LDL/HDL Ratio 1.97   (A) Abnormal value            TSH    TSH 4/9/21 1/4/22   TSH 1.680 1.270           Lab Results   Component Value Date    HGBA1C 7.57 (H) 01/04/2022                  Assessment/Plan     Diagnoses and all orders for this visit:    1. Type 2 diabetes mellitus with hyperglycemia, without long-term current use of insulin (HCC) (Primary)  Comments:  Continue Farxiga and metformin  Encouraged to continue low-carb diabetic diet    Orders:  -     Dapagliflozin Propanediol (Farxiga) 10 MG tablet; Take 10 mg by mouth Every Morning.  Dispense:  30 tablet; Refill: 5  -     metFORMIN (Glucophage) 1000 MG tablet; Take 1 tablet by mouth 2 (Two) Times a Day With Meals.  Dispense: 60 tablet; Refill: 5    2. Vitamin D deficiency  Comments:  Continue vitamin D  Orders:  -     vitamin D (ERGOCALCIFEROL) 1.25 MG (39829 UT) capsule capsule; Take 1 capsule by mouth Every 7 (Seven) Days.  Dispense: 5 capsule; Refill: 5    3. Mixed hyperlipidemia  Comments:  Start atorvastatin for risk factor reduction given increased CV risk including hyperlipidemia diabetes and NAFLD  Orders:  -     atorvastatin (LIPITOR) 10 MG tablet; Take 1 tablet by mouth Every Night.  Dispense: 30 tablet; Refill: 5    4. Environmental and seasonal allergies  -     cetirizine (zyrTEC) 10 MG tablet; Take 1 tablet by mouth Daily.  Dispense: 30 tablet; Refill: 5    5. Hair loss  -     ketoconazole (NIZORAL) 2 % shampoo; Apply  topically to the appropriate area as directed 2 (Two) Times a Week. to affected area  Dispense: 120 mL; Refill: 5    6. Essential hypertension  Comments:  Continue Cozaar 100mg qd  Orders:  -     losartan (COZAAR) 100 MG tablet; Take 1 tablet by mouth Daily.  Dispense: 30 tablet; Refill: 5    7. Mild intermittent asthma without complication  -     montelukast (Singulair) 10 MG tablet; Take 1 tablet by mouth Every Night.  Dispense: 30 tablet; Refill: 5    8. Gastroesophageal reflux disease without esophagitis  Comments:  Continue pantoprazole  Orders:  -     pantoprazole (PROTONIX) 40 MG EC tablet; Take 1 tablet by mouth Daily.  Dispense: 30 tablet; Refill: 5    9. RLS (restless legs syndrome)  Comments:  Continue Requip  Orders:  -     rOPINIRole (REQUIP) 1 MG tablet; Take 1 tablet by mouth Every Night. Take 1 hour before bedtime  Dispense: 30 tablet; Refill: 5            Patient was given instructions and counseling regarding his condition or for health maintenance advice. Please see specific information pulled into the AVS if appropriate      This document has been  electronically signed by:  Rosa Quigley PA-C

## 2022-01-25 ENCOUNTER — OFFICE VISIT (OUTPATIENT)
Dept: FAMILY MEDICINE CLINIC | Facility: CLINIC | Age: 42
End: 2022-01-25

## 2022-01-25 VITALS
HEIGHT: 66 IN | HEART RATE: 70 BPM | DIASTOLIC BLOOD PRESSURE: 78 MMHG | WEIGHT: 293 LBS | SYSTOLIC BLOOD PRESSURE: 136 MMHG | OXYGEN SATURATION: 98 % | BODY MASS INDEX: 47.09 KG/M2 | TEMPERATURE: 97.8 F

## 2022-01-25 DIAGNOSIS — H66.91 RIGHT OTITIS MEDIA, UNSPECIFIED OTITIS MEDIA TYPE: Primary | ICD-10-CM

## 2022-01-25 PROCEDURE — 99213 OFFICE O/P EST LOW 20 MIN: CPT | Performed by: NURSE PRACTITIONER

## 2022-01-25 RX ORDER — METHYLPREDNISOLONE 4 MG/1
TABLET ORAL
Qty: 21 TABLET | Refills: 0 | Status: SHIPPED | OUTPATIENT
Start: 2022-01-25 | End: 2022-04-05

## 2022-01-25 RX ORDER — CEFDINIR 300 MG/1
300 CAPSULE ORAL 2 TIMES DAILY
Qty: 20 CAPSULE | Refills: 0 | Status: SHIPPED | OUTPATIENT
Start: 2022-01-25 | End: 2022-02-04

## 2022-01-25 NOTE — PROGRESS NOTES
Chief Complaint  Earache    Subjective          Anika Alegria is a 41 y.o. female who presents today to [facility] for initial evaluation for right earache.    HPI:   Earache   There is pain in the right ear. This is a new problem. The current episode started in the past 7 days. The problem occurs constantly. The problem has been gradually worsening. There has been no fever. The pain is at a severity of 7/10. The pain is moderate. Pertinent negatives include no headaches, rhinorrhea or sore throat. She has tried nothing for the symptoms. There is no history of a chronic ear infection.         Objective     Problem List:  Patient Active Problem List   Diagnosis   • Moderate episode of recurrent major depressive disorder (HCC)   • Gastroesophageal reflux disease without esophagitis   • PCOS (polycystic ovarian syndrome)   • Essential hypertension   • Chronic seasonal allergic rhinitis due to pollen   • NAFL (nonalcoholic fatty liver)   • Mixed hyperlipidemia   • Carpal tunnel syndrome of right wrist   • Nontoxic uninodular goiter   • Vitamin D deficiency   • Type 2 diabetes mellitus with hyperglycemia, without long-term current use of insulin ([facility])   • RLS (restless legs syndrome)   • Morbid obesity ([facility])   • Mild asthma       Allergy:   Allergies   Allergen Reactions   • Ultram [Tramadol] Swelling   • Venlafaxine Other (See Comments)     Nightmares        Discontinued Medications:  There are no discontinued medications.    Current Medications:   Current Outpatient Medications   Medication Sig Dispense Refill   • albuterol sulfate HFA (Ventolin HFA) 108 (90 Base) MCG/ACT inhaler Inhale 2 puffs Every 4 (Four) Hours As Needed for Wheezing. 18 g 2   • atorvastatin (LIPITOR) 10 MG tablet Take 1 tablet by mouth Every Night. 30 tablet 5   • cetirizine (zyrTEC) 10 MG tablet Take 1 tablet by mouth Daily. 30 tablet 5   • Dapagliflozin Propanediol (Farxiga) 10 MG tablet Take 10 mg by mouth  Every Morning. 30 tablet 5   • Diclofenac Sodium (VOLTAREN) 1 % gel gel APPLY 4 GRAMS AS DIRECTED ONTO THE APPROPRIATE AREA FOUR TIMES DAILY AS NEEDED 300 g 5   • DULoxetine (CYMBALTA) 60 MG capsule TAKE 1 CAPSULE BY MOUTH DAILY 30 capsule 5   • fluticasone (FLONASE) 50 MCG/ACT nasal spray 2 sprays into the nostril(s) as directed by provider Daily. 16 mL 5   • ibuprofen (ADVIL,MOTRIN) 800 MG tablet TAKE 1 TABLET BY MOUTH EVERY 8 HOURS 90 tablet 2   • ketoconazole (NIZORAL) 2 % shampoo Apply  topically to the appropriate area as directed 2 (Two) Times a Week. to affected area 120 mL 5   • Lancets (OneTouch Delica Plus Osdksq14M) misc USE TO TEST BLOOD SUGAR THREE TIMES DAILY AS DIRECTED 100 each 5   • levothyroxine (Synthroid) 100 MCG tablet Take 1 tablet by mouth Daily. 30 tablet 11   • losartan (COZAAR) 100 MG tablet Take 1 tablet by mouth Daily. 30 tablet 5   • metFORMIN (Glucophage) 1000 MG tablet Take 1 tablet by mouth 2 (Two) Times a Day With Meals. 60 tablet 5   • montelukast (Singulair) 10 MG tablet Take 1 tablet by mouth Every Night. 30 tablet 5   • OneTouch Ultra test strip USE TO TEST BLOOD SUGAR AS DIRECTED 90 each 5   • oxybutynin XL (DITROPAN-XL) 5 MG 24 hr tablet TAKE 1 TABLET BY MOUTH DAILY 30 tablet 5   • pantoprazole (PROTONIX) 40 MG EC tablet Take 1 tablet by mouth Daily. 30 tablet 5   • rOPINIRole (REQUIP) 1 MG tablet Take 1 tablet by mouth Every Night. Take 1 hour before bedtime 30 tablet 5   • vitamin D (ERGOCALCIFEROL) 1.25 MG (96982 UT) capsule capsule Take 1 capsule by mouth Every 7 (Seven) Days. 5 capsule 5   • cefdinir (OMNICEF) 300 MG capsule Take 1 capsule by mouth 2 (Two) Times a Day for 10 days. 20 capsule 0   • methylPREDNISolone (MEDROL) 4 MG dose pack Take as directed on package instructions. 21 tablet 0     No current facility-administered medications for this visit.       Past Medical History:  Past Medical History:   Diagnosis Date   • Anxiety and depression    • GERD  (gastroesophageal reflux disease)    • Goiter    • Goiter    • Hepatitis A    • Hypertension    • Mild asthma 2021   • PONV (postoperative nausea and vomiting)    • Thyroid disease    • Type 2 diabetes mellitus with hyperglycemia, without long-term current use of insulin ([facility]) 2020       Past Surgical History:  Past Surgical History:   Procedure Laterality Date   • ABDOMINAL SURGERY     • CARPAL TUNNEL RELEASE Right 10/15/2019    Procedure: CARPAL TUNNEL RELEASE;  Surgeon: Rony Cruz MD;  Location: [facility];  Service: Orthopedics   •  SECTION      x2   • TUBAL ABDOMINAL LIGATION         Review of Systems:  Review of Systems   Constitutional: Negative.    HENT: Positive for ear pain. Negative for rhinorrhea and sore throat.    Eyes: Negative.    Respiratory: Negative.    Cardiovascular: Negative.    Gastrointestinal: Negative.    Genitourinary: Negative.    Musculoskeletal: Negative.    Skin: Negative.    Neurological: Negative.  Negative for headaches.   Psychiatric/Behavioral: Negative.    All other systems reviewed and are negative.      Physical Exam:  Physical Exam  Vitals and nursing note reviewed.   Constitutional:       General: She is not in acute distress.     Appearance: Normal appearance. She is not ill-appearing.      Interventions: Face mask in place.   HENT:      Head: Normocephalic and atraumatic.      Right Ear: Tympanic membrane is erythematous.      Left Ear: Tympanic membrane normal.      Nose: Nose normal.      Mouth/Throat:      Mouth: Mucous membranes are moist.      Pharynx: Oropharynx is clear.   Cardiovascular:      Rate and Rhythm: Normal rate and regular rhythm.   Pulmonary:      Effort: Pulmonary effort is normal. No respiratory distress.      Breath sounds: Normal breath sounds.   Abdominal:      Palpations: Abdomen is soft.   Musculoskeletal:         General: Normal range of motion.      Cervical back: Normal range of motion and neck supple.   Skin:      "General: Skin is warm and dry.   Neurological:      Mental Status: She is alert and oriented to person, place, and time.   Psychiatric:         Mood and Affect: Mood normal.         Behavior: Behavior normal.         Vital Signs:   /78 (BP Location: Right arm, Patient Position: Sitting)   Pulse 70   Temp 97.8 °F (36.6 °C)   Ht 167.6 cm (66\")   Wt (!) 166 kg (366 lb)   SpO2 98%   BMI 59.07 kg/m²      Lab Results:   Lab on 01/04/2022   Component Date Value Ref Range Status   • Glucose 01/04/2022 159* 65 - 99 mg/dL Final   • BUN 01/04/2022 9  6 - 20 mg/dL Final   • Creatinine 01/04/2022 0.58  0.57 - 1.00 mg/dL Final   • Sodium 01/04/2022 138  136 - 145 mmol/L Final   • Potassium 01/04/2022 3.9  3.5 - 5.2 mmol/L Final   • Chloride 01/04/2022 101  98 - 107 mmol/L Final   • CO2 01/04/2022 27.5  22.0 - 29.0 mmol/L Final   • Calcium 01/04/2022 9.5  8.6 - 10.5 mg/dL Final   • Total Protein 01/04/2022 7.6  6.0 - 8.5 g/dL Final   • Albumin 01/04/2022 4.20  3.50 - 5.20 g/dL Final   • ALT (SGPT) 01/04/2022 57* 1 - 33 U/L Final   • AST (SGOT) 01/04/2022 37* 1 - 32 U/L Final   • Alkaline Phosphatase 01/04/2022 106  39 - 117 U/L Final   • Total Bilirubin 01/04/2022 0.6  0.0 - 1.2 mg/dL Final   • eGFR Non African Amer 01/04/2022 114  >60 mL/min/1.73 Final   • Globulin 01/04/2022 3.4  gm/dL Final   • A/G Ratio 01/04/2022 1.2  g/dL Final   • BUN/Creatinine Ratio 01/04/2022 15.5  7.0 - 25.0 Final   • Anion Gap 01/04/2022 9.5  5.0 - 15.0 mmol/L Final   • TSH 01/04/2022 1.270  0.270 - 4.200 uIU/mL Final   • Free T4 01/04/2022 1.05  0.93 - 1.70 ng/dL Final   • Total Cholesterol 01/04/2022 182  0 - 200 mg/dL Final   • Triglycerides 01/04/2022 137  0 - 150 mg/dL Final   • HDL Cholesterol 01/04/2022 52  40 - 60 mg/dL Final   • LDL Cholesterol  01/04/2022 106* 0 - 100 mg/dL Final   • VLDL Cholesterol 01/04/2022 24  5 - 40 mg/dL Final   • LDL/HDL Ratio 01/04/2022 1.97   Final   • Microalbumin, Urine 01/04/2022 4.3  mg/dL Final   • " Hemoglobin A1C 01/04/2022 7.57* 4.80 - 5.60 % Final   • 25 Hydroxy, Vitamin D 01/04/2022 21.3* 30.0 - 100.0 ng/ml Final       EKG Results:  No orders to display       Imaging Results:  No Images in the past 120 days found..              Assessment and Plan   Diagnoses and all orders for this visit:    1. Right otitis media, unspecified otitis media type (Primary)  -     cefdinir (OMNICEF) 300 MG capsule; Take 1 capsule by mouth 2 (Two) Times a Day for 10 days.  Dispense: 20 capsule; Refill: 0  -     methylPREDNISolone (MEDROL) 4 MG dose pack; Take as directed on package instructions.  Dispense: 21 tablet; Refill: 0        Meds ordered during this visit:  New Medications Ordered This Visit   Medications   • cefdinir (OMNICEF) 300 MG capsule     Sig: Take 1 capsule by mouth 2 (Two) Times a Day for 10 days.     Dispense:  20 capsule     Refill:  0   • methylPREDNISolone (MEDROL) 4 MG dose pack     Sig: Take as directed on package instructions.     Dispense:  21 tablet     Refill:  0       Patient Instructions:  Patient instructions given for the following visit diagnosis:    ICD-10-CM ICD-9-CM   1. Right otitis media, unspecified otitis media type  H66.91 382.9       Follow Up   Return if symptoms worsen or fail to improve.        This document has been electronically signed by NIKKO Piper  January 26, 2022 11:36 EST    Patient was given instructions and counseling regarding her condition or for health maintenance advice. Please see specific information pulled into the AVS if appropriate.     Part of this note may be an electronic transcription/translation of spoken language to printed text using the Dragon Dictation System.

## 2022-02-06 DIAGNOSIS — M15.9 PRIMARY OSTEOARTHRITIS INVOLVING MULTIPLE JOINTS: ICD-10-CM

## 2022-02-07 RX ORDER — IBUPROFEN 800 MG/1
TABLET ORAL
Qty: 90 TABLET | Refills: 2 | Status: SHIPPED | OUTPATIENT
Start: 2022-02-07 | End: 2022-04-05 | Stop reason: SDUPTHER

## 2022-04-05 DIAGNOSIS — J30.89 ENVIRONMENTAL AND SEASONAL ALLERGIES: Chronic | ICD-10-CM

## 2022-04-05 DIAGNOSIS — E55.9 VITAMIN D DEFICIENCY: ICD-10-CM

## 2022-04-05 DIAGNOSIS — I10 ESSENTIAL HYPERTENSION: ICD-10-CM

## 2022-04-05 DIAGNOSIS — E78.2 MIXED HYPERLIPIDEMIA: Chronic | ICD-10-CM

## 2022-04-05 DIAGNOSIS — E11.65 TYPE 2 DIABETES MELLITUS WITH HYPERGLYCEMIA, WITHOUT LONG-TERM CURRENT USE OF INSULIN: ICD-10-CM

## 2022-04-05 DIAGNOSIS — J45.20 MILD INTERMITTENT ASTHMA WITHOUT COMPLICATION: Chronic | ICD-10-CM

## 2022-04-05 DIAGNOSIS — N39.41 URGE INCONTINENCE OF URINE: ICD-10-CM

## 2022-04-05 DIAGNOSIS — M15.9 PRIMARY OSTEOARTHRITIS INVOLVING MULTIPLE JOINTS: ICD-10-CM

## 2022-04-05 DIAGNOSIS — L65.9 HAIR LOSS: ICD-10-CM

## 2022-04-05 DIAGNOSIS — F33.1 MODERATE EPISODE OF RECURRENT MAJOR DEPRESSIVE DISORDER: Chronic | ICD-10-CM

## 2022-04-05 DIAGNOSIS — K21.9 GASTROESOPHAGEAL REFLUX DISEASE WITHOUT ESOPHAGITIS: ICD-10-CM

## 2022-04-05 DIAGNOSIS — J30.1 CHRONIC SEASONAL ALLERGIC RHINITIS DUE TO POLLEN: Chronic | ICD-10-CM

## 2022-04-05 DIAGNOSIS — J45.20 MILD INTERMITTENT ASTHMA, UNSPECIFIED WHETHER COMPLICATED: ICD-10-CM

## 2022-04-05 DIAGNOSIS — G25.81 RLS (RESTLESS LEGS SYNDROME): Chronic | ICD-10-CM

## 2022-04-05 NOTE — TELEPHONE ENCOUNTER
Pt changed pharmacy's to [facility] and needs all of her prescriptions sent there. She has tried to go through [facility] to have them transfer everything but that has not worked out.

## 2022-04-07 RX ORDER — ERGOCALCIFEROL 1.25 MG/1
50000 CAPSULE ORAL
Qty: 5 CAPSULE | Refills: 5 | Status: SHIPPED | OUTPATIENT
Start: 2022-04-07 | End: 2022-09-16 | Stop reason: SDUPTHER

## 2022-04-07 RX ORDER — IBUPROFEN 800 MG/1
800 TABLET ORAL EVERY 8 HOURS
Qty: 90 TABLET | Refills: 2 | Status: SHIPPED | OUTPATIENT
Start: 2022-04-07

## 2022-04-07 RX ORDER — DAPAGLIFLOZIN 10 MG/1
1 TABLET, FILM COATED ORAL EVERY MORNING
Qty: 30 TABLET | Refills: 5 | Status: SHIPPED | OUTPATIENT
Start: 2022-04-07 | End: 2022-09-01

## 2022-04-07 RX ORDER — BLOOD SUGAR DIAGNOSTIC
1 STRIP MISCELLANEOUS TAKE AS DIRECTED
Qty: 90 EACH | Refills: 5 | Status: SHIPPED | OUTPATIENT
Start: 2022-04-07 | End: 2022-08-07

## 2022-04-07 RX ORDER — LOSARTAN POTASSIUM 100 MG/1
100 TABLET ORAL DAILY
Qty: 30 TABLET | Refills: 5 | Status: SHIPPED | OUTPATIENT
Start: 2022-04-07 | End: 2022-09-01

## 2022-04-07 RX ORDER — FLUTICASONE PROPIONATE 50 MCG
2 SPRAY, SUSPENSION (ML) NASAL DAILY
Qty: 16 ML | Refills: 5 | Status: SHIPPED | OUTPATIENT
Start: 2022-04-07 | End: 2022-09-16 | Stop reason: SDUPTHER

## 2022-04-07 RX ORDER — OXYBUTYNIN CHLORIDE 5 MG/1
5 TABLET, EXTENDED RELEASE ORAL DAILY
Qty: 30 TABLET | Refills: 5 | Status: SHIPPED | OUTPATIENT
Start: 2022-04-07 | End: 2022-09-16 | Stop reason: SDUPTHER

## 2022-04-07 RX ORDER — ROPINIROLE 1 MG/1
1 TABLET, FILM COATED ORAL NIGHTLY
Qty: 30 TABLET | Refills: 5 | Status: SHIPPED | OUTPATIENT
Start: 2022-04-07 | End: 2022-09-16 | Stop reason: SDUPTHER

## 2022-04-07 RX ORDER — LEVOTHYROXINE SODIUM 0.1 MG/1
100 TABLET ORAL DAILY
Qty: 30 TABLET | Refills: 11 | Status: SHIPPED | OUTPATIENT
Start: 2022-04-07 | End: 2022-09-16 | Stop reason: SDUPTHER

## 2022-04-07 RX ORDER — MONTELUKAST SODIUM 10 MG/1
10 TABLET ORAL NIGHTLY
Qty: 30 TABLET | Refills: 5 | Status: SHIPPED | OUTPATIENT
Start: 2022-04-07 | End: 2022-09-16 | Stop reason: SDUPTHER

## 2022-04-07 RX ORDER — LANCETS 33 GAUGE
1 EACH MISCELLANEOUS 3 TIMES DAILY
Qty: 100 EACH | Refills: 5 | Status: SHIPPED | OUTPATIENT
Start: 2022-04-07

## 2022-04-07 RX ORDER — CETIRIZINE HYDROCHLORIDE 10 MG/1
10 TABLET ORAL DAILY
Qty: 30 TABLET | Refills: 5 | Status: SHIPPED | OUTPATIENT
Start: 2022-04-07 | End: 2022-07-28 | Stop reason: SDUPTHER

## 2022-04-07 RX ORDER — ALBUTEROL SULFATE 90 UG/1
2 AEROSOL, METERED RESPIRATORY (INHALATION) EVERY 4 HOURS PRN
Qty: 18 G | Refills: 2 | Status: SHIPPED | OUTPATIENT
Start: 2022-04-07 | End: 2022-09-06 | Stop reason: SDUPTHER

## 2022-04-07 RX ORDER — DULOXETIN HYDROCHLORIDE 60 MG/1
60 CAPSULE, DELAYED RELEASE ORAL DAILY
Qty: 30 CAPSULE | Refills: 5 | Status: SHIPPED | OUTPATIENT
Start: 2022-04-07 | End: 2022-04-12

## 2022-04-07 RX ORDER — ATORVASTATIN CALCIUM 10 MG/1
10 TABLET, FILM COATED ORAL NIGHTLY
Qty: 30 TABLET | Refills: 5 | Status: SHIPPED | OUTPATIENT
Start: 2022-04-07 | End: 2022-09-16 | Stop reason: SDUPTHER

## 2022-04-07 RX ORDER — KETOCONAZOLE 20 MG/ML
SHAMPOO TOPICAL 2 TIMES WEEKLY
Qty: 120 ML | Refills: 5 | Status: SHIPPED | OUTPATIENT
Start: 2022-04-07 | End: 2022-12-08

## 2022-04-07 RX ORDER — PANTOPRAZOLE SODIUM 40 MG/1
40 TABLET, DELAYED RELEASE ORAL DAILY
Qty: 30 TABLET | Refills: 5 | Status: SHIPPED | OUTPATIENT
Start: 2022-04-07 | End: 2022-09-16 | Stop reason: SDUPTHER

## 2022-04-12 ENCOUNTER — OFFICE VISIT (OUTPATIENT)
Dept: FAMILY MEDICINE CLINIC | Facility: CLINIC | Age: 42
End: 2022-04-12

## 2022-04-12 VITALS
DIASTOLIC BLOOD PRESSURE: 76 MMHG | BODY MASS INDEX: 47.09 KG/M2 | WEIGHT: 293 LBS | OXYGEN SATURATION: 100 % | TEMPERATURE: 97.5 F | HEART RATE: 89 BPM | SYSTOLIC BLOOD PRESSURE: 132 MMHG | HEIGHT: 66 IN

## 2022-04-12 DIAGNOSIS — D17.1 LIPOMA OF TORSO: ICD-10-CM

## 2022-04-12 DIAGNOSIS — E11.65 TYPE 2 DIABETES MELLITUS WITH HYPERGLYCEMIA, WITHOUT LONG-TERM CURRENT USE OF INSULIN: Chronic | ICD-10-CM

## 2022-04-12 DIAGNOSIS — E78.2 MIXED HYPERLIPIDEMIA: Chronic | ICD-10-CM

## 2022-04-12 DIAGNOSIS — I87.2 PERIPHERAL VENOUS INSUFFICIENCY: Primary | Chronic | ICD-10-CM

## 2022-04-12 DIAGNOSIS — F33.1 MODERATE EPISODE OF RECURRENT MAJOR DEPRESSIVE DISORDER: Chronic | ICD-10-CM

## 2022-04-12 DIAGNOSIS — E55.9 VITAMIN D DEFICIENCY: Chronic | ICD-10-CM

## 2022-04-12 PROCEDURE — 99214 OFFICE O/P EST MOD 30 MIN: CPT | Performed by: PHYSICIAN ASSISTANT

## 2022-04-12 RX ORDER — SPIRONOLACTONE 25 MG/1
25 TABLET ORAL DAILY PRN
Qty: 15 TABLET | Refills: 0 | Status: SHIPPED | OUTPATIENT
Start: 2022-04-12 | End: 2022-09-01

## 2022-04-12 RX ORDER — DULOXETIN HYDROCHLORIDE 30 MG/1
90 CAPSULE, DELAYED RELEASE ORAL DAILY
Qty: 90 CAPSULE | Refills: 5 | Status: SHIPPED | OUTPATIENT
Start: 2022-04-12 | End: 2022-05-12

## 2022-04-12 NOTE — PROGRESS NOTES
"Subjective   Anika Alegria is a 41 y.o. female.       Chief Complaint -swelling    History of Present Illness -    ROS    Swelling-  Patient complains of swelling in her lower extremities and hands.  Worse after prolonged sitting.    Anxiety-  Not at goal with Cymbalta 60 mg daily.  Patient reports labile moods and easily irritable.  She denies any hallucinations SI or HI.    Lump-  Patient complains of a lump on her left side lower flank rib area that is painful.  She reports some pain in the area that is worse when touching or manipulating the lump.  Onset greater than 3 months and patient reports getting larger    Hyperlipidemia-stable with atorvastatin and low-cholesterol diet    Vitamin D deficiency-stable with vitamin D supplementation    Diabetes mellitus type 2- stable with Farxiga, metformin and diet    The following portions of the patient's history were reviewed and updated as appropriate: allergies, current medications, past family history, past medical history, past social history, past surgical history and problem list.    Review of Systems    Objective  Vital signs:  /76   Pulse 89   Temp 97.5 °F (36.4 °C) (Temporal)   Ht 167.6 cm (66\")   Wt (!) 168 kg (370 lb)   LMP  (LMP Unknown)   SpO2 100%   BMI 59.72 kg/m²     Physical Exam  Vitals and nursing note reviewed.   Constitutional:       Appearance: Normal appearance. She is well-developed.   Eyes:      Extraocular Movements: Extraocular movements intact.      Conjunctiva/sclera: Conjunctivae normal.   Cardiovascular:      Rate and Rhythm: Normal rate and regular rhythm.      Heart sounds: Normal heart sounds. No murmur heard.  Pulmonary:      Effort: Pulmonary effort is normal. No respiratory distress.      Breath sounds: Normal breath sounds. No wheezing.   Musculoskeletal:         General: Tenderness present.      Comments: Approximately 2 x 3 cm subcutaneous tender mobile mass noted left flank/lower rib area without localized swelling " or erythema appreciated   Skin:     General: Skin is warm and dry.      Findings: No rash.   Neurological:      Mental Status: She is alert and oriented to person, place, and time.   Psychiatric:         Mood and Affect: Mood normal.         Behavior: Behavior normal.         Thought Content: Thought content normal.         The following data was reviewed by: KALI Armstrong on 04/12/2022:  CMP    CMP 1/4/22   Glucose 159 (A)   BUN 9   Creatinine 0.58   eGFR Non African Am 114   Sodium 138   Potassium 3.9   Chloride 101   Calcium 9.5   Albumin 4.20   Total Bilirubin 0.6   Alkaline Phosphatase 106   AST (SGOT) 37 (A)   ALT (SGPT) 57 (A)   (A) Abnormal value              Lipid Panel    Lipid Panel 1/4/22   Total Cholesterol 182   Triglycerides 137   HDL Cholesterol 52   VLDL Cholesterol 24   LDL Cholesterol  106 (A)   LDL/HDL Ratio 1.97   (A) Abnormal value            TSH    TSH 1/4/22   TSH 1.270           Most Recent A1C    HGBA1C Most Recent 1/4/22   Hemoglobin A1C 7.57 (A)   (A) Abnormal value                   Assessment/Plan     Diagnoses and all orders for this visit:    1. Peripheral venous insufficiency (Primary)  Comments:  Advised conservative measures including low-sodium diet and leg elevation  Start spironolactone as needed  Orders:  -     spironolactone (Aldactone) 25 MG tablet; Take 1 tablet by mouth Daily As Needed (leg swelling).  Dispense: 15 tablet; Refill: 0    2. Moderate episode of recurrent major depressive disorder (HCC)  Comments:  Increase Cymbalta 90 mg daily  Advised conservative measures for dealing with depression  Orders:  -     DULoxetine (CYMBALTA) 30 MG capsule; Take 3 capsules by mouth Daily.  Dispense: 90 capsule; Refill: 5    3. Lipoma of torso  Comments:  Likely fatty tumor/lipoma discussed with patient  Refer for ultrasound for further evaluation  Orders:  -     Ambulatory Referral to General Surgery    4. Mixed hyperlipidemia  Comments:  Continue atorvastatin  Advised  low-cholesterol diet  Orders:  -     Lipid Panel; Future  -     Comprehensive Metabolic Panel; Future    5. Vitamin D deficiency  Comments:  Advised vitamin D supplementation  Orders:  -     Vitamin D 25 Hydroxy; Future    6. Type 2 diabetes mellitus with hyperglycemia, without long-term current use of insulin (HCC)  Comments:  Continue Farxiga and metformin  Advised a low carbohydrate diabetic diet  Orders:  -     MicroAlbumin, Urine, Random - Urine, Clean Catch; Future  -     Hemoglobin A1c; Future            Patient was given instructions and counseling regarding his condition or for health maintenance advice. Please see specific information pulled into the AVS if appropriate      This document has been electronically signed by:  Rosa Quigley PA-C

## 2022-05-03 ENCOUNTER — OFFICE VISIT (OUTPATIENT)
Dept: SURGERY | Facility: CLINIC | Age: 42
End: 2022-05-03

## 2022-05-03 VITALS
DIASTOLIC BLOOD PRESSURE: 90 MMHG | BODY MASS INDEX: 47.09 KG/M2 | HEIGHT: 66 IN | HEART RATE: 80 BPM | SYSTOLIC BLOOD PRESSURE: 130 MMHG | WEIGHT: 293 LBS

## 2022-05-03 DIAGNOSIS — D17.1 LIPOMA OF TORSO: ICD-10-CM

## 2022-05-03 DIAGNOSIS — E04.1 NONTOXIC UNINODULAR GOITER: Primary | ICD-10-CM

## 2022-05-03 DIAGNOSIS — D17.1 LIPOMA OF TORSO: Primary | ICD-10-CM

## 2022-05-03 PROCEDURE — 11404 EXC TR-EXT B9+MARG 3.1-4 CM: CPT | Performed by: SURGERY

## 2022-05-03 PROCEDURE — 99213 OFFICE O/P EST LOW 20 MIN: CPT | Performed by: SURGERY

## 2022-05-04 ENCOUNTER — PATIENT ROUNDING (BHMG ONLY) (OUTPATIENT)
Dept: SURGERY | Facility: CLINIC | Age: 42
End: 2022-05-04

## 2022-05-04 PROBLEM — D17.1 LIPOMA OF TORSO: Status: ACTIVE | Noted: 2022-05-04

## 2022-05-04 NOTE — PROGRESS NOTES
May 4, 2022    Hello, may I speak with Anika Alegria?    My name is Iza      I am  with [facility]  [address] [facility], [place] [address] [phone].    Before we get started may I verify your date of birth? 1980    I am calling to officially welcome you to our practice and ask about your recent visit. Is this a good time to talk? yes    Tell me about your visit with us. What things went well?  everything went well. She was quite pleased       We're always looking for ways to make our patients' experiences even better. Do you have recommendations on ways we may improve?  no    Overall were you satisfied with your first visit to our practice? yes       I appreciate you taking the time to speak with me today. Is there anything else I can do for you? no      Thank you, and have a great day.

## 2022-05-04 NOTE — PROGRESS NOTES
Subjective   Anika Alegria is a 41 y.o. female is being seen for consultation today at the request of Rosa Quigley PA    Anika Alegria is a 41 y.o. female Morbidly obese female with palpable ovoid mass of the left upper abdomen measuring 4 cm consistent with lipoma.  Increasing in size and causing discomfort.  No anticoagulation reported.      Past Medical History:   Diagnosis Date   • Anxiety and depression    • GERD (gastroesophageal reflux disease)    • Goiter    • Goiter    • Hepatitis A    • Hypertension    • Mild asthma 2021   • PONV (postoperative nausea and vomiting)    • Thyroid disease    • Type 2 diabetes mellitus with hyperglycemia, without long-term current use of insulin (HCC) 2020       Family History   Problem Relation Age of Onset   • Diabetes Mother    • Hypertension Mother    • No Known Problems Father    • Gout Sister    • Osteoarthritis Maternal Grandmother    • Osteoporosis Maternal Grandmother    • Heart disease Maternal Grandmother    • Diabetes Maternal Grandmother    • Hypertension Maternal Grandmother    • Heart disease Maternal Grandfather    • Diabetes Maternal Grandfather    • Hypertension Maternal Grandfather    • Breast cancer Neg Hx        Social History     Socioeconomic History   • Marital status:      Spouse name: bea   • Number of children: 2   • Years of education: 12   Tobacco Use   • Smoking status: Former Smoker   • Smokeless tobacco: Never Used   Substance and Sexual Activity   • Alcohol use: No   • Drug use: No   • Sexual activity: Defer       Past Surgical History:   Procedure Laterality Date   • ABDOMINAL SURGERY     • CARPAL TUNNEL RELEASE Right 10/15/2019    Procedure: CARPAL TUNNEL RELEASE;  Surgeon: Rony Cruz MD;  Location: [facility];  Service: Orthopedics   •  SECTION      x2   • TUBAL ABDOMINAL LIGATION         Review of Systems   Constitutional: Negative for activity change, appetite change, chills and fever.  "  HENT: Negative for sore throat and trouble swallowing.    Eyes: Negative for visual disturbance.   Respiratory: Negative for cough and shortness of breath.    Cardiovascular: Negative for chest pain and palpitations.   Gastrointestinal: Negative for abdominal distention, abdominal pain, blood in stool, constipation, diarrhea, nausea and vomiting.   Endocrine: Negative for cold intolerance and heat intolerance.   Genitourinary: Negative for dysuria.   Musculoskeletal: Negative for joint swelling.   Skin: Negative for color change, rash and wound.   Allergic/Immunologic: Negative for immunocompromised state.   Neurological: Negative for dizziness, seizures, weakness and headaches.   Hematological: Negative for adenopathy. Does not bruise/bleed easily.   Psychiatric/Behavioral: Negative for agitation and confusion.         /90   Pulse 80   Ht 167.6 cm (65.98\")   Wt (!) 167 kg (369 lb)   LMP  (LMP Unknown)   BMI 59.59 kg/m²   Objective   Physical Exam  Constitutional:       Appearance: She is well-developed.   HENT:      Head: Normocephalic and atraumatic.   Eyes:      Conjunctiva/sclera: Conjunctivae normal.      Pupils: Pupils are equal, round, and reactive to light.   Neck:      Thyroid: No thyromegaly.      Vascular: No JVD.      Trachea: No tracheal deviation.   Cardiovascular:      Rate and Rhythm: Normal rate and regular rhythm.      Heart sounds: No murmur heard.    No friction rub. No gallop.   Pulmonary:      Effort: Pulmonary effort is normal.      Breath sounds: Normal breath sounds.   Abdominal:      General: There is no distension.      Palpations: Abdomen is soft. There is no hepatomegaly or splenomegaly.      Tenderness: There is no abdominal tenderness.      Hernia: No hernia is present.   Musculoskeletal:         General: No deformity. Normal range of motion.      Cervical back: Neck supple.   Skin:     General: Skin is warm and dry.             Comments: 4 cm lipoma   Neurological:      " Mental Status: She is alert and oriented to person, place, and time.       Excision of 4.2 centimeters lipoma of the left upper abdomen due to increasing size and discomfort    Left upper abdomen prepped and draped in usual sterile fashion.  Timeout procedure was performed.  A transverse incision overlying the palpable mass was made and dissection was carried through a large amount of the normal subcutaneous fat down to the underlying lipomatous fat.  The lipoma was multilobulated and was disrupted digitally and in a piecemeal fashion removed with a portion sent to pathology.  Total size of the area of fat removal was 4.2 cm.  The wound was hemostatic and closed with interrupted nylon suture and a bandage.  Patient tolerated procedure well.    Estimated blood loss: Less than 5 mL    Specimens: None    Complications: None        Assessment   Diagnoses and all orders for this visit:    1. Nontoxic uninodular goiter (Primary)  -     Cancel: TISSUE EXAM, [facility]); Future  -     Cancel: TISSUE EXAM, [facility] ([facility])  -     Cancel: TISSUE EXAM, [facility]); Future  -     TISSUE EXAM, [facility]); Future    2. Lipoma of torso      Anika Alegria is a 41 y.o. female with morbid obesity and large lipoma measuring 4 cm of the left upper abdominal region.  Piecemeal excision of the lipoma performed in the office today.  Follow-up procedure removal in 2 weeks.    Class 3 Severe Obesity (BMI >=40). Obesity-related health conditions include the following: obstructive sleep apnea, hypertension, diabetes mellitus and dyslipidemias. Obesity is unchanged. BMI is is above average; BMI management plan is completed. We discussed portion control and increasing exercise.

## 2022-05-10 ENCOUNTER — OFFICE VISIT (OUTPATIENT)
Dept: SURGERY | Facility: CLINIC | Age: 42
End: 2022-05-10

## 2022-05-10 VITALS — OXYGEN SATURATION: 97 % | HEART RATE: 79 BPM | HEIGHT: 66 IN | WEIGHT: 293 LBS | BODY MASS INDEX: 47.09 KG/M2

## 2022-05-10 DIAGNOSIS — D17.1 LIPOMA OF TORSO: Primary | ICD-10-CM

## 2022-05-10 PROCEDURE — 99024 POSTOP FOLLOW-UP VISIT: CPT

## 2022-05-10 NOTE — PROGRESS NOTES
"Anika is a 41-year-old female who had a lipoma removed by Dr. Herbert on 5-4-2022.  Patient states that she is here today because she has 2 family members that are nurses and they told her she needs to have her incision site checked.  She states that they were concerned with it being red and concerned that it was \"\".    Area around incision appears irritated from adhesive bandages.  Otherwise, incision sites and sutures appear well. There is no discharge noted from incision site.  There is no signs of dehiscence.  Patient does not complain of pain upon palpation.  There is no fluctuance noted.  I recommended to patient to keep follow-up appointment on 5- for suture removal.  Also recommended that if anything changes to come back to the office.  "

## 2022-05-12 ENCOUNTER — OFFICE VISIT (OUTPATIENT)
Dept: FAMILY MEDICINE CLINIC | Facility: CLINIC | Age: 42
End: 2022-05-12

## 2022-05-12 VITALS — TEMPERATURE: 92 F | DIASTOLIC BLOOD PRESSURE: 74 MMHG | HEART RATE: 92 BPM | SYSTOLIC BLOOD PRESSURE: 122 MMHG

## 2022-05-12 DIAGNOSIS — E11.65 TYPE 2 DIABETES MELLITUS WITH HYPERGLYCEMIA, WITHOUT LONG-TERM CURRENT USE OF INSULIN: Chronic | ICD-10-CM

## 2022-05-12 DIAGNOSIS — F33.1 MODERATE EPISODE OF RECURRENT MAJOR DEPRESSIVE DISORDER: Chronic | ICD-10-CM

## 2022-05-12 DIAGNOSIS — I87.2 PERIPHERAL VENOUS INSUFFICIENCY: Primary | Chronic | ICD-10-CM

## 2022-05-12 DIAGNOSIS — H65.02 NON-RECURRENT ACUTE SEROUS OTITIS MEDIA OF LEFT EAR: ICD-10-CM

## 2022-05-12 PROCEDURE — 99214 OFFICE O/P EST MOD 30 MIN: CPT | Performed by: PHYSICIAN ASSISTANT

## 2022-05-12 RX ORDER — BUPROPION HYDROCHLORIDE 150 MG/1
150 TABLET, EXTENDED RELEASE ORAL 2 TIMES DAILY
Qty: 60 TABLET | Refills: 5 | Status: SHIPPED | OUTPATIENT
Start: 2022-05-12 | End: 2022-07-28

## 2022-05-12 RX ORDER — CEFDINIR 300 MG/1
600 CAPSULE ORAL DAILY
Qty: 20 CAPSULE | Refills: 0 | Status: SHIPPED | OUTPATIENT
Start: 2022-05-12 | End: 2022-05-23

## 2022-05-12 RX ORDER — DULOXETIN HYDROCHLORIDE 60 MG/1
60 CAPSULE, DELAYED RELEASE ORAL DAILY
Qty: 30 CAPSULE | Refills: 5 | Status: SHIPPED | OUTPATIENT
Start: 2022-05-12 | End: 2022-09-01

## 2022-05-14 NOTE — PATIENT INSTRUCTIONS
"https://www.diabeteseducator.org/docs/default-source/living-with-diabetes/conquering-the-grocery-store-v1.pdf?sfvrsn=4\">   Carbohydrate Counting for Diabetes Mellitus, Adult  Carbohydrate counting is a method of keeping track of how many carbohydrates you eat. Eating carbohydrates naturally increases the amount of sugar (glucose) in the blood. Counting how many carbohydrates you eat improves your blood glucose control, which helps you manage your diabetes.  It is important to know how many carbohydrates you can safely have in each meal. This is different for every person. A dietitian can help you make a meal plan and calculate how many carbohydrates you should have at each meal and snack.  What foods contain carbohydrates?  Carbohydrates are found in the following foods:  Grains, such as breads and cereals.  Dried beans and soy products.  Starchy vegetables, such as potatoes, peas, and corn.  Fruit and fruit juices.  Milk and yogurt.  Sweets and snack foods, such as cake, cookies, candy, chips, and soft drinks.  How do I count carbohydrates in foods?  There are two ways to count carbohydrates in food. You can read food labels or learn standard serving sizes of foods. You can use either of the methods or a combination of both.  Using the Nutrition Facts label  The Nutrition Facts list is included on the labels of almost all packaged foods and beverages in the [place] It includes:  The serving size.  Information about nutrients in each serving, including the grams (g) of carbohydrate per serving.  To use the Nutrition Facts:  Decide how many servings you will have.  Multiply the number of servings by the number of carbohydrates per serving.  The resulting number is the total amount of carbohydrates that you will be having.  Learning the standard serving sizes of foods  When you eat carbohydrate foods that are not packaged or do not include Nutrition Facts on the label, you need to measure the servings in order to count " the amount of carbohydrates.  Measure the foods that you will eat with a food scale or measuring cup, if needed.  Decide how many standard-size servings you will eat.  Multiply the number of servings by 15. For foods that contain carbohydrates, one serving equals 15 g of carbohydrates.  For example, if you eat 2 cups or 10 oz (300 g) of strawberries, you will have eaten 2 servings and 30 g of carbohydrates (2 servings x 15 g = 30 g).  For foods that have more than one food mixed, such as soups and casseroles, you must count the carbohydrates in each food that is included.  The following list contains standard serving sizes of common carbohydrate-rich foods. Each of these servings has about 15 g of carbohydrates:  1 slice of bread.  1 six-inch (15 cm) tortilla.  ? cup or 2 oz (53 g) cooked rice or pasta.  ½ cup or 3 oz (85 g) cooked or canned, drained and rinsed beans or lentils.  ½ cup or 3 oz (85 g) starchy vegetable, such as peas, corn, or squash.  ½ cup or 4 oz (120 g) hot cereal.  ½ cup or 3 oz (85 g) boiled or mashed potatoes, or ¼ or 3 oz (85 g) of a large baked potato.  ½ cup or 4 fl oz (118 mL) fruit juice.  1 cup or 8 fl oz (237 mL) milk.  1 small or 4 oz (106 g) apple.  ½ or 2 oz (63 g) of a medium banana.  1 cup or 5 oz (150 g) strawberries.  3 cups or 1 oz (24 g) popped popcorn.  What is an example of carbohydrate counting?  To calculate the number of carbohydrates in this sample meal, follow the steps shown below.  Sample meal  3 oz (85 g) chicken breast.  ? cup or 4 oz (106 g) brown rice.  ½ cup or 3 oz (85 g) corn.  1 cup or 8 fl oz (237 mL) milk.  1 cup or 5 oz (150 g) strawberries with sugar-free whipped topping.  Carbohydrate calculation  Identify the foods that contain carbohydrates:  Rice.  Corn.  Milk.  Strawberries.  Calculate how many servings you have of each food:  2 servings rice.  1 serving corn.  1 serving milk.  1 serving strawberries.  Multiply each number of servings by 15   servings rice x 15 g = 30 g.  1 serving corn x 15 g = 15 g.  1 serving milk x 15 g = 15 g.  1 serving strawberries x 15 g = 15 g.  Add together all of the amounts to find the total grams of carbohydrates eaten:  30 g + 15 g + 15 g + 15 g = 75 g of carbohydrates total.  What are tips for following this plan?  Shopping  Develop a meal plan and then make a shopping list.  Buy fresh and frozen vegetables, fresh and frozen fruit, dairy, eggs, beans, lentils, and whole grains.  Look at food labels. Choose foods that have more fiber and less sugar.  Avoid processed foods and foods with added sugars.  Meal planning  Aim to have the same amount of carbohydrates at each meal and for each snack time.  Plan to have regular, balanced meals and snacks.  Where to find more information  American Diabetes Association: www.diabetes.org  Centers for Disease Control and Prevention: www.cdc.gov  Summary  Carbohydrate counting is a method of keeping track of how many carbohydrates you eat.  Eating carbohydrates naturally increases the amount of sugar (glucose) in the blood.  Counting how many carbohydrates you eat improves your blood glucose control, which helps you manage your diabetes.  A dietitian can help you make a meal plan and calculate how many carbohydrates you should have at each meal and snack.  This information is not intended to replace advice given to you by your health care provider. Make sure you discuss any questions you have with your health care provider.  Document Revised: 12/17/2020 Document Reviewed: 12/18/2020  [employer] Patient Education © 2021 [employer]

## 2022-05-14 NOTE — PROGRESS NOTES
Subjective   Anika Alegria is a 41 y.o. female.       Chief Complaint -venous insufficiency    History of Present Illness -    ROS    Peripheral venous insufficiency-  Patient reports improvement of bilateral lower extremity swelling since starting Aldactone and low-sodium diet.    Depression-  Not at goal as patient states she was unable to tolerate 90 mg of Cymbalta due to vivid dreams/nightmares.    Diabetes mellitus type 2- stable with Farxiga, Glucophage and low-carb diet    Ear ache-  Patient complains of sharp and throbbing pains in the left ear that began a few days ago.  She denies any fever.    The following portions of the patient's history were reviewed and updated as appropriate: allergies, current medications, past family history, past medical history, past social history, past surgical history and problem list.    Review of Systems    Objective  Vital signs:  /74   Pulse 92   Temp 92 °F (33.3 °C)   LMP  (LMP Unknown)     Physical Exam  Vitals and nursing note reviewed.   Constitutional:       General: She is not in acute distress.     Appearance: Normal appearance. She is well-developed. She is not diaphoretic.   HENT:      Head: Normocephalic and atraumatic.      Right Ear: Tympanic membrane, ear canal and external ear normal.      Left Ear: Ear canal and external ear normal.      Ears:      Comments: Left TM erythematous without bulging or perforation appreciated     Nose: Nose normal.      Mouth/Throat:      Mouth: Mucous membranes are moist.      Pharynx: No oropharyngeal exudate or posterior oropharyngeal erythema.   Neck:      Thyroid: No thyromegaly.   Cardiovascular:      Rate and Rhythm: Normal rate and regular rhythm.      Heart sounds: Normal heart sounds. No murmur heard.  Pulmonary:      Effort: Pulmonary effort is normal.      Breath sounds: Normal breath sounds. No wheezing or rales.   Musculoskeletal:      Cervical back: Normal range of motion and neck supple.    Lymphadenopathy:      Cervical: No cervical adenopathy.   Skin:     General: Skin is warm and dry.      Findings: No rash.   Neurological:      Mental Status: She is alert and oriented to person, place, and time.   Psychiatric:         Mood and Affect: Mood normal.         Behavior: Behavior normal.         Thought Content: Thought content normal.         The following data was reviewed by: KALI Armstrong on 05/12/2022:  CMP    CMP 1/4/22   Glucose 159 (A)   BUN 9   Creatinine 0.58   eGFR Non African Am 114   Sodium 138   Potassium 3.9   Chloride 101   Calcium 9.5   Albumin 4.20   Total Bilirubin 0.6   Alkaline Phosphatase 106   AST (SGOT) 37 (A)   ALT (SGPT) 57 (A)   (A) Abnormal value              TSH    TSH 1/4/22   TSH 1.270           Most Recent A1C    HGBA1C Most Recent 1/4/22   Hemoglobin A1C 7.57 (A)   (A) Abnormal value                   Assessment & Plan     Diagnoses and all orders for this visit:    1. Peripheral venous insufficiency (Primary)  Comments:  Continue Aldactone and advised conservative measures including leg elevations and low-sodium diet    2. Moderate episode of recurrent major depressive disorder (HCC)  Comments:  Decrease Cymbalta 60 mg daily (90 mg caused nightmares)  Start Wellbutrin  Advised conservative measures for dealing with depression  Orders:  -     DULoxetine (Cymbalta) 60 MG capsule; Take 1 capsule by mouth Daily.  Dispense: 30 capsule; Refill: 5  -     buPROPion SR (Wellbutrin SR) 150 MG 12 hr tablet; Take 1 tablet by mouth 2 (Two) Times a Day.  Dispense: 60 tablet; Refill: 5    3. Type 2 diabetes mellitus with hyperglycemia, without long-term current use of insulin ([facility])  Comments:  Advise low carbohydrate diabetic diet  Continue Farxiga and Glucophage  Orders:  -     Blood Glucose Monitoring Suppl misc; 1 each 3 (Three) Times a Day. Test tid  Dispense: 1 each; Refill: 0    4. Non-recurrent acute serous otitis media of left ear  Comments:  Start Omnicef  Advised  Tylenol as needed pain  RTC if symptoms not improved within 1 week  Orders:  -     cefdinir (OMNICEF) 300 MG capsule; Take 2 capsules by mouth Daily for 10 days.  Dispense: 20 capsule; Refill: 0            Patient was given instructions and counseling regarding his condition or for health maintenance advice. Please see specific information pulled into the AVS if appropriate      This document has been electronically signed by:  Rosa Quigley PA-C

## 2022-05-16 ENCOUNTER — OFFICE VISIT (OUTPATIENT)
Dept: SURGERY | Facility: CLINIC | Age: 42
End: 2022-05-16

## 2022-05-16 DIAGNOSIS — D17.1 LIPOMA OF TORSO: Primary | ICD-10-CM

## 2022-05-16 PROCEDURE — 99024 POSTOP FOLLOW-UP VISIT: CPT

## 2022-05-16 NOTE — PROGRESS NOTES
Anika is a 41-year-old female who presents post lipoma removal to left abdomen.  This was performed by Dr. Herbert.  She presents to the office today with worries about sutures.  She states that there is a blister noted on suture site.  Upon examination there is a 5 sutures noted.  There is no drainage coming from wound, no irritation noted.  Sutures were removed at this time.  There is no fluctuance to wound, no pain upon palpation. Discussed the pathology of benign lipoma with patient.  She denies any needs or concerns.  She will follow-up if needed.

## 2022-05-23 ENCOUNTER — OFFICE VISIT (OUTPATIENT)
Dept: FAMILY MEDICINE CLINIC | Facility: CLINIC | Age: 42
End: 2022-05-23

## 2022-05-23 VITALS
TEMPERATURE: 96.8 F | WEIGHT: 293 LBS | BODY MASS INDEX: 47.09 KG/M2 | SYSTOLIC BLOOD PRESSURE: 124 MMHG | DIASTOLIC BLOOD PRESSURE: 74 MMHG | HEIGHT: 66 IN | OXYGEN SATURATION: 97 % | HEART RATE: 77 BPM

## 2022-05-23 DIAGNOSIS — N61.0 CELLULITIS OF RIGHT BREAST: ICD-10-CM

## 2022-05-23 DIAGNOSIS — N61.1 ABSCESS OF RIGHT BREAST: Primary | ICD-10-CM

## 2022-05-23 DIAGNOSIS — I10 ESSENTIAL HYPERTENSION: Chronic | ICD-10-CM

## 2022-05-23 DIAGNOSIS — E78.2 MIXED HYPERLIPIDEMIA: Chronic | ICD-10-CM

## 2022-05-23 PROCEDURE — 99214 OFFICE O/P EST MOD 30 MIN: CPT | Performed by: PHYSICIAN ASSISTANT

## 2022-05-23 PROCEDURE — 10060 I&D ABSCESS SIMPLE/SINGLE: CPT | Performed by: PHYSICIAN ASSISTANT

## 2022-05-23 RX ORDER — SULFAMETHOXAZOLE AND TRIMETHOPRIM 800; 160 MG/1; MG/1
1 TABLET ORAL 2 TIMES DAILY
Qty: 20 TABLET | Refills: 0 | Status: SHIPPED | OUTPATIENT
Start: 2022-05-23 | End: 2022-06-17

## 2022-05-24 NOTE — PROGRESS NOTES
"Sravanthi Alegria is a 41 y.o. female.       Chief Complaint -abscess    History of Present Illness -    ROS    Abscess-  Patient complains of an abscess on her right medial breast that has grown larger over the past few days.  Minimal relief with warm compresses.  She denies any fever.    Hyperlipidemia-stable with atorvastatin and low-cholesterol diet    Hypertension-controlled with losartan and spironolactone    The following portions of the patient's history were reviewed and updated as appropriate: allergies, current medications, past family history, past medical history, past social history, past surgical history and problem list.    Review of Systems    Objective  Vital signs:  /74   Pulse 77   Temp 96.8 °F (36 °C) (Temporal)   Ht 167.6 cm (65.98\")   Wt (!) 170 kg (374 lb)   LMP  (LMP Unknown)   SpO2 97%   BMI 60.40 kg/m²     Physical Exam  Vitals and nursing note reviewed.   Constitutional:       Appearance: Normal appearance. She is well-developed.   Eyes:      Extraocular Movements: Extraocular movements intact.      Conjunctiva/sclera: Conjunctivae normal.   Cardiovascular:      Rate and Rhythm: Normal rate and regular rhythm.      Heart sounds: Normal heart sounds. No murmur heard.  Pulmonary:      Effort: Pulmonary effort is normal. No respiratory distress.      Breath sounds: Normal breath sounds. No wheezing.   Musculoskeletal:         General: Tenderness present.   Skin:     General: Skin is warm and dry.      Findings: Erythema present. No rash.      Comments: Right breast inner lower quadrant abscess noted approximately 1 x 1 cm with localized erythema and tenderness   Neurological:      Mental Status: She is alert and oriented to person, place, and time.   Psychiatric:         Mood and Affect: Mood normal.         Behavior: Behavior normal.         Thought Content: Thought content normal.         The following data was reviewed by: KALI Armstrong on 05/23/2022:  CMP  "   CMP 1/4/22   Glucose 159 (A)   BUN 9   Creatinine 0.58   eGFR Non African Am 114   Sodium 138   Potassium 3.9   Chloride 101   Calcium 9.5   Albumin 4.20   Total Bilirubin 0.6   Alkaline Phosphatase 106   AST (SGOT) 37 (A)   ALT (SGPT) 57 (A)   (A) Abnormal value            Lipid Panel    Lipid Panel 1/4/22   Total Cholesterol 182   Triglycerides 137   HDL Cholesterol 52   VLDL Cholesterol 24   LDL Cholesterol  106 (A)   LDL/HDL Ratio 1.97   (A) Abnormal value            TSH    TSH 1/4/22   TSH 1.270                  Assessment & Plan     Diagnoses and all orders for this visit:    1. Abscess of right breast (Primary)  Comments:  Incision and drainage performed today  Home wound care instructions given  Orders:  -     sulfamethoxazole-trimethoprim (Bactrim DS) 800-160 MG per tablet; Take 1 tablet by mouth 2 (Two) Times a Day.  Dispense: 20 tablet; Refill: 0    2. Cellulitis of right breast  Comments:  Start Bactrim  Advised continued warm compresses and keeping the area dry  RTC if not improving within 3 days  Orders:  -     sulfamethoxazole-trimethoprim (Bactrim DS) 800-160 MG per tablet; Take 1 tablet by mouth 2 (Two) Times a Day.  Dispense: 20 tablet; Refill: 0    3. Mixed hyperlipidemia  Comments:  Advised low-cholesterol diet  Continue atorvastatin    4. Essential hypertension  Comments:  Continue to monitor  Continue losartan and spironolactone    Procedure: Incision and drainage of breast abscess  Provider: Rosa Quigley PA-C  Indication: Breast abscess  Timeout was taken to verify correct patient procedure and location  Description: The right breast was prepped and draped in sterile fashion.  The abscess was drained.  Sterile dressing was placed.  No complications  Estimated blood loss: Minimal  Patient tolerated seizure well.          Patient was given instructions and counseling regarding his condition or for health maintenance advice. Please see specific information pulled into the AVS if  appropriate      This document has been electronically signed by:  Rosa Quigley PA-C

## 2022-06-17 ENCOUNTER — OFFICE VISIT (OUTPATIENT)
Dept: FAMILY MEDICINE CLINIC | Facility: CLINIC | Age: 42
End: 2022-06-17

## 2022-06-17 VITALS
WEIGHT: 293 LBS | OXYGEN SATURATION: 98 % | SYSTOLIC BLOOD PRESSURE: 126 MMHG | HEIGHT: 66 IN | HEART RATE: 104 BPM | DIASTOLIC BLOOD PRESSURE: 84 MMHG | TEMPERATURE: 97.5 F | BODY MASS INDEX: 47.09 KG/M2

## 2022-06-17 DIAGNOSIS — I10 ESSENTIAL HYPERTENSION: Chronic | ICD-10-CM

## 2022-06-17 DIAGNOSIS — H68.013 EUSTACHIAN SALPINGITIS, ACUTE, BILATERAL: Primary | ICD-10-CM

## 2022-06-17 DIAGNOSIS — F33.1 MODERATE EPISODE OF RECURRENT MAJOR DEPRESSIVE DISORDER: Chronic | ICD-10-CM

## 2022-06-17 PROCEDURE — 99214 OFFICE O/P EST MOD 30 MIN: CPT | Performed by: PHYSICIAN ASSISTANT

## 2022-06-17 RX ORDER — PSEUDOEPHEDRINE HYDROCHLORIDE 60 MG/1
60 TABLET, FILM COATED ORAL EVERY 6 HOURS PRN
Qty: 30 TABLET | Refills: 0 | Status: SHIPPED | OUTPATIENT
Start: 2022-06-17 | End: 2022-09-16

## 2022-06-17 NOTE — PROGRESS NOTES
"Subjective   Anika Alegria is a 41 y.o. female.       Chief Complaint -fullness in ear    History of Present Illness -    ROS    Fullness-  Patient complains of a fullness sensation in the left ear that is worse with seasonal allergens.  Some relief with Zyrtec and Flonase.    Depression-  Improved with use of Wellbutrin and Cymbalta.  Patient complains of labile moods initially but has been on both medications at current doses for over 3 weeks and states that her mood is stabilizing.  She reports a decreased frequency and vivid dreams.  She denies any hallucinations SI or HI.    Hypertension- controlled with losartan.    The following portions of the patient's history were reviewed and updated as appropriate: allergies, current medications, past family history, past medical history, past social history, past surgical history and problem list.    Review of Systems    Objective  Vital signs:  /84   Pulse 104   Temp 97.5 °F (36.4 °C) (Temporal)   Ht 167.6 cm (65.98\")   Wt (!) 161 kg (356 lb)   LMP  (LMP Unknown)   SpO2 98%   BMI 57.49 kg/m²     Physical Exam  Vitals and nursing note reviewed.   Constitutional:       General: She is not in acute distress.     Appearance: Normal appearance. She is well-developed. She is not diaphoretic.   HENT:      Head: Normocephalic and atraumatic.      Right Ear: Ear canal and external ear normal.      Left Ear: Ear canal and external ear normal.      Ears:      Comments: Clear fluid noted behind bilateral TMs with no perforation or erythema appreciated     Nose: Nose normal.      Mouth/Throat:      Mouth: Mucous membranes are moist.      Pharynx: No oropharyngeal exudate or posterior oropharyngeal erythema.   Neck:      Thyroid: No thyromegaly.   Cardiovascular:      Rate and Rhythm: Normal rate and regular rhythm.      Heart sounds: Normal heart sounds. No murmur heard.  Pulmonary:      Effort: Pulmonary effort is normal.      Breath sounds: Normal breath sounds. No " wheezing or rales.   Musculoskeletal:      Cervical back: Normal range of motion and neck supple.   Lymphadenopathy:      Cervical: No cervical adenopathy.   Skin:     General: Skin is warm and dry.      Findings: No rash.   Neurological:      Mental Status: She is alert and oriented to person, place, and time.   Psychiatric:         Mood and Affect: Mood normal.         Behavior: Behavior normal.         Thought Content: Thought content normal.         The following data was reviewed by: KALI Armstrong on 06/17/2022:  CMP    CMP 1/4/22   Glucose 159 (A)   BUN 9   Creatinine 0.58   eGFR Non African Am 114   Sodium 138   Potassium 3.9   Chloride 101   Calcium 9.5   Albumin 4.20   Total Bilirubin 0.6   Alkaline Phosphatase 106   AST (SGOT) 37 (A)   ALT (SGPT) 57 (A)   (A) Abnormal value              Lipid Panel    Lipid Panel 1/4/22   Total Cholesterol 182   Triglycerides 137   HDL Cholesterol 52   VLDL Cholesterol 24   LDL Cholesterol  106 (A)   LDL/HDL Ratio 1.97   (A) Abnormal value            TSH    TSH 1/4/22   TSH 1.270                  Assessment & Plan     Diagnoses and all orders for this visit:    1. Eustachian salpingitis, acute, bilateral (Primary)  -     pseudoephedrine (SUDAFED) 60 MG tablet; Take 1 tablet by mouth Every 6 (Six) Hours As Needed for Congestion.  Dispense: 30 tablet; Refill: 0    2. Moderate episode of recurrent major depressive disorder (HCC)  Comments:  Continue Wellbutrin and Cymbalta  Advised conservative measures for dealing with depression    3. Essential hypertension  Comments:  Continue losartan  Advised to monitor blood pressure and discontinue Sudafed if greater than 140/90            Patient was given instructions and counseling regarding his condition or for health maintenance advice. Please see specific information pulled into the AVS if appropriate      This document has been electronically signed by:  Rosa Quigley PA-C

## 2022-07-06 ENCOUNTER — APPOINTMENT (OUTPATIENT)
Dept: CT IMAGING | Facility: HOSPITAL | Age: 42
End: 2022-07-06

## 2022-07-06 ENCOUNTER — HOSPITAL ENCOUNTER (EMERGENCY)
Facility: HOSPITAL | Age: 42
Discharge: HOME | End: 2022-07-06
Attending: STUDENT IN AN ORGANIZED HEALTH CARE EDUCATION/TRAINING PROGRAM | Admitting: STUDENT IN AN ORGANIZED HEALTH CARE EDUCATION/TRAINING PROGRAM

## 2022-07-06 VITALS
BODY MASS INDEX: 47.09 KG/M2 | TEMPERATURE: 97.8 F | SYSTOLIC BLOOD PRESSURE: 151 MMHG | WEIGHT: 293 LBS | OXYGEN SATURATION: 96 % | HEIGHT: 66 IN | HEART RATE: 85 BPM | DIASTOLIC BLOOD PRESSURE: 93 MMHG | RESPIRATION RATE: 18 BRPM

## 2022-07-06 DIAGNOSIS — G43.809 OTHER MIGRAINE WITHOUT STATUS MIGRAINOSUS, NOT INTRACTABLE: Primary | ICD-10-CM

## 2022-07-06 LAB
ALBUMIN SERPL-MCNC: 3.84 G/DL (ref 3.5–5.2)
ALBUMIN/GLOB SERPL: 1.2 G/DL
ALP SERPL-CCNC: 94 U/L (ref 39–117)
ALT SERPL W P-5'-P-CCNC: 49 U/L (ref 1–33)
ANION GAP SERPL CALCULATED.3IONS-SCNC: 11.7 MMOL/L (ref 5–15)
AST SERPL-CCNC: 37 U/L (ref 1–32)
BASOPHILS # BLD AUTO: 0.05 10*3/MM3 (ref 0–0.2)
BASOPHILS NFR BLD AUTO: 0.6 % (ref 0–1.5)
BILIRUB SERPL-MCNC: 0.6 MG/DL (ref 0–1.2)
BUN SERPL-MCNC: 6 MG/DL (ref 6–20)
BUN/CREAT SERPL: 12 (ref 7–25)
CALCIUM SPEC-SCNC: 8.6 MG/DL (ref 8.6–10.5)
CHLORIDE SERPL-SCNC: 103 MMOL/L (ref 98–107)
CO2 SERPL-SCNC: 24.3 MMOL/L (ref 22–29)
CREAT SERPL-MCNC: 0.5 MG/DL (ref 0.57–1)
DEPRECATED RDW RBC AUTO: 44.4 FL (ref 37–54)
EGFRCR SERPLBLD CKD-EPI 2021: 119.5 ML/MIN/1.73
EOSINOPHIL # BLD AUTO: 0.15 10*3/MM3 (ref 0–0.4)
EOSINOPHIL NFR BLD AUTO: 1.9 % (ref 0.3–6.2)
ERYTHROCYTE [DISTWIDTH] IN BLOOD BY AUTOMATED COUNT: 13.2 % (ref 12.3–15.4)
GLOBULIN UR ELPH-MCNC: 3.3 GM/DL
GLUCOSE SERPL-MCNC: 169 MG/DL (ref 65–99)
HCG SERPL QL: NEGATIVE
HCT VFR BLD AUTO: 42.6 % (ref 34–46.6)
HGB BLD-MCNC: 14.1 G/DL (ref 12–15.9)
IMM GRANULOCYTES # BLD AUTO: 0.03 10*3/MM3 (ref 0–0.05)
IMM GRANULOCYTES NFR BLD AUTO: 0.4 % (ref 0–0.5)
INR PPP: 1.03 (ref 0.9–1.1)
LYMPHOCYTES # BLD AUTO: 3.04 10*3/MM3 (ref 0.7–3.1)
LYMPHOCYTES NFR BLD AUTO: 37.6 % (ref 19.6–45.3)
MCH RBC QN AUTO: 30.3 PG (ref 26.6–33)
MCHC RBC AUTO-ENTMCNC: 33.1 G/DL (ref 31.5–35.7)
MCV RBC AUTO: 91.6 FL (ref 79–97)
MONOCYTES # BLD AUTO: 0.71 10*3/MM3 (ref 0.1–0.9)
MONOCYTES NFR BLD AUTO: 8.8 % (ref 5–12)
NEUTROPHILS NFR BLD AUTO: 4.1 10*3/MM3 (ref 1.7–7)
NEUTROPHILS NFR BLD AUTO: 50.7 % (ref 42.7–76)
NRBC BLD AUTO-RTO: 0 /100 WBC (ref 0–0.2)
PLATELET # BLD AUTO: 170 10*3/MM3 (ref 140–450)
PMV BLD AUTO: 10 FL (ref 6–12)
POTASSIUM SERPL-SCNC: 3.7 MMOL/L (ref 3.5–5.2)
PROT SERPL-MCNC: 7.1 G/DL (ref 6–8.5)
PROTHROMBIN TIME: 13.8 SECONDS (ref 12.1–14.7)
RBC # BLD AUTO: 4.65 10*6/MM3 (ref 3.77–5.28)
SODIUM SERPL-SCNC: 139 MMOL/L (ref 136–145)
WBC NRBC COR # BLD: 8.08 10*3/MM3 (ref 3.4–10.8)

## 2022-07-06 PROCEDURE — 99283 EMERGENCY DEPT VISIT LOW MDM: CPT

## 2022-07-06 PROCEDURE — 84703 CHORIONIC GONADOTROPIN ASSAY: CPT | Performed by: STUDENT IN AN ORGANIZED HEALTH CARE EDUCATION/TRAINING PROGRAM

## 2022-07-06 PROCEDURE — 70496 CT ANGIOGRAPHY HEAD: CPT | Performed by: RADIOLOGY

## 2022-07-06 PROCEDURE — 80053 COMPREHEN METABOLIC PANEL: CPT | Performed by: STUDENT IN AN ORGANIZED HEALTH CARE EDUCATION/TRAINING PROGRAM

## 2022-07-06 PROCEDURE — 70498 CT ANGIOGRAPHY NECK: CPT

## 2022-07-06 PROCEDURE — 85610 PROTHROMBIN TIME: CPT | Performed by: STUDENT IN AN ORGANIZED HEALTH CARE EDUCATION/TRAINING PROGRAM

## 2022-07-06 PROCEDURE — 0 IOPAMIDOL PER 1 ML: Performed by: STUDENT IN AN ORGANIZED HEALTH CARE EDUCATION/TRAINING PROGRAM

## 2022-07-06 PROCEDURE — 70496 CT ANGIOGRAPHY HEAD: CPT

## 2022-07-06 PROCEDURE — 70450 CT HEAD/BRAIN W/O DYE: CPT

## 2022-07-06 PROCEDURE — 85025 COMPLETE CBC W/AUTO DIFF WBC: CPT | Performed by: STUDENT IN AN ORGANIZED HEALTH CARE EDUCATION/TRAINING PROGRAM

## 2022-07-06 PROCEDURE — 70498 CT ANGIOGRAPHY NECK: CPT | Performed by: RADIOLOGY

## 2022-07-06 RX ADMIN — IOPAMIDOL 85 ML: 755 INJECTION, SOLUTION INTRAVENOUS at 15:48

## 2022-07-06 NOTE — ED PROVIDER NOTES
Subjective   42-year-old female with a past medical history of anxiety and depression presents to the ER due to concerns for visual loss in the left eye for about 6 minutes prior to arrival to the ER.  Patient was initially evaluated by her optometrist.  No acute abnormalities were given and report by the optometrist, Dr. Campbell, during her physical examination.  No immediate concerns for optic nerve issues or findings consistent with retinal artery occlusion.  Patient noted visual symptoms had resolved on arrival to the ER.  No significant headache.  No fever or chills.  No cough or congestion.  No dysarthria or dysphagia.  No focal neurological deficits.  Vitals stable          Review of Systems   Eyes: Positive for visual disturbance.   All other systems reviewed and are negative.      Past Medical History:   Diagnosis Date   • Anxiety and depression    • GERD (gastroesophageal reflux disease)    • Goiter    • Goiter    • Hepatitis A    • Hypertension    • Mild asthma 2021   • PONV (postoperative nausea and vomiting)    • Thyroid disease    • Type 2 diabetes mellitus with hyperglycemia, without long-term current use of insulin ([facility]) 2020       Allergies   Allergen Reactions   • Ultram [Tramadol] Swelling   • Venlafaxine Other (See Comments)     Nightmares       Past Surgical History:   Procedure Laterality Date   • ABDOMINAL SURGERY     • CARPAL TUNNEL RELEASE Right 10/15/2019    Procedure: CARPAL TUNNEL RELEASE;  Surgeon: Rony Cruz MD;  Location: [facility];  Service: Orthopedics   •  SECTION      x2   • TUBAL ABDOMINAL LIGATION         Family History   Problem Relation Age of Onset   • Diabetes Mother    • Hypertension Mother    • No Known Problems Father    • Gout Sister    • Osteoarthritis Maternal Grandmother    • Osteoporosis Maternal Grandmother    • Heart disease Maternal Grandmother    • Diabetes Maternal Grandmother    • Hypertension Maternal Grandmother    • Heart disease  Maternal Grandfather    • Diabetes Maternal Grandfather    • Hypertension Maternal Grandfather    • Breast cancer Neg Hx        Social History     Socioeconomic History   • Marital status:      Spouse name: bea   • Number of children: 2   • Years of education: 12   Tobacco Use   • Smoking status: Former Smoker   • Smokeless tobacco: Never Used   Substance and Sexual Activity   • Alcohol use: No   • Drug use: No   • Sexual activity: Defer           Objective   Physical Exam  Constitutional:       General: She is not in acute distress.     Appearance: Normal appearance. She is not ill-appearing.   HENT:      Head: Normocephalic and atraumatic.      Right Ear: External ear normal.      Left Ear: External ear normal.      Nose: Nose normal.      Mouth/Throat:      Mouth: Mucous membranes are moist.   Eyes:      Extraocular Movements: Extraocular movements intact.      Pupils: Pupils are equal, round, and reactive to light.   Cardiovascular:      Rate and Rhythm: Normal rate and regular rhythm.      Heart sounds: No murmur heard.  Pulmonary:      Effort: Pulmonary effort is normal. No respiratory distress.      Breath sounds: Normal breath sounds. No wheezing.   Abdominal:      General: Bowel sounds are normal.      Palpations: Abdomen is soft.      Tenderness: There is no abdominal tenderness.   Musculoskeletal:         General: No deformity or signs of injury. Normal range of motion.      Cervical back: Normal range of motion and neck supple.   Skin:     General: Skin is warm and dry.      Findings: No erythema.   Neurological:      General: No focal deficit present.      Mental Status: She is alert and oriented to person, place, and time. Mental status is at baseline.      Cranial Nerves: No cranial nerve deficit.   Psychiatric:         Mood and Affect: Mood normal.         Behavior: Behavior normal.         Thought Content: Thought content normal.         Procedures           ED Course  ED Course as of  07/06/22 1644 Wed Jul 06, 2022   1643 CBC and CCP unremarkable.  Coagulation studies unremarkable.  hCG negative.  CT angiogram of the head and neck noted acute abnormality.  Head CT without contrast noted no acute abnormality.  Cannot rule out possible complex migraine.  No focal deficits noted throughout entire ER course.  Vision remained normal throughout ER course.  Counseled to follow-up with her PCP and optometrist as needed.  Work up and results were discussed throughly with the patient.  The patient will be discharged for further monitoring and management with their PCP.  Red flags, warning signs, worsening symptoms, and when to return to the ER discussed with and understood by the patient.  Patient will follow up with their PCP in a timely manner.  Vitals stable at discharge. [SF]      ED Course User Index  [SF] Dom Storey DO                                           [facility]    Final diagnoses:   Other migraine without status migrainosus, not intractable       ED Disposition  ED Disposition     ED Disposition   Discharge    Condition   Stable    Comment   --             No follow-up provider specified.       Medication List      No changes were made to your prescriptions during this visit.          Dom Storey DO  07/06/22 1644

## 2022-07-08 DIAGNOSIS — H33.22 LEFT RETINAL DETACHMENT: Primary | ICD-10-CM

## 2022-07-27 ENCOUNTER — TRANSCRIBE ORDERS (OUTPATIENT)
Dept: ADMINISTRATIVE | Facility: HOSPITAL | Age: 42
End: 2022-07-27

## 2022-07-27 ENCOUNTER — LAB (OUTPATIENT)
Dept: LAB | Facility: HOSPITAL | Age: 42
End: 2022-07-27

## 2022-07-27 DIAGNOSIS — G45.3 AMAUROSIS FUGAX: ICD-10-CM

## 2022-07-27 DIAGNOSIS — G45.3 AMAUROSIS FUGAX: Primary | ICD-10-CM

## 2022-07-27 LAB
ANION GAP SERPL CALCULATED.3IONS-SCNC: 12.1 MMOL/L (ref 5–15)
BUN SERPL-MCNC: 10 MG/DL (ref 6–20)
BUN/CREAT SERPL: 17.9 (ref 7–25)
CALCIUM SPEC-SCNC: 8.9 MG/DL (ref 8.6–10.5)
CHLORIDE SERPL-SCNC: 102 MMOL/L (ref 98–107)
CHOLEST SERPL-MCNC: 164 MG/DL (ref 0–200)
CO2 SERPL-SCNC: 23.9 MMOL/L (ref 22–29)
CREAT SERPL-MCNC: 0.56 MG/DL (ref 0.57–1)
EGFRCR SERPLBLD CKD-EPI 2021: 116.3 ML/MIN/1.73
ERYTHROCYTE [SEDIMENTATION RATE] IN BLOOD: 37 MM/HR (ref 0–20)
GLUCOSE SERPL-MCNC: 138 MG/DL (ref 65–99)
HDLC SERPL-MCNC: 46 MG/DL (ref 40–60)
LDLC SERPL CALC-MCNC: 87 MG/DL (ref 0–100)
LDLC/HDLC SERPL: 1.78 {RATIO}
POTASSIUM SERPL-SCNC: 4.1 MMOL/L (ref 3.5–5.2)
SODIUM SERPL-SCNC: 138 MMOL/L (ref 136–145)
TRIGL SERPL-MCNC: 181 MG/DL (ref 0–150)
VLDLC SERPL-MCNC: 31 MG/DL (ref 5–40)

## 2022-07-27 PROCEDURE — 80061 LIPID PANEL: CPT

## 2022-07-27 PROCEDURE — 36415 COLL VENOUS BLD VENIPUNCTURE: CPT

## 2022-07-27 PROCEDURE — 80048 BASIC METABOLIC PNL TOTAL CA: CPT

## 2022-07-27 PROCEDURE — 85652 RBC SED RATE AUTOMATED: CPT

## 2022-07-28 ENCOUNTER — OFFICE VISIT (OUTPATIENT)
Dept: FAMILY MEDICINE CLINIC | Facility: CLINIC | Age: 42
End: 2022-07-28

## 2022-07-28 VITALS
WEIGHT: 293 LBS | DIASTOLIC BLOOD PRESSURE: 104 MMHG | OXYGEN SATURATION: 98 % | HEIGHT: 66 IN | BODY MASS INDEX: 47.09 KG/M2 | SYSTOLIC BLOOD PRESSURE: 138 MMHG | HEART RATE: 94 BPM | TEMPERATURE: 98.3 F

## 2022-07-28 DIAGNOSIS — E78.2 MIXED HYPERLIPIDEMIA: Chronic | ICD-10-CM

## 2022-07-28 DIAGNOSIS — E11.65 TYPE 2 DIABETES MELLITUS WITH HYPERGLYCEMIA, WITHOUT LONG-TERM CURRENT USE OF INSULIN: Chronic | ICD-10-CM

## 2022-07-28 DIAGNOSIS — I10 ESSENTIAL HYPERTENSION: Primary | Chronic | ICD-10-CM

## 2022-07-28 LAB
EXPIRATION DATE: NORMAL
HBA1C MFR BLD: 7.6 %
Lab: NORMAL

## 2022-07-28 PROCEDURE — 99214 OFFICE O/P EST MOD 30 MIN: CPT | Performed by: PHYSICIAN ASSISTANT

## 2022-07-28 PROCEDURE — 36416 COLLJ CAPILLARY BLOOD SPEC: CPT | Performed by: PHYSICIAN ASSISTANT

## 2022-07-28 PROCEDURE — 83036 HEMOGLOBIN GLYCOSYLATED A1C: CPT | Performed by: PHYSICIAN ASSISTANT

## 2022-07-28 PROCEDURE — 3051F HG A1C>EQUAL 7.0%<8.0%: CPT | Performed by: PHYSICIAN ASSISTANT

## 2022-07-28 RX ORDER — LORATADINE 10 MG/1
1 TABLET ORAL DAILY
COMMUNITY
End: 2022-09-16

## 2022-07-28 RX ORDER — DULOXETIN HYDROCHLORIDE 60 MG/1
1 CAPSULE, DELAYED RELEASE ORAL DAILY
COMMUNITY
End: 2022-07-28 | Stop reason: SDUPTHER

## 2022-07-28 RX ORDER — PANTOPRAZOLE SODIUM 20 MG/1
2 TABLET, DELAYED RELEASE ORAL
COMMUNITY
End: 2022-07-28 | Stop reason: SDUPTHER

## 2022-07-29 NOTE — PROGRESS NOTES
"Sravanthi Alegria is a 42 y.o. female.       Chief Complaint -hypertension    History of Present Illness -    ROS    Hypertension-  Not at goal with losartan 100 mg daily and current diet.  Patient states that she does not take the spironolactone on a regular basis.  She has had some pain with her eyes secondary to retinal issues.  She has appointment with retinal specialist tomorrow.  We discussed that pain and stress could temporarily increase blood pressure.    Diabetes mellitus type 2- stable with Farxiga, metformin and low carbohydrate diabetic diet    Hyperlipidemia-stable with atorvastatin and low-cholesterol diet    The following portions of the patient's history were reviewed and updated as appropriate: allergies, current medications, past family history, past medical history, past social history, past surgical history and problem list.    Review of Systems    Objective  Vital signs:  BP (!) 138/104   Pulse 94   Temp 98.3 °F (36.8 °C) (Temporal)   Ht 167.6 cm (66\")   Wt (!) 164 kg (362 lb)   LMP  (LMP Unknown)   SpO2 98%   BMI 58.43 kg/m²     Physical Exam  Vitals and nursing note reviewed.   Constitutional:       Appearance: Normal appearance. She is well-developed.   Eyes:      Extraocular Movements: Extraocular movements intact.      Conjunctiva/sclera: Conjunctivae normal.   Cardiovascular:      Rate and Rhythm: Normal rate and regular rhythm.      Heart sounds: Normal heart sounds. No murmur heard.  Pulmonary:      Effort: Pulmonary effort is normal. No respiratory distress.      Breath sounds: Normal breath sounds. No wheezing.   Musculoskeletal:         General: No tenderness.   Skin:     General: Skin is warm and dry.      Findings: No rash.   Neurological:      Mental Status: She is alert and oriented to person, place, and time.   Psychiatric:         Mood and Affect: Mood normal.         Behavior: Behavior normal.         Thought Content: Thought content normal.         The " following data was reviewed by: KALI Armstrong on 07/28/2022:  CMP    CMP 1/4/22 7/6/22 7/27/22   Glucose 159 (A) 169 (A) 138 (A)   BUN 9 6 10   Creatinine 0.58 0.50 (A) 0.56 (A)   eGFR Non African Am 114     Sodium 138 139 138   Potassium 3.9 3.7 4.1   Chloride 101 103 102   Calcium 9.5 8.6 8.9   Albumin 4.20 3.84    Total Bilirubin 0.6 0.6    Alkaline Phosphatase 106 94    AST (SGOT) 37 (A) 37 (A)    ALT (SGPT) 57 (A) 49 (A)    (A) Abnormal value            CBC w/diff    CBC w/Diff 7/6/22   WBC 8.08   RBC 4.65   Hemoglobin 14.1   Hematocrit 42.6   MCV 91.6   MCH 30.3   MCHC 33.1   RDW 13.2   Platelets 170   Neutrophil Rel % 50.7   Immature Granulocyte Rel % 0.4   Lymphocyte Rel % 37.6   Monocyte Rel % 8.8   Eosinophil Rel % 1.9   Basophil Rel % 0.6           Lipid Panel    Lipid Panel 1/4/22 7/27/22   Total Cholesterol 182 164   Triglycerides 137 181 (A)   HDL Cholesterol 52 46   VLDL Cholesterol 24 31   LDL Cholesterol  106 (A) 87   LDL/HDL Ratio 1.97 1.78   (A) Abnormal value            TSH    TSH 1/4/22   TSH 1.270           Most Recent A1C    HGBA1C Most Recent 7/28/22   Hemoglobin A1C 7.6                  Assessment & Plan     Diagnoses and all orders for this visit:    1. Essential hypertension (Primary)  Comments:  Start spironolactone every day  Continue losartan  Advised a low-sodium diet    2. Type 2 diabetes mellitus with hyperglycemia, without long-term current use of insulin (HCC)  Comments:  Advise low carbohydrate diabetic diet  Continue Farxiga and metformin  Orders:  -     POCT glycated hemoglobin, total    3. Mixed hyperlipidemia  Comments:  Continue atorvastatin  Advised low-cholesterol diet            Patient was given instructions and counseling regarding his condition or for health maintenance advice. Please see specific information pulled into the AVS if appropriate      This document has been electronically signed by:  Rosa Quigley PA-C

## 2022-08-05 DIAGNOSIS — E11.65 TYPE 2 DIABETES MELLITUS WITH HYPERGLYCEMIA, WITHOUT LONG-TERM CURRENT USE OF INSULIN: ICD-10-CM

## 2022-08-07 RX ORDER — BLOOD SUGAR DIAGNOSTIC
STRIP MISCELLANEOUS
Qty: 100 EACH | Refills: 11 | Status: SHIPPED | OUTPATIENT
Start: 2022-08-07

## 2022-08-23 ENCOUNTER — TELEPHONE (OUTPATIENT)
Dept: FAMILY MEDICINE CLINIC | Facility: CLINIC | Age: 42
End: 2022-08-23

## 2022-08-23 RX ORDER — AMOXICILLIN AND CLAVULANATE POTASSIUM 875; 125 MG/1; MG/1
1 TABLET, FILM COATED ORAL 2 TIMES DAILY
Qty: 20 TABLET | Refills: 0 | Status: SHIPPED | OUTPATIENT
Start: 2022-08-23 | End: 2022-09-06

## 2022-08-23 NOTE — TELEPHONE ENCOUNTER
----- Message from NIKKO Dominguez sent at 8/23/2022  4:29 PM EDT -----  Sent meds    ----- Message -----  From: Gabby Johnson MA  Sent: 8/23/2022   1:39 PM EDT  To: NIKKO Dominguez    Patient tested positive for covid on 08/19 and is still symptomatic. She is still testing positive and her symptoms are congestion, cough, sob (when she is laying down) and she has no fever. She doesn't want to do the antiviral medicine but wanted to see if you could send her in an antibiotic. She thinks it is mostly sinus symptoms that she is experiencing right now.

## 2022-08-31 DIAGNOSIS — I87.2 PERIPHERAL VENOUS INSUFFICIENCY: Chronic | ICD-10-CM

## 2022-08-31 DIAGNOSIS — F33.1 MODERATE EPISODE OF RECURRENT MAJOR DEPRESSIVE DISORDER: Chronic | ICD-10-CM

## 2022-08-31 DIAGNOSIS — E11.65 TYPE 2 DIABETES MELLITUS WITH HYPERGLYCEMIA, WITHOUT LONG-TERM CURRENT USE OF INSULIN: ICD-10-CM

## 2022-08-31 DIAGNOSIS — I10 ESSENTIAL HYPERTENSION: ICD-10-CM

## 2022-09-01 RX ORDER — SPIRONOLACTONE 25 MG/1
TABLET ORAL
Qty: 15 TABLET | Refills: 0 | Status: SHIPPED | OUTPATIENT
Start: 2022-09-01 | End: 2022-09-16 | Stop reason: SDUPTHER

## 2022-09-01 RX ORDER — DAPAGLIFLOZIN 10 MG/1
TABLET, FILM COATED ORAL
Qty: 90 TABLET | Refills: 0 | Status: SHIPPED | OUTPATIENT
Start: 2022-09-01 | End: 2022-09-16 | Stop reason: SDUPTHER

## 2022-09-01 RX ORDER — DULOXETIN HYDROCHLORIDE 60 MG/1
CAPSULE, DELAYED RELEASE ORAL
Qty: 90 CAPSULE | Refills: 0 | Status: SHIPPED | OUTPATIENT
Start: 2022-09-01 | End: 2022-09-16 | Stop reason: SDUPTHER

## 2022-09-01 RX ORDER — LOSARTAN POTASSIUM 100 MG/1
TABLET ORAL
Qty: 90 TABLET | Refills: 0 | Status: SHIPPED | OUTPATIENT
Start: 2022-09-01 | End: 2022-09-16 | Stop reason: SDUPTHER

## 2022-09-06 ENCOUNTER — OFFICE VISIT (OUTPATIENT)
Dept: FAMILY MEDICINE CLINIC | Facility: CLINIC | Age: 42
End: 2022-09-06

## 2022-09-06 VITALS
OXYGEN SATURATION: 97 % | HEIGHT: 66 IN | BODY MASS INDEX: 47.09 KG/M2 | DIASTOLIC BLOOD PRESSURE: 90 MMHG | SYSTOLIC BLOOD PRESSURE: 150 MMHG | TEMPERATURE: 97.1 F | WEIGHT: 293 LBS | HEART RATE: 95 BPM

## 2022-09-06 DIAGNOSIS — J02.9 VIRAL PHARYNGITIS: Primary | ICD-10-CM

## 2022-09-06 DIAGNOSIS — H60.393 OTHER INFECTIVE ACUTE OTITIS EXTERNA OF BOTH EARS: ICD-10-CM

## 2022-09-06 DIAGNOSIS — J01.01 ACUTE RECURRENT MAXILLARY SINUSITIS: ICD-10-CM

## 2022-09-06 DIAGNOSIS — J45.20 MILD INTERMITTENT ASTHMA, UNSPECIFIED WHETHER COMPLICATED: ICD-10-CM

## 2022-09-06 LAB
EXPIRATION DATE: NORMAL
INTERNAL CONTROL: NORMAL
Lab: NORMAL
S PYO AG THROAT QL: NEGATIVE

## 2022-09-06 PROCEDURE — 86403 PARTICLE AGGLUT ANTBDY SCRN: CPT | Performed by: PHYSICIAN ASSISTANT

## 2022-09-06 PROCEDURE — 99213 OFFICE O/P EST LOW 20 MIN: CPT | Performed by: PHYSICIAN ASSISTANT

## 2022-09-06 PROCEDURE — 87880 STREP A ASSAY W/OPTIC: CPT | Performed by: PHYSICIAN ASSISTANT

## 2022-09-06 RX ORDER — OFLOXACIN 3 MG/ML
5 SOLUTION AURICULAR (OTIC) 2 TIMES DAILY
Qty: 10 ML | Refills: 0 | Status: CANCELLED | OUTPATIENT
Start: 2022-09-06 | End: 2022-09-13

## 2022-09-06 RX ORDER — AZITHROMYCIN 250 MG/1
TABLET, FILM COATED ORAL
Qty: 6 TABLET | Refills: 0 | Status: SHIPPED | OUTPATIENT
Start: 2022-09-06 | End: 2022-09-16

## 2022-09-06 RX ORDER — NEOMYCIN SULFATE, POLYMYXIN B SULFATE AND HYDROCORTISONE 10; 3.5; 1 MG/ML; MG/ML; [USP'U]/ML
4 SUSPENSION/ DROPS AURICULAR (OTIC) 3 TIMES DAILY
Qty: 10 ML | Refills: 0 | Status: SHIPPED | OUTPATIENT
Start: 2022-09-06 | End: 2022-09-07

## 2022-09-06 RX ORDER — ALBUTEROL SULFATE 90 UG/1
2 AEROSOL, METERED RESPIRATORY (INHALATION) EVERY 4 HOURS PRN
Qty: 18 G | Refills: 2 | Status: SHIPPED | OUTPATIENT
Start: 2022-09-06 | End: 2023-01-30

## 2022-09-06 NOTE — PROGRESS NOTES
Subjective        Chief Complaint  Sore Throat    Subjective      History of Present Illness  Anika Alegria is a 42 y.o. female who presents today to [facility] for complaints of sore throat. Past medical history is significant for HTN, HLD, type 2 diabetes mellitus which is non-insulin-requiring, PCOS, morbid obesity, GERD, nonalcoholic fatty liver disease, major depressive disorder, and mild asthma.    Sore throat:  Hoarse voice:   Anika reports that she was previously diagnosed with COVID-19 on 8/19. She contacted the office on 8/23 with complaints of mostly sinus congestion, cough, and SOA with laying down. She declined antiviral therapy. Given her sinus congestion, she was sent in Augmentin BID x 10 days. She reports improvement in her symptoms, however, this morning she woke up with sore throat, hoarse voice, and some recurrent sinus congestion. She has a feeling of fullness in both ears, denies any significant ear pain. She denies any fever or chills.       Current Outpatient Medications:   •  albuterol sulfate HFA (Ventolin HFA) 108 (90 Base) MCG/ACT inhaler, Inhale 2 puffs Every 4 (Four) Hours As Needed for Wheezing., Disp: 18 g, Rfl: 2  •  atorvastatin (LIPITOR) 10 MG tablet, Take 1 tablet by mouth Every Night., Disp: 30 tablet, Rfl: 5  •  Blood Glucose Monitoring Suppl misc, 1 each 3 (Three) Times a Day. Test tid, Disp: 1 each, Rfl: 0  •  Cholecalciferol 50 MCG (2000 UT) tablet, , Disp: , Rfl:   •  Diclofenac Sodium (VOLTAREN) 1 % gel gel, APPLY 4 GRAMS AS DIRECTED ONTO THE APPROPRIATE AREA FOUR TIMES DAILY AS NEEDED, Disp: 300 g, Rfl: 5  •  DULoxetine (CYMBALTA) 60 MG capsule, Take 1 capsule by mouth once daily, Disp: 90 capsule, Rfl: 0  •  Farxiga 10 MG tablet, TAKE 1 TABLET BY MOUTH IN THE MORNING, Disp: 90 tablet, Rfl: 0  •  fluticasone (FLONASE) 50 MCG/ACT nasal spray, 2 sprays into the nostril(s) as directed by provider Daily., Disp: 16 mL, Rfl: 5  •  ibuprofen  (ADVIL,MOTRIN) 800 MG tablet, Take 1 tablet by mouth Every 8 (Eight) Hours., Disp: 90 tablet, Rfl: 2  •  ketoconazole (NIZORAL) 2 % shampoo, Apply  topically to the appropriate area as directed 2 (Two) Times a Week. to affected area, Disp: 120 mL, Rfl: 5  •  Lancets (OneTouch Delica Plus [id number]) misc, 1 each by Other route 3 (Three) Times a Day., Disp: 100 each, Rfl: 5  •  levothyroxine (Synthroid) 100 MCG tablet, Take 1 tablet by mouth Daily., Disp: 30 tablet, Rfl: 11  •  loratadine (CLARITIN) 10 MG tablet, Take 1 tablet by mouth Daily., Disp: , Rfl:   •  losartan (COZAAR) 100 MG tablet, Take 1 tablet by mouth once daily, Disp: 90 tablet, Rfl: 0  •  metFORMIN (GLUCOPHAGE) 500 MG tablet, Take 1 tablet by mouth Every 12 (Twelve) Hours., Disp: , Rfl:   •  montelukast (Singulair) 10 MG tablet, Take 1 tablet by mouth Every Night., Disp: 30 tablet, Rfl: 5  •  OneTouch Ultra test strip, USE TO TEST ONCE DAILY AS DIRECTED, Disp: 100 each, Rfl: 11  •  oxybutynin XL (DITROPAN-XL) 5 MG 24 hr tablet, Take 1 tablet by mouth Daily., Disp: 30 tablet, Rfl: 5  •  pantoprazole (PROTONIX) 40 MG EC tablet, Take 1 tablet by mouth Daily., Disp: 30 tablet, Rfl: 5  •  pseudoephedrine (SUDAFED) 60 MG tablet, Take 1 tablet by mouth Every 6 (Six) Hours As Needed for Congestion., Disp: 30 tablet, Rfl: 0  •  rOPINIRole (REQUIP) 1 MG tablet, Take 1 tablet by mouth Every Night. Take 1 hour before bedtime, Disp: 30 tablet, Rfl: 5  •  spironolactone (ALDACTONE) 25 MG tablet, TAKE 1 TABLET BY MOUTH ONCE DAILY AS NEEDED, Disp: 15 tablet, Rfl: 0  •  vitamin D (ERGOCALCIFEROL) 1.25 MG (63061 UT) capsule capsule, Take 1 capsule by mouth Every 7 (Seven) Days., Disp: 5 capsule, Rfl: 5  •  azithromycin (Zithromax Z-Carlos) 250 MG tablet, Take 2 tablets by mouth on day 1, then 1 tablet daily on days 2-5, Disp: 6 tablet, Rfl: 0  •  neomycin-polymyxin-hydrocortisone (CORTISPORIN) 3.5-03257-1 otic suspension, Administer 4 drops into both ears 3 (Three) Times  "a Day for 10 days. 2-3 drops in affected ear (s) four times daily, Disp: 10 mL, Rfl: 0      Allergies   Allergen Reactions   • Ultram [Tramadol] Swelling   • Venlafaxine Other (See Comments)     Nightmares       Objective     Objective   Vital Signs:  Blood Pressure 150/90   Pulse 95   Temperature 97.1 °F (36.2 °C) (Temporal)   Height 167.6 cm (66\")   Weight (Abnormal) 164 kg (361 lb)   Oxygen Saturation 97%   Body Mass Index 58.27 kg/m²   Estimated body mass index is 58.27 kg/m² as calculated from the following:    Height as of this encounter: 167.6 cm (66\").    Weight as of this encounter: 164 kg (361 lb).    Class 3 Severe Obesity (BMI >=40). Obesity-related health conditions include the following: diabetes mellitus and GERD. Obesity is worsening. BMI is is above average; BMI management plan is completed. We discussed diet and exercise when she improves from her multiple recent illnesses.     Past Medical History:   Diagnosis Date   • Anxiety and depression    • GERD (gastroesophageal reflux disease)    • Goiter    • Goiter    • Hepatitis A    • Hypertension    • Mild asthma 2021   • PONV (postoperative nausea and vomiting)    • Thyroid disease    • Type 2 diabetes mellitus with hyperglycemia, without long-term current use of insulin ([facility]) 2020     Past Surgical History:   Procedure Laterality Date   • ABDOMINAL SURGERY     • CARPAL TUNNEL RELEASE Right 10/15/2019    Procedure: CARPAL TUNNEL RELEASE;  Surgeon: Rony Cruz MD;  Location: [facility];  Service: Orthopedics   •  SECTION      x2   • TUBAL ABDOMINAL LIGATION       Social History     Socioeconomic History   • Marital status:      Spouse name: bea   • Number of children: 2   • Years of education: 12   Tobacco Use   • Smoking status: Former Smoker   • Smokeless tobacco: Never Used   Substance and Sexual Activity   • Alcohol use: No   • Drug use: No   • Sexual activity: Defer      Physical Exam  Vitals and " nursing note reviewed.   Constitutional:       General: She is not in acute distress.     Appearance: She is well-developed. She is not diaphoretic.   HENT:      Head: Normocephalic and atraumatic.      Right Ear: Tympanic membrane normal.      Left Ear: Tympanic membrane normal.      Ears:      Comments: Bilateral external canal erythema noted. No drainage/exudates.      Mouth/Throat:      Pharynx: Posterior oropharyngeal erythema (Mild pharyngeal erythema. No exudates. ) present.   Eyes:      General: No scleral icterus.        Right eye: No discharge.         Left eye: No discharge.      Conjunctiva/sclera: Conjunctivae normal.   Neck:      Vascular: No carotid bruit or JVD.   Cardiovascular:      Rate and Rhythm: Normal rate and regular rhythm.      Heart sounds: Normal heart sounds. No murmur heard.    No friction rub. No gallop.   Pulmonary:      Effort: Pulmonary effort is normal. No respiratory distress.      Breath sounds: Normal breath sounds. No wheezing or rales.   Chest:      Chest wall: No tenderness.   Musculoskeletal:         General: Normal range of motion.      Cervical back: Normal range of motion and neck supple.      Right lower leg: No edema.      Left lower leg: No edema.   Skin:     General: Skin is warm and dry.      Coloration: Skin is not pale.      Findings: No erythema or rash.   Neurological:      Mental Status: She is alert and oriented to person, place, and time.   Psychiatric:         Behavior: Behavior normal.        Result Review :  The following data was reviewed by: KALI Hook on 09/06/2022:  Rapid strep screen: Negative.            Assessment / Plan         Assessment   Diagnoses and all orders for this visit:    1. Viral pharyngitis (Primary)  2. Other infective acute otitis externa of both ears  3. Acute recurrent maxillary sinusitis  -     POCT rapid strep A: Negtive.   -     She reports improvement in her sinus congestion while on Augmentin, now seems to be  recurring. Also has the start of bilateral otitis externa.   -     Add cortisporin otic 4 drops bilaterally TID x10 days.  -     Will add Zpak, take per pack instructions.   -     Continue home claritin, singulair, flonase.     4. Mild intermittent asthma, unspecified whether complicated  -     No acute exacerbation at this time. Treating as above to try to prevent asthma flare or bronchitis.   -Refill of home albuterol provider to use PRN.   -     albuterol sulfate HFA (Ventolin HFA) 108 (90 Base) MCG/ACT inhaler; Inhale 2 puffs Every 4 (Four) Hours As Needed for Wheezing.  Dispense: 18 g; Refill: 2    Other orders  -     azithromycin (Zithromax Z-Carlos) 250 MG tablet; Take 2 tablets by mouth on day 1, then 1 tablet daily on days 2-5  Dispense: 6 tablet; Refill: 0  -     neomycin-polymyxin-hydrocortisone (CORTISPORIN) 3.5-85938-2 otic suspension; Administer 4 drops into both ears 3 (Three) Times a Day for 10 days. 2-3 drops in affected ear (s) four times daily  Dispense: 10 mL; Refill: 0        New Medications Ordered This Visit   Medications   • albuterol sulfate HFA (Ventolin HFA) 108 (90 Base) MCG/ACT inhaler     Sig: Inhale 2 puffs Every 4 (Four) Hours As Needed for Wheezing.     Dispense:  18 g     Refill:  2   • azithromycin (Zithromax Z-Carlos) 250 MG tablet     Sig: Take 2 tablets by mouth on day 1, then 1 tablet daily on days 2-5     Dispense:  6 tablet     Refill:  0   • neomycin-polymyxin-hydrocortisone (CORTISPORIN) 3.5-54359-4 otic suspension     Sig: Administer 4 drops into both ears 3 (Three) Times a Day for 10 days. 2-3 drops in affected ear (s) four times daily     Dispense:  10 mL     Refill:  0     Follow Up   Return for Next scheduled follow up as scheduled with Rosa in 10 days or sooner if needed. .    Patient was given instructions and counseling regarding her condition or for health maintenance advice. Please see specific information pulled into the AVS if appropriate.       This document has  been electronically signed by KALI Hook   September 6, 2022 16:36 EDT    Dictated Utilizing Dragon Dictation: Part of this note may be an electronic transcription/translation of spoken language to printed text using the Dragon Dictation System.

## 2022-09-07 RX ORDER — NEOMYCIN SULFATE, POLYMYXIN B SULFATE AND HYDROCORTISONE 10; 3.5; 1 MG/ML; MG/ML; [USP'U]/ML
4 SUSPENSION/ DROPS AURICULAR (OTIC) 3 TIMES DAILY
Qty: 10 ML | Refills: 0 | Status: SHIPPED | OUTPATIENT
Start: 2022-09-07 | End: 2022-09-16

## 2022-09-09 ENCOUNTER — TELEPHONE (OUTPATIENT)
Dept: FAMILY MEDICINE CLINIC | Facility: CLINIC | Age: 42
End: 2022-09-09

## 2022-09-09 NOTE — TELEPHONE ENCOUNTER
Pharmacy Name:  [facility]     Pharmacy representative name: SARINA    Pharmacy representative phone number: [phone]    What medication are you calling in regards to: neomycin-polymyxin-hydrocortisone (CORTISPORIN) 3.5-81662-3 otic suspension       What question does the pharmacy have: PHARMACY HAS SOLUTION BUT NOT SUSPENSION AND WANTED TO SEE IF PROVIDER WOULD WANT TO CHANGE    Who is the provider that prescribed the medication: NANDA    Additional notes: N/A

## 2022-09-16 ENCOUNTER — OFFICE VISIT (OUTPATIENT)
Dept: FAMILY MEDICINE CLINIC | Facility: CLINIC | Age: 42
End: 2022-09-16

## 2022-09-16 VITALS
SYSTOLIC BLOOD PRESSURE: 128 MMHG | TEMPERATURE: 98.3 F | HEART RATE: 83 BPM | HEIGHT: 66 IN | BODY MASS INDEX: 47.09 KG/M2 | WEIGHT: 293 LBS | OXYGEN SATURATION: 98 % | DIASTOLIC BLOOD PRESSURE: 88 MMHG

## 2022-09-16 DIAGNOSIS — K21.9 GASTROESOPHAGEAL REFLUX DISEASE WITHOUT ESOPHAGITIS: ICD-10-CM

## 2022-09-16 DIAGNOSIS — F33.1 MODERATE EPISODE OF RECURRENT MAJOR DEPRESSIVE DISORDER: Chronic | ICD-10-CM

## 2022-09-16 DIAGNOSIS — J45.20 MILD INTERMITTENT ASTHMA WITHOUT COMPLICATION: Chronic | ICD-10-CM

## 2022-09-16 DIAGNOSIS — G25.81 RLS (RESTLESS LEGS SYNDROME): Chronic | ICD-10-CM

## 2022-09-16 DIAGNOSIS — E78.2 MIXED HYPERLIPIDEMIA: Chronic | ICD-10-CM

## 2022-09-16 DIAGNOSIS — N39.41 URGE INCONTINENCE OF URINE: ICD-10-CM

## 2022-09-16 DIAGNOSIS — I87.2 PERIPHERAL VENOUS INSUFFICIENCY: Chronic | ICD-10-CM

## 2022-09-16 DIAGNOSIS — E55.9 VITAMIN D DEFICIENCY: ICD-10-CM

## 2022-09-16 DIAGNOSIS — E03.9 ACQUIRED HYPOTHYROIDISM: ICD-10-CM

## 2022-09-16 DIAGNOSIS — E11.65 TYPE 2 DIABETES MELLITUS WITH HYPERGLYCEMIA, WITHOUT LONG-TERM CURRENT USE OF INSULIN: Primary | ICD-10-CM

## 2022-09-16 DIAGNOSIS — I10 ESSENTIAL HYPERTENSION: ICD-10-CM

## 2022-09-16 DIAGNOSIS — J30.1 CHRONIC SEASONAL ALLERGIC RHINITIS DUE TO POLLEN: Chronic | ICD-10-CM

## 2022-09-16 LAB
EXPIRATION DATE: NORMAL
HBA1C MFR BLD: 7.1 %
Lab: NORMAL

## 2022-09-16 PROCEDURE — 3051F HG A1C>EQUAL 7.0%<8.0%: CPT | Performed by: PHYSICIAN ASSISTANT

## 2022-09-16 PROCEDURE — 99214 OFFICE O/P EST MOD 30 MIN: CPT | Performed by: PHYSICIAN ASSISTANT

## 2022-09-16 PROCEDURE — 83036 HEMOGLOBIN GLYCOSYLATED A1C: CPT | Performed by: PHYSICIAN ASSISTANT

## 2022-09-16 RX ORDER — PANTOPRAZOLE SODIUM 40 MG/1
40 TABLET, DELAYED RELEASE ORAL DAILY
Qty: 90 TABLET | Refills: 3 | Status: SHIPPED | OUTPATIENT
Start: 2022-09-16

## 2022-09-16 RX ORDER — CETIRIZINE HYDROCHLORIDE 10 MG/1
10 TABLET ORAL DAILY
COMMUNITY
Start: 2022-08-31

## 2022-09-16 RX ORDER — SPIRONOLACTONE 25 MG/1
25 TABLET ORAL DAILY PRN
Qty: 45 TABLET | Refills: 0 | Status: SHIPPED | OUTPATIENT
Start: 2022-09-16 | End: 2023-02-16

## 2022-09-16 RX ORDER — ATORVASTATIN CALCIUM 10 MG/1
10 TABLET, FILM COATED ORAL NIGHTLY
Qty: 90 TABLET | Refills: 3 | Status: SHIPPED | OUTPATIENT
Start: 2022-09-16

## 2022-09-16 RX ORDER — OXYBUTYNIN CHLORIDE 5 MG/1
5 TABLET, EXTENDED RELEASE ORAL DAILY
Qty: 90 TABLET | Refills: 3 | Status: SHIPPED | OUTPATIENT
Start: 2022-09-16

## 2022-09-16 RX ORDER — DULOXETIN HYDROCHLORIDE 60 MG/1
60 CAPSULE, DELAYED RELEASE ORAL DAILY
Qty: 90 CAPSULE | Refills: 3 | Status: SHIPPED | OUTPATIENT
Start: 2022-09-16

## 2022-09-16 RX ORDER — FLUTICASONE PROPIONATE 50 MCG
2 SPRAY, SUSPENSION (ML) NASAL DAILY
Qty: 16 ML | Refills: 5 | Status: SHIPPED | OUTPATIENT
Start: 2022-09-16 | End: 2023-01-10

## 2022-09-16 RX ORDER — DAPAGLIFLOZIN 10 MG/1
1 TABLET, FILM COATED ORAL EVERY MORNING
Qty: 90 TABLET | Refills: 3 | Status: SHIPPED | OUTPATIENT
Start: 2022-09-16

## 2022-09-16 RX ORDER — LOSARTAN POTASSIUM 100 MG/1
100 TABLET ORAL DAILY
Qty: 90 TABLET | Refills: 3 | Status: SHIPPED | OUTPATIENT
Start: 2022-09-16

## 2022-09-16 RX ORDER — LEVOTHYROXINE SODIUM 0.1 MG/1
100 TABLET ORAL DAILY
Qty: 90 TABLET | Refills: 3 | Status: SHIPPED | OUTPATIENT
Start: 2022-09-16 | End: 2023-09-16

## 2022-09-16 RX ORDER — DULOXETIN HYDROCHLORIDE 30 MG/1
90 CAPSULE, DELAYED RELEASE ORAL DAILY
COMMUNITY
Start: 2022-08-31 | End: 2022-09-16

## 2022-09-16 RX ORDER — ROPINIROLE 1 MG/1
1 TABLET, FILM COATED ORAL NIGHTLY
Qty: 90 TABLET | Refills: 3 | Status: SHIPPED | OUTPATIENT
Start: 2022-09-16

## 2022-09-16 RX ORDER — MONTELUKAST SODIUM 10 MG/1
10 TABLET ORAL NIGHTLY
Qty: 90 TABLET | Refills: 3 | Status: SHIPPED | OUTPATIENT
Start: 2022-09-16 | End: 2022-12-08

## 2022-09-16 RX ORDER — ERGOCALCIFEROL 1.25 MG/1
50000 CAPSULE ORAL
Qty: 15 CAPSULE | Refills: 3 | Status: SHIPPED | OUTPATIENT
Start: 2022-09-16

## 2022-09-21 NOTE — PROGRESS NOTES
"Sravanthi Alegria is a 42 y.o. female.       Chief Complaint -diabetes    History of Present Illness -    ROS    Diabetes-  Stable with most recent hemoglobin A1c of 7.1.  Patient is taking Farxiga metformin and following a low carbohydrate diet    Hyperlipidemia-stable with atorvastatin and low-cholesterol diet    Depression- controlled with Cymbalta.  She denies any SI, HI, or hallucinations    Allergic rhinitis-stable with Flonase    Hypertension- stable with losartan.  Patient reports home blood pressures usually less than 130/80    Asthma-stable with albuterol as needed    Restless leg syndrome-stable with Requip    Peripheral venous insufficiency-stable with spironolactone    Vitamin D deficiency-stable with vitamin D supplementation    Hypothyroidism-stable with Synthroid 100 mcg daily    The following portions of the patient's history were reviewed and updated as appropriate: allergies, current medications, past family history, past medical history, past social history, past surgical history and problem list.    Review of Systems    Objective  Vital signs:  /88   Pulse 83   Temp 98.3 °F (36.8 °C) (Temporal)   Ht 167.6 cm (66\")   Wt (!) 163 kg (360 lb)   LMP  (LMP Unknown)   SpO2 98%   BMI 58.11 kg/m²     Physical Exam  Vitals and nursing note reviewed.   Constitutional:       Appearance: Normal appearance. She is well-developed.   Eyes:      Extraocular Movements: Extraocular movements intact.      Conjunctiva/sclera: Conjunctivae normal.   Cardiovascular:      Rate and Rhythm: Normal rate and regular rhythm.      Heart sounds: Normal heart sounds. No murmur heard.  Pulmonary:      Effort: Pulmonary effort is normal. No respiratory distress.      Breath sounds: Normal breath sounds. No wheezing.   Musculoskeletal:         General: No tenderness.   Skin:     General: Skin is warm and dry.      Findings: No rash.   Neurological:      Mental Status: She is alert and oriented to person, " place, and time.   Psychiatric:         Mood and Affect: Mood normal.         Behavior: Behavior normal.         Thought Content: Thought content normal.         The following data was reviewed by: KALI Armstrong on 09/16/2022:  CMP    CMP 1/4/22 7/6/22 7/27/22   Glucose 159 (A) 169 (A) 138 (A)   BUN 9 6 10   Creatinine 0.58 0.50 (A) 0.56 (A)   eGFR Non African Am 114     Sodium 138 139 138   Potassium 3.9 3.7 4.1   Chloride 101 103 102   Calcium 9.5 8.6 8.9   Albumin 4.20 3.84    Total Bilirubin 0.6 0.6    Alkaline Phosphatase 106 94    AST (SGOT) 37 (A) 37 (A)    ALT (SGPT) 57 (A) 49 (A)    (A) Abnormal value            CBC w/diff    CBC w/Diff 7/6/22   WBC 8.08   RBC 4.65   Hemoglobin 14.1   Hematocrit 42.6   MCV 91.6   MCH 30.3   MCHC 33.1   RDW 13.2   Platelets 170   Neutrophil Rel % 50.7   Immature Granulocyte Rel % 0.4   Lymphocyte Rel % 37.6   Monocyte Rel % 8.8   Eosinophil Rel % 1.9   Basophil Rel % 0.6           Lipid Panel    Lipid Panel 1/4/22 7/27/22   Total Cholesterol 182 164   Triglycerides 137 181 (A)   HDL Cholesterol 52 46   VLDL Cholesterol 24 31   LDL Cholesterol  106 (A) 87   LDL/HDL Ratio 1.97 1.78   (A) Abnormal value            TSH    TSH 1/4/22   TSH 1.270           Most Recent A1C    HGBA1C Most Recent 9/16/22   Hemoglobin A1C 7.1                  Assessment & Plan     Diagnoses and all orders for this visit:    1. Type 2 diabetes mellitus with hyperglycemia, without long-term current use of insulin (HCC) (Primary)  Comments:  Continue Farxiga and metformin  Encouraged to continue low-carb diabetic diet    Orders:  -     dapagliflozin Propanediol (Farxiga) 10 MG tablet; Take 10 mg by mouth Every Morning.  Dispense: 90 tablet; Refill: 3  -     metFORMIN (GLUCOPHAGE) 500 MG tablet; Take 1 tablet by mouth Every 12 (Twelve) Hours.  Dispense: 180 tablet; Refill: 3  -     Comprehensive Metabolic Panel; Future  -     Hemoglobin A1c; Future  -     POCT glycated hemoglobin, total    2.  Mixed hyperlipidemia  Comments:  Continue atorvastatin for risk factor reduction given increased CV risk including hyperlipidemia diabetes and NAFLD  Orders:  -     atorvastatin (LIPITOR) 10 MG tablet; Take 1 tablet by mouth Every Night.  Dispense: 90 tablet; Refill: 3  -     Lipid Panel; Future    3. Moderate episode of recurrent major depressive disorder (HCC)  Comments:  Continue Cymbalta 60 mg daily (90 mg caused nightmares)  Advised conservative measures for dealing with depression  Orders:  -     DULoxetine (CYMBALTA) 60 MG capsule; Take 1 capsule by mouth Daily.  Dispense: 90 capsule; Refill: 3  -     Comprehensive Metabolic Panel; Future    4. Chronic seasonal allergic rhinitis due to pollen  Comments:  Continue Flonase  Orders:  -     fluticasone (FLONASE) 50 MCG/ACT nasal spray; 2 sprays into the nostril(s) as directed by provider Daily.  Dispense: 16 mL; Refill: 5    5. Essential hypertension  Comments:  Continue Cozaar 100mg qd  Orders:  -     losartan (COZAAR) 100 MG tablet; Take 1 tablet by mouth Daily.  Dispense: 90 tablet; Refill: 3    6. Mild intermittent asthma without complication  -     montelukast (Singulair) 10 MG tablet; Take 1 tablet by mouth Every Night.  Dispense: 90 tablet; Refill: 3    7. Urge incontinence of urine  Comments:  Start oxybutynin  Advised increased water intake  Orders:  -     oxybutynin XL (DITROPAN-XL) 5 MG 24 hr tablet; Take 1 tablet by mouth Daily.  Dispense: 90 tablet; Refill: 3    8. Gastroesophageal reflux disease without esophagitis  Comments:  Continue pantoprazole  Orders:  -     pantoprazole (PROTONIX) 40 MG EC tablet; Take 1 tablet by mouth Daily.  Dispense: 90 tablet; Refill: 3    9. RLS (restless legs syndrome)  Comments:  Continue Requip  Orders:  -     rOPINIRole (REQUIP) 1 MG tablet; Take 1 tablet by mouth Every Night. Take 1 hour before bedtime  Dispense: 90 tablet; Refill: 3    10. Peripheral venous insufficiency  Comments:  Advised conservative measures  including low-sodium diet and leg elevation  Start spironolactone as needed  Orders:  -     spironolactone (ALDACTONE) 25 MG tablet; Take 1 tablet by mouth Daily As Needed (leg swelling).  Dispense: 45 tablet; Refill: 0    11. Vitamin D deficiency  Comments:  Continue vitamin D  Orders:  -     vitamin D (ERGOCALCIFEROL) 1.25 MG (24288 UT) capsule capsule; Take 1 capsule by mouth Every 7 (Seven) Days.  Dispense: 15 capsule; Refill: 3  -     Vitamin D 25 Hydroxy; Future    12. Acquired hypothyroidism  -     TSH; Future  -     T4, Free; Future    Other orders  -     levothyroxine (Synthroid) 100 MCG tablet; Take 1 tablet by mouth Daily.  Dispense: 90 tablet; Refill: 3  -     Cancel: FluLaval/Fluzone >6 mos ([id number])            Patient was given instructions and counseling regarding his condition or for health maintenance advice. Please see specific information pulled into the AVS if appropriate      This document has been electronically signed by:  Rosa Quigley PA-C

## 2022-09-21 NOTE — PATIENT INSTRUCTIONS
Carbohydrate Counting for Diabetes Mellitus, Adult  Carbohydrate counting is a method of keeping track of how many carbohydrates you eat. Eating carbohydrates increases the amount of sugar (glucose) in the blood. Counting how many carbohydrates you eat improves how well you manage your blood glucose. This, in turn, helps you manage your diabetes.  Carbohydrates are measured in grams (g) per serving. It is important to know how many carbohydrates (in grams or by serving size) you can have in each meal. This is different for every person. A dietitian can help you make a meal plan and calculate how many carbohydrates you should have at each meal and snack.  What foods contain carbohydrates?  Carbohydrates are found in the following foods:  Grains, such as breads and cereals.  Dried beans and soy products.  Starchy vegetables, such as potatoes, peas, and corn.  Fruit and fruit juices.  Milk and yogurt.  Sweets and snack foods, such as cake, cookies, candy, chips, and soft drinks.  How do I count carbohydrates in foods?  There are two ways to count carbohydrates in food. You can read food labels or learn standard serving sizes of foods. You can use either of these methods or a combination of both.  Using the Nutrition Facts label  The Nutrition Facts list is included on the labels of almost all packaged foods and beverages in the [place]. It includes:  The serving size.  Information about nutrients in each serving, including the grams of carbohydrate per serving.  To use the Nutrition Facts, decide how many servings you will have. Then, multiply the number of servings by the number of carbohydrates per serving. The resulting number is the total grams of carbohydrates that you will be having.  Learning the standard serving sizes of foods  When you eat carbohydrate foods that are not packaged or do not include Nutrition Facts on the label, you need to measure the servings in order to count the grams of  carbohydrates.  Measure the foods that you will eat with a food scale or measuring cup, if needed.  Decide how many standard-size servings you will eat.  Multiply the number of servings by 15. For foods that contain carbohydrates, one serving equals 15 g of carbohydrates.  For example, if you eat 2 cups or 10 oz (300 g) of strawberries, you will have eaten 2 servings and 30 g of carbohydrates (2 servings x 15 g = 30 g).  For foods that have more than one food mixed, such as soups and casseroles, you must count the carbohydrates in each food that is included.  The following list contains standard serving sizes of common carbohydrate-rich foods. Each of these servings has about 15 g of carbohydrates:  1 slice of bread.  1 six-inch (15 cm) tortilla.  ? cup or 2 oz (53 g) cooked rice or pasta.  ½ cup or 3 oz (85 g) cooked or canned, drained and rinsed beans or lentils.  ½ cup or 3 oz (85 g) starchy vegetable, such as peas, corn, or squash.  ½ cup or 4 oz (120 g) hot cereal.  ½ cup or 3 oz (85 g) boiled or mashed potatoes, or ¼ or 3 oz (85 g) of a large baked potato.  ½ cup or 4 fl oz (118 mL) fruit juice.  1 cup or 8 fl oz (237 mL) milk.  1 small or 4 oz (106 g) apple.  ½ or 2 oz (63 g) of a medium banana.  1 cup or 5 oz (150 g) strawberries.  3 cups or 1 oz (28.3 g) popped popcorn.  What is an example of carbohydrate counting?  To calculate the grams of carbohydrates in this sample meal, follow the steps shown below.  Sample meal  3 oz (85 g) chicken breast.  ? cup or 4 oz (106 g) brown rice.  ½ cup or 3 oz (85 g) corn.  1 cup or 8 fl oz (237 mL) milk.  1 cup or 5 oz (150 g) strawberries with sugar-free whipped topping.  Carbohydrate calculation  Identify the foods that contain carbohydrates:  Rice.  Corn.  Milk.  Strawberries.  Calculate how many servings you have of each food:  2 servings rice.  1 serving corn.  1 serving milk.  1 serving strawberries.  Multiply each number of servings by 15  servings rice x 15  g = 30 g.  1 serving corn x 15 g = 15 g.  1 serving milk x 15 g = 15 g.  1 serving strawberries x 15 g = 15 g.  Add together all of the amounts to find the total grams of carbohydrates eaten:  30 g + 15 g + 15 g + 15 g = 75 g of carbohydrates total.  What are tips for following this plan?  Shopping  Develop a meal plan and then make a shopping list.  Buy fresh and frozen vegetables, fresh and frozen fruit, dairy, eggs, beans, lentils, and whole grains.  Look at food labels. Choose foods that have more fiber and less sugar.  Avoid processed foods and foods with added sugars.  Meal planning  Aim to have the same number of grams of carbohydrates at each meal and for each snack time.  Plan to have regular, balanced meals and snacks.  Where to find more information  American Diabetes Association: diabetes.org  Centers for Disease Control and Prevention: cdc.gov  Academy of Nutrition and Dietetics: eatright.org  Association of Diabetes Care & Education Specialists: diabeteseducator.org  Summary  Carbohydrate counting is a method of keeping track of how many carbohydrates you eat.  Eating carbohydrates increases the amount of sugar (glucose) in your blood.  Counting how many carbohydrates you eat improves how well you manage your blood glucose. This helps you manage your diabetes.  A dietitian can help you make a meal plan and calculate how many carbohydrates you should have at each meal and snack.  This information is not intended to replace advice given to you by your health care provider. Make sure you discuss any questions you have with your health care provider.  Document Revised: 07/21/2021 Document Reviewed: 07/21/2021  Elsevier Patient Education © 2022 Elsevier Inc.

## 2022-10-12 ENCOUNTER — TELEPHONE (OUTPATIENT)
Dept: FAMILY MEDICINE CLINIC | Facility: CLINIC | Age: 42
End: 2022-10-12

## 2022-10-12 RX ORDER — CIPROFLOXACIN AND DEXAMETHASONE 3; 1 MG/ML; MG/ML
4 SUSPENSION/ DROPS AURICULAR (OTIC) 2 TIMES DAILY
Qty: 10 ML | Refills: 0 | Status: SHIPPED | OUTPATIENT
Start: 2022-10-12 | End: 2022-10-19

## 2022-10-12 RX ORDER — CIPROFLOXACIN/HYDROCORTISONE 0.2 %-1 %
3 SUSPENSION, DROPS(FINAL DOSAGE FORM)(ML) OTIC (EAR) 2 TIMES DAILY
Qty: 10 ML | Refills: 0 | Status: SHIPPED | OUTPATIENT
Start: 2022-10-12 | End: 2022-10-12 | Stop reason: SDUPTHER

## 2022-10-12 NOTE — TELEPHONE ENCOUNTER
----- Message from Gabby Johnson MA sent at 10/12/2022  1:06 PM EDT -----  The ear drops that you sent in for the patient are not covered on her insurance. They said the insurance should cover the ciprodex.

## 2022-10-12 NOTE — TELEPHONE ENCOUNTER
Ciprodex sent.       This document has been electronically signed by KALI Hook   October 12, 2022 14:00 EDT

## 2022-10-13 NOTE — PROGRESS NOTES
Anika was in the office today with her daughter who is being seen.  During her daughter's visit, she did report that she had been having some right-sided ear discomfort.  Mild erythema of the external auditory canal noted.  TM is normal without erythema or exudates.    Originally prescribed ciprofloxacin-hydrocortisone 0.2-1% otic suspension, however, this was not covered by her insurance at the pharmacy.  Pharmacy stated that the ciprofloxacin-dexamethasone combination was covered and her prescription was changed to this. Return to clinic if no improvement noted or if symptoms are worsening.       This document has been electronically signed by KALI Hook   October 13, 2022 08:33 EDT    Please note that portions of this note were completed with a voice recognition program. Efforts were made to edit dictation, but occasionally words are mistranscribed.

## 2022-11-01 ENCOUNTER — OFFICE VISIT (OUTPATIENT)
Dept: FAMILY MEDICINE CLINIC | Facility: CLINIC | Age: 42
End: 2022-11-01

## 2022-11-01 VITALS
BODY MASS INDEX: 47.09 KG/M2 | HEIGHT: 66 IN | WEIGHT: 293 LBS | TEMPERATURE: 98.6 F | HEART RATE: 79 BPM | OXYGEN SATURATION: 96 %

## 2022-11-01 DIAGNOSIS — Z23 ENCOUNTER FOR IMMUNIZATION: ICD-10-CM

## 2022-11-01 DIAGNOSIS — K21.9 GASTROESOPHAGEAL REFLUX DISEASE WITHOUT ESOPHAGITIS: Chronic | ICD-10-CM

## 2022-11-01 DIAGNOSIS — J04.0 LARYNGITIS: Primary | ICD-10-CM

## 2022-11-01 DIAGNOSIS — I10 ESSENTIAL HYPERTENSION: Chronic | ICD-10-CM

## 2022-11-01 DIAGNOSIS — E11.65 TYPE 2 DIABETES MELLITUS WITH HYPERGLYCEMIA, WITHOUT LONG-TERM CURRENT USE OF INSULIN: Chronic | ICD-10-CM

## 2022-11-01 PROCEDURE — 90686 IIV4 VACC NO PRSV 0.5 ML IM: CPT | Performed by: PHYSICIAN ASSISTANT

## 2022-11-01 PROCEDURE — 99214 OFFICE O/P EST MOD 30 MIN: CPT | Performed by: PHYSICIAN ASSISTANT

## 2022-11-01 PROCEDURE — 90471 IMMUNIZATION ADMIN: CPT | Performed by: PHYSICIAN ASSISTANT

## 2022-11-01 PROCEDURE — 96372 THER/PROPH/DIAG INJ SC/IM: CPT | Performed by: PHYSICIAN ASSISTANT

## 2022-11-01 RX ORDER — DEXAMETHASONE SODIUM PHOSPHATE 4 MG/ML
4 INJECTION, SOLUTION INTRA-ARTICULAR; INTRALESIONAL; INTRAMUSCULAR; INTRAVENOUS; SOFT TISSUE ONCE
Status: COMPLETED | OUTPATIENT
Start: 2022-11-01 | End: 2022-11-01

## 2022-11-01 RX ORDER — DEXAMETHASONE SODIUM PHOSPHATE 4 MG/ML
8 INJECTION, SOLUTION INTRA-ARTICULAR; INTRALESIONAL; INTRAMUSCULAR; INTRAVENOUS; SOFT TISSUE ONCE
Status: DISCONTINUED | OUTPATIENT
Start: 2022-11-01 | End: 2022-11-01

## 2022-11-01 RX ADMIN — DEXAMETHASONE SODIUM PHOSPHATE 4 MG: 4 INJECTION, SOLUTION INTRA-ARTICULAR; INTRALESIONAL; INTRAMUSCULAR; INTRAVENOUS; SOFT TISSUE at 09:53

## 2022-11-01 NOTE — PROGRESS NOTES
"Subjective   Anika Alegria is a 42 y.o. female.       Chief Complaint -lost her voice    History of Present Illness -    ROS    Lost her voice-  Patient reports hoarseness that began upon awakening 4 days ago.  She does report associated postnasal drainage.  She denies any nausea vomiting diarrhea fever cough or sore throat.    Diabetes mellitus type 2-  Stable with Farxiga, metformin and low carbohydrate diet    Gastroesophageal reflux disease-stable with pantoprazole and dietary modification    Hypertension-  Controlled with spironolactone and losartan    The following portions of the patient's history were reviewed and updated as appropriate: allergies, current medications, past family history, past medical history, past social history, past surgical history and problem list.    Review of Systems    Objective  Vital signs:  Pulse 79   Temp 98.6 °F (37 °C) (Temporal)   Ht 167.6 cm (66\")   Wt (!) 162 kg (358 lb)   LMP  (LMP Unknown)   SpO2 96%   BMI 57.78 kg/m²     Physical Exam  Vitals and nursing note reviewed.   Constitutional:       General: She is not in acute distress.     Appearance: Normal appearance. She is well-developed. She is not diaphoretic.   HENT:      Head: Normocephalic and atraumatic.      Right Ear: Tympanic membrane, ear canal and external ear normal.      Left Ear: Tympanic membrane, ear canal and external ear normal.      Nose: Rhinorrhea present.      Mouth/Throat:      Mouth: Mucous membranes are moist.      Pharynx: No oropharyngeal exudate or posterior oropharyngeal erythema.   Neck:      Thyroid: No thyromegaly.   Cardiovascular:      Rate and Rhythm: Normal rate and regular rhythm.      Heart sounds: Normal heart sounds. No murmur heard.  Pulmonary:      Effort: Pulmonary effort is normal.      Breath sounds: Normal breath sounds. No wheezing or rales.   Musculoskeletal:      Cervical back: Normal range of motion and neck supple.   Lymphadenopathy:      Cervical: No cervical " adenopathy.   Skin:     General: Skin is warm and dry.      Findings: No rash.   Neurological:      Mental Status: She is alert and oriented to person, place, and time.   Psychiatric:         Mood and Affect: Mood normal.         Behavior: Behavior normal.         Thought Content: Thought content normal.         The following data was reviewed by: KALI Armstrong on 11/01/2022:  CMP    CMP 1/4/22 7/6/22 7/27/22   Glucose 159 (A) 169 (A) 138 (A)   BUN 9 6 10   Creatinine 0.58 0.50 (A) 0.56 (A)   eGFR Non African Am 114     Sodium 138 139 138   Potassium 3.9 3.7 4.1   Chloride 101 103 102   Calcium 9.5 8.6 8.9   Albumin 4.20 3.84    Total Bilirubin 0.6 0.6    Alkaline Phosphatase 106 94    AST (SGOT) 37 (A) 37 (A)    ALT (SGPT) 57 (A) 49 (A)    (A) Abnormal value            Lipid Panel    Lipid Panel 1/4/22 7/27/22   Total Cholesterol 182 164   Triglycerides 137 181 (A)   HDL Cholesterol 52 46   VLDL Cholesterol 24 31   LDL Cholesterol  106 (A) 87   LDL/HDL Ratio 1.97 1.78   (A) Abnormal value            TSH    TSH 1/4/22   TSH 1.270           Most Recent A1C    HGBA1C Most Recent 9/16/22   Hemoglobin A1C 7.1                  Assessment & Plan     Diagnoses and all orders for this visit:    1. Laryngitis (Primary)  Comments:  Advised conservative measures including drinking warm liquids  Continue Zyrtec to minimize PN drainage  Orders:  -     dexamethasone (DECADRON) injection 4 mg    2. Encounter for immunization  -     FluLaval/Fluzone >6 mos ([id number])    3. Type 2 diabetes mellitus with hyperglycemia, without long-term current use of insulin (HCC)  Comments:  Continue Farxiga and metformin  Advise low carbohydrate diabetic diet    4. Gastroesophageal reflux disease without esophagitis  Comments:  Continue pantoprazole  Advised to avoid known trigger foods    5. Essential hypertension  Comments:  Continue spironolactone and losartan            Patient was given instructions and counseling regarding his  condition or for health maintenance advice. Please see specific information pulled into the AVS if appropriate      This document has been electronically signed by:  Rosa Quigley PA-C

## 2022-11-28 RX ORDER — OSELTAMIVIR PHOSPHATE 75 MG/1
75 CAPSULE ORAL 2 TIMES DAILY
Qty: 10 CAPSULE | Refills: 0 | Status: SHIPPED | OUTPATIENT
Start: 2022-11-28 | End: 2022-12-19

## 2022-12-06 DIAGNOSIS — J45.20 MILD INTERMITTENT ASTHMA WITHOUT COMPLICATION: Chronic | ICD-10-CM

## 2022-12-06 DIAGNOSIS — L65.9 HAIR LOSS: ICD-10-CM

## 2022-12-08 RX ORDER — KETOCONAZOLE 20 MG/ML
SHAMPOO TOPICAL
Qty: 120 ML | Refills: 0 | Status: SHIPPED | OUTPATIENT
Start: 2022-12-08 | End: 2023-02-16

## 2022-12-08 RX ORDER — MONTELUKAST SODIUM 10 MG/1
TABLET ORAL
Qty: 30 TABLET | Refills: 0 | Status: SHIPPED | OUTPATIENT
Start: 2022-12-08

## 2022-12-16 ENCOUNTER — LAB (OUTPATIENT)
Dept: FAMILY MEDICINE CLINIC | Facility: CLINIC | Age: 42
End: 2022-12-16

## 2022-12-16 DIAGNOSIS — E78.2 MIXED HYPERLIPIDEMIA: Chronic | ICD-10-CM

## 2022-12-16 DIAGNOSIS — E11.65 TYPE 2 DIABETES MELLITUS WITH HYPERGLYCEMIA, WITHOUT LONG-TERM CURRENT USE OF INSULIN: ICD-10-CM

## 2022-12-16 DIAGNOSIS — F33.1 MODERATE EPISODE OF RECURRENT MAJOR DEPRESSIVE DISORDER: Chronic | ICD-10-CM

## 2022-12-16 DIAGNOSIS — E03.9 ACQUIRED HYPOTHYROIDISM: ICD-10-CM

## 2022-12-16 DIAGNOSIS — E55.9 VITAMIN D DEFICIENCY: ICD-10-CM

## 2022-12-16 LAB
25(OH)D3 SERPL-MCNC: 22 NG/ML (ref 30–100)
ALBUMIN SERPL-MCNC: 3.7 G/DL (ref 3.5–5.2)
ALBUMIN/GLOB SERPL: 1.1 G/DL
ALP SERPL-CCNC: 87 U/L (ref 39–117)
ALT SERPL W P-5'-P-CCNC: 38 U/L (ref 1–33)
ANION GAP SERPL CALCULATED.3IONS-SCNC: 7.4 MMOL/L (ref 5–15)
AST SERPL-CCNC: 31 U/L (ref 1–32)
BILIRUB SERPL-MCNC: 0.6 MG/DL (ref 0–1.2)
BUN SERPL-MCNC: 9 MG/DL (ref 6–20)
BUN/CREAT SERPL: 15.3 (ref 7–25)
CALCIUM SPEC-SCNC: 9 MG/DL (ref 8.6–10.5)
CHLORIDE SERPL-SCNC: 103 MMOL/L (ref 98–107)
CHOLEST SERPL-MCNC: 163 MG/DL (ref 0–200)
CO2 SERPL-SCNC: 28.6 MMOL/L (ref 22–29)
CREAT SERPL-MCNC: 0.59 MG/DL (ref 0.57–1)
EGFRCR SERPLBLD CKD-EPI 2021: 114.8 ML/MIN/1.73
GLOBULIN UR ELPH-MCNC: 3.4 GM/DL
GLUCOSE SERPL-MCNC: 126 MG/DL (ref 65–99)
HBA1C MFR BLD: 7.5 % (ref 4.8–5.6)
HDLC SERPL-MCNC: 43 MG/DL (ref 40–60)
LDLC SERPL CALC-MCNC: 94 MG/DL (ref 0–100)
LDLC/HDLC SERPL: 2.09 {RATIO}
POTASSIUM SERPL-SCNC: 3.9 MMOL/L (ref 3.5–5.2)
PROT SERPL-MCNC: 7.1 G/DL (ref 6–8.5)
SODIUM SERPL-SCNC: 139 MMOL/L (ref 136–145)
T4 FREE SERPL-MCNC: 0.97 NG/DL (ref 0.93–1.7)
TRIGL SERPL-MCNC: 151 MG/DL (ref 0–150)
TSH SERPL DL<=0.05 MIU/L-ACNC: 0.92 UIU/ML (ref 0.27–4.2)
VLDLC SERPL-MCNC: 26 MG/DL (ref 5–40)

## 2022-12-16 PROCEDURE — 80061 LIPID PANEL: CPT | Performed by: PHYSICIAN ASSISTANT

## 2022-12-16 PROCEDURE — 83036 HEMOGLOBIN GLYCOSYLATED A1C: CPT | Performed by: PHYSICIAN ASSISTANT

## 2022-12-16 PROCEDURE — 80053 COMPREHEN METABOLIC PANEL: CPT | Performed by: PHYSICIAN ASSISTANT

## 2022-12-16 PROCEDURE — 84439 ASSAY OF FREE THYROXINE: CPT | Performed by: PHYSICIAN ASSISTANT

## 2022-12-16 PROCEDURE — 82306 VITAMIN D 25 HYDROXY: CPT | Performed by: PHYSICIAN ASSISTANT

## 2022-12-16 PROCEDURE — 84443 ASSAY THYROID STIM HORMONE: CPT | Performed by: PHYSICIAN ASSISTANT

## 2022-12-19 ENCOUNTER — OFFICE VISIT (OUTPATIENT)
Dept: FAMILY MEDICINE CLINIC | Facility: CLINIC | Age: 42
End: 2022-12-19

## 2022-12-19 VITALS
TEMPERATURE: 98.1 F | SYSTOLIC BLOOD PRESSURE: 152 MMHG | DIASTOLIC BLOOD PRESSURE: 96 MMHG | WEIGHT: 293 LBS | HEART RATE: 84 BPM | HEIGHT: 66 IN | OXYGEN SATURATION: 99 % | BODY MASS INDEX: 47.09 KG/M2

## 2022-12-19 DIAGNOSIS — H65.03 NON-RECURRENT ACUTE SEROUS OTITIS MEDIA OF BOTH EARS: Primary | ICD-10-CM

## 2022-12-19 DIAGNOSIS — I10 ESSENTIAL HYPERTENSION: Chronic | ICD-10-CM

## 2022-12-19 DIAGNOSIS — E11.65 TYPE 2 DIABETES MELLITUS WITH HYPERGLYCEMIA, WITHOUT LONG-TERM CURRENT USE OF INSULIN: Primary | ICD-10-CM

## 2022-12-19 DIAGNOSIS — E11.65 TYPE 2 DIABETES MELLITUS WITH HYPERGLYCEMIA, WITHOUT LONG-TERM CURRENT USE OF INSULIN: Chronic | ICD-10-CM

## 2022-12-19 DIAGNOSIS — E78.2 MIXED HYPERLIPIDEMIA: Chronic | ICD-10-CM

## 2022-12-19 PROCEDURE — 99214 OFFICE O/P EST MOD 30 MIN: CPT | Performed by: PHYSICIAN ASSISTANT

## 2022-12-19 RX ORDER — FLUCONAZOLE 150 MG/1
150 TABLET ORAL ONCE
Qty: 1 TABLET | Refills: 0 | Status: SHIPPED | OUTPATIENT
Start: 2022-12-19 | End: 2022-12-19

## 2022-12-19 RX ORDER — TIRZEPATIDE 2.5 MG/.5ML
2.5 INJECTION, SOLUTION SUBCUTANEOUS
Qty: 2 ML | Refills: 2 | Status: SHIPPED | OUTPATIENT
Start: 2022-12-19 | End: 2023-01-19

## 2022-12-19 RX ORDER — DULOXETIN HYDROCHLORIDE 30 MG/1
90 CAPSULE, DELAYED RELEASE ORAL DAILY
COMMUNITY
Start: 2022-12-07 | End: 2022-12-19 | Stop reason: SDUPTHER

## 2022-12-19 RX ORDER — SEMAGLUTIDE 1.34 MG/ML
0.25 INJECTION, SOLUTION SUBCUTANEOUS WEEKLY
Qty: 1.5 ML | Refills: 2 | Status: SHIPPED | OUTPATIENT
Start: 2022-12-19 | End: 2022-12-19

## 2022-12-19 RX ORDER — AMOXICILLIN AND CLAVULANATE POTASSIUM 875; 125 MG/1; MG/1
1 TABLET, FILM COATED ORAL 2 TIMES DAILY
Qty: 20 TABLET | Refills: 0 | Status: SHIPPED | OUTPATIENT
Start: 2022-12-19 | End: 2023-01-10

## 2022-12-19 NOTE — PROGRESS NOTES
"Subjective   Anika Alegria is a 42 y.o. female.       Chief Complaint -earache    History of Present Illness -    ROS    Ear ache-  Patient complains of bilateral moderate pressure pain in her ears with associated headache and sinus congestion for the past week.  Minimal relief with Zyrtec.    Diabetes mellitus type 2-  Not at goal with current diet and exercise.  Patient states that she has been watching her diet to do more of a low carbohydrate diet recently with minimal effect.  She does continue to use Farxiga and metformin.    Hypertension-  Likely elevated today due to acute illness and pain.  Patient reports blood pressure at home is usually less than 130/80 with the use of losartan    Hyperlipidemia-stable with atorvastatin and low-cholesterol diet    The following portions of the patient's history were reviewed and updated as appropriate: allergies, current medications, past family history, past medical history, past social history, past surgical history and problem list.    Review of Systems    Objective  Vital signs:  /96   Pulse 84   Temp 98.1 °F (36.7 °C) (Temporal)   Ht 167.6 cm (66\")   Wt (!) 164 kg (362 lb)   LMP  (LMP Unknown)   SpO2 99%   BMI 58.43 kg/m²     Physical Exam  Vitals and nursing note reviewed.   Constitutional:       General: She is not in acute distress.     Appearance: Normal appearance. She is well-developed. She is not diaphoretic.   HENT:      Head: Normocephalic and atraumatic.      Right Ear: Ear canal and external ear normal.      Left Ear: Ear canal and external ear normal.      Ears:      Comments: Bilateral TMs erythematous and bulging with clear fluid behind     Nose: Congestion present.      Mouth/Throat:      Mouth: Mucous membranes are moist.      Pharynx: Posterior oropharyngeal erythema present. No oropharyngeal exudate.   Eyes:      Extraocular Movements: Extraocular movements intact.      Conjunctiva/sclera: Conjunctivae normal.   Neck:      Thyroid: No " thyromegaly.   Cardiovascular:      Rate and Rhythm: Normal rate and regular rhythm.      Heart sounds: Normal heart sounds. No murmur heard.  Pulmonary:      Effort: Pulmonary effort is normal. No respiratory distress.      Breath sounds: Normal breath sounds. No wheezing or rales.   Musculoskeletal:         General: No tenderness.      Cervical back: Normal range of motion and neck supple.   Lymphadenopathy:      Cervical: Cervical adenopathy present.   Skin:     General: Skin is warm and dry.      Findings: No rash.   Neurological:      Mental Status: She is alert and oriented to person, place, and time.   Psychiatric:         Mood and Affect: Mood normal.         Behavior: Behavior normal.         Thought Content: Thought content normal.         The following data was reviewed by: KALI Armstrong on 12/19/2022:  CMP    CMP 7/6/22 7/27/22 12/16/22   Glucose 169 (A) 138 (A) 126 (A)   BUN 6 10 9   Creatinine 0.50 (A) 0.56 (A) 0.59   Sodium 139 138 139   Potassium 3.7 4.1 3.9   Chloride 103 102 103   Calcium 8.6 8.9 9.0   Albumin 3.84  3.70   Total Bilirubin 0.6  0.6   Alkaline Phosphatase 94  87   AST (SGOT) 37 (A)  31   ALT (SGPT) 49 (A)  38 (A)   (A) Abnormal value            CBC w/diff    CBC w/Diff 7/6/22   WBC 8.08   RBC 4.65   Hemoglobin 14.1   Hematocrit 42.6   MCV 91.6   MCH 30.3   MCHC 33.1   RDW 13.2   Platelets 170   Neutrophil Rel % 50.7   Immature Granulocyte Rel % 0.4   Lymphocyte Rel % 37.6   Monocyte Rel % 8.8   Eosinophil Rel % 1.9   Basophil Rel % 0.6           Lipid Panel    Lipid Panel 1/4/22 7/27/22 12/16/22   Total Cholesterol 182 164 163   Triglycerides 137 181 (A) 151 (A)   HDL Cholesterol 52 46 43   VLDL Cholesterol 24 31 26   LDL Cholesterol  106 (A) 87 94   LDL/HDL Ratio 1.97 1.78 2.09   (A) Abnormal value            TSH    TSH 1/4/22 12/16/22   TSH 1.270 0.923           Most Recent A1C    HGBA1C Most Recent 12/16/22   Hemoglobin A1C 7.50 (A)   (A) Abnormal value                    Assessment & Plan     Diagnoses and all orders for this visit:    1. Non-recurrent acute serous otitis media of both ears (Primary)  Comments:  Start Augmentin  Conservative measures advised  RTC if symptoms not improved within 1 week    2. Type 2 diabetes mellitus with hyperglycemia, without long-term current use of insulin (HCC)  Comments:  Start Ozempic 0.25 mg weekly as this should help with glucose control as well as weight management  Advise low-carb diet  Orders:  -     Semaglutide,0.25 or 0.5MG/DOS, (Ozempic, 0.25 or 0.5 MG/DOSE,) 2 MG/1.5ML solution pen-injector; Inject 0.25 mg under the skin into the appropriate area as directed 1 (One) Time Per Week.  Dispense: 1.5 mL; Refill: 2  -     amoxicillin-clavulanate (Augmentin) 875-125 MG per tablet; Take 1 tablet by mouth 2 (Two) Times a Day.  Dispense: 20 tablet; Refill: 0    3. Essential hypertension  Comments:  Continue losartan  Likely elevated today due to acute illness and pain  Advise daily BP monitoring and report any consistent readings greater than 130/80    4. Mixed hyperlipidemia  Comments:  Continue atorvastatin  Advise low-cholesterol diet    Other orders  -     fluconazole (Diflucan) 150 MG tablet; Take 1 tablet by mouth 1 (One) Time for 1 dose.  Dispense: 1 tablet; Refill: 0            Patient was given instructions and counseling regarding his condition or for health maintenance advice. Please see specific information pulled into the AVS if appropriate      This document has been electronically signed by:  Rosa Quigley PA-C

## 2022-12-28 ENCOUNTER — TELEPHONE (OUTPATIENT)
Dept: FAMILY MEDICINE CLINIC | Facility: CLINIC | Age: 42
End: 2022-12-28

## 2022-12-28 NOTE — TELEPHONE ENCOUNTER
Caller: Anika Alegria    Relationship: Self    Best call back number: [phone]     What was the call regarding: PATIENT CALLED TO CHECK THE STATUS OF THE PRIOR AUTHORIZATION FOR Tirzepatide (Mounjaro) 2.5 MG/0.5ML solution pen-injector    Do you require a callback: YES

## 2022-12-30 ENCOUNTER — TELEPHONE (OUTPATIENT)
Dept: FAMILY MEDICINE CLINIC | Facility: CLINIC | Age: 42
End: 2022-12-30

## 2022-12-30 NOTE — TELEPHONE ENCOUNTER
Spoke to patient about other medication options per Rosa Quigley PA-C. Patient has chosen to switch to ozempic, with the indication the pharmacy has it in stock.        ----- Message from Betty Sumner MA sent at 12/30/2022 10:07 AM EST -----  Regarding: FW: Ozempic  Contact: [phone]    ----- Message -----  From: Anika Alegria  Sent: 12/30/2022   9:49 AM EST  To: [facility]  Subject: Ozempic                                          Is there any word on the PA?

## 2023-01-03 ENCOUNTER — TELEPHONE (OUTPATIENT)
Dept: FAMILY MEDICINE CLINIC | Facility: CLINIC | Age: 43
End: 2023-01-03

## 2023-01-03 NOTE — TELEPHONE ENCOUNTER
----- Message from Gabby Johnson MA sent at 1/3/2023  7:51 AM EST -----  Regarding: FW: Gaurav  Contact: [phone]    ----- Message -----  From: Anika Alegria  Sent: 1/2/2023  12:38 AM EST  To: [facility]  Subject: Ozempic                                          Is there any way I can be squeezed in to see Rosa this week. Not sure if you’re open Monday but my bursitis is in full swing in my right shoulder and I am have extreme pain. I can come any time on Tuesday if you’re not open Monday.

## 2023-01-10 ENCOUNTER — OFFICE VISIT (OUTPATIENT)
Dept: FAMILY MEDICINE CLINIC | Facility: CLINIC | Age: 43
End: 2023-01-10
Payer: COMMERCIAL

## 2023-01-10 VITALS
DIASTOLIC BLOOD PRESSURE: 82 MMHG | HEART RATE: 72 BPM | HEIGHT: 66 IN | OXYGEN SATURATION: 96 % | SYSTOLIC BLOOD PRESSURE: 122 MMHG | TEMPERATURE: 98.4 F | BODY MASS INDEX: 47.09 KG/M2 | WEIGHT: 293 LBS

## 2023-01-10 DIAGNOSIS — I10 ESSENTIAL HYPERTENSION: Chronic | ICD-10-CM

## 2023-01-10 DIAGNOSIS — E78.2 MIXED HYPERLIPIDEMIA: Chronic | ICD-10-CM

## 2023-01-10 DIAGNOSIS — H68.013 EUSTACHIAN SALPINGITIS, ACUTE, BILATERAL: Primary | ICD-10-CM

## 2023-01-10 PROCEDURE — 99214 OFFICE O/P EST MOD 30 MIN: CPT | Performed by: PHYSICIAN ASSISTANT

## 2023-01-10 RX ORDER — MOMETASONE FUROATE 50 UG/1
2 SPRAY, METERED NASAL DAILY
Qty: 17 G | Refills: 5 | Status: SHIPPED | OUTPATIENT
Start: 2023-01-10

## 2023-01-10 RX ORDER — DEXAMETHASONE SODIUM PHOSPHATE 4 MG/ML
8 INJECTION, SOLUTION INTRA-ARTICULAR; INTRALESIONAL; INTRAMUSCULAR; INTRAVENOUS; SOFT TISSUE ONCE
Status: DISCONTINUED | OUTPATIENT
Start: 2023-01-10 | End: 2023-01-19

## 2023-01-10 RX ORDER — SEMAGLUTIDE 1.34 MG/ML
INJECTION, SOLUTION SUBCUTANEOUS
COMMUNITY
Start: 2022-12-30 | End: 2023-02-23 | Stop reason: SDUPTHER

## 2023-01-10 NOTE — PROGRESS NOTES
Sravanthi Alegria is a 42 y.o. female.       Chief Complaint -\"ears feel full\"    History of Present Illness -    ROS    \"Ears feel full\"-  Patient complains that her ears feel full with intermittent dizziness for the past week.  She was previously diagnosed with otitis media and has finished a 10-day prescription of Augmentin with some relief of pain but the feeling of fullness has persisted.    Hypertension-  Controlled with losartan and spironolactone along with dietary modification    Hyperlipidemia-stable with atorvastatin and low-cholesterol diet    The following portions of the patient's history were reviewed and updated as appropriate: allergies, current medications, past family history, past medical history, past social history, past surgical history and problem list.    Review of Systems    Objective  Vital signs:  /82 Comment: rechecked  Pulse 72   Temp 98.4 °F (36.9 °C)   Ht 167.6 cm (66\")   Wt (!) 164 kg (362 lb 6.4 oz)   LMP  (LMP Unknown)   SpO2 96%   BMI 58.49 kg/m²     Physical Exam  Vitals and nursing note reviewed.   Constitutional:       General: She is not in acute distress.     Appearance: Normal appearance. She is well-developed. She is not diaphoretic.   HENT:      Head: Normocephalic and atraumatic.      Right Ear: Ear canal and external ear normal.      Left Ear: Ear canal and external ear normal.      Ears:      Comments: Clear fluid noted behind TMs bilaterally without erythema or bulging     Nose: Nose normal.      Mouth/Throat:      Mouth: Mucous membranes are moist.      Pharynx: No oropharyngeal exudate or posterior oropharyngeal erythema.   Neck:      Thyroid: No thyromegaly.   Cardiovascular:      Rate and Rhythm: Normal rate and regular rhythm.      Heart sounds: Normal heart sounds. No murmur heard.  Pulmonary:      Effort: Pulmonary effort is normal.      Breath sounds: Normal breath sounds. No wheezing or rales.   Musculoskeletal:      Cervical back: Normal  range of motion and neck supple.   Lymphadenopathy:      Cervical: No cervical adenopathy.   Skin:     General: Skin is warm and dry.      Findings: No rash.   Neurological:      Mental Status: She is alert and oriented to person, place, and time.   Psychiatric:         Mood and Affect: Mood normal.         Behavior: Behavior normal.         Thought Content: Thought content normal.         The following data was reviewed by: KALI Armstrong on 01/10/2023:  CMP    CMP 7/6/22 7/27/22 12/16/22   Glucose 169 (A) 138 (A) 126 (A)   BUN 6 10 9   Creatinine 0.50 (A) 0.56 (A) 0.59   eGFR 119.5 116.3 114.8   Sodium 139 138 139   Potassium 3.7 4.1 3.9   Chloride 103 102 103   Calcium 8.6 8.9 9.0   Total Protein 7.1  7.1   Albumin 3.84  3.70   Globulin 3.3  3.4   Total Bilirubin 0.6  0.6   Alkaline Phosphatase 94  87   AST (SGOT) 37 (A)  31   ALT (SGPT) 49 (A)  38 (A)   Albumin/Globulin Ratio 1.2  1.1   BUN/Creatinine Ratio 12.0 17.9 15.3   Anion Gap 11.7 12.1 7.4   (A) Abnormal value       Comments are available for some flowsheets but are not being displayed.           CBC w/diff    CBC w/Diff 7/6/22   WBC 8.08   RBC 4.65   Hemoglobin 14.1   Hematocrit 42.6   MCV 91.6   MCH 30.3   MCHC 33.1   RDW 13.2   Platelets 170   Neutrophil Rel % 50.7   Immature Granulocyte Rel % 0.4   Lymphocyte Rel % 37.6   Monocyte Rel % 8.8   Eosinophil Rel % 1.9   Basophil Rel % 0.6           Lipid Panel    Lipid Panel 7/27/22 12/16/22   Total Cholesterol 164 163   Triglycerides 181 (A) 151 (A)   HDL Cholesterol 46 43   VLDL Cholesterol 31 26   LDL Cholesterol  87 94   LDL/HDL Ratio 1.78 2.09   (A) Abnormal value            TSH    TSH 12/16/22   TSH 0.923           Most Recent A1C    HGBA1C Most Recent 12/16/22   Hemoglobin A1C 7.50 (A)   (A) Abnormal value                   Assessment & Plan     Diagnoses and all orders for this visit:    1. Eustachian salpingitis, acute, bilateral (Primary)  Comments:  Discontinue Flonase  Start  Nasonex  Advised to avoid known seasonal allergens when possible  Fall risk precautions advised  Orders:  -     mometasone (Nasonex) 50 MCG/ACT nasal spray; 2 sprays into the nostril(s) as directed by provider Daily.  Dispense: 17 g; Refill: 5  -     dexamethasone (DECADRON) injection 8 mg    2. Mixed hyperlipidemia  Comments:  Continue atorvastatin  Advised low-cholesterol diet    3. Essential hypertension  Comments:  Continue spironolactone and losartan  Advise daily BP monitoring and report any consistent readings greater than 130/80            Patient was given instructions and counseling regarding his condition or for health maintenance advice. Please see specific information pulled into the AVS if appropriate      This document has been electronically signed by:  Rosa Quigley PA-C

## 2023-01-19 ENCOUNTER — OFFICE VISIT (OUTPATIENT)
Dept: FAMILY MEDICINE CLINIC | Facility: CLINIC | Age: 43
End: 2023-01-19
Payer: COMMERCIAL

## 2023-01-19 VITALS
DIASTOLIC BLOOD PRESSURE: 88 MMHG | HEIGHT: 66 IN | OXYGEN SATURATION: 97 % | HEART RATE: 94 BPM | BODY MASS INDEX: 47.09 KG/M2 | SYSTOLIC BLOOD PRESSURE: 132 MMHG | WEIGHT: 293 LBS | TEMPERATURE: 98.3 F

## 2023-01-19 DIAGNOSIS — E11.65 TYPE 2 DIABETES MELLITUS WITH HYPERGLYCEMIA, WITHOUT LONG-TERM CURRENT USE OF INSULIN: Chronic | ICD-10-CM

## 2023-01-19 DIAGNOSIS — E04.9 GOITER: Primary | Chronic | ICD-10-CM

## 2023-01-19 DIAGNOSIS — I10 ESSENTIAL HYPERTENSION: Chronic | ICD-10-CM

## 2023-01-19 DIAGNOSIS — E78.2 MIXED HYPERLIPIDEMIA: Chronic | ICD-10-CM

## 2023-01-19 PROCEDURE — 99214 OFFICE O/P EST MOD 30 MIN: CPT | Performed by: PHYSICIAN ASSISTANT

## 2023-01-19 NOTE — PROGRESS NOTES
"Subjective   Anika Alegria is a 42 y.o. female.       Chief Complaint -dysphagia    History of Present Illness -    ROS    Dysphagia-  Patient complains of dysphagia with eating large bites of food.  She states that she has to cut her food or chew it well prior to swallowing.  Patient has had a goiter for several years but feels that it is getting larger.  She denies any shortness of breath.    Hypertension-  Stable with losartan and spironolactone    Hyperlipidemia-  Stable with atorvastatin and low-cholesterol diet    Diabetes mellitus type 2-  Stable with Ozempic 0.25 mg weekly, Farxiga and metformin.  Patient reports significant nausea since she started the Ozempic but states is it is tolerable and she would like to stay on the medication.    The following portions of the patient's history were reviewed and updated as appropriate: allergies, current medications, past family history, past medical history, past social history, past surgical history and problem list.    Review of Systems    Objective  Vital signs:  /88   Pulse 94   Temp 98.3 °F (36.8 °C) (Temporal)   Ht 167.6 cm (66\")   Wt (!) 165 kg (364 lb)   LMP  (LMP Unknown)   SpO2 97%   BMI 58.75 kg/m²     Physical Exam  Vitals and nursing note reviewed.   Constitutional:       Appearance: Normal appearance. She is well-developed.   Eyes:      Extraocular Movements: Extraocular movements intact.      Conjunctiva/sclera: Conjunctivae normal.   Neck:      Comments: Large goiter noted bilaterally anterior neck  Cardiovascular:      Rate and Rhythm: Normal rate and regular rhythm.      Heart sounds: Normal heart sounds. No murmur heard.  Pulmonary:      Effort: Pulmonary effort is normal. No respiratory distress.      Breath sounds: Normal breath sounds. No wheezing.   Musculoskeletal:         General: No tenderness.      Cervical back: No tenderness.   Lymphadenopathy:      Cervical: No cervical adenopathy.   Skin:     General: Skin is warm and dry. "      Findings: No rash.   Neurological:      Mental Status: She is alert and oriented to person, place, and time.   Psychiatric:         Mood and Affect: Mood normal.         Behavior: Behavior normal.         Thought Content: Thought content normal.         The following data was reviewed by: KALI Armstrong on 01/19/2023:  CMP    CMP 7/6/22 7/27/22 12/16/22   Glucose 169 (A) 138 (A) 126 (A)   BUN 6 10 9   Creatinine 0.50 (A) 0.56 (A) 0.59   eGFR 119.5 116.3 114.8   Sodium 139 138 139   Potassium 3.7 4.1 3.9   Chloride 103 102 103   Calcium 8.6 8.9 9.0   Total Protein 7.1  7.1   Albumin 3.84  3.70   Globulin 3.3  3.4   Total Bilirubin 0.6  0.6   Alkaline Phosphatase 94  87   AST (SGOT) 37 (A)  31   ALT (SGPT) 49 (A)  38 (A)   Albumin/Globulin Ratio 1.2  1.1   BUN/Creatinine Ratio 12.0 17.9 15.3   Anion Gap 11.7 12.1 7.4   (A) Abnormal value       Comments are available for some flowsheets but are not being displayed.           CBC w/diff    CBC w/Diff 7/6/22   WBC 8.08   RBC 4.65   Hemoglobin 14.1   Hematocrit 42.6   MCV 91.6   MCH 30.3   MCHC 33.1   RDW 13.2   Platelets 170   Neutrophil Rel % 50.7   Immature Granulocyte Rel % 0.4   Lymphocyte Rel % 37.6   Monocyte Rel % 8.8   Eosinophil Rel % 1.9   Basophil Rel % 0.6           Lipid Panel    Lipid Panel 7/27/22 12/16/22   Total Cholesterol 164 163   Triglycerides 181 (A) 151 (A)   HDL Cholesterol 46 43   VLDL Cholesterol 31 26   LDL Cholesterol  87 94   LDL/HDL Ratio 1.78 2.09   (A) Abnormal value            TSH    TSH 12/16/22   TSH 0.923           Most Recent A1C    HGBA1C Most Recent 12/16/22   Hemoglobin A1C 7.50 (A)   (A) Abnormal value                   Assessment & Plan     Diagnoses and all orders for this visit:    1. Goiter (Primary)  Comments:  Large goiter likely causing dysphagia  Ordered ultrasound for further evaluation  Discussed treatment options including excision  Orders:  -     US Thyroid; Future    2. Essential  hypertension  Comments:  Continue losartan and spironolactone  Continue to monitor    3. Mixed hyperlipidemia  Comments:  Advised low-cholesterol diet  Continue atorvastatin    4. Type 2 diabetes mellitus with hyperglycemia, without long-term current use of insulin (HCC)  Comments:  Continue Ozempic 0.25 weekly  Continue metformin and Farxiga  Advised a low carbohydrate diabetic diet            Patient was given instructions and counseling regarding his condition or for health maintenance advice. Please see specific information pulled into the AVS if appropriate      This document has been electronically signed by:  Rosa Quigley PA-C

## 2023-01-29 DIAGNOSIS — J45.20 MILD INTERMITTENT ASTHMA, UNSPECIFIED WHETHER COMPLICATED: ICD-10-CM

## 2023-01-30 RX ORDER — ALBUTEROL SULFATE 90 UG/1
AEROSOL, METERED RESPIRATORY (INHALATION)
Qty: 18 G | Refills: 0 | Status: SHIPPED | OUTPATIENT
Start: 2023-01-30

## 2023-02-01 ENCOUNTER — HOSPITAL ENCOUNTER (OUTPATIENT)
Dept: ULTRASOUND IMAGING | Facility: HOSPITAL | Age: 43
Discharge: HOME | End: 2023-02-01
Admitting: PHYSICIAN ASSISTANT
Payer: COMMERCIAL

## 2023-02-01 DIAGNOSIS — E04.9 GOITER: Chronic | ICD-10-CM

## 2023-02-01 PROCEDURE — 76536 US EXAM OF HEAD AND NECK: CPT

## 2023-02-01 PROCEDURE — 76536 US EXAM OF HEAD AND NECK: CPT | Performed by: RADIOLOGY

## 2023-02-16 DIAGNOSIS — I87.2 PERIPHERAL VENOUS INSUFFICIENCY: Chronic | ICD-10-CM

## 2023-02-16 DIAGNOSIS — L65.9 HAIR LOSS: ICD-10-CM

## 2023-02-16 RX ORDER — KETOCONAZOLE 20 MG/ML
SHAMPOO TOPICAL
Qty: 120 ML | Refills: 0 | Status: SHIPPED | OUTPATIENT
Start: 2023-02-16

## 2023-02-16 RX ORDER — SPIRONOLACTONE 25 MG/1
TABLET ORAL
Qty: 45 TABLET | Refills: 0 | Status: SHIPPED | OUTPATIENT
Start: 2023-02-16

## 2023-02-23 ENCOUNTER — OFFICE VISIT (OUTPATIENT)
Dept: FAMILY MEDICINE CLINIC | Facility: CLINIC | Age: 43
End: 2023-02-23
Payer: COMMERCIAL

## 2023-02-23 VITALS
SYSTOLIC BLOOD PRESSURE: 132 MMHG | HEIGHT: 66 IN | HEART RATE: 84 BPM | OXYGEN SATURATION: 98 % | TEMPERATURE: 97.3 F | WEIGHT: 293 LBS | BODY MASS INDEX: 47.09 KG/M2 | DIASTOLIC BLOOD PRESSURE: 84 MMHG

## 2023-02-23 DIAGNOSIS — E11.65 TYPE 2 DIABETES MELLITUS WITH HYPERGLYCEMIA, WITHOUT LONG-TERM CURRENT USE OF INSULIN: Primary | Chronic | ICD-10-CM

## 2023-02-23 DIAGNOSIS — E66.01 MORBID (SEVERE) OBESITY DUE TO EXCESS CALORIES: Chronic | ICD-10-CM

## 2023-02-23 DIAGNOSIS — I10 ESSENTIAL HYPERTENSION: Chronic | ICD-10-CM

## 2023-02-23 DIAGNOSIS — E04.1 NONTOXIC UNINODULAR GOITER: Chronic | ICD-10-CM

## 2023-02-23 PROCEDURE — 99214 OFFICE O/P EST MOD 30 MIN: CPT | Performed by: PHYSICIAN ASSISTANT

## 2023-02-23 PROCEDURE — 82043 UR ALBUMIN QUANTITATIVE: CPT | Performed by: PHYSICIAN ASSISTANT

## 2023-02-23 RX ORDER — SEMAGLUTIDE 1.34 MG/ML
0.5 INJECTION, SOLUTION SUBCUTANEOUS WEEKLY
Qty: 2 ML | Refills: 1 | Status: SHIPPED | OUTPATIENT
Start: 2023-02-23

## 2023-02-23 NOTE — PROGRESS NOTES
"Sravanthi Alegria is a 42 y.o. female.       Chief Complaint -diabetes    History of Present Illness -    ROS    Diabetes-  Stable but patient still has comorbidities including obesity.  She has done well on Ozempic with a 1 pound weight loss since the last visit    Goiter-  Not at goal as patient continues to complain of some dysphagia.  Ultrasound did confirm goiter.    2/1/2023 ultrasound thyroid revealed diffusely enlarged diffusely heterogenous thyroid similar to previous exam.    Hypertension-  Controlled with losartan and spironolactone    The following portions of the patient's history were reviewed and updated as appropriate: allergies, current medications, past family history, past medical history, past social history, past surgical history and problem list.    Review of Systems    Objective  Vital signs:  /84   Pulse 84   Temp 97.3 °F (36.3 °C) (Temporal)   Ht 167.6 cm (66\")   Wt (!) 165 kg (363 lb)   LMP  (LMP Unknown)   SpO2 98%   BMI 58.59 kg/m²     Physical Exam  Vitals and nursing note reviewed.   Constitutional:       Appearance: Normal appearance. She is well-developed. She is obese.   Eyes:      Extraocular Movements: Extraocular movements intact.      Conjunctiva/sclera: Conjunctivae normal.   Cardiovascular:      Rate and Rhythm: Normal rate and regular rhythm.      Heart sounds: Normal heart sounds. No murmur heard.  Pulmonary:      Effort: Pulmonary effort is normal. No respiratory distress.      Breath sounds: Normal breath sounds. No wheezing.   Musculoskeletal:         General: No tenderness.   Skin:     General: Skin is warm and dry.      Findings: No rash.   Neurological:      Mental Status: She is alert and oriented to person, place, and time.   Psychiatric:         Mood and Affect: Mood normal.         Behavior: Behavior normal.         Thought Content: Thought content normal.         The following data was reviewed by: KALI Armstrong on 02/23/2023:  CMP  "   CMP 7/6/22 7/27/22 12/16/22   Glucose 169 (A) 138 (A) 126 (A)   BUN 6 10 9   Creatinine 0.50 (A) 0.56 (A) 0.59   eGFR 119.5 116.3 114.8   Sodium 139 138 139   Potassium 3.7 4.1 3.9   Chloride 103 102 103   Calcium 8.6 8.9 9.0   Total Protein 7.1  7.1   Albumin 3.84  3.70   Globulin 3.3  3.4   Total Bilirubin 0.6  0.6   Alkaline Phosphatase 94  87   AST (SGOT) 37 (A)  31   ALT (SGPT) 49 (A)  38 (A)   Albumin/Globulin Ratio 1.2  1.1   BUN/Creatinine Ratio 12.0 17.9 15.3   Anion Gap 11.7 12.1 7.4   (A) Abnormal value       Comments are available for some flowsheets but are not being displayed.           CBC w/diff    CBC w/Diff 7/6/22   WBC 8.08   RBC 4.65   Hemoglobin 14.1   Hematocrit 42.6   MCV 91.6   MCH 30.3   MCHC 33.1   RDW 13.2   Platelets 170   Neutrophil Rel % 50.7   Immature Granulocyte Rel % 0.4   Lymphocyte Rel % 37.6   Monocyte Rel % 8.8   Eosinophil Rel % 1.9   Basophil Rel % 0.6           Lipid Panel    Lipid Panel 7/27/22 12/16/22   Total Cholesterol 164 163   Triglycerides 181 (A) 151 (A)   HDL Cholesterol 46 43   VLDL Cholesterol 31 26   LDL Cholesterol  87 94   LDL/HDL Ratio 1.78 2.09   (A) Abnormal value            TSH    TSH 12/16/22   TSH 0.923           Most Recent A1C    HGBA1C Most Recent 12/16/22   Hemoglobin A1C 7.50 (A)   (A) Abnormal value            Data reviewed: Ultrasound thyroid reviewed       Assessment & Plan     Diagnoses and all orders for this visit:    1. Type 2 diabetes mellitus with hyperglycemia, without long-term current use of insulin (HCC) (Primary)  Comments:  Increase Ozempic 0.5 mg weekly  Advise low carbohydrate diabetic diet  Continue metformin  Orders:  -     MicroAlbumin, Urine, Random - Urine, Clean Catch  -     Ozempic, 0.25 or 0.5 MG/DOSE, 2 MG/1.5ML solution pen-injector; Inject 0.5 mg under the skin into the appropriate area as directed 1 (One) Time Per Week.  Dispense: 2 mL; Refill: 1    2. Nontoxic uninodular goiter  Comments:  Refer to surgeon for further  evaluation and possible thyroidectomy secondary to symptomatic dysphagia  Orders:  -     Ambulatory Referral to General Surgery    3. Essential hypertension  Comments:  Continue losartan and spironolactone  Continue to monitor    4. Morbid (severe) obesity due to excess calories (HCC)  Comments:  Increasing Ozempic 0.5 mg weekly which should help with glycemic control as well as obesity and appetite suppression  Advise 30 minutes CV activity daily            Patient was given instructions and counseling regarding his condition or for health maintenance advice. Please see specific information pulled into the AVS if appropriate      This document has been electronically signed by:  Rosa Quigley PA-C

## 2023-02-24 LAB — ALBUMIN UR-MCNC: 5.3 MG/DL

## 2023-03-13 ENCOUNTER — TELEPHONE (OUTPATIENT)
Dept: FAMILY MEDICINE CLINIC | Facility: CLINIC | Age: 43
End: 2023-03-13
Payer: COMMERCIAL

## 2023-03-13 NOTE — TELEPHONE ENCOUNTER
Justin lozada carinaas mother   She is scheduled to see eleanor today 3/13/23 tm     ----- Message from Gabby Johnson MA sent at 3/13/2023  7:57 AM EDT -----  Regarding: FW: Jennifer  Contact: [phone]    ----- Message -----  From: Anika Alegria  Sent: 3/13/2023   7:51 AM EDT  To: Esha [facility]  Subject: Jennifer                                             I need an appointment for Jennifer Alegria [birth year]. She doesn’t have my chart so. Her brother tested positive for Strep on Friday and she started feeling bad Saturday but worse on Sunday. Just want to get her checked out too. [phone] is my number. Thank you.

## 2023-03-23 ENCOUNTER — OFFICE VISIT (OUTPATIENT)
Dept: FAMILY MEDICINE CLINIC | Facility: CLINIC | Age: 43
End: 2023-03-23
Payer: COMMERCIAL

## 2023-03-23 VITALS
HEART RATE: 69 BPM | BODY MASS INDEX: 47.09 KG/M2 | DIASTOLIC BLOOD PRESSURE: 90 MMHG | OXYGEN SATURATION: 95 % | TEMPERATURE: 98.6 F | WEIGHT: 293 LBS | HEIGHT: 66 IN | SYSTOLIC BLOOD PRESSURE: 140 MMHG

## 2023-03-23 DIAGNOSIS — I10 ESSENTIAL HYPERTENSION: Chronic | ICD-10-CM

## 2023-03-23 DIAGNOSIS — E78.2 MIXED HYPERLIPIDEMIA: Chronic | ICD-10-CM

## 2023-03-23 DIAGNOSIS — E66.01 MORBID OBESITY: Chronic | ICD-10-CM

## 2023-03-23 DIAGNOSIS — E11.65 TYPE 2 DIABETES MELLITUS WITH HYPERGLYCEMIA, WITHOUT LONG-TERM CURRENT USE OF INSULIN: Primary | Chronic | ICD-10-CM

## 2023-03-23 NOTE — PROGRESS NOTES
"Sravanthi Alegria is a 42 y.o. female.       Chief Complaint -diabetes    History of Present Illness -    ROS    Diabetes-  Improved since patient has started Ozempic 0.5 mg weekly.  She also continues to use metformin.  She states that she has lost 4 pounds in the past month and has made some healthy lifestyle changes.  She is walking for 20 minutes a day and has stopped eating any fried foods.  She states she does have a decreased appetite.    Morbid obesity-  Improving with diet exercise and Ozempic which is helping with appetite suppression.    Hypertension-  Stable with losartan and spironolactone.  Patient reports home blood pressure less than 130/80 consistently    Hyperlipidemia-  Stable with atorvastatin and low-cholesterol diet    The following portions of the patient's history were reviewed and updated as appropriate: allergies, current medications, past family history, past medical history, past social history, past surgical history and problem list.    Review of Systems    Objective  Vital signs:  /90   Pulse 69   Temp 98.6 °F (37 °C) (Temporal)   Ht 167.6 cm (66\")   Wt (!) 163 kg (359 lb)   LMP  (LMP Unknown)   SpO2 95%   BMI 57.94 kg/m²     Physical Exam  Vitals and nursing note reviewed.   Constitutional:       Appearance: Normal appearance. She is well-developed.   Eyes:      Extraocular Movements: Extraocular movements intact.      Conjunctiva/sclera: Conjunctivae normal.   Cardiovascular:      Rate and Rhythm: Normal rate and regular rhythm.      Heart sounds: Normal heart sounds. No murmur heard.  Pulmonary:      Effort: Pulmonary effort is normal. No respiratory distress.      Breath sounds: Normal breath sounds. No wheezing.   Musculoskeletal:         General: No tenderness.   Skin:     General: Skin is warm and dry.      Findings: No rash.   Neurological:      Mental Status: She is alert and oriented to person, place, and time.   Psychiatric:         Mood and Affect: Mood " normal.         Behavior: Behavior normal.         Thought Content: Thought content normal.         The following data was reviewed by: KALI Armstrong on 03/23/2023:  CMP    CMP 7/6/22 7/27/22 12/16/22   Glucose 169 (A) 138 (A) 126 (A)   BUN 6 10 9   Creatinine 0.50 (A) 0.56 (A) 0.59   eGFR 119.5 116.3 114.8   Sodium 139 138 139   Potassium 3.7 4.1 3.9   Chloride 103 102 103   Calcium 8.6 8.9 9.0   Total Protein 7.1  7.1   Albumin 3.84  3.70   Globulin 3.3  3.4   Total Bilirubin 0.6  0.6   Alkaline Phosphatase 94  87   AST (SGOT) 37 (A)  31   ALT (SGPT) 49 (A)  38 (A)   Albumin/Globulin Ratio 1.2  1.1   BUN/Creatinine Ratio 12.0 17.9 15.3   Anion Gap 11.7 12.1 7.4   (A) Abnormal value       Comments are available for some flowsheets but are not being displayed.           CBC w/diff    CBC w/Diff 7/6/22   WBC 8.08   RBC 4.65   Hemoglobin 14.1   Hematocrit 42.6   MCV 91.6   MCH 30.3   MCHC 33.1   RDW 13.2   Platelets 170   Neutrophil Rel % 50.7   Immature Granulocyte Rel % 0.4   Lymphocyte Rel % 37.6   Monocyte Rel % 8.8   Eosinophil Rel % 1.9   Basophil Rel % 0.6           Lipid Panel    Lipid Panel 7/27/22 12/16/22   Total Cholesterol 164 163   Triglycerides 181 (A) 151 (A)   HDL Cholesterol 46 43   VLDL Cholesterol 31 26   LDL Cholesterol  87 94   LDL/HDL Ratio 1.78 2.09   (A) Abnormal value            TSH    TSH 12/16/22   TSH 0.923           Most Recent A1C    HGBA1C Most Recent 12/16/22   Hemoglobin A1C 7.50 (A)   (A) Abnormal value                   Assessment & Plan     Diagnoses and all orders for this visit:    1. Type 2 diabetes mellitus with hyperglycemia, without long-term current use of insulin (HCC) (Primary)  Comments:  Continue Ozempic 0.5 mg weekly and metformin  Advise low carbohydrate diabetic diet    2. Morbid obesity (HCC)  Comments:  Advise 30 minutes CV activity daily  Advise low carbohydrate diabetic diet and avoidance of fried foods      3. Essential hypertension  Comments:  Advised to  monitor BP and pulse daily and report any consistent readings greater than 130/80  Continue spironolactone and losartan    4. Mixed hyperlipidemia  Comments:  Continue atorvastatin  Advised low-cholesterol diet            Patient was given instructions and counseling regarding his condition or for health maintenance advice. Please see specific information pulled into the AVS if appropriate      This document has been electronically signed by:  Rosa Quigley PA-C

## 2023-03-28 ENCOUNTER — OFFICE VISIT (OUTPATIENT)
Dept: SURGERY | Facility: CLINIC | Age: 43
End: 2023-03-28
Payer: COMMERCIAL

## 2023-03-28 VITALS
SYSTOLIC BLOOD PRESSURE: 118 MMHG | WEIGHT: 293 LBS | DIASTOLIC BLOOD PRESSURE: 76 MMHG | BODY MASS INDEX: 47.09 KG/M2 | HEIGHT: 66 IN

## 2023-03-28 DIAGNOSIS — E66.01 MORBID OBESITY: Primary | ICD-10-CM

## 2023-03-28 DIAGNOSIS — E04.9 GOITER: ICD-10-CM

## 2023-03-28 PROCEDURE — 3078F DIAST BP <80 MM HG: CPT | Performed by: SURGERY

## 2023-03-28 PROCEDURE — 1160F RVW MEDS BY RX/DR IN RCRD: CPT | Performed by: SURGERY

## 2023-03-28 PROCEDURE — 1159F MED LIST DOCD IN RCRD: CPT | Performed by: SURGERY

## 2023-03-28 PROCEDURE — 99213 OFFICE O/P EST LOW 20 MIN: CPT | Performed by: SURGERY

## 2023-03-28 PROCEDURE — 3074F SYST BP LT 130 MM HG: CPT | Performed by: SURGERY

## 2023-03-28 NOTE — PROGRESS NOTES
Sravanthi Alegria is a 42 y.o. female.     Chief Complaint: goiter    History of Present Illness She is a morbidly obese 43 yo who has a diffusely enlarged thyroid on US. This is not changed from 2020 when her last US was done. She has some feeling of pressure in her neck when she lays down and sometimes on swallowing. She has lost about 60 lbs in the last year from dieting. She has been on synthroid but the thyroid had not shrunk. Previously her levels had been normal.     The following portions of the patient's history were reviewed and updated as appropriate: current medications, past family history, past medical history, past social history, past surgical history and problem list.    Review of Systems    Objective   Physical Exam she is very heavy and the thyroid is not definable due to her neck size.     Past Medical History:   Diagnosis Date   • Anxiety and depression    • GERD (gastroesophageal reflux disease)    • Goiter    • Goiter    • Hepatitis A    • Hypertension    • Mild asthma 11/22/2021   • PONV (postoperative nausea and vomiting)    • Thyroid disease    • Type 2 diabetes mellitus with hyperglycemia, without long-term current use of insulin ([facility]) 6/8/2020       Family History   Problem Relation Age of Onset   • Diabetes Mother    • Hypertension Mother    • No Known Problems Father    • Gout Sister    • Osteoarthritis Maternal Grandmother    • Osteoporosis Maternal Grandmother    • Heart disease Maternal Grandmother    • Diabetes Maternal Grandmother    • Hypertension Maternal Grandmother    • Heart disease Maternal Grandfather    • Diabetes Maternal Grandfather    • Hypertension Maternal Grandfather    • Breast cancer Neg Hx        Social History     Tobacco Use   • Smoking status: Former     Passive exposure: Past   • Smokeless tobacco: Never   Substance Use Topics   • Alcohol use: No   • Drug use: No       Past Surgical History:   Procedure Laterality Date   • ABDOMINAL SURGERY     •  CARPAL TUNNEL RELEASE Right 10/15/2019    Procedure: CARPAL TUNNEL RELEASE;  Surgeon: Rony Cruz MD;  Location: [facility];  Service: Orthopedics   •  SECTION      x2   • TUBAL ABDOMINAL LIGATION         Current Outpatient Medications   Medication Instructions   • atorvastatin (LIPITOR) 10 mg, Oral, Nightly   • Blood Glucose Monitoring Suppl misc 1 each, Does not apply, 3 Times Daily, Test tid   • cetirizine (ZYRTEC) 10 mg, Oral, Daily   • Cholecalciferol 50 MCG (2000 UT) tablet No dose, route, or frequency recorded.   • Diclofenac Sodium (VOLTAREN) 1 % gel gel APPLY 4 GRAMS AS DIRECTED ONTO THE APPROPRIATE AREA FOUR TIMES DAILY AS NEEDED   • DULoxetine (CYMBALTA) 60 mg, Oral, Daily   • Farxiga 10 mg, Oral, Every Morning   • ibuprofen (ADVIL,MOTRIN) 800 mg, Oral, Every 8 Hours   • ketoconazole (NIZORAL) 2 % shampoo APPLY TOPICALLY TO THE APPROPRIATE AREA AS DIRECTED TWICE WEEKLY   • Lancets (OneTouch Delica Plus [id number]) misc 1 each, Other, 3 Times Daily   • levothyroxine (SYNTHROID) 100 mcg, Oral, Daily   • losartan (COZAAR) 100 mg, Oral, Daily   • metFORMIN (GLUCOPHAGE) 500 mg, Oral, Every 12 Hours   • mometasone (Nasonex) 50 MCG/ACT nasal spray 2 sprays, Nasal, Daily   • montelukast (SINGULAIR) 10 MG tablet TAKE 1 TABLET BY MOUTH ONCE DAILY AT NIGHT   • OneTouch Ultra test strip USE TO TEST ONCE DAILY AS DIRECTED   • oxybutynin XL (DITROPAN-XL) 5 mg, Oral, Daily   • Ozempic (0.25 or 0.5 MG/DOSE) 0.5 mg, Subcutaneous, Weekly   • pantoprazole (PROTONIX) 40 mg, Oral, Daily   • rOPINIRole (REQUIP) 1 mg, Oral, Nightly, Take 1 hour before bedtime   • spironolactone (ALDACTONE) 25 MG tablet TAKE 1 TABLET BY MOUTH AS NEEDED FOR LEG  SWELLING   • Ventolin  (90 Base) MCG/ACT inhaler INHALE 2 PUFFS BY MOUTH EVERY 4 HOURS AS NEEDED FOR  WHEEZING   • vitamin D (ERGOCALCIFEROL) 50,000 Units, Oral, Every 7 Days         Assessment & Plan   Diagnoses and all orders for this visit:    1. Morbid obesity  (HCC) (Primary)    2. Goiter    I discussed partial thyroidectomy, but as her symptoms are not bad and may be more weight related than from the thyroid, she is going to work on weight loss and recheck US in 1 yr.

## 2023-04-10 DIAGNOSIS — J45.20 MILD INTERMITTENT ASTHMA, UNSPECIFIED WHETHER COMPLICATED: ICD-10-CM

## 2023-04-10 DIAGNOSIS — F33.1 MODERATE EPISODE OF RECURRENT MAJOR DEPRESSIVE DISORDER: Chronic | ICD-10-CM

## 2023-04-10 DIAGNOSIS — I87.2 PERIPHERAL VENOUS INSUFFICIENCY: Chronic | ICD-10-CM

## 2023-04-10 DIAGNOSIS — L65.9 HAIR LOSS: ICD-10-CM

## 2023-04-11 RX ORDER — BUPROPION HYDROCHLORIDE 150 MG/1
TABLET, EXTENDED RELEASE ORAL
Qty: 180 TABLET | Refills: 0 | Status: SHIPPED | OUTPATIENT
Start: 2023-04-11

## 2023-04-11 RX ORDER — SPIRONOLACTONE 25 MG/1
TABLET ORAL
Qty: 45 TABLET | Refills: 0 | Status: SHIPPED | OUTPATIENT
Start: 2023-04-11

## 2023-04-11 RX ORDER — KETOCONAZOLE 20 MG/ML
SHAMPOO TOPICAL
Qty: 120 ML | Refills: 0 | Status: SHIPPED | OUTPATIENT
Start: 2023-04-11

## 2023-04-11 RX ORDER — ALBUTEROL SULFATE 90 UG/1
AEROSOL, METERED RESPIRATORY (INHALATION)
Qty: 18 G | Refills: 0 | Status: SHIPPED | OUTPATIENT
Start: 2023-04-11

## 2023-04-20 ENCOUNTER — OFFICE VISIT (OUTPATIENT)
Dept: FAMILY MEDICINE CLINIC | Facility: CLINIC | Age: 43
End: 2023-04-20
Payer: COMMERCIAL

## 2023-04-20 VITALS
TEMPERATURE: 98.6 F | HEART RATE: 80 BPM | BODY MASS INDEX: 47.09 KG/M2 | WEIGHT: 293 LBS | DIASTOLIC BLOOD PRESSURE: 86 MMHG | SYSTOLIC BLOOD PRESSURE: 138 MMHG | HEIGHT: 66 IN | OXYGEN SATURATION: 97 % | RESPIRATION RATE: 16 BRPM

## 2023-04-20 DIAGNOSIS — E55.9 VITAMIN D DEFICIENCY: Chronic | ICD-10-CM

## 2023-04-20 DIAGNOSIS — E11.65 TYPE 2 DIABETES MELLITUS WITH HYPERGLYCEMIA, WITHOUT LONG-TERM CURRENT USE OF INSULIN: Primary | Chronic | ICD-10-CM

## 2023-04-20 DIAGNOSIS — I10 ESSENTIAL HYPERTENSION: Chronic | ICD-10-CM

## 2023-04-20 PROCEDURE — 3075F SYST BP GE 130 - 139MM HG: CPT | Performed by: PHYSICIAN ASSISTANT

## 2023-04-20 PROCEDURE — 99214 OFFICE O/P EST MOD 30 MIN: CPT | Performed by: PHYSICIAN ASSISTANT

## 2023-04-20 PROCEDURE — 3079F DIAST BP 80-89 MM HG: CPT | Performed by: PHYSICIAN ASSISTANT

## 2023-04-20 PROCEDURE — 1159F MED LIST DOCD IN RCRD: CPT | Performed by: PHYSICIAN ASSISTANT

## 2023-04-20 PROCEDURE — 1160F RVW MEDS BY RX/DR IN RCRD: CPT | Performed by: PHYSICIAN ASSISTANT

## 2023-04-20 NOTE — PROGRESS NOTES
"Sravanthi Alegria is a 42 y.o. female.       Chief Complaint -diabetes    History of Present Illness -    ROS    Diabetes mellitus-  Improving with Ozempic 0.5 mg weekly along with metformin and low-carb diet.  She is also lost 6 pounds in the past month.  She states that she is exercising as tolerated by walking several times a week.  She reports fasting blood glucose has remained less than 140 fasting and postprandial.    Vitamin D deficiency-stable with vitamin D supplementation    Hypertension-  Stable with losartan    The following portions of the patient's history were reviewed and updated as appropriate: allergies, current medications, past family history, past medical history, past social history, past surgical history and problem list.    Review of Systems    Objective  Vital signs:  /86   Pulse 80   Temp 98.6 °F (37 °C)   Resp 16   Ht 167.6 cm (65.98\")   Wt (!) 161 kg (355 lb)   LMP  (LMP Unknown)   SpO2 97%   BMI 57.33 kg/m²     Physical Exam  Vitals and nursing note reviewed.   Constitutional:       Appearance: Normal appearance. She is well-developed.   Eyes:      Extraocular Movements: Extraocular movements intact.      Conjunctiva/sclera: Conjunctivae normal.   Cardiovascular:      Rate and Rhythm: Normal rate and regular rhythm.      Heart sounds: Normal heart sounds. No murmur heard.  Pulmonary:      Effort: Pulmonary effort is normal. No respiratory distress.      Breath sounds: Normal breath sounds. No wheezing.   Musculoskeletal:         General: No tenderness.   Skin:     General: Skin is warm and dry.      Findings: No rash.   Neurological:      Mental Status: She is alert and oriented to person, place, and time.   Psychiatric:         Mood and Affect: Mood normal.         Behavior: Behavior normal.         Thought Content: Thought content normal.         The following data was reviewed by: KALI Armstrong on 04/20/2023:  CMP        7/6/2022    12:55 7/27/2022    " 09:46 12/16/2022    08:42   CMP   Glucose 169   138   126     BUN 6   10   9     Creatinine 0.50   0.56   0.59     EGFR 119.5   116.3   114.8     Sodium 139   138   139     Potassium 3.7   4.1   3.9     Chloride 103   102   103     Calcium 8.6   8.9   9.0     Total Protein 7.1    7.1     Albumin 3.84    3.70     Globulin 3.3    3.4     Total Bilirubin 0.6    0.6     Alkaline Phosphatase 94    87     AST (SGOT) 37    31     ALT (SGPT) 49    38     Albumin/Globulin Ratio 1.2    1.1     BUN/Creatinine Ratio 12.0   17.9   15.3     Anion Gap 11.7   12.1   7.4       CBC w/diff        7/6/2022    12:55   CBC w/Diff   WBC 8.08     RBC 4.65     Hemoglobin 14.1     Hematocrit 42.6     MCV 91.6     MCH 30.3     MCHC 33.1     RDW 13.2     Platelets 170     Neutrophil Rel % 50.7     Immature Granulocyte Rel % 0.4     Lymphocyte Rel % 37.6     Monocyte Rel % 8.8     Eosinophil Rel % 1.9     Basophil Rel % 0.6       Lipid Panel        7/27/2022    09:46 12/16/2022    08:42   Lipid Panel   Total Cholesterol 164   163     Triglycerides 181   151     HDL Cholesterol 46   43     VLDL Cholesterol 31   26     LDL Cholesterol  87   94     LDL/HDL Ratio 1.78   2.09       TSH        12/16/2022    08:42   TSH   TSH 0.923       Most Recent A1C        12/16/2022    08:42   HGBA1C Most Recent   Hemoglobin A1C 7.50              Assessment & Plan     Diagnoses and all orders for this visit:    1. Type 2 diabetes mellitus with hyperglycemia, without long-term current use of insulin (Primary)  Comments:  Continue Ozempic 0.5 mg weekly along with metformin  Advise low-carb diet  Orders:  -     Comprehensive Metabolic Panel; Future  -     Hemoglobin A1c; Future    2. Vitamin D deficiency  Comments:  Continue vitamin D supplementation  Orders:  -     Vitamin D,25-Hydroxy; Future    3. Essential hypertension  Comments:  Continue losartan  Advised conservative measures including low-sodium diet            Patient was given instructions and counseling  regarding his condition or for health maintenance advice. Please see specific information pulled into the AVS if appropriate      This document has been electronically signed by:  Rosa Quigley PA-C

## 2023-04-21 DIAGNOSIS — H10.9 CONJUNCTIVITIS OF LEFT EYE, UNSPECIFIED CONJUNCTIVITIS TYPE: Primary | ICD-10-CM

## 2023-04-21 RX ORDER — ERYTHROMYCIN 5 MG/G
OINTMENT OPHTHALMIC NIGHTLY
Qty: 3.5 G | Refills: 0 | Status: SHIPPED | OUTPATIENT
Start: 2023-04-21

## 2023-05-15 ENCOUNTER — LAB (OUTPATIENT)
Dept: FAMILY MEDICINE CLINIC | Facility: CLINIC | Age: 43
End: 2023-05-15
Payer: COMMERCIAL

## 2023-05-15 DIAGNOSIS — E11.65 TYPE 2 DIABETES MELLITUS WITH HYPERGLYCEMIA, WITHOUT LONG-TERM CURRENT USE OF INSULIN: Chronic | ICD-10-CM

## 2023-05-15 DIAGNOSIS — E55.9 VITAMIN D DEFICIENCY: Chronic | ICD-10-CM

## 2023-05-15 LAB
25(OH)D3 SERPL-MCNC: 19.2 NG/ML (ref 30–100)
ALBUMIN SERPL-MCNC: 3.9 G/DL (ref 3.5–5.2)
ALBUMIN/GLOB SERPL: 1.1 G/DL
ALP SERPL-CCNC: 87 U/L (ref 39–117)
ALT SERPL W P-5'-P-CCNC: 36 U/L (ref 1–33)
ANION GAP SERPL CALCULATED.3IONS-SCNC: 8.7 MMOL/L (ref 5–15)
AST SERPL-CCNC: 32 U/L (ref 1–32)
BILIRUB SERPL-MCNC: 0.4 MG/DL (ref 0–1.2)
BUN SERPL-MCNC: 8 MG/DL (ref 6–20)
BUN/CREAT SERPL: 16.7 (ref 7–25)
CALCIUM SPEC-SCNC: 9.1 MG/DL (ref 8.6–10.5)
CHLORIDE SERPL-SCNC: 107 MMOL/L (ref 98–107)
CO2 SERPL-SCNC: 24.3 MMOL/L (ref 22–29)
CREAT SERPL-MCNC: 0.48 MG/DL (ref 0.57–1)
EGFRCR SERPLBLD CKD-EPI 2021: 120.7 ML/MIN/1.73
GLOBULIN UR ELPH-MCNC: 3.5 GM/DL
GLUCOSE SERPL-MCNC: 111 MG/DL (ref 65–99)
HBA1C MFR BLD: 6.1 % (ref 4.8–5.6)
POTASSIUM SERPL-SCNC: 4.1 MMOL/L (ref 3.5–5.2)
PROT SERPL-MCNC: 7.4 G/DL (ref 6–8.5)
SODIUM SERPL-SCNC: 140 MMOL/L (ref 136–145)

## 2023-05-15 PROCEDURE — 82306 VITAMIN D 25 HYDROXY: CPT | Performed by: PHYSICIAN ASSISTANT

## 2023-05-15 PROCEDURE — 80053 COMPREHEN METABOLIC PANEL: CPT | Performed by: PHYSICIAN ASSISTANT

## 2023-05-15 PROCEDURE — 83036 HEMOGLOBIN GLYCOSYLATED A1C: CPT | Performed by: PHYSICIAN ASSISTANT

## 2023-05-19 ENCOUNTER — OFFICE VISIT (OUTPATIENT)
Dept: FAMILY MEDICINE CLINIC | Facility: CLINIC | Age: 43
End: 2023-05-19
Payer: COMMERCIAL

## 2023-05-19 VITALS
DIASTOLIC BLOOD PRESSURE: 80 MMHG | BODY MASS INDEX: 47.09 KG/M2 | SYSTOLIC BLOOD PRESSURE: 130 MMHG | HEART RATE: 84 BPM | TEMPERATURE: 97.9 F | WEIGHT: 293 LBS | HEIGHT: 66 IN | OXYGEN SATURATION: 97 %

## 2023-05-19 DIAGNOSIS — E78.2 MIXED HYPERLIPIDEMIA: Chronic | ICD-10-CM

## 2023-05-19 DIAGNOSIS — E11.65 TYPE 2 DIABETES MELLITUS WITH HYPERGLYCEMIA, WITHOUT LONG-TERM CURRENT USE OF INSULIN: Chronic | ICD-10-CM

## 2023-05-19 DIAGNOSIS — H65.02 NON-RECURRENT ACUTE SEROUS OTITIS MEDIA OF LEFT EAR: Primary | ICD-10-CM

## 2023-05-19 PROCEDURE — 1159F MED LIST DOCD IN RCRD: CPT | Performed by: PHYSICIAN ASSISTANT

## 2023-05-19 PROCEDURE — 1160F RVW MEDS BY RX/DR IN RCRD: CPT | Performed by: PHYSICIAN ASSISTANT

## 2023-05-19 PROCEDURE — 3079F DIAST BP 80-89 MM HG: CPT | Performed by: PHYSICIAN ASSISTANT

## 2023-05-19 PROCEDURE — 3075F SYST BP GE 130 - 139MM HG: CPT | Performed by: PHYSICIAN ASSISTANT

## 2023-05-19 PROCEDURE — 3044F HG A1C LEVEL LT 7.0%: CPT | Performed by: PHYSICIAN ASSISTANT

## 2023-05-19 PROCEDURE — 99214 OFFICE O/P EST MOD 30 MIN: CPT | Performed by: PHYSICIAN ASSISTANT

## 2023-05-19 RX ORDER — CEFDINIR 300 MG/1
600 CAPSULE ORAL DAILY
Qty: 20 CAPSULE | Refills: 0 | Status: SHIPPED | OUTPATIENT
Start: 2023-05-19 | End: 2023-05-29

## 2023-05-19 RX ORDER — SEMAGLUTIDE 1.34 MG/ML
0.5 INJECTION, SOLUTION SUBCUTANEOUS WEEKLY
Qty: 2 ML | Refills: 3 | Status: SHIPPED | OUTPATIENT
Start: 2023-05-19

## 2023-05-19 NOTE — PROGRESS NOTES
"Sravanthi Alegria is a 42 y.o. female.       Chief Complaint -earache    History of Present Illness -       Earache-  She complains of left-sided earache with sharp moderate intermittent pains in the left ear and associated congestion.    Diabetes mellitus type 2-  Improved with Ozempic 0.  5 mg weekly.  She has lost another 3 pounds and does report some appetite suppression.  She has been following a low carbohydrate diet and has been active throughout the day.    Hyperlipidemia-  Stable with atorvastatin and low-cholesterol diet    The following portions of the patient's history were reviewed and updated as appropriate: allergies, current medications, past family history, past medical history, past social history, past surgical history and problem list.        Objective  Vital signs:  /80   Pulse 84   Temp 97.9 °F (36.6 °C) (Temporal)   Ht 167.6 cm (66\")   Wt (!) 160 kg (352 lb)   LMP  (LMP Unknown)   SpO2 97%   BMI 56.81 kg/m²     Physical Exam  Vitals and nursing note reviewed.   Constitutional:       General: She is not in acute distress.     Appearance: Normal appearance. She is well-developed. She is not diaphoretic.   HENT:      Head: Normocephalic and atraumatic.      Right Ear: Tympanic membrane, ear canal and external ear normal.      Left Ear: Ear canal and external ear normal.      Ears:      Comments: Left TM erythematous with clear fluid behind     Nose: Nose normal.      Mouth/Throat:      Mouth: Mucous membranes are moist.      Pharynx: No oropharyngeal exudate or posterior oropharyngeal erythema.   Neck:      Thyroid: No thyromegaly.   Cardiovascular:      Rate and Rhythm: Normal rate and regular rhythm.      Heart sounds: Normal heart sounds. No murmur heard.  Pulmonary:      Effort: Pulmonary effort is normal.      Breath sounds: Normal breath sounds. No wheezing or rales.   Musculoskeletal:      Cervical back: Normal range of motion and neck supple.   Lymphadenopathy:      " Cervical: No cervical adenopathy.   Skin:     General: Skin is warm and dry.      Findings: No rash.   Neurological:      Mental Status: She is alert and oriented to person, place, and time.   Psychiatric:         Mood and Affect: Mood normal.         Behavior: Behavior normal.         Thought Content: Thought content normal.         The following data was reviewed by Rosa Quigley PA-C:         Lab Results   Component Value Date    BUN 8 05/15/2023    CREATININE 0.48 (L) 05/15/2023    EGFR 120.7 05/15/2023    ALT 36 (H) 05/15/2023    AST 32 05/15/2023    WBC 8.08 07/06/2022    HGB 14.1 07/06/2022    HCT 42.6 07/06/2022     07/06/2022    CHOL 163 12/16/2022    TRIG 151 (H) 12/16/2022    HDL 43 12/16/2022    LDL 94 12/16/2022    TSH 0.923 12/16/2022    HGBA1C 6.10 (H) 05/15/2023           Assessment & Plan     Diagnoses and all orders for this visit:    1. Non-recurrent acute serous otitis media of left ear (Primary)  -     cefdinir (OMNICEF) 300 MG capsule; Take 2 capsules by mouth Daily for 10 days.  Dispense: 20 capsule; Refill: 0    2. Type 2 diabetes mellitus with hyperglycemia, without long-term current use of insulin  Comments:  Continue Ozempic 0.5 mg weekly and metformin  Advise low carbohydrate diabetic diet  Orders:  -     Ozempic, 0.25 or 0.5 MG/DOSE, 2 MG/1.5ML solution pen-injector; Inject 0.5 mg under the skin into the appropriate area as directed 1 (One) Time Per Week.  Dispense: 2 mL; Refill: 3    3. Mixed hyperlipidemia  Comments:  Continue atorvastatin  Advised low-cholesterol diet        Class 3 Severe Obesity (BMI >=40). Obesity-related health conditions include the following: hypertension, diabetes mellitus, dyslipidemias and GERD. Obesity is improving with treatment. BMI is is above average; BMI management plan is completed. We discussed portion control and increasing exercise.          Patient was given instructions and counseling regarding his condition or for health maintenance advice.  Please see specific information pulled into the AVS if appropriate      This document has been electronically signed by:  Rosa Quigley PA-C

## 2023-05-22 DIAGNOSIS — E04.9 GOITER: Primary | ICD-10-CM

## 2023-06-19 ENCOUNTER — OFFICE VISIT (OUTPATIENT)
Dept: FAMILY MEDICINE CLINIC | Facility: CLINIC | Age: 43
End: 2023-06-19
Payer: COMMERCIAL

## 2023-06-19 VITALS
SYSTOLIC BLOOD PRESSURE: 116 MMHG | HEIGHT: 66 IN | DIASTOLIC BLOOD PRESSURE: 68 MMHG | WEIGHT: 293 LBS | HEART RATE: 74 BPM | BODY MASS INDEX: 47.09 KG/M2 | OXYGEN SATURATION: 95 % | TEMPERATURE: 97.5 F

## 2023-06-19 DIAGNOSIS — H65.02 NON-RECURRENT ACUTE SEROUS OTITIS MEDIA OF LEFT EAR: Primary | ICD-10-CM

## 2023-06-19 DIAGNOSIS — I10 ESSENTIAL HYPERTENSION: ICD-10-CM

## 2023-06-19 DIAGNOSIS — E11.65 TYPE 2 DIABETES MELLITUS WITH HYPERGLYCEMIA, WITHOUT LONG-TERM CURRENT USE OF INSULIN: ICD-10-CM

## 2023-06-19 PROCEDURE — 3074F SYST BP LT 130 MM HG: CPT | Performed by: PHYSICIAN ASSISTANT

## 2023-06-19 PROCEDURE — 3044F HG A1C LEVEL LT 7.0%: CPT | Performed by: PHYSICIAN ASSISTANT

## 2023-06-19 PROCEDURE — 99214 OFFICE O/P EST MOD 30 MIN: CPT | Performed by: PHYSICIAN ASSISTANT

## 2023-06-19 PROCEDURE — 3078F DIAST BP <80 MM HG: CPT | Performed by: PHYSICIAN ASSISTANT

## 2023-06-19 PROCEDURE — 1159F MED LIST DOCD IN RCRD: CPT | Performed by: PHYSICIAN ASSISTANT

## 2023-06-19 PROCEDURE — 1160F RVW MEDS BY RX/DR IN RCRD: CPT | Performed by: PHYSICIAN ASSISTANT

## 2023-06-19 RX ORDER — AZITHROMYCIN 250 MG/1
TABLET, FILM COATED ORAL
Qty: 6 TABLET | Refills: 0 | Status: SHIPPED | OUTPATIENT
Start: 2023-06-19

## 2023-06-19 NOTE — PROGRESS NOTES
"Sravanthi Alegria is a 42 y.o. female.       Chief Complaint -ear ache    History of Present Illness -       Earache-  She complains of moderate sharp left-sided earache that began 2 days ago.  Minimal relief with heat and ibuprofen.    Diabetes mellitus-  Improved with Ozempic, Farxiga, and metformin.  Patient's hemoglobin A1c has decreased and she is even lost 7 pounds.  She reports decreased appetite and states she has been eating healthier low-carb food options.    Hypertension-  Controlled with losartan and dietary modification    The following portions of the patient's history were reviewed and updated as appropriate: allergies, current medications, past family history, past medical history, past social history, past surgical history and problem list.        Objective  Vital signs:  /68   Pulse 74   Temp 97.5 °F (36.4 °C) (Temporal)   Ht 167.6 cm (66\")   Wt (!) 156 kg (345 lb)   LMP  (LMP Unknown)   SpO2 95%   BMI 55.68 kg/m²     Physical Exam  Vitals and nursing note reviewed.   Constitutional:       General: She is not in acute distress.     Appearance: Normal appearance. She is well-developed. She is not diaphoretic.   HENT:      Head: Normocephalic and atraumatic.      Right Ear: Tympanic membrane, ear canal and external ear normal.      Left Ear: Ear canal and external ear normal.      Ears:      Comments: Left TM erythematous and bulging with clear fluid.  No perforation noted     Nose: Nose normal.      Mouth/Throat:      Mouth: Mucous membranes are moist.      Pharynx: No oropharyngeal exudate or posterior oropharyngeal erythema.   Eyes:      Extraocular Movements: Extraocular movements intact.      Conjunctiva/sclera: Conjunctivae normal.   Neck:      Thyroid: No thyromegaly.   Cardiovascular:      Rate and Rhythm: Normal rate and regular rhythm.      Heart sounds: Normal heart sounds. No murmur heard.  Pulmonary:      Effort: Pulmonary effort is normal. No respiratory distress. "      Breath sounds: Normal breath sounds. No wheezing or rales.   Musculoskeletal:         General: No tenderness.      Cervical back: Normal range of motion and neck supple.   Lymphadenopathy:      Cervical: No cervical adenopathy.   Skin:     General: Skin is warm and dry.      Findings: No rash.   Neurological:      Mental Status: She is alert and oriented to person, place, and time.   Psychiatric:         Mood and Affect: Mood normal.         Behavior: Behavior normal.         Thought Content: Thought content normal.       The following data was reviewed by Rosa Quigley PA-C:         Lab Results   Component Value Date    BUN 8 05/15/2023    CREATININE 0.48 (L) 05/15/2023    EGFR 120.7 05/15/2023    ALT 36 (H) 05/15/2023    AST 32 05/15/2023    WBC 8.08 07/06/2022    HGB 14.1 07/06/2022    HCT 42.6 07/06/2022     07/06/2022    CHOL 163 12/16/2022    TRIG 151 (H) 12/16/2022    HDL 43 12/16/2022    LDL 94 12/16/2022    TSH 0.923 12/16/2022    HGBA1C 6.10 (H) 05/15/2023           Assessment & Plan     Diagnoses and all orders for this visit:    1. Non-recurrent acute serous otitis media of left ear (Primary)  Comments:  Start azithromycin  Orders:  -     azithromycin (Zithromax Z-Carlos) 250 MG tablet; Take 2 tablets by mouth on day 1, then 1 tablet daily on days 2-5  Dispense: 6 tablet; Refill: 0    2. Type 2 diabetes mellitus with hyperglycemia, without long-term current use of insulin  Comments:  Continue Ozempic, Farxiga, and metformin  Advise low carbohydrate diabetic diet    3. Essential hypertension  Comments:  Continue losartan  Advise daily BP and pulse monitoring and report any consistent readings greater than 130/80                   Patient was given instructions and counseling regarding his condition or for health maintenance advice. Please see specific information pulled into the AVS if appropriate      This document has been electronically signed by:  Rosa Quigley PA-C

## 2023-07-31 ENCOUNTER — OFFICE VISIT (OUTPATIENT)
Dept: FAMILY MEDICINE CLINIC | Facility: CLINIC | Age: 43
End: 2023-07-31
Payer: COMMERCIAL

## 2023-07-31 VITALS
DIASTOLIC BLOOD PRESSURE: 82 MMHG | OXYGEN SATURATION: 98 % | HEIGHT: 66 IN | SYSTOLIC BLOOD PRESSURE: 132 MMHG | WEIGHT: 293 LBS | BODY MASS INDEX: 47.09 KG/M2 | TEMPERATURE: 98.2 F | HEART RATE: 88 BPM

## 2023-07-31 DIAGNOSIS — E11.65 TYPE 2 DIABETES MELLITUS WITH HYPERGLYCEMIA, WITHOUT LONG-TERM CURRENT USE OF INSULIN: Primary | Chronic | ICD-10-CM

## 2023-07-31 DIAGNOSIS — E78.2 MIXED HYPERLIPIDEMIA: Chronic | ICD-10-CM

## 2023-07-31 DIAGNOSIS — I10 ESSENTIAL HYPERTENSION: Chronic | ICD-10-CM

## 2023-07-31 PROCEDURE — 3079F DIAST BP 80-89 MM HG: CPT | Performed by: PHYSICIAN ASSISTANT

## 2023-07-31 PROCEDURE — 3044F HG A1C LEVEL LT 7.0%: CPT | Performed by: PHYSICIAN ASSISTANT

## 2023-07-31 PROCEDURE — 1159F MED LIST DOCD IN RCRD: CPT | Performed by: PHYSICIAN ASSISTANT

## 2023-07-31 PROCEDURE — 99214 OFFICE O/P EST MOD 30 MIN: CPT | Performed by: PHYSICIAN ASSISTANT

## 2023-07-31 PROCEDURE — 1160F RVW MEDS BY RX/DR IN RCRD: CPT | Performed by: PHYSICIAN ASSISTANT

## 2023-07-31 PROCEDURE — 3075F SYST BP GE 130 - 139MM HG: CPT | Performed by: PHYSICIAN ASSISTANT

## 2023-08-28 ENCOUNTER — OFFICE VISIT (OUTPATIENT)
Dept: FAMILY MEDICINE CLINIC | Facility: CLINIC | Age: 43
End: 2023-08-28
Payer: COMMERCIAL

## 2023-08-28 VITALS
OXYGEN SATURATION: 96 % | HEART RATE: 81 BPM | SYSTOLIC BLOOD PRESSURE: 140 MMHG | BODY MASS INDEX: 47.09 KG/M2 | HEIGHT: 66 IN | DIASTOLIC BLOOD PRESSURE: 78 MMHG | WEIGHT: 293 LBS | TEMPERATURE: 98 F

## 2023-08-28 DIAGNOSIS — F33.1 MODERATE EPISODE OF RECURRENT MAJOR DEPRESSIVE DISORDER: Chronic | ICD-10-CM

## 2023-08-28 DIAGNOSIS — E11.65 TYPE 2 DIABETES MELLITUS WITH HYPERGLYCEMIA, WITHOUT LONG-TERM CURRENT USE OF INSULIN: Primary | Chronic | ICD-10-CM

## 2023-08-28 DIAGNOSIS — I10 ESSENTIAL HYPERTENSION: Chronic | ICD-10-CM

## 2023-08-28 PROCEDURE — 3078F DIAST BP <80 MM HG: CPT | Performed by: PHYSICIAN ASSISTANT

## 2023-08-28 PROCEDURE — 99214 OFFICE O/P EST MOD 30 MIN: CPT | Performed by: PHYSICIAN ASSISTANT

## 2023-08-28 PROCEDURE — 1160F RVW MEDS BY RX/DR IN RCRD: CPT | Performed by: PHYSICIAN ASSISTANT

## 2023-08-28 PROCEDURE — 3044F HG A1C LEVEL LT 7.0%: CPT | Performed by: PHYSICIAN ASSISTANT

## 2023-08-28 PROCEDURE — 3077F SYST BP >= 140 MM HG: CPT | Performed by: PHYSICIAN ASSISTANT

## 2023-08-28 PROCEDURE — 1159F MED LIST DOCD IN RCRD: CPT | Performed by: PHYSICIAN ASSISTANT

## 2023-08-28 RX ORDER — DULOXETIN HYDROCHLORIDE 30 MG/1
90 CAPSULE, DELAYED RELEASE ORAL DAILY
Qty: 90 CAPSULE | Refills: 2 | Status: SHIPPED | OUTPATIENT
Start: 2023-08-28

## 2023-08-28 NOTE — PATIENT INSTRUCTIONS
Carbohydrate Counting for Diabetes Mellitus, Adult  Carbohydrate counting is a method of keeping track of how many carbohydrates you eat. Eating carbohydrates increases the amount of sugar (glucose) in the blood. Counting how many carbohydrates you eat improves how well you manage your blood glucose. This, in turn, helps you manage your diabetes.  Carbohydrates are measured in grams (g) per serving. It is important to know how many carbohydrates (in grams or by serving size) you can have in each meal. This is different for every person. A dietitian can help you make a meal plan and calculate how many carbohydrates you should have at each meal and snack.  What foods contain carbohydrates?  Carbohydrates are found in the following foods:  Grains, such as breads and cereals.  Dried beans and soy products.  Starchy vegetables, such as potatoes, peas, and corn.  Fruit and fruit juices.  Milk and yogurt.  Sweets and snack foods, such as cake, cookies, candy, chips, and soft drinks.  How do I count carbohydrates in foods?  There are two ways to count carbohydrates in food. You can read food labels or learn standard serving sizes of foods. You can use either of these methods or a combination of both.  Using the Nutrition Facts label  The Nutrition Facts list is included on the labels of almost all packaged foods and beverages in the [place]. It includes:  The serving size.  Information about nutrients in each serving, including the grams of carbohydrate per serving.  To use the Nutrition Facts, decide how many servings you will have. Then, multiply the number of servings by the number of carbohydrates per serving. The resulting number is the total grams of carbohydrates that you will be having.  Learning the standard serving sizes of foods  When you eat carbohydrate foods that are not packaged or do not include Nutrition Facts on the label, you need to measure the servings in order to count the grams of  carbohydrates.  Measure the foods that you will eat with a food scale or measuring cup, if needed.  Decide how many standard-size servings you will eat.  Multiply the number of servings by 15. For foods that contain carbohydrates, one serving equals 15 g of carbohydrates.  For example, if you eat 2 cups or 10 oz (300 g) of strawberries, you will have eaten 2 servings and 30 g of carbohydrates (2 servings x 15 g = 30 g).  For foods that have more than one food mixed, such as soups and casseroles, you must count the carbohydrates in each food that is included.  The following list contains standard serving sizes of common carbohydrate-rich foods. Each of these servings has about 15 g of carbohydrates:  1 slice of bread.  1 six-inch (15 cm) tortilla.  ? cup or 2 oz (53 g) cooked rice or pasta.  « cup or 3 oz (85 g) cooked or canned, drained and rinsed beans or lentils.  « cup or 3 oz (85 g) starchy vegetable, such as peas, corn, or squash.  « cup or 4 oz (120 g) hot cereal.  « cup or 3 oz (85 g) boiled or mashed potatoes, or ¬ or 3 oz (85 g) of a large baked potato.  « cup or 4 fl oz (118 mL) fruit juice.  1 cup or 8 fl oz (237 mL) milk.  1 small or 4 oz (106 g) apple.  « or 2 oz (63 g) of a medium banana.  1 cup or 5 oz (150 g) strawberries.  3 cups or 1 oz (28.3 g) popped popcorn.  What is an example of carbohydrate counting?  To calculate the grams of carbohydrates in this sample meal, follow the steps shown below.  Sample meal  3 oz (85 g) chicken breast.  ? cup or 4 oz (106 g) brown rice.  « cup or 3 oz (85 g) corn.  1 cup or 8 fl oz (237 mL) milk.  1 cup or 5 oz (150 g) strawberries with sugar-free whipped topping.  Carbohydrate calculation  Identify the foods that contain carbohydrates:  Rice.  Corn.  Milk.  Strawberries.  Calculate how many servings you have of each food:  2 servings rice.  1 serving corn.  1 serving milk.  1 serving strawberries.  Multiply each number of servings by 15  servings rice x 15  g = 30 g.  1 serving corn x 15 g = 15 g.  1 serving milk x 15 g = 15 g.  1 serving strawberries x 15 g = 15 g.  Add together all of the amounts to find the total grams of carbohydrates eaten:  30 g + 15 g + 15 g + 15 g = 75 g of carbohydrates total.  What are tips for following this plan?  Shopping  Develop a meal plan and then make a shopping list.  Buy fresh and frozen vegetables, fresh and frozen fruit, dairy, eggs, beans, lentils, and whole grains.  Look at food labels. Choose foods that have more fiber and less sugar.  Avoid processed foods and foods with added sugars.  Meal planning  Aim to have the same number of grams of carbohydrates at each meal and for each snack time.  Plan to have regular, balanced meals and snacks.  Where to find more information  American Diabetes Association: diabetes.org  Centers for Disease Control and Prevention: cdc.gov  Academy of Nutrition and Dietetics: eatright.org  Association of Diabetes Care & Education Specialists: diabeteseducator.org  Summary  Carbohydrate counting is a method of keeping track of how many carbohydrates you eat.  Eating carbohydrates increases the amount of sugar (glucose) in your blood.  Counting how many carbohydrates you eat improves how well you manage your blood glucose. This helps you manage your diabetes.  A dietitian can help you make a meal plan and calculate how many carbohydrates you should have at each meal and snack.  This information is not intended to replace advice given to you by your health care provider. Make sure you discuss any questions you have with your health care provider.  Document Revised: 07/21/2021 Document Reviewed: 07/21/2021  [employer] Patient Education c 2023 [employer]

## 2023-08-28 NOTE — PROGRESS NOTES
"Sravanthi Alegria is a 43 y.o. female.       Chief Complaint -  diabetes    History of Present Illness -       Diabetes-  Improving with Ozempic 1 mg weekly.  Patient states that she has not lost any weight in the past month and does not feel like it is giving her much appetite suppression.    Depression-  Uncontrolled with patient complaining of mood swings despite Cymbalta 60 mg daily and Wellbutrin 150 mg twice daily.  She denies any hallucinations SI or HI.    Hypertension-  Stable with losartan.  Patient reports home blood pressure less than 130/80 consistently    The following portions of the patient's history were reviewed and updated as appropriate: allergies, current medications, past family history, past medical history, past social history, past surgical history and problem list.        Objective  Vital signs:  /78   Pulse 81   Temp 98 øF (36.7 øC) (Temporal)   Ht 167.6 cm (66\")   Wt (!) 155 kg (341 lb)   LMP  (LMP Unknown)   SpO2 96%   BMI 55.04 kg/mý     Physical Exam  Vitals and nursing note reviewed.   Constitutional:       Appearance: Normal appearance. She is well-developed. She is obese.   Eyes:      Extraocular Movements: Extraocular movements intact.      Conjunctiva/sclera: Conjunctivae normal.   Cardiovascular:      Rate and Rhythm: Normal rate and regular rhythm.      Heart sounds: Normal heart sounds. No murmur heard.  Pulmonary:      Effort: Pulmonary effort is normal. No respiratory distress.      Breath sounds: Normal breath sounds. No wheezing.   Musculoskeletal:         General: No tenderness.   Skin:     General: Skin is warm and dry.      Findings: No rash.   Neurological:      Mental Status: She is alert and oriented to person, place, and time.   Psychiatric:         Mood and Affect: Mood normal.         Behavior: Behavior normal.         Thought Content: Thought content normal.       The following data was reviewed by Rosa Quigley PA-C:         Lab Results "   Component Value Date    BUN 8 05/15/2023    CREATININE 0.48 (L) 05/15/2023    EGFR 120.7 05/15/2023    ALT 36 (H) 05/15/2023    AST 32 05/15/2023    WBC 8.08 07/06/2022    HGB 14.1 07/06/2022    HCT 42.6 07/06/2022     07/06/2022    CHOL 163 12/16/2022    TRIG 151 (H) 12/16/2022    HDL 43 12/16/2022    LDL 94 12/16/2022    TSH 0.923 12/16/2022    HGBA1C 6.10 (H) 05/15/2023           Assessment & Plan     Diagnoses and all orders for this visit:    1. Type 2 diabetes mellitus with hyperglycemia, without long-term current use of insulin (Primary)  Comments:  Increase Ozempic 2 mg weekly  Advise low carbohydrate diabetic diet  Orders:  -     Semaglutide, 2 MG/DOSE, (OZEMPIC) 8 MG/3ML solution pen-injector; Inject 2 mg under the skin into the appropriate area as directed 1 (One) Time Per Week.  Dispense: 3 mL; Refill: 1    2. Moderate episode of recurrent major depressive disorder  Comments:  Increase Cymbalta 60 mg daily  Continue Wellbutrin 150 mg twice daily  Orders:  -     DULoxetine (CYMBALTA) 30 MG capsule; Take 3 capsules by mouth Daily.  Dispense: 90 capsule; Refill: 2    3. Essential hypertension  Comments:  Continue losartan  Continue to monitor                   Patient was given instructions and counseling regarding his condition or for health maintenance advice. Please see specific information pulled into the AVS if appropriate      This document has been electronically signed by:  Rosa Quigley PA-C

## 2023-08-29 DIAGNOSIS — N39.41 URGE INCONTINENCE OF URINE: ICD-10-CM

## 2023-08-31 RX ORDER — OXYBUTYNIN CHLORIDE 5 MG/1
TABLET, EXTENDED RELEASE ORAL
Qty: 90 TABLET | Refills: 0 | Status: SHIPPED | OUTPATIENT
Start: 2023-08-31

## 2023-09-05 ENCOUNTER — TELEPHONE (OUTPATIENT)
Dept: FAMILY MEDICINE CLINIC | Facility: CLINIC | Age: 43
End: 2023-09-05
Payer: COMMERCIAL

## 2023-09-05 ENCOUNTER — OFFICE VISIT (OUTPATIENT)
Dept: FAMILY MEDICINE CLINIC | Facility: CLINIC | Age: 43
End: 2023-09-05
Payer: COMMERCIAL

## 2023-09-05 VITALS
OXYGEN SATURATION: 96 % | TEMPERATURE: 97.5 F | HEART RATE: 100 BPM | SYSTOLIC BLOOD PRESSURE: 140 MMHG | HEIGHT: 66 IN | BODY MASS INDEX: 47.09 KG/M2 | WEIGHT: 293 LBS | DIASTOLIC BLOOD PRESSURE: 100 MMHG

## 2023-09-05 DIAGNOSIS — J06.9 VIRAL URI: ICD-10-CM

## 2023-09-05 DIAGNOSIS — H65.112 ACUTE MUCOID OTITIS MEDIA OF LEFT EAR: Primary | ICD-10-CM

## 2023-09-05 DIAGNOSIS — J02.9 SORE THROAT: ICD-10-CM

## 2023-09-05 DIAGNOSIS — J04.0 LARYNGITIS, ACUTE: ICD-10-CM

## 2023-09-05 LAB
B PARAPERT DNA SPEC QL NAA+PROBE: NOT DETECTED
B PERT DNA SPEC QL NAA+PROBE: NOT DETECTED
C PNEUM DNA NPH QL NAA+NON-PROBE: NOT DETECTED
EXPIRATION DATE: NORMAL
FLUAV SUBTYP SPEC NAA+PROBE: NOT DETECTED
FLUBV RNA ISLT QL NAA+PROBE: NOT DETECTED
HADV DNA SPEC NAA+PROBE: NOT DETECTED
HCOV 229E RNA SPEC QL NAA+PROBE: NOT DETECTED
HCOV HKU1 RNA SPEC QL NAA+PROBE: NOT DETECTED
HCOV NL63 RNA SPEC QL NAA+PROBE: NOT DETECTED
HCOV OC43 RNA SPEC QL NAA+PROBE: NOT DETECTED
HMPV RNA NPH QL NAA+NON-PROBE: NOT DETECTED
HPIV1 RNA ISLT QL NAA+PROBE: NOT DETECTED
HPIV2 RNA SPEC QL NAA+PROBE: NOT DETECTED
HPIV3 RNA NPH QL NAA+PROBE: NOT DETECTED
HPIV4 P GENE NPH QL NAA+PROBE: NOT DETECTED
INTERNAL CONTROL: NORMAL
Lab: NORMAL
M PNEUMO IGG SER IA-ACNC: NOT DETECTED
RHINOVIRUS RNA SPEC NAA+PROBE: NOT DETECTED
RSV RNA NPH QL NAA+NON-PROBE: NOT DETECTED
S PYO RRNA THROAT QL PROBE: NEGATIVE
SARS-COV-2 RNA NPH QL NAA+NON-PROBE: NOT DETECTED

## 2023-09-05 PROCEDURE — 0202U NFCT DS 22 TRGT SARS-COV-2: CPT | Performed by: PHYSICIAN ASSISTANT

## 2023-09-05 PROCEDURE — 1160F RVW MEDS BY RX/DR IN RCRD: CPT | Performed by: PHYSICIAN ASSISTANT

## 2023-09-05 PROCEDURE — 1159F MED LIST DOCD IN RCRD: CPT | Performed by: PHYSICIAN ASSISTANT

## 2023-09-05 PROCEDURE — 3080F DIAST BP >= 90 MM HG: CPT | Performed by: PHYSICIAN ASSISTANT

## 2023-09-05 PROCEDURE — 99213 OFFICE O/P EST LOW 20 MIN: CPT | Performed by: PHYSICIAN ASSISTANT

## 2023-09-05 PROCEDURE — 86403 PARTICLE AGGLUT ANTBDY SCRN: CPT | Performed by: PHYSICIAN ASSISTANT

## 2023-09-05 PROCEDURE — 3044F HG A1C LEVEL LT 7.0%: CPT | Performed by: PHYSICIAN ASSISTANT

## 2023-09-05 PROCEDURE — 3077F SYST BP >= 140 MM HG: CPT | Performed by: PHYSICIAN ASSISTANT

## 2023-09-05 PROCEDURE — 87651 STREP A DNA AMP PROBE: CPT | Performed by: PHYSICIAN ASSISTANT

## 2023-09-05 RX ORDER — AMOXICILLIN AND CLAVULANATE POTASSIUM 875; 125 MG/1; MG/1
1 TABLET, FILM COATED ORAL 2 TIMES DAILY
Qty: 14 TABLET | Refills: 0 | Status: SHIPPED | OUTPATIENT
Start: 2023-09-05 | End: 2023-09-12

## 2023-09-05 NOTE — PROGRESS NOTES
Subjective        Chief Complaint  Sore Throat    Subjective      Anika Alegria is a 43 y.o. female who presents today to [facility] for Sore Throat. Past medical history is significant for HTN, HLD, type 2 diabetes mellitus which is non-insulin-requiring, PCOS, morbid obesity, GERD, nonalcoholic fatty liver disease, major depressive disorder, and mild asthma.      Sore Throat:  She reports 3 days of sore throat, hoarseness of voice, generalized fatigue, borderline fever at  F, chills, and bilateral ear pain.  Her kids have also since developed similar symptoms.  She took a home COVID-19 test on the day her symptoms began which was negative.  She has been taking her home cetirizine, Singulair, Ventolin inhaler, and using nasal spray.  She has also been taking Mucinex.      Current Outpatient Medications:     atorvastatin (LIPITOR) 10 MG tablet, Take 1 tablet by mouth Every Night., Disp: 90 tablet, Rfl: 3    Blood Glucose Monitoring Suppl misc, 1 each 3 (Three) Times a Day. Test tid, Disp: 1 each, Rfl: 0    buPROPion SR (WELLBUTRIN SR) 150 MG 12 hr tablet, Take 1 tablet by mouth twice daily, Disp: 180 tablet, Rfl: 0    cetirizine (zyrTEC) 10 MG tablet, Take 1 tablet by mouth Daily., Disp: , Rfl:     Cholecalciferol 50 MCG (2000 UT) tablet, , Disp: , Rfl:     dapagliflozin Propanediol (Farxiga) 10 MG tablet, Take 10 mg by mouth Every Morning., Disp: 90 tablet, Rfl: 3    Diclofenac Sodium (VOLTAREN) 1 % gel gel, APPLY 4 GRAMS AS DIRECTED ONTO THE APPROPRIATE AREA FOUR TIMES DAILY AS NEEDED, Disp: 300 g, Rfl: 5    DULoxetine (CYMBALTA) 30 MG capsule, Take 3 capsules by mouth Daily., Disp: 90 capsule, Rfl: 2    ibuprofen (ADVIL,MOTRIN) 800 MG tablet, Take 1 tablet by mouth Every 8 (Eight) Hours., Disp: 90 tablet, Rfl: 2    ketoconazole (NIZORAL) 2 % shampoo, APPLY TOPICALLY TO THE APPROPRIATE AREA AS DIRECTED TWICE WEEKLY, Disp: 120 mL, Rfl: 0    Lancets (OneTouch Delica Plus  [id number]) [facility], 1 each by Other route 3 (Three) Times a Day., Disp: 100 each, Rfl: 5    levothyroxine (Synthroid) 100 MCG tablet, Take 1 tablet by mouth Daily., Disp: 90 tablet, Rfl: 3    losartan (COZAAR) 100 MG tablet, Take 1 tablet by mouth Daily., Disp: 90 tablet, Rfl: 3    metFORMIN (GLUCOPHAGE) 500 MG tablet, Take 1 tablet by mouth Every 12 (Twelve) Hours., Disp: 180 tablet, Rfl: 3    mometasone (Nasonex) 50 MCG/ACT nasal spray, 2 sprays into the nostril(s) as directed by provider Daily., Disp: 17 g, Rfl: 5    montelukast (SINGULAIR) 10 MG tablet, TAKE 1 TABLET BY MOUTH ONCE DAILY AT NIGHT, Disp: 30 tablet, Rfl: 0    OneTouch Ultra test strip, USE TO TEST ONCE DAILY AS DIRECTED, Disp: 100 each, Rfl: 11    oxybutynin XL (DITROPAN-XL) 5 MG 24 hr tablet, Take 1 tablet by mouth once daily, Disp: 90 tablet, Rfl: 0    pantoprazole (PROTONIX) 40 MG EC tablet, Take 1 tablet by mouth Daily., Disp: 90 tablet, Rfl: 3    rOPINIRole (REQUIP) 1 MG tablet, Take 1 tablet by mouth Every Night. Take 1 hour before bedtime, Disp: 90 tablet, Rfl: 3    Semaglutide, 2 MG/DOSE, (OZEMPIC) 8 MG/3ML solution pen-injector, Inject 2 mg under the skin into the appropriate area as directed 1 (One) Time Per Week., Disp: 3 mL, Rfl: 1    spironolactone (ALDACTONE) 25 MG tablet, TAKE 1 TABLET BY MOUTH AS NEEDED FOR LEG  SWELLING, Disp: 45 tablet, Rfl: 0    Ventolin  (90 Base) MCG/ACT inhaler, INHALE 2 PUFFS BY MOUTH EVERY 4 HOURS AS NEEDED FOR WHEEZING, Disp: 18 g, Rfl: 0    vitamin D (ERGOCALCIFEROL) 1.25 MG (63650 UT) capsule capsule, Take 1 capsule by mouth Every 7 (Seven) Days., Disp: 15 capsule, Rfl: 3    amoxicillin-clavulanate (AUGMENTIN) 875-125 MG per tablet, Take 1 tablet by mouth 2 (Two) Times a Day for 7 days., Disp: 14 tablet, Rfl: 0      Allergies   Allergen Reactions    Ultram [Tramadol] Swelling    Venlafaxine Other (See Comments)     Nightmares       Objective     Objective   Vital Signs:  Blood Pressure 140/100    "Pulse 100   Temperature 97.5 °F (36.4 °C) (Temporal)   Height 167.6 cm (66\")   Weight (Abnormal) 154 kg (340 lb)   Oxygen Saturation 96%   Body Mass Index 54.88 kg/m²   Estimated body mass index is 54.88 kg/m² as calculated from the following:    Height as of this encounter: 167.6 cm (66\").    Weight as of this encounter: 154 kg (340 lb).    Past Medical History:   Diagnosis Date    Anxiety and depression     GERD (gastroesophageal reflux disease)     Goiter     Goiter     Hepatitis A     Hypertension     Mild asthma 2021    PONV (postoperative nausea and vomiting)     Thyroid disease     Type 2 diabetes mellitus with hyperglycemia, without long-term current use of insulin 2020     Past Surgical History:   Procedure Laterality Date    ABDOMINAL SURGERY      CARPAL TUNNEL RELEASE Right 10/15/2019    Procedure: CARPAL TUNNEL RELEASE;  Surgeon: Rony Cruz MD;  Location: [facility];  Service: Orthopedics     SECTION      x2    TUBAL ABDOMINAL LIGATION       Social History     Socioeconomic History    Marital status:      Spouse name: bea    Number of children: 2    Years of education: 12   Tobacco Use    Smoking status: Former     Passive exposure: Past    Smokeless tobacco: Never   Substance and Sexual Activity    Alcohol use: No    Drug use: No    Sexual activity: Defer      Physical Exam  Vitals and nursing note reviewed.   Constitutional:       General: She is not in acute distress.     Appearance: She is well-developed. She is not diaphoretic.   HENT:      Head: Normocephalic and atraumatic.      Right Ear: Tympanic membrane, ear canal and external ear normal. There is no impacted cerumen.      Left Ear: Ear canal and external ear normal. There is no impacted cerumen.      Ears:      Comments: Erythema of the TM and mucoid effusion noted on the left.      Mouth/Throat:      Pharynx: Posterior oropharyngeal erythema present. No oropharyngeal exudate.   Eyes:      " General: No scleral icterus.        Right eye: No discharge.         Left eye: No discharge.      Conjunctiva/sclera: Conjunctivae normal.   Cardiovascular:      Rate and Rhythm: Normal rate and regular rhythm.      Heart sounds: Normal heart sounds. No murmur heard.    No friction rub. No gallop.   Pulmonary:      Effort: Pulmonary effort is normal. No respiratory distress.      Breath sounds: Normal breath sounds. No wheezing or rales.   Chest:      Chest wall: No tenderness.   Musculoskeletal:         General: Normal range of motion.      Cervical back: Normal range of motion and neck supple.   Skin:     General: Skin is warm and dry.      Coloration: Skin is not pale.      Findings: No erythema or rash.   Neurological:      Mental Status: She is alert and oriented to person, place, and time.   Psychiatric:         Behavior: Behavior normal.     Result Review :  The following data was reviewed by: KALI Hook on 09/05/2023:  Hemoglobin A1C   Date Value Ref Range Status   05/15/2023 6.10 (H) 4.80 - 5.60 % Final     TSH   Date Value Ref Range Status   12/16/2022 0.923 0.270 - 4.200 uIU/mL Final     HDL Cholesterol   Date Value Ref Range Status   12/16/2022 43 40 - 60 mg/dL Final     LDL Cholesterol    Date Value Ref Range Status   12/16/2022 94 0 - 100 mg/dL Final     Triglycerides   Date Value Ref Range Status   12/16/2022 151 (H) 0 - 150 mg/dL Final     Lab Results   Component Value Date    RAPSCRN Negative 09/05/2023           Assessment / Plan         Assessment   Diagnoses and all orders for this visit:    1. Acute mucoid otitis media of left ear (Primary)  Evidence of early OM of the left associated with increased pain on that side.  Cover with Augmentin 875-125 mg twice daily x7 days.  Contact the office if symptoms persist on day 7.    2. Laryngitis, acute  3. Sore throat  4. Viral URI  Rapid strep screen negative.   Suspected viral respiratory illness.   Continue supportive care with rest,  adequate oral fluid intake, symptomatic care.   Continue as needed ibuprofen/Tylenol for fever, myalgias, or headaches.  Continue cetirizine, Singulair, nasal spray, Ventolin inhaler, Mucinex.  Given her children are also ill with similar symptoms, respiratory PCR panel sent for testing, will notify patient of results when available.   Return to clinic if no improvement noted or if symptoms are worsening.   -     POCT Strep A, molecular  -     Respiratory Panel PCR w/COVID-19(SARS-CoV-2) GIUSEPPE/NIKKI/ALLYN/PAD/COR/MAD/GENEVIEVE In-House, NP Swab in UTM/VTM, 3-4 HR TAT - Swab, Nasopharynx; Future  -     amoxicillin-clavulanate (AUGMENTIN) 875-125 MG per tablet; Take 1 tablet by mouth 2 (Two) Times a Day for 7 days.  Dispense: 14 tablet; Refill: 0       New Medications Ordered This Visit   Medications    amoxicillin-clavulanate (AUGMENTIN) 875-125 MG per tablet     Sig: Take 1 tablet by mouth 2 (Two) Times a Day for 7 days.     Dispense:  14 tablet     Refill:  0     Follow Up   Return if symptoms worsen or fail to improve.    Patient was given instructions and counseling regarding her condition or for health maintenance advice. Please see specific information pulled into the AVS if appropriate.       This document has been electronically signed by KALI Hook   September 5, 2023 11:50 EDT    Dictated Utilizing Dragon Dictation: Part of this note may be an electronic transcription/translation of spoken language to printed text using the Dragon Dictation System.

## 2023-09-05 NOTE — TELEPHONE ENCOUNTER
S/w anika she is scheduled to see donna eleanor today 9/5        ----- Message from Gabby Johnson MA sent at 9/5/2023  7:48 AM EDT -----  Regarding: FW: Need appointment   Contact: [phone]    ----- Message -----  From: Anika Alegria  Sent: 9/4/2023  10:48 PM EDT  To: Esha Stahl [facility]  Subject: Need appointment                                 I have no voice. Throat is sore. Feel bad all together. I did take an at home Covid test it is negative. So I need an appointment t find out what is wrong. Preferably with Rosa if at all possible.

## 2023-09-20 ENCOUNTER — OFFICE VISIT (OUTPATIENT)
Dept: ENDOCRINOLOGY | Facility: CLINIC | Age: 43
End: 2023-09-20
Payer: COMMERCIAL

## 2023-09-20 VITALS
OXYGEN SATURATION: 97 % | DIASTOLIC BLOOD PRESSURE: 62 MMHG | WEIGHT: 293 LBS | SYSTOLIC BLOOD PRESSURE: 122 MMHG | HEART RATE: 91 BPM | BODY MASS INDEX: 47.09 KG/M2 | HEIGHT: 66 IN

## 2023-09-20 DIAGNOSIS — E04.9 GOITER: Primary | ICD-10-CM

## 2023-09-20 PROCEDURE — 84443 ASSAY THYROID STIM HORMONE: CPT | Performed by: INTERNAL MEDICINE

## 2023-09-20 NOTE — ASSESSMENT & PLAN NOTE
She has a rather large goiter that seems c/w Hashimoto's thyroiditis based on u/s reports.  It appears relatively stable in size since 2018.  It hasn't responded to treatment with T4 over the last year.  She is having some compressive sxs.  We discussed the diagnosis and treatment options including observation or thyroidectomy.  Given the size of the goiter, I would recommend thyroidectomy.  Will make surgical referral.

## 2023-09-20 NOTE — PROGRESS NOTES
"       Office Note      Date: 2023  Patient Name: Anika Alegria  MRN: [id number]  : 1980    Chief Complaint   Patient presents with    Thyroid Problem     goiter       History of Present Illness:   Anika Alegria is a 43 y.o. female who presents for Thyroid Problem (goiter)    She was diagnosed with small goiter in around .  She has noted some enlargement over the years.  She has h/o heterogeneous goiter dating back to at least .  The thyroid lobes have been enlarged but appear relatively stable since .  She has been on T4 since  to try to shrink the goiter.  She denies any h/o hypothyroidism.  She is currently taking 100mcg qd.  She notes trouble swallowing.  She says she has to get food the consistency of baby food otherwise the food get stuck.  She notes some dyspnea when supine.  She notes some fatigue.  She denies any other sxs of hypo- or hyperthyroidism at this time.      Subjective      Patient was born where: [place].  Facial radiation exposure: No.  High iodine intake: No  Family hx of thyroid disease: Yes, describe: mother.    Review of Systems:   Review of Systems   Constitutional:  Positive for diaphoresis and fatigue.   Cardiovascular: Negative.    Gastrointestinal: Negative.    Endocrine: Negative.      The following portions of the patient's history were reviewed and updated as appropriate: allergies, current medications, past family history, past medical history, past social history, past surgical history, and problem list.    Objective     Visit Vitals  /62 (BP Location: Left arm, Patient Position: Sitting, Cuff Size: Adult)   Pulse 91   Ht 167.6 cm (66\")   Wt (!) 153 kg (338 lb)   LMP  (LMP Unknown)   SpO2 97%   BMI 54.55 kg/m²       Physical Exam:  Physical Exam  Constitutional:       Appearance: She is obese.   Eyes:      Extraocular Movements: Extraocular movements intact.      Conjunctiva/sclera: Conjunctivae normal.      Pupils: Pupils are equal, round, and " reactive to light.   Neck:      Thyroid: Thyromegaly present. No thyroid mass or thyroid tenderness.      Comments: Massive goiter which is firm but freely movable with no discrete nodules noted  Cardiovascular:      Heart sounds: Normal heart sounds.   Pulmonary:      Effort: Pulmonary effort is normal.      Breath sounds: Normal breath sounds.   Abdominal:      General: Bowel sounds are normal.      Palpations: Abdomen is soft.   Musculoskeletal:      Cervical back: Normal range of motion and neck supple.   Lymphadenopathy:      Cervical: No cervical adenopathy.   Skin:     General: Skin is warm and dry.   Neurological:      General: No focal deficit present.      Mental Status: She is alert.   Psychiatric:         Mood and Affect: Mood normal.         Behavior: Behavior normal.         Thought Content: Thought content normal.         Judgment: Judgment normal.       Labs:    TSH  No results found for: TSHBASE     Free T4  Free T4   Date Value Ref Range Status   12/16/2022 0.97 0.93 - 1.70 ng/dL Final       T3  No results found for: Z5VNSNL      TPO  No results found for: THYROIDAB    TG AB  No results found for: THGAB    TG  No results found for: THYROGLB    CBC w/DIFF  Lab Results   Component Value Date    WBC 8.08 07/06/2022    RBC 4.65 07/06/2022    HGB 14.1 07/06/2022    HCT 42.6 07/06/2022    MCV 91.6 07/06/2022    MCH 30.3 07/06/2022    MCHC 33.1 07/06/2022    RDW 13.2 07/06/2022    RDWSD 44.4 07/06/2022    MPV 10.0 07/06/2022     07/06/2022    NEUTRORELPCT 50.7 07/06/2022    LYMPHORELPCT 37.6 07/06/2022    MONORELPCT 8.8 07/06/2022    EOSRELPCT 1.9 07/06/2022    BASORELPCT 0.6 07/06/2022    AUTOIGPER 0.4 07/06/2022    NEUTROABS 4.10 07/06/2022    LYMPHSABS 3.04 07/06/2022    MONOSABS 0.71 07/06/2022    EOSABS 0.15 07/06/2022    BASOSABS 0.05 07/06/2022    AUTOIGNUM 0.03 07/06/2022    NRBC 0.0 07/06/2022           Assessment / Plan      Assessment & Plan:  Diagnoses and all orders for this  visit:    1. Goiter (Primary)  Assessment & Plan:  She has a rather large goiter that seems c/w Hashimoto's thyroiditis based on u/s reports.  It appears relatively stable in size since 2018.  It hasn't responded to treatment with T4 over the last year.  She is having some compressive sxs.  We discussed the diagnosis and treatment options including observation or thyroidectomy.  Given the size of the goiter, I would recommend thyroidectomy.  Will make surgical referral.    Orders:  -     Ambulatory Referral to General Surgery  -     TSH; Future       Current Outpatient Medications   Medication Instructions    atorvastatin (LIPITOR) 10 mg, Oral, Nightly    Blood Glucose Monitoring Suppl misc 1 each, Does not apply, 3 Times Daily, Test tid    buPROPion SR (WELLBUTRIN SR) 150 MG 12 hr tablet Take 1 tablet by mouth twice daily    cetirizine (ZYRTEC) 10 mg, Oral, Daily    Cholecalciferol 50 MCG (2000 UT) tablet No dose, route, or frequency recorded.    Diclofenac Sodium (VOLTAREN) 1 % gel gel APPLY 4 GRAMS AS DIRECTED ONTO THE APPROPRIATE AREA FOUR TIMES DAILY AS NEEDED    DULoxetine (CYMBALTA) 90 mg, Oral, Daily    Farxiga 10 mg, Oral, Every Morning    ibuprofen (ADVIL,MOTRIN) 800 mg, Oral, Every 8 Hours    ketoconazole (NIZORAL) 2 % shampoo APPLY TOPICALLY TO THE APPROPRIATE AREA AS DIRECTED TWICE WEEKLY    Lancets (OneTouch Delica Plus [id number]) misc 1 each, Other, 3 Times Daily    levothyroxine (SYNTHROID) 100 mcg, Oral, Daily    losartan (COZAAR) 100 mg, Oral, Daily    metFORMIN (GLUCOPHAGE) 500 mg, Oral, Every 12 Hours    mometasone (Nasonex) 50 MCG/ACT nasal spray 2 sprays, Nasal, Daily    montelukast (SINGULAIR) 10 MG tablet TAKE 1 TABLET BY MOUTH ONCE DAILY AT NIGHT    [employer] Ultra test strip USE TO TEST ONCE DAILY AS DIRECTED    oxybutynin XL (DITROPAN-XL) 5 MG 24 hr tablet Take 1 tablet by mouth once daily    pantoprazole (PROTONIX) 40 mg, Oral, Daily    rOPINIRole (REQUIP) 1 mg, Oral, Nightly, Take 1 hour  before bedtime    Semaglutide (2 MG/DOSE) (OZEMPIC) 2 mg, Subcutaneous, Weekly    spironolactone (ALDACTONE) 25 MG tablet TAKE 1 TABLET BY MOUTH AS NEEDED FOR LEG  SWELLING    Ventolin  (90 Base) MCG/ACT inhaler INHALE 2 PUFFS BY MOUTH EVERY 4 HOURS AS NEEDED FOR WHEEZING    vitamin D (ERGOCALCIFEROL) 50,000 Units, Oral, Every 7 Days      Return in about 3 months (around 12/20/2023) for Recheck with TSH.    Chris Diehl MD   09/20/2023

## 2023-09-21 LAB — TSH SERPL DL<=0.05 MIU/L-ACNC: 0.54 UIU/ML (ref 0.27–4.2)

## 2023-09-25 ENCOUNTER — OFFICE VISIT (OUTPATIENT)
Dept: FAMILY MEDICINE CLINIC | Facility: CLINIC | Age: 43
End: 2023-09-25

## 2023-09-25 VITALS
TEMPERATURE: 97.9 F | WEIGHT: 293 LBS | HEART RATE: 77 BPM | OXYGEN SATURATION: 98 % | SYSTOLIC BLOOD PRESSURE: 130 MMHG | BODY MASS INDEX: 47.09 KG/M2 | DIASTOLIC BLOOD PRESSURE: 84 MMHG | HEIGHT: 66 IN

## 2023-09-25 DIAGNOSIS — I10 ESSENTIAL HYPERTENSION: Chronic | ICD-10-CM

## 2023-09-25 DIAGNOSIS — E11.65 TYPE 2 DIABETES MELLITUS WITH HYPERGLYCEMIA, WITHOUT LONG-TERM CURRENT USE OF INSULIN: Primary | Chronic | ICD-10-CM

## 2023-09-25 DIAGNOSIS — K21.9 GASTROESOPHAGEAL REFLUX DISEASE WITHOUT ESOPHAGITIS: Chronic | ICD-10-CM

## 2023-09-25 DIAGNOSIS — E04.9 GOITER: Chronic | ICD-10-CM

## 2023-09-25 PROCEDURE — 1159F MED LIST DOCD IN RCRD: CPT | Performed by: PHYSICIAN ASSISTANT

## 2023-09-25 PROCEDURE — 1160F RVW MEDS BY RX/DR IN RCRD: CPT | Performed by: PHYSICIAN ASSISTANT

## 2023-09-25 PROCEDURE — 99214 OFFICE O/P EST MOD 30 MIN: CPT | Performed by: PHYSICIAN ASSISTANT

## 2023-09-25 PROCEDURE — 3075F SYST BP GE 130 - 139MM HG: CPT | Performed by: PHYSICIAN ASSISTANT

## 2023-09-25 PROCEDURE — 3079F DIAST BP 80-89 MM HG: CPT | Performed by: PHYSICIAN ASSISTANT

## 2023-09-25 PROCEDURE — 3044F HG A1C LEVEL LT 7.0%: CPT | Performed by: PHYSICIAN ASSISTANT

## 2023-09-25 RX ORDER — TIRZEPATIDE 2.5 MG/.5ML
2.5 INJECTION, SOLUTION SUBCUTANEOUS
Qty: 2 ML | Refills: 1 | Status: SHIPPED | OUTPATIENT
Start: 2023-09-25

## 2023-09-25 NOTE — PROGRESS NOTES
"Chief Complaint -diabetes    History of Present Illness -     Anika Alegria is a 43 y.o. female.     Diabetes-  Stable with significant improvement in hemoglobin A1c down to 6.1.  Patient has been on Ozempic.  She reports that she does not have much appetite suppression and is interested in that for weight loss to further improve glycemic control.  Patient also uses metformin and low carbohydrate diet.    Hypertension-  Stable with losartan and dietary modification.    Gastroesophageal reflux disease-  Stable with pantoprazole    Goiter-  Patient has been assessed by  Endocrinologist, Dr. Delgado since the last visit.  Although ultrasound suggests benign goiter the size is causing her some problems with hoarseness and dysphagia.  She has been referred to surgeon for further evaluation and possible excision.    The following portions of the patient's history were reviewed and updated as appropriate: allergies, current medications, past family history, past medical history, past social history, past surgical history and problem list.        Objective  Vital signs:  /84   Pulse 77   Temp 97.9 °F (36.6 °C) (Temporal)   Ht 167.6 cm (66\")   Wt (!) 153 kg (338 lb)   LMP  (LMP Unknown)   SpO2 98%   BMI 54.55 kg/m²     Physical Exam  Vitals and nursing note reviewed.   Constitutional:       Appearance: Normal appearance. She is well-developed. She is obese.   Eyes:      Extraocular Movements: Extraocular movements intact.      Conjunctiva/sclera: Conjunctivae normal.   Neck:      Comments: Large goiter noted bilaterally  Cardiovascular:      Rate and Rhythm: Normal rate and regular rhythm.      Heart sounds: Normal heart sounds. No murmur heard.  Pulmonary:      Effort: Pulmonary effort is normal. No respiratory distress.      Breath sounds: Normal breath sounds. No wheezing.   Musculoskeletal:         General: No tenderness.   Skin:     General: Skin is warm and dry.      Findings: No rash.   Neurological:      " Mental Status: She is alert and oriented to person, place, and time.   Psychiatric:         Mood and Affect: Mood normal.         Behavior: Behavior normal.         Thought Content: Thought content normal.       The following data was reviewed by Rosa Quigley PA-C:         Lab Results   Component Value Date    BUN 8 05/15/2023    CREATININE 0.48 (L) 05/15/2023    EGFR 120.7 05/15/2023    ALT 36 (H) 05/15/2023    AST 32 05/15/2023    WBC 8.08 07/06/2022    HGB 14.1 07/06/2022    HCT 42.6 07/06/2022     07/06/2022    CHOL 163 12/16/2022    TRIG 151 (H) 12/16/2022    HDL 43 12/16/2022    LDL 94 12/16/2022    TSH 0.535 09/20/2023    HGBA1C 6.10 (H) 05/15/2023           Assessment & Plan     Diagnoses and all orders for this visit:    1. Type 2 diabetes mellitus with hyperglycemia, without long-term current use of insulin (Primary)  Comments:  Discontinue Ozempic  Start Mounjaro  Continue metformin  Orders:  -     Tirzepatide (Mounjaro) 2.5 MG/0.5ML solution pen-injector; Inject 0.5 mL under the skin into the appropriate area as directed Every 7 (Seven) Days.  Dispense: 2 mL; Refill: 1    2. Goiter  Comments:  Advised to keep appointment with surgeon for further evaluation and possible excision of goiter    3. Essential hypertension  Comments:  Continue losartan  Advised daily BP and pulse monitoring    4. Gastroesophageal reflux disease without esophagitis  Comments:  Continue pantoprazole  Advised to avoid known trigger foods                   Patient was given instructions and counseling regarding his condition or for health maintenance advice. Please see specific information pulled into the AVS if appropriate      This document has been electronically signed by:  Rosa Quigley PA-C

## 2023-09-25 NOTE — PATIENT INSTRUCTIONS
"Fat and Cholesterol Restricted Eating Plan  Getting too much fat and cholesterol in your diet may cause health problems. Choosing the right foods helps keep your fat and cholesterol at normal levels. This can keep you from getting certain diseases.  Your doctor may recommend an eating plan that includes:  Total fat: ______% or less of total calories a day. This is ______g of fat a day.  Saturated fat: ______% or less of total calories a day. This is ______g of saturated fat a day.  Cholesterol: less than _________mg a day.  Fiber: ______g a day.  What are tips for following this plan?  General tips  Work with your doctor to lose weight if you need to.  Avoid:  Foods with added sugar.  Fried foods.  Foods with trans fat or partially hydrogenated oils. This includes some margarines and baked goods.  If you drink alcohol:  Limit how much you have to:  0-1 drink a day for women who are not pregnant.  0-2 drinks a day for men.  Know how much alcohol is in a drink. In the [place] one drink equals one 12 oz bottle of beer (355 mL), one 5 oz glass of wine (148 mL), or one 1½ oz glass of hard liquor (44 mL).  Reading food labels  Check food labels for:  Trans fats.  Partially hydrogenated oils.  Saturated fat (g) in each serving.  Cholesterol (mg) in each serving.  Fiber (g) in each serving.  Choose foods with healthy fats, such as:  Monounsaturated fats and polyunsaturated fats. These include olive and canola oil, flaxseeds, walnuts, almonds, and seeds.  Omega-3 fats. These are found in certain fish, flaxseed oil, and ground flaxseeds.  Choose grain products that have whole grains. Look for the word \"whole\" as the first word in the ingredient list.  Cooking  Cook foods using low-fat methods. These include baking, boiling, grilling, and broiling.  Eat more home-cooked foods. Eat at restaurants and buffets less often. Eat less fast food.  Avoid cooking using saturated fats, such as butter, cream, palm oil, palm kernel oil, and " coconut oil.  Meal planning    At meals, divide your plate into four equal parts:  Fill one-half of your plate with vegetables, green salads, and fruit.  Fill one-fourth of your plate with whole grains.  Fill one-fourth of your plate with low-fat (lean) protein foods.  Eat fish that is high in omega-3 fats at least two times a week. This includes mackerel, tuna, sardines, and salmon.  Eat foods that are high in fiber, such as whole grains, beans, apples, pears, berries, broccoli, carrots, peas, and barley.  What foods should I eat?  Fruits  All fresh, canned (in natural juice), or frozen fruits.  Vegetables  Fresh or frozen vegetables (raw, steamed, roasted, or grilled). Green salads.  Grains  Whole grains, such as whole wheat or whole grain breads, crackers, cereals, and pasta. Unsweetened oatmeal, bulgur, barley, quinoa, or brown rice. Corn or whole wheat flour tortillas.  Meats and other protein foods  Ground beef (85% or leaner), grass-fed beef, or beef trimmed of fat. Skinless chicken or turkey. Ground chicken or turkey. Pork trimmed of fat. All fish and seafood. Egg whites. Dried beans, peas, or lentils. Unsalted nuts or seeds. Unsalted canned beans. Nut butters without added sugar or oil.  Dairy  Low-fat or nonfat dairy products, such as skim or 1% milk, 2% or reduced-fat cheeses, low-fat and fat-free ricotta or cottage cheese, or plain low-fat and nonfat yogurt.  Fats and oils  Tub margarine without trans fats. Light or reduced-fat mayonnaise and salad dressings. Avocado. Olive, canola, sesame, or safflower oils.  The items listed above may not be a complete list of foods and beverages you can eat. Contact a dietitian for more information.  What foods should I avoid?  Fruits  Canned fruit in heavy syrup. Fruit in cream or butter sauce. Fried fruit.  Vegetables  Vegetables cooked in cheese, cream, or butter sauce. Fried vegetables.  Grains  White bread. White pasta. White rice. Cornbread. Bagels, pastries,  and croissants. Crackers and snack foods that contain trans fat and hydrogenated oils.  Meats and other protein foods  Fatty cuts of meat. Ribs, chicken wings, klein, sausage, bologna, salami, chitterlings, fatback, hot dogs, bratwurst, and packaged lunch meats. Liver and organ meats. Whole eggs and egg yolks. Chicken and turkey with skin. Fried meat.  Dairy  Whole or 2% milk, cream, half-and-half, and cream cheese. Whole milk cheeses. Whole-fat or sweetened yogurt. Full-fat cheeses. Nondairy creamers and whipped toppings. Processed cheese, cheese spreads, and cheese curds.  Fats and oils  Butter, stick margarine, lard, shortening, ghee, or klein fat. Coconut, palm kernel, and palm oils.  Beverages  Alcohol. Sugar-sweetened drinks such as sodas, lemonade, and fruit drinks.  Sweets and desserts  Corn syrup, sugars, honey, and molasses. Candy. Jam and jelly. Syrup. Sweetened cereals. Cookies, pies, cakes, donuts, muffins, and ice cream.  The items listed above may not be a complete list of foods and beverages you should avoid. Contact a dietitian for more information.  Summary  Choosing the right foods helps keep your fat and cholesterol at normal levels. This can keep you from getting certain diseases.  At meals, fill one-half of your plate with vegetables, green salads, and fruits.  Eat high fiber foods, like whole grains, beans, apples, pears, berries, carrots, peas, and barley.  Limit added sugar, saturated fats, alcohol, and fried foods.  This information is not intended to replace advice given to you by your health care provider. Make sure you discuss any questions you have with your health care provider.  Document Revised: 04/29/2022 Document Reviewed: 04/29/2022  Elsevier Patient Education © 2023 [employer]  Carbohydrate Counting for Diabetes Mellitus, Adult  Carbohydrate counting is a method of keeping track of how many carbohydrates you eat. Eating carbohydrates increases the amount of sugar (glucose) in  the blood. Counting how many carbohydrates you eat improves how well you manage your blood glucose. This, in turn, helps you manage your diabetes.  Carbohydrates are measured in grams (g) per serving. It is important to know how many carbohydrates (in grams or by serving size) you can have in each meal. This is different for every person. A dietitian can help you make a meal plan and calculate how many carbohydrates you should have at each meal and snack.  What foods contain carbohydrates?  Carbohydrates are found in the following foods:  Grains, such as breads and cereals.  Dried beans and soy products.  Starchy vegetables, such as potatoes, peas, and corn.  Fruit and fruit juices.  Milk and yogurt.  Sweets and snack foods, such as cake, cookies, candy, chips, and soft drinks.  How do I count carbohydrates in foods?  There are two ways to count carbohydrates in food. You can read food labels or learn standard serving sizes of foods. You can use either of these methods or a combination of both.  Using the Nutrition Facts label  The Nutrition Facts list is included on the labels of almost all packaged foods and beverages in the [place]. It includes:  The serving size.  Information about nutrients in each serving, including the grams of carbohydrate per serving.  To use the Nutrition Facts, decide how many servings you will have. Then, multiply the number of servings by the number of carbohydrates per serving. The resulting number is the total grams of carbohydrates that you will be having.  Learning the standard serving sizes of foods  When you eat carbohydrate foods that are not packaged or do not include Nutrition Facts on the label, you need to measure the servings in order to count the grams of carbohydrates.  Measure the foods that you will eat with a food scale or measuring cup, if needed.  Decide how many standard-size servings you will eat.  Multiply the number of servings by 15. For foods that contain  carbohydrates, one serving equals 15 g of carbohydrates.  For example, if you eat 2 cups or 10 oz (300 g) of strawberries, you will have eaten 2 servings and 30 g of carbohydrates (2 servings x 15 g = 30 g).  For foods that have more than one food mixed, such as soups and casseroles, you must count the carbohydrates in each food that is included.  The following list contains standard serving sizes of common carbohydrate-rich foods. Each of these servings has about 15 g of carbohydrates:  1 slice of bread.  1 six-inch (15 cm) tortilla.  ? cup or 2 oz (53 g) cooked rice or pasta.  ½ cup or 3 oz (85 g) cooked or canned, drained and rinsed beans or lentils.  ½ cup or 3 oz (85 g) starchy vegetable, such as peas, corn, or squash.  ½ cup or 4 oz (120 g) hot cereal.  ½ cup or 3 oz (85 g) boiled or mashed potatoes, or ¼ or 3 oz (85 g) of a large baked potato.  ½ cup or 4 fl oz (118 mL) fruit juice.  1 cup or 8 fl oz (237 mL) milk.  1 small or 4 oz (106 g) apple.  ½ or 2 oz (63 g) of a medium banana.  1 cup or 5 oz (150 g) strawberries.  3 cups or 1 oz (28.3 g) popped popcorn.  What is an example of carbohydrate counting?  To calculate the grams of carbohydrates in this sample meal, follow the steps shown below.  Sample meal  3 oz (85 g) chicken breast.  ? cup or 4 oz (106 g) brown rice.  ½ cup or 3 oz (85 g) corn.  1 cup or 8 fl oz (237 mL) milk.  1 cup or 5 oz (150 g) strawberries with sugar-free whipped topping.  Carbohydrate calculation  Identify the foods that contain carbohydrates:  Rice.  Corn.  Milk.  Strawberries.  Calculate how many servings you have of each food:  2 servings rice.  1 serving corn.  1 serving milk.  1 serving strawberries.  Multiply each number of servings by 15  servings rice x 15 g = 30 g.  1 serving corn x 15 g = 15 g.  1 serving milk x 15 g = 15 g.  1 serving strawberries x 15 g = 15 g.  Add together all of the amounts to find the total grams of carbohydrates eaten:  30 g + 15 g + 15 g + 15  g = 75 g of carbohydrates total.  What are tips for following this plan?  Shopping  Develop a meal plan and then make a shopping list.  Buy fresh and frozen vegetables, fresh and frozen fruit, dairy, eggs, beans, lentils, and whole grains.  Look at food labels. Choose foods that have more fiber and less sugar.  Avoid processed foods and foods with added sugars.  Meal planning  Aim to have the same number of grams of carbohydrates at each meal and for each snack time.  Plan to have regular, balanced meals and snacks.  Where to find more information  American Diabetes Association: diabetes.org  Centers for Disease Control and Prevention: cdc.gov  Academy of Nutrition and Dietetics: eatright.org  Association of Diabetes Care & Education Specialists: diabeteseducator.org  Summary  Carbohydrate counting is a method of keeping track of how many carbohydrates you eat.  Eating carbohydrates increases the amount of sugar (glucose) in your blood.  Counting how many carbohydrates you eat improves how well you manage your blood glucose. This helps you manage your diabetes.  A dietitian can help you make a meal plan and calculate how many carbohydrates you should have at each meal and snack.  This information is not intended to replace advice given to you by your health care provider. Make sure you discuss any questions you have with your health care provider.  Document Revised: 07/21/2021 Document Reviewed: 07/21/2021  Elsevier Patient Education © 2023 [employer]

## 2023-10-23 ENCOUNTER — PRIOR AUTHORIZATION (OUTPATIENT)
Dept: FAMILY MEDICINE CLINIC | Facility: CLINIC | Age: 43
End: 2023-10-23
Payer: COMMERCIAL

## 2023-10-23 ENCOUNTER — OFFICE VISIT (OUTPATIENT)
Dept: FAMILY MEDICINE CLINIC | Facility: CLINIC | Age: 43
End: 2023-10-23
Payer: COMMERCIAL

## 2023-10-23 VITALS
HEIGHT: 66 IN | TEMPERATURE: 96.8 F | OXYGEN SATURATION: 99 % | SYSTOLIC BLOOD PRESSURE: 110 MMHG | DIASTOLIC BLOOD PRESSURE: 80 MMHG | HEART RATE: 62 BPM | WEIGHT: 293 LBS | BODY MASS INDEX: 47.09 KG/M2

## 2023-10-23 DIAGNOSIS — E04.9 GOITER: Chronic | ICD-10-CM

## 2023-10-23 DIAGNOSIS — E78.2 MIXED HYPERLIPIDEMIA: Chronic | ICD-10-CM

## 2023-10-23 DIAGNOSIS — J02.0 PHARYNGITIS DUE TO STREPTOCOCCUS SPECIES: Primary | ICD-10-CM

## 2023-10-23 DIAGNOSIS — E11.65 TYPE 2 DIABETES MELLITUS WITH HYPERGLYCEMIA, WITHOUT LONG-TERM CURRENT USE OF INSULIN: Chronic | ICD-10-CM

## 2023-10-23 DIAGNOSIS — I10 ESSENTIAL HYPERTENSION: Chronic | ICD-10-CM

## 2023-10-23 PROCEDURE — 3044F HG A1C LEVEL LT 7.0%: CPT | Performed by: PHYSICIAN ASSISTANT

## 2023-10-23 PROCEDURE — 1159F MED LIST DOCD IN RCRD: CPT | Performed by: PHYSICIAN ASSISTANT

## 2023-10-23 PROCEDURE — 99214 OFFICE O/P EST MOD 30 MIN: CPT | Performed by: PHYSICIAN ASSISTANT

## 2023-10-23 PROCEDURE — 3074F SYST BP LT 130 MM HG: CPT | Performed by: PHYSICIAN ASSISTANT

## 2023-10-23 PROCEDURE — 1160F RVW MEDS BY RX/DR IN RCRD: CPT | Performed by: PHYSICIAN ASSISTANT

## 2023-10-23 PROCEDURE — 3079F DIAST BP 80-89 MM HG: CPT | Performed by: PHYSICIAN ASSISTANT

## 2023-10-23 RX ORDER — AZITHROMYCIN 250 MG/1
TABLET, FILM COATED ORAL
Qty: 6 TABLET | Refills: 0 | Status: SHIPPED | OUTPATIENT
Start: 2023-10-23

## 2023-10-23 RX ORDER — TIRZEPATIDE 5 MG/.5ML
5 INJECTION, SOLUTION SUBCUTANEOUS
Qty: 2 ML | Refills: 2 | Status: SHIPPED | OUTPATIENT
Start: 2023-10-23

## 2023-10-23 NOTE — PATIENT INSTRUCTIONS
Carbohydrate Counting for Diabetes Mellitus, Adult  Carbohydrate counting is a method of keeping track of how many carbohydrates you eat. Eating carbohydrates increases the amount of sugar (glucose) in the blood. Counting how many carbohydrates you eat improves how well you manage your blood glucose. This, in turn, helps you manage your diabetes.  Carbohydrates are measured in grams (g) per serving. It is important to know how many carbohydrates (in grams or by serving size) you can have in each meal. This is different for every person. A dietitian can help you make a meal plan and calculate how many carbohydrates you should have at each meal and snack.  What foods contain carbohydrates?  Carbohydrates are found in the following foods:  Grains, such as breads and cereals.  Dried beans and soy products.  Starchy vegetables, such as potatoes, peas, and corn.  Fruit and fruit juices.  Milk and yogurt.  Sweets and snack foods, such as cake, cookies, candy, chips, and soft drinks.  How do I count carbohydrates in foods?  There are two ways to count carbohydrates in food. You can read food labels or learn standard serving sizes of foods. You can use either of these methods or a combination of both.  Using the Nutrition Facts label  The Nutrition Facts list is included on the labels of almost all packaged foods and beverages in the [place]. It includes:  The serving size.  Information about nutrients in each serving, including the grams of carbohydrate per serving.  To use the Nutrition Facts, decide how many servings you will have. Then, multiply the number of servings by the number of carbohydrates per serving. The resulting number is the total grams of carbohydrates that you will be having.  Learning the standard serving sizes of foods  When you eat carbohydrate foods that are not packaged or do not include Nutrition Facts on the label, you need to measure the servings in order to count the grams of  carbohydrates.  Measure the foods that you will eat with a food scale or measuring cup, if needed.  Decide how many standard-size servings you will eat.  Multiply the number of servings by 15. For foods that contain carbohydrates, one serving equals 15 g of carbohydrates.  For example, if you eat 2 cups or 10 oz (300 g) of strawberries, you will have eaten 2 servings and 30 g of carbohydrates (2 servings x 15 g = 30 g).  For foods that have more than one food mixed, such as soups and casseroles, you must count the carbohydrates in each food that is included.  The following list contains standard serving sizes of common carbohydrate-rich foods. Each of these servings has about 15 g of carbohydrates:  1 slice of bread.  1 six-inch (15 cm) tortilla.  ? cup or 2 oz (53 g) cooked rice or pasta.  ½ cup or 3 oz (85 g) cooked or canned, drained and rinsed beans or lentils.  ½ cup or 3 oz (85 g) starchy vegetable, such as peas, corn, or squash.  ½ cup or 4 oz (120 g) hot cereal.  ½ cup or 3 oz (85 g) boiled or mashed potatoes, or ¼ or 3 oz (85 g) of a large baked potato.  ½ cup or 4 fl oz (118 mL) fruit juice.  1 cup or 8 fl oz (237 mL) milk.  1 small or 4 oz (106 g) apple.  ½ or 2 oz (63 g) of a medium banana.  1 cup or 5 oz (150 g) strawberries.  3 cups or 1 oz (28.3 g) popped popcorn.  What is an example of carbohydrate counting?  To calculate the grams of carbohydrates in this sample meal, follow the steps shown below.  Sample meal  3 oz (85 g) chicken breast.  ? cup or 4 oz (106 g) brown rice.  ½ cup or 3 oz (85 g) corn.  1 cup or 8 fl oz (237 mL) milk.  1 cup or 5 oz (150 g) strawberries with sugar-free whipped topping.  Carbohydrate calculation  Identify the foods that contain carbohydrates:  Rice.  Corn.  Milk.  Strawberries.  Calculate how many servings you have of each food:  2 servings rice.  1 serving corn.  1 serving milk.  1 serving strawberries.  Multiply each number of servings by 15  servings rice x 15  g = 30 g.  1 serving corn x 15 g = 15 g.  1 serving milk x 15 g = 15 g.  1 serving strawberries x 15 g = 15 g.  Add together all of the amounts to find the total grams of carbohydrates eaten:  30 g + 15 g + 15 g + 15 g = 75 g of carbohydrates total.  What are tips for following this plan?  Shopping  Develop a meal plan and then make a shopping list.  Buy fresh and frozen vegetables, fresh and frozen fruit, dairy, eggs, beans, lentils, and whole grains.  Look at food labels. Choose foods that have more fiber and less sugar.  Avoid processed foods and foods with added sugars.  Meal planning  Aim to have the same number of grams of carbohydrates at each meal and for each snack time.  Plan to have regular, balanced meals and snacks.  Where to find more information  American Diabetes Association: diabetes.org  Centers for Disease Control and Prevention: cdc.gov  Academy of Nutrition and Dietetics: eatright.org  Association of Diabetes Care & Education Specialists: diabeteseducator.org  Summary  Carbohydrate counting is a method of keeping track of how many carbohydrates you eat.  Eating carbohydrates increases the amount of sugar (glucose) in your blood.  Counting how many carbohydrates you eat improves how well you manage your blood glucose. This helps you manage your diabetes.  A dietitian can help you make a meal plan and calculate how many carbohydrates you should have at each meal and snack.  This information is not intended to replace advice given to you by your health care provider. Make sure you discuss any questions you have with your health care provider.  Document Revised: 07/21/2021 Document Reviewed: 07/21/2021  Elsevier Patient Education © 2023 [employer]  Fat and Cholesterol Restricted Eating Plan  Getting too much fat and cholesterol in your diet may cause health problems. Choosing the right foods helps keep your fat and cholesterol at normal levels. This can keep you from getting certain  "diseases.  Your doctor may recommend an eating plan that includes:  Total fat: ______% or less of total calories a day. This is ______g of fat a day.  Saturated fat: ______% or less of total calories a day. This is ______g of saturated fat a day.  Cholesterol: less than _________mg a day.  Fiber: ______g a day.  What are tips for following this plan?  General tips  Work with your doctor to lose weight if you need to.  Avoid:  Foods with added sugar.  Fried foods.  Foods with trans fat or partially hydrogenated oils. This includes some margarines and baked goods.  If you drink alcohol:  Limit how much you have to:  0-1 drink a day for women who are not pregnant.  0-2 drinks a day for men.  Know how much alcohol is in a drink. In the [place] one drink equals one 12 oz bottle of beer (355 mL), one 5 oz glass of wine (148 mL), or one 1½ oz glass of hard liquor (44 mL).  Reading food labels  Check food labels for:  Trans fats.  Partially hydrogenated oils.  Saturated fat (g) in each serving.  Cholesterol (mg) in each serving.  Fiber (g) in each serving.  Choose foods with healthy fats, such as:  Monounsaturated fats and polyunsaturated fats. These include olive and canola oil, flaxseeds, walnuts, almonds, and seeds.  Omega-3 fats. These are found in certain fish, flaxseed oil, and ground flaxseeds.  Choose grain products that have whole grains. Look for the word \"whole\" as the first word in the ingredient list.  Cooking  Cook foods using low-fat methods. These include baking, boiling, grilling, and broiling.  Eat more home-cooked foods. Eat at restaurants and buffets less often. Eat less fast food.  Avoid cooking using saturated fats, such as butter, cream, palm oil, palm kernel oil, and coconut oil.  Meal planning    At meals, divide your plate into four equal parts:  Fill one-half of your plate with vegetables, green salads, and fruit.  Fill one-fourth of your plate with whole grains.  Fill one-fourth of your plate with " low-fat (lean) protein foods.  Eat fish that is high in omega-3 fats at least two times a week. This includes mackerel, tuna, sardines, and salmon.  Eat foods that are high in fiber, such as whole grains, beans, apples, pears, berries, broccoli, carrots, peas, and barley.  What foods should I eat?  Fruits  All fresh, canned (in natural juice), or frozen fruits.  Vegetables  Fresh or frozen vegetables (raw, steamed, roasted, or grilled). Green salads.  Grains  Whole grains, such as whole wheat or whole grain breads, crackers, cereals, and pasta. Unsweetened oatmeal, bulgur, barley, quinoa, or brown rice. Corn or whole wheat flour tortillas.  Meats and other protein foods  Ground beef (85% or leaner), grass-fed beef, or beef trimmed of fat. Skinless chicken or turkey. Ground chicken or turkey. Pork trimmed of fat. All fish and seafood. Egg whites. Dried beans, peas, or lentils. Unsalted nuts or seeds. Unsalted canned beans. Nut butters without added sugar or oil.  Dairy  Low-fat or nonfat dairy products, such as skim or 1% milk, 2% or reduced-fat cheeses, low-fat and fat-free ricotta or cottage cheese, or plain low-fat and nonfat yogurt.  Fats and oils  Tub margarine without trans fats. Light or reduced-fat mayonnaise and salad dressings. Avocado. Olive, canola, sesame, or safflower oils.  The items listed above may not be a complete list of foods and beverages you can eat. Contact a dietitian for more information.  What foods should I avoid?  Fruits  Canned fruit in heavy syrup. Fruit in cream or butter sauce. Fried fruit.  Vegetables  Vegetables cooked in cheese, cream, or butter sauce. Fried vegetables.  Grains  White bread. White pasta. White rice. Cornbread. Bagels, pastries, and croissants. Crackers and snack foods that contain trans fat and hydrogenated oils.  Meats and other protein foods  Fatty cuts of meat. Ribs, chicken wings, klein, sausage, bologna, salami, chitterlings, fatback, hot dogs, bratwurst, and  packaged lunch meats. Liver and organ meats. Whole eggs and egg yolks. Chicken and turkey with skin. Fried meat.  Dairy  Whole or 2% milk, cream, half-and-half, and cream cheese. Whole milk cheeses. Whole-fat or sweetened yogurt. Full-fat cheeses. Nondairy creamers and whipped toppings. Processed cheese, cheese spreads, and cheese curds.  Fats and oils  Butter, stick margarine, lard, shortening, ghee, or klein fat. Coconut, palm kernel, and palm oils.  Beverages  Alcohol. Sugar-sweetened drinks such as sodas, lemonade, and fruit drinks.  Sweets and desserts  Corn syrup, sugars, honey, and molasses. Candy. Jam and jelly. Syrup. Sweetened cereals. Cookies, pies, cakes, donuts, muffins, and ice cream.  The items listed above may not be a complete list of foods and beverages you should avoid. Contact a dietitian for more information.  Summary  Choosing the right foods helps keep your fat and cholesterol at normal levels. This can keep you from getting certain diseases.  At meals, fill one-half of your plate with vegetables, green salads, and fruits.  Eat high fiber foods, like whole grains, beans, apples, pears, berries, carrots, peas, and barley.  Limit added sugar, saturated fats, alcohol, and fried foods.  This information is not intended to replace advice given to you by your health care provider. Make sure you discuss any questions you have with your health care provider.  Document Revised: 04/29/2022 Document Reviewed: 04/29/2022  Elsevier Patient Education © 2023 [employer]

## 2023-10-23 NOTE — PROGRESS NOTES
"Chief Complaint -sore throat    History of Present Illness -     Anika Alegria is a 43 y.o. female.       Sore throat-  Patient complains of sore throat that began 3 days ago.  Her daughter recently tested positive for strep throat.  She does have some body aches and chills.    Diabetes mellitus-  Stable with Mounjaro 2.5 mg weekly.  She has lost 4 pounds in the past month.  She states that she does not have much appetite suppression.  She also uses metformin and follows a low-carb diet    Hypertension-  Controlled with losartan and spironolactone    Hyperlipidemia-  Stable with atorvastatin and low-cholesterol diet    Goiter-  Patient does have goiter and thyromegaly.  The plan is to have thyroidectomy before the end of the year.  She does have appointment with surgeon next month.    The following portions of the patient's history were reviewed and updated as appropriate: allergies, current medications, past family history, past medical history, past social history, past surgical history and problem list.        Objective  Vital signs:  /80 (BP Location: Right arm, Patient Position: Sitting, Cuff Size: Adult)   Pulse 62   Temp 96.8 °F (36 °C) (Temporal)   Ht 167.6 cm (65.98\")   Wt (!) 152 kg (334 lb)   LMP  (LMP Unknown)   SpO2 99%   BMI 53.94 kg/m²     Physical Exam  Vitals and nursing note reviewed.   Constitutional:       General: She is not in acute distress.     Appearance: Normal appearance. She is well-developed. She is obese. She is not diaphoretic.   HENT:      Head: Normocephalic and atraumatic.      Right Ear: Tympanic membrane, ear canal and external ear normal.      Left Ear: Tympanic membrane, ear canal and external ear normal.      Nose: Nose normal. Rhinorrhea present.      Mouth/Throat:      Mouth: Mucous membranes are moist.      Pharynx: Posterior oropharyngeal erythema present. No oropharyngeal exudate.   Neck:      Thyroid: No thyromegaly.      Comments: Thyromegaly " noted/goiter  Cardiovascular:      Rate and Rhythm: Normal rate and regular rhythm.      Heart sounds: Normal heart sounds. No murmur heard.  Pulmonary:      Effort: Pulmonary effort is normal.      Breath sounds: Normal breath sounds. No wheezing or rales.   Musculoskeletal:      Cervical back: Normal range of motion and neck supple.   Lymphadenopathy:      Cervical: Cervical adenopathy present.   Skin:     General: Skin is warm and dry.      Findings: No rash.   Neurological:      Mental Status: She is alert and oriented to person, place, and time.   Psychiatric:         Mood and Affect: Mood normal.         Behavior: Behavior normal.         Thought Content: Thought content normal.         The following data was reviewed by Rosa Quigley PA-C:         Lab Results   Component Value Date    BUN 8 05/15/2023    CREATININE 0.48 (L) 05/15/2023    EGFR 120.7 05/15/2023    ALT 36 (H) 05/15/2023    AST 32 05/15/2023    WBC 8.08 07/06/2022    HGB 14.1 07/06/2022    HCT 42.6 07/06/2022     07/06/2022    CHOL 163 12/16/2022    TRIG 151 (H) 12/16/2022    HDL 43 12/16/2022    LDL 94 12/16/2022    TSH 0.535 09/20/2023    HGBA1C 6.10 (H) 05/15/2023           Assessment & Plan     Diagnoses and all orders for this visit:    1. Pharyngitis due to Streptococcus species (Primary)  Comments:  Plan to empirically treat for strep pharyngitis  Since son has anaphylactic reactions with cephalosporins I will write her prescription for azithromycin instead  Orders:  -     azithromycin (Zithromax Z-Carlos) 250 MG tablet; Take 2 tablets by mouth on day 1, then 1 tablet daily on days 2-5  Dispense: 6 tablet; Refill: 0    2. Type 2 diabetes mellitus with hyperglycemia, without long-term current use of insulin  Comments:  Increase Mounjaro 5 mg weekly  Advise low carbohydrate diabetic diet  Continue metformin  Orders:  -     Tirzepatide (Mounjaro) 5 MG/0.5ML solution pen-injector; Inject 0.5 mL under the skin into the appropriate area as  directed Every 7 (Seven) Days.  Dispense: 2 mL; Refill: 2    3. Essential hypertension  Comments:  Continue losartan and spironolactone  Advise daily BP and pulse monitoring    4. Mixed hyperlipidemia  Comments:  Continue atorvastatin  Advised low-cholesterol diet    5. Goiter  Comments:  Advised to keep appointments with surgeon and plan for excision prior to the end of the year                   Patient was given instructions and counseling regarding his condition or for health maintenance advice. Please see specific information pulled into the AVS if appropriate      This document has been electronically signed by:  Rosa Quigley PA-C

## 2023-11-10 ENCOUNTER — TRANSCRIBE ORDERS (OUTPATIENT)
Dept: NUTRITION | Facility: HOSPITAL | Age: 43
End: 2023-11-10
Payer: COMMERCIAL

## 2023-11-10 DIAGNOSIS — E01.0 THYROMEGALY: Primary | ICD-10-CM

## 2023-11-24 DIAGNOSIS — I10 ESSENTIAL HYPERTENSION: ICD-10-CM

## 2023-11-24 DIAGNOSIS — E11.65 TYPE 2 DIABETES MELLITUS WITH HYPERGLYCEMIA, WITHOUT LONG-TERM CURRENT USE OF INSULIN: ICD-10-CM

## 2023-11-24 DIAGNOSIS — F33.1 MODERATE EPISODE OF RECURRENT MAJOR DEPRESSIVE DISORDER: Chronic | ICD-10-CM

## 2023-11-24 DIAGNOSIS — G25.81 RLS (RESTLESS LEGS SYNDROME): Chronic | ICD-10-CM

## 2023-11-24 DIAGNOSIS — K21.9 GASTROESOPHAGEAL REFLUX DISEASE WITHOUT ESOPHAGITIS: ICD-10-CM

## 2023-11-27 RX ORDER — ROPINIROLE 1 MG/1
1 TABLET, FILM COATED ORAL NIGHTLY
Qty: 90 TABLET | Refills: 0 | Status: SHIPPED | OUTPATIENT
Start: 2023-11-27

## 2023-11-27 RX ORDER — DAPAGLIFLOZIN 10 MG/1
1 TABLET, FILM COATED ORAL EVERY MORNING
Qty: 90 TABLET | Refills: 0 | Status: SHIPPED | OUTPATIENT
Start: 2023-11-27

## 2023-11-27 RX ORDER — PANTOPRAZOLE SODIUM 40 MG/1
40 TABLET, DELAYED RELEASE ORAL DAILY
Qty: 90 TABLET | Refills: 0 | Status: SHIPPED | OUTPATIENT
Start: 2023-11-27

## 2023-11-27 RX ORDER — DULOXETIN HYDROCHLORIDE 60 MG/1
60 CAPSULE, DELAYED RELEASE ORAL DAILY
Qty: 90 CAPSULE | Refills: 0 | Status: SHIPPED | OUTPATIENT
Start: 2023-11-27

## 2023-11-27 RX ORDER — LOSARTAN POTASSIUM 100 MG/1
100 TABLET ORAL DAILY
Qty: 90 TABLET | Refills: 0 | Status: SHIPPED | OUTPATIENT
Start: 2023-11-27

## 2023-11-29 DIAGNOSIS — N39.41 URGE INCONTINENCE OF URINE: ICD-10-CM

## 2023-11-30 RX ORDER — OXYBUTYNIN CHLORIDE 5 MG/1
TABLET, EXTENDED RELEASE ORAL
Qty: 90 TABLET | Refills: 0 | Status: SHIPPED | OUTPATIENT
Start: 2023-11-30

## 2023-12-03 DIAGNOSIS — J45.20 MILD INTERMITTENT ASTHMA, UNSPECIFIED WHETHER COMPLICATED: ICD-10-CM

## 2023-12-03 DIAGNOSIS — J30.1 CHRONIC SEASONAL ALLERGIC RHINITIS DUE TO POLLEN: Chronic | ICD-10-CM

## 2023-12-04 ENCOUNTER — OFFICE VISIT (OUTPATIENT)
Dept: FAMILY MEDICINE CLINIC | Facility: CLINIC | Age: 43
End: 2023-12-04
Payer: COMMERCIAL

## 2023-12-04 VITALS
BODY MASS INDEX: 47.09 KG/M2 | TEMPERATURE: 98.2 F | DIASTOLIC BLOOD PRESSURE: 82 MMHG | HEIGHT: 66 IN | HEART RATE: 94 BPM | WEIGHT: 293 LBS | SYSTOLIC BLOOD PRESSURE: 138 MMHG | OXYGEN SATURATION: 97 %

## 2023-12-04 DIAGNOSIS — E78.2 MIXED HYPERLIPIDEMIA: Chronic | ICD-10-CM

## 2023-12-04 DIAGNOSIS — E11.65 TYPE 2 DIABETES MELLITUS WITH HYPERGLYCEMIA, WITHOUT LONG-TERM CURRENT USE OF INSULIN: Primary | Chronic | ICD-10-CM

## 2023-12-04 DIAGNOSIS — M15.9 PRIMARY OSTEOARTHRITIS INVOLVING MULTIPLE JOINTS: Chronic | ICD-10-CM

## 2023-12-04 LAB — HBA1C MFR BLD: 5.5 % (ref 4.5–5.7)

## 2023-12-04 PROCEDURE — 3075F SYST BP GE 130 - 139MM HG: CPT | Performed by: PHYSICIAN ASSISTANT

## 2023-12-04 PROCEDURE — 99214 OFFICE O/P EST MOD 30 MIN: CPT | Performed by: PHYSICIAN ASSISTANT

## 2023-12-04 PROCEDURE — 1159F MED LIST DOCD IN RCRD: CPT | Performed by: PHYSICIAN ASSISTANT

## 2023-12-04 PROCEDURE — 3044F HG A1C LEVEL LT 7.0%: CPT | Performed by: PHYSICIAN ASSISTANT

## 2023-12-04 PROCEDURE — 1160F RVW MEDS BY RX/DR IN RCRD: CPT | Performed by: PHYSICIAN ASSISTANT

## 2023-12-04 PROCEDURE — 3079F DIAST BP 80-89 MM HG: CPT | Performed by: PHYSICIAN ASSISTANT

## 2023-12-04 RX ORDER — LEVOTHYROXINE SODIUM 0.1 MG/1
100 TABLET ORAL DAILY
Qty: 90 TABLET | Refills: 0 | Status: SHIPPED | OUTPATIENT
Start: 2023-12-04

## 2023-12-04 RX ORDER — FLUTICASONE PROPIONATE 50 MCG
SPRAY, SUSPENSION (ML) NASAL
Qty: 48 G | Refills: 0 | Status: SHIPPED | OUTPATIENT
Start: 2023-12-04

## 2023-12-04 RX ORDER — ALBUTEROL SULFATE 90 UG/1
AEROSOL, METERED RESPIRATORY (INHALATION)
Qty: 18 G | Refills: 0 | Status: SHIPPED | OUTPATIENT
Start: 2023-12-04

## 2023-12-04 NOTE — PROGRESS NOTES
"Chief Complaint -diabetes    History of Present Illness -     Anika Alegria is a 43 y.o. female.     Diabetes-  Stable with metformin 500 mg twice daily and Mounjaro 5 mg weekly.  Patient does report some appetite suppression with the use of Mounjaro.    Osteoarthritis-  Patient complains of osteoarthritis pain in her knees that has been worse with the recent cold weather.  Some relief with Voltaren gel    Hyperlipidemia-  Stable with atorvastatin and low-cholesterol diet    The following portions of the patient's history were reviewed and updated as appropriate: allergies, current medications, past family history, past medical history, past social history, past surgical history and problem list.        Objective  Vital signs:  /82   Pulse 94   Temp 98.2 °F (36.8 °C) (Temporal)   Ht 167.6 cm (66\")   Wt (!) 151 kg (333 lb)   LMP  (LMP Unknown)   SpO2 97%   BMI 53.75 kg/m²     Physical Exam  Vitals and nursing note reviewed.   Constitutional:       Appearance: Normal appearance. She is well-developed. She is obese.   Eyes:      Extraocular Movements: Extraocular movements intact.      Conjunctiva/sclera: Conjunctivae normal.   Cardiovascular:      Rate and Rhythm: Normal rate and regular rhythm.      Heart sounds: Normal heart sounds. No murmur heard.  Pulmonary:      Effort: Pulmonary effort is normal. No respiratory distress.      Breath sounds: Normal breath sounds. No wheezing.   Musculoskeletal:         General: No tenderness.   Skin:     General: Skin is warm and dry.      Findings: No rash.   Neurological:      Mental Status: She is alert and oriented to person, place, and time.   Psychiatric:         Mood and Affect: Mood normal.         Behavior: Behavior normal.         Thought Content: Thought content normal.         The following data was reviewed by Rosa Quigley PA-C:         Lab Results   Component Value Date    BUN 8 05/15/2023    CREATININE 0.48 (L) 05/15/2023    EGFR 120.7 05/15/2023    " ALT 36 (H) 05/15/2023    AST 32 05/15/2023    WBC 8.08 07/06/2022    HGB 14.1 07/06/2022    HCT 42.6 07/06/2022     07/06/2022    CHOL 163 12/16/2022    TRIG 151 (H) 12/16/2022    HDL 43 12/16/2022    LDL 94 12/16/2022    TSH 0.535 09/20/2023    HGBA1C 6.10 (H) 05/15/2023           Assessment & Plan     Diagnoses and all orders for this visit:    1. Type 2 diabetes mellitus with hyperglycemia, without long-term current use of insulin (Primary)  Comments:  Increase Mounjaro 7.5 mg weekly  Decrease metformin 500 mg daily then discontinue if fasting BG less than 120  Orders:  -     Tirzepatide (Mounjaro) 7.5 MG/0.5ML solution pen-injector; Inject 0.5 mL under the skin into the appropriate area as directed Every 7 (Seven) Days.  Dispense: 2 mL; Refill: 2    2. Primary osteoarthritis involving multiple joints  Comments:  Continue Voltaren gel as needed  Advised activity as tolerated and daily stretching  Orders:  -     Diclofenac Sodium (VOLTAREN) 1 % gel gel; Apply  topically to the appropriate area as directed 4 (Four) Times a Day As Needed (knee pain).  Dispense: 300 g; Refill: 5    3. Mixed hyperlipidemia  Comments:  Continue atorvastatin  Advised low-cholesterol diet                   Patient was given instructions and counseling regarding his condition or for health maintenance advice. Please see specific information pulled into the AVS if appropriate      This document has been electronically signed by:  Rosa Quigley PA-C

## 2023-12-13 ENCOUNTER — HOSPITAL ENCOUNTER (OUTPATIENT)
Dept: CT IMAGING | Facility: HOSPITAL | Age: 43
Discharge: HOME | End: 2023-12-13
Admitting: SURGERY
Payer: COMMERCIAL

## 2023-12-13 DIAGNOSIS — E01.0 THYROMEGALY: ICD-10-CM

## 2023-12-13 LAB — CREAT BLDA-MCNC: 0.7 MG/DL (ref 0.6–1.3)

## 2023-12-13 PROCEDURE — 70491 CT SOFT TISSUE NECK W/DYE: CPT

## 2023-12-13 PROCEDURE — 25510000001 IOPAMIDOL 61 % SOLUTION: Performed by: SURGERY

## 2023-12-13 PROCEDURE — 82565 ASSAY OF CREATININE: CPT

## 2023-12-13 RX ADMIN — IOPAMIDOL 88 ML: 612 INJECTION, SOLUTION INTRAVENOUS at 10:56

## 2024-01-17 DIAGNOSIS — M15.9 PRIMARY OSTEOARTHRITIS INVOLVING MULTIPLE JOINTS: ICD-10-CM

## 2024-01-18 ENCOUNTER — OFFICE VISIT (OUTPATIENT)
Dept: FAMILY MEDICINE CLINIC | Facility: CLINIC | Age: 44
End: 2024-01-18
Payer: COMMERCIAL

## 2024-01-18 VITALS
HEIGHT: 66 IN | DIASTOLIC BLOOD PRESSURE: 90 MMHG | TEMPERATURE: 98.3 F | BODY MASS INDEX: 47.09 KG/M2 | WEIGHT: 293 LBS | SYSTOLIC BLOOD PRESSURE: 142 MMHG | HEART RATE: 111 BPM | OXYGEN SATURATION: 98 %

## 2024-01-18 DIAGNOSIS — K21.9 GASTROESOPHAGEAL REFLUX DISEASE WITHOUT ESOPHAGITIS: ICD-10-CM

## 2024-01-18 DIAGNOSIS — E11.65 TYPE 2 DIABETES MELLITUS WITH HYPERGLYCEMIA, WITHOUT LONG-TERM CURRENT USE OF INSULIN: Chronic | ICD-10-CM

## 2024-01-18 DIAGNOSIS — I10 ESSENTIAL HYPERTENSION: Primary | Chronic | ICD-10-CM

## 2024-01-18 DIAGNOSIS — J45.20 MILD INTERMITTENT ASTHMA WITHOUT COMPLICATION: Chronic | ICD-10-CM

## 2024-01-18 DIAGNOSIS — E78.2 MIXED HYPERLIPIDEMIA: Chronic | ICD-10-CM

## 2024-01-18 DIAGNOSIS — I87.2 PERIPHERAL VENOUS INSUFFICIENCY: Chronic | ICD-10-CM

## 2024-01-18 DIAGNOSIS — L65.9 HAIR LOSS: ICD-10-CM

## 2024-01-18 DIAGNOSIS — G25.81 RLS (RESTLESS LEGS SYNDROME): Chronic | ICD-10-CM

## 2024-01-18 DIAGNOSIS — F33.1 MODERATE EPISODE OF RECURRENT MAJOR DEPRESSIVE DISORDER: Chronic | ICD-10-CM

## 2024-01-18 DIAGNOSIS — E03.0 CONGENITAL HYPOTHYROIDISM WITH DIFFUSE GOITER: ICD-10-CM

## 2024-01-18 DIAGNOSIS — N39.41 URGE INCONTINENCE OF URINE: ICD-10-CM

## 2024-01-18 DIAGNOSIS — J30.1 CHRONIC SEASONAL ALLERGIC RHINITIS DUE TO POLLEN: Chronic | ICD-10-CM

## 2024-01-18 DIAGNOSIS — E55.9 VITAMIN D DEFICIENCY: ICD-10-CM

## 2024-01-18 PROCEDURE — 3077F SYST BP >= 140 MM HG: CPT | Performed by: PHYSICIAN ASSISTANT

## 2024-01-18 PROCEDURE — 1160F RVW MEDS BY RX/DR IN RCRD: CPT | Performed by: PHYSICIAN ASSISTANT

## 2024-01-18 PROCEDURE — 1159F MED LIST DOCD IN RCRD: CPT | Performed by: PHYSICIAN ASSISTANT

## 2024-01-18 PROCEDURE — 99214 OFFICE O/P EST MOD 30 MIN: CPT | Performed by: PHYSICIAN ASSISTANT

## 2024-01-18 PROCEDURE — 3080F DIAST BP >= 90 MM HG: CPT | Performed by: PHYSICIAN ASSISTANT

## 2024-01-18 RX ORDER — ATORVASTATIN CALCIUM 10 MG/1
10 TABLET, FILM COATED ORAL NIGHTLY
Qty: 90 TABLET | Refills: 3 | Status: SHIPPED | OUTPATIENT
Start: 2024-01-18

## 2024-01-18 RX ORDER — DULOXETIN HYDROCHLORIDE 60 MG/1
60 CAPSULE, DELAYED RELEASE ORAL DAILY
Qty: 90 CAPSULE | Refills: 3 | Status: SHIPPED | OUTPATIENT
Start: 2024-01-18

## 2024-01-18 RX ORDER — IBUPROFEN 800 MG/1
800 TABLET ORAL EVERY 8 HOURS
Qty: 90 TABLET | Refills: 2 | Status: SHIPPED | OUTPATIENT
Start: 2024-01-18

## 2024-01-18 RX ORDER — CETIRIZINE HYDROCHLORIDE 10 MG/1
10 TABLET ORAL DAILY
Qty: 90 TABLET | Refills: 3 | Status: SHIPPED | OUTPATIENT
Start: 2024-01-18

## 2024-01-18 RX ORDER — ROPINIROLE 1 MG/1
1 TABLET, FILM COATED ORAL NIGHTLY
Qty: 90 TABLET | Refills: 3 | Status: SHIPPED | OUTPATIENT
Start: 2024-01-18

## 2024-01-18 RX ORDER — LEVOTHYROXINE SODIUM 0.1 MG/1
100 TABLET ORAL DAILY
Qty: 90 TABLET | Refills: 0 | Status: SHIPPED | OUTPATIENT
Start: 2024-01-18

## 2024-01-18 RX ORDER — BUPROPION HYDROCHLORIDE 150 MG/1
150 TABLET, EXTENDED RELEASE ORAL 2 TIMES DAILY
Qty: 180 TABLET | Refills: 3 | Status: SHIPPED | OUTPATIENT
Start: 2024-01-18

## 2024-01-18 RX ORDER — LOSARTAN POTASSIUM 100 MG/1
100 TABLET ORAL DAILY
Qty: 90 TABLET | Refills: 3 | Status: SHIPPED | OUTPATIENT
Start: 2024-01-18

## 2024-01-18 RX ORDER — DULOXETIN HYDROCHLORIDE 30 MG/1
90 CAPSULE, DELAYED RELEASE ORAL DAILY
Qty: 90 CAPSULE | Refills: 3 | Status: SHIPPED | OUTPATIENT
Start: 2024-01-18

## 2024-01-18 RX ORDER — ALBUTEROL SULFATE 90 UG/1
2 AEROSOL, METERED RESPIRATORY (INHALATION) EVERY 4 HOURS PRN
Qty: 54 G | Refills: 3 | Status: SHIPPED | OUTPATIENT
Start: 2024-01-18

## 2024-01-18 RX ORDER — MONTELUKAST SODIUM 10 MG/1
10 TABLET ORAL NIGHTLY
Qty: 90 TABLET | Refills: 3 | Status: SHIPPED | OUTPATIENT
Start: 2024-01-18

## 2024-01-18 RX ORDER — KETOCONAZOLE 20 MG/ML
SHAMPOO TOPICAL 2 TIMES WEEKLY
Qty: 120 ML | Refills: 3 | Status: SHIPPED | OUTPATIENT
Start: 2024-01-18

## 2024-01-18 RX ORDER — SPIRONOLACTONE 25 MG/1
25 TABLET ORAL DAILY
Qty: 90 TABLET | Refills: 3 | Status: SHIPPED | OUTPATIENT
Start: 2024-01-18

## 2024-01-18 RX ORDER — PANTOPRAZOLE SODIUM 40 MG/1
40 TABLET, DELAYED RELEASE ORAL DAILY
Qty: 90 TABLET | Refills: 3 | Status: SHIPPED | OUTPATIENT
Start: 2024-01-18

## 2024-01-18 RX ORDER — ERGOCALCIFEROL 1.25 MG/1
50000 CAPSULE ORAL
Qty: 15 CAPSULE | Refills: 3 | Status: SHIPPED | OUTPATIENT
Start: 2024-01-18

## 2024-01-18 RX ORDER — FLUTICASONE PROPIONATE 50 MCG
2 SPRAY, SUSPENSION (ML) NASAL DAILY
Qty: 48 G | Refills: 3 | Status: SHIPPED | OUTPATIENT
Start: 2024-01-18

## 2024-01-18 RX ORDER — OXYBUTYNIN CHLORIDE 5 MG/1
5 TABLET, EXTENDED RELEASE ORAL DAILY
Qty: 90 TABLET | Refills: 3 | Status: SHIPPED | OUTPATIENT
Start: 2024-01-18

## 2024-01-18 NOTE — PROGRESS NOTES
"Chief Complaint -hypertension    History of Present Illness -     Anika Alegria is a 43 y.o. female.     Hypertension-  Not at goal today but patient states that she has not yet taken her blood pressure medication.  She reports that when she takes losartan and spironolactone her blood pressure at home is less than 130/80 consistently.    Hyperlipidemia-  Stable with atorvastatin and low-cholesterol diet    Depression-  Controlled with Cymbalta 90 mg daily (the 60 mg and 30 mg tablets are preferred by the patient.    Diabetes mellitus type 2-  Stable with Farxiga and Mounjaro  Patient reports that blood glucose has been controlled with the increased dose of Mounjaro.  She is also had another 5 pound weight loss since last visit    Allergic rhinitis-  Stable with Flonase    Hair loss-  Improving with the use of ketoconazole shampoo    Asthma-  Stable with albuterol as needed    Restless leg syndrome-stable with Requip    Peripheral venous insufficiency-  Controlled with conservative measures as well as spironolactone    Hypothyroidism-  Stable with Synthroid 100 mcg daily.  She does have a multinodular goiter and is scheduled for excision on February 1.      The following portions of the patient's history were reviewed and updated as appropriate: allergies, current medications, past family history, past medical history, past social history, past surgical history and problem list.        Objective  Vital signs:  /90   Pulse 111   Temp 98.3 °F (36.8 °C) (Temporal)   Ht 167.6 cm (66\")   Wt (!) 149 kg (328 lb)   LMP  (LMP Unknown)   SpO2 98%   BMI 52.94 kg/m²     Physical Exam  Vitals and nursing note reviewed.   Constitutional:       Appearance: Normal appearance. She is well-developed. She is obese.   Eyes:      Extraocular Movements: Extraocular movements intact.      Conjunctiva/sclera: Conjunctivae normal.   Neck:      Comments: Large goiter noted  Cardiovascular:      Rate and Rhythm: Regular rhythm. " Tachycardia present.      Heart sounds: Normal heart sounds. No murmur heard.  Pulmonary:      Effort: Pulmonary effort is normal. No respiratory distress.      Breath sounds: Normal breath sounds. No wheezing.   Musculoskeletal:         General: No tenderness.   Skin:     General: Skin is warm and dry.      Findings: No rash.   Neurological:      Mental Status: She is alert and oriented to person, place, and time.   Psychiatric:         Mood and Affect: Mood normal.         Behavior: Behavior normal.         Thought Content: Thought content normal.         The following data was reviewed by Rosa Quigley PA-C:         Lab Results   Component Value Date    BUN 8 05/15/2023    CREATININE 0.70 12/13/2023    EGFR 120.7 05/15/2023    ALT 36 (H) 05/15/2023    AST 32 05/15/2023    WBC 8.08 07/06/2022    HGB 14.1 07/06/2022    HCT 42.6 07/06/2022     07/06/2022    CHOL 163 12/16/2022    TRIG 151 (H) 12/16/2022    HDL 43 12/16/2022    LDL 94 12/16/2022    TSH 0.535 09/20/2023    HGBA1C 5.5 12/04/2023           Assessment & Plan     Diagnoses and all orders for this visit:    1. Essential hypertension (Primary)  Comments:  Likely elevated today as she has not yet taken her blood pressure medication  Continue Cozaar 100mg qd and spironolactone  Orders:  -     losartan (COZAAR) 100 MG tablet; Take 1 tablet by mouth Daily.  Dispense: 90 tablet; Refill: 3  -     Lipid Panel; Future    2. Mixed hyperlipidemia  Comments:  Continue atorvastatin for risk factor reduction given increased CV risk including hyperlipidemia diabetes and NAFLD  Orders:  -     atorvastatin (LIPITOR) 10 MG tablet; Take 1 tablet by mouth Every Night.  Dispense: 90 tablet; Refill: 3    3. Moderate episode of recurrent major depressive disorder  Comments:  Continue Cymbalta and Wellbutrin  Advised conservative measures for dealing with depression  Orders:  -     buPROPion SR (WELLBUTRIN SR) 150 MG 12 hr tablet; Take 1 tablet by mouth 2 (Two) Times a Day.   Dispense: 180 tablet; Refill: 3  -     DULoxetine (CYMBALTA) 30 MG capsule; Take 3 capsules by mouth Daily.  Dispense: 90 capsule; Refill: 3  -     DULoxetine (CYMBALTA) 60 MG capsule; Take 1 capsule by mouth Daily.  Dispense: 90 capsule; Refill: 3    4. Type 2 diabetes mellitus with hyperglycemia, without long-term current use of insulin  Comments:  Continue Farxiga and Mounjaro  Encouraged to continue low-carb diabetic diet  Orders:  -     dapagliflozin Propanediol (Farxiga) 10 MG tablet; Take 10 mg by mouth Every Morning.  Dispense: 90 tablet; Refill: 3  -     Tirzepatide (Mounjaro) 7.5 MG/0.5ML solution pen-injector pen; Inject 0.5 mL under the skin into the appropriate area as directed Every 7 (Seven) Days.  Dispense: 2 mL; Refill: 3  -     Comprehensive Metabolic Panel; Future  -     Lipid Panel; Future  -     Hemoglobin A1c; Future    5. Chronic seasonal allergic rhinitis due to pollen  Comments:  Continue Flonase  Orders:  -     fluticasone (FLONASE) 50 MCG/ACT nasal spray; 2 sprays into the nostril(s) as directed by provider Daily.  Dispense: 48 g; Refill: 3    6. Hair loss  -     ketoconazole (NIZORAL) 2 % shampoo; Apply  topically to the appropriate area as directed 2 (Two) Times a Week.  Dispense: 120 mL; Refill: 3    7. Mild intermittent asthma without complication  -     montelukast (SINGULAIR) 10 MG tablet; Take 1 tablet by mouth Every Night.  Dispense: 90 tablet; Refill: 3  -     Ventolin  (90 Base) MCG/ACT inhaler; Inhale 2 puffs Every 4 (Four) Hours As Needed for Wheezing.  Dispense: 54 g; Refill: 3    8. Urge incontinence of urine  Comments:  Start oxybutynin  Advised increased water intake  Orders:  -     oxybutynin XL (DITROPAN-XL) 5 MG 24 hr tablet; Take 1 tablet by mouth Daily.  Dispense: 90 tablet; Refill: 3    9. Gastroesophageal reflux disease without esophagitis  Comments:  Continue pantoprazole  Orders:  -     pantoprazole (PROTONIX) 40 MG EC tablet; Take 1 tablet by mouth Daily.   Dispense: 90 tablet; Refill: 3    10. RLS (restless legs syndrome)  Comments:  Continue Requip  Orders:  -     rOPINIRole (REQUIP) 1 MG tablet; Take 1 tablet by mouth Every Night.  Dispense: 90 tablet; Refill: 3    11. Congenital hypothyroidism with diffuse goiter  -     levothyroxine (SYNTHROID, LEVOTHROID) 100 MCG tablet; Take 1 tablet by mouth Daily.  Dispense: 90 tablet; Refill: 0  -     TSH; Future  -     T4, Free; Future    12. Peripheral venous insufficiency  Comments:  Advised conservative measures including low-sodium diet and leg elevation  Continue spironolactone as needed  Orders:  -     spironolactone (ALDACTONE) 25 MG tablet; Take 1 tablet by mouth Daily.  Dispense: 90 tablet; Refill: 3    13. Vitamin D deficiency  Comments:  Continue vitamin D  Orders:  -     vitamin D (ERGOCALCIFEROL) 1.25 MG (35668 UT) capsule capsule; Take 1 capsule by mouth Every 7 (Seven) Days.  Dispense: 15 capsule; Refill: 3  -     Vitamin D,25-Hydroxy; Future    Other orders  -     cetirizine (zyrTEC) 10 MG tablet; Take 1 tablet by mouth Daily.  Dispense: 90 tablet; Refill: 3                   Patient was given instructions and counseling regarding his condition or for health maintenance advice. Please see specific information pulled into the AVS if appropriate      This document has been electronically signed by:  Rosa Quigley PA-C

## 2024-01-25 ENCOUNTER — PRE-ADMISSION TESTING (OUTPATIENT)
Dept: PREADMISSION TESTING | Facility: HOSPITAL | Age: 44
End: 2024-01-25
Payer: COMMERCIAL

## 2024-01-25 VITALS — BODY MASS INDEX: 47.09 KG/M2 | WEIGHT: 293 LBS | HEIGHT: 66 IN

## 2024-01-25 LAB
DEPRECATED RDW RBC AUTO: 42.2 FL (ref 37–54)
ERYTHROCYTE [DISTWIDTH] IN BLOOD BY AUTOMATED COUNT: 12.9 % (ref 12.3–15.4)
HCT VFR BLD AUTO: 42 % (ref 34–46.6)
HGB BLD-MCNC: 14.2 G/DL (ref 12–15.9)
MCH RBC QN AUTO: 30.2 PG (ref 26.6–33)
MCHC RBC AUTO-ENTMCNC: 33.8 G/DL (ref 31.5–35.7)
MCV RBC AUTO: 89.4 FL (ref 79–97)
PLATELET # BLD AUTO: 184 10*3/MM3 (ref 140–450)
PMV BLD AUTO: 9.5 FL (ref 6–12)
POTASSIUM SERPL-SCNC: 4.1 MMOL/L (ref 3.5–5.2)
RBC # BLD AUTO: 4.7 10*6/MM3 (ref 3.77–5.28)
WBC NRBC COR # BLD AUTO: 7.04 10*3/MM3 (ref 3.4–10.8)

## 2024-01-25 PROCEDURE — 84132 ASSAY OF SERUM POTASSIUM: CPT

## 2024-01-25 PROCEDURE — 93005 ELECTROCARDIOGRAM TRACING: CPT

## 2024-01-25 PROCEDURE — 85027 COMPLETE CBC AUTOMATED: CPT

## 2024-01-25 PROCEDURE — 93010 ELECTROCARDIOGRAM REPORT: CPT | Performed by: INTERNAL MEDICINE

## 2024-01-25 PROCEDURE — 36415 COLL VENOUS BLD VENIPUNCTURE: CPT

## 2024-01-25 NOTE — PAT
An arrival time for procedure was not provided during PAT visit. If patient had any questions or concerns about their arrival time, they were instructed to contact their surgeon/physician.  Additionally, if the patient referred to an arrival time that was acquired from their my chart account, patient was encouraged to verify that time with their surgeon/physician. Arrival times are NOT provided in Pre Admission Testing Department.      Per Anesthesia Request, patient instructed not to take their ACE/ARB medications on the AM of surgery.      Patient to apply Chlorhexadine wipes  to surgical area (as instructed) the night before procedure and the AM of procedure. Wipes provided.

## 2024-01-26 LAB
QT INTERVAL: 394 MS
QTC INTERVAL: 449 MS

## 2024-02-01 ENCOUNTER — ANESTHESIA EVENT (OUTPATIENT)
Dept: PERIOP | Facility: HOSPITAL | Age: 44
End: 2024-02-01
Payer: COMMERCIAL

## 2024-02-01 ENCOUNTER — ANESTHESIA (OUTPATIENT)
Dept: PERIOP | Facility: HOSPITAL | Age: 44
End: 2024-02-01
Payer: COMMERCIAL

## 2024-02-01 ENCOUNTER — HOSPITAL ENCOUNTER (OUTPATIENT)
Facility: HOSPITAL | Age: 44
Discharge: HOME | End: 2024-02-02
Attending: SURGERY | Admitting: SURGERY
Payer: COMMERCIAL

## 2024-02-01 DIAGNOSIS — E01.0 THYROMEGALY: ICD-10-CM

## 2024-02-01 LAB
B-HCG UR QL: NEGATIVE
CALCIUM SPEC-SCNC: 8.5 MG/DL (ref 8.6–10.5)
EXPIRATION DATE: NORMAL
GLUCOSE BLDC GLUCOMTR-MCNC: 122 MG/DL (ref 70–130)
GLUCOSE BLDC GLUCOMTR-MCNC: 226 MG/DL (ref 70–130)
GLUCOSE BLDC GLUCOMTR-MCNC: 94 MG/DL (ref 70–130)
INTERNAL NEGATIVE CONTROL: NEGATIVE
INTERNAL POSITIVE CONTROL: POSITIVE
Lab: NORMAL
POTASSIUM SERPL-SCNC: 3.7 MMOL/L (ref 3.5–5.2)
PTH-INTACT SERPL-MCNC: 9.3 PG/ML (ref 15–65)

## 2024-02-01 PROCEDURE — 84132 ASSAY OF SERUM POTASSIUM: CPT | Performed by: SURGERY

## 2024-02-01 PROCEDURE — 63710000001 INSULIN LISPRO (HUMAN) PER 5 UNITS: Performed by: STUDENT IN AN ORGANIZED HEALTH CARE EDUCATION/TRAINING PROGRAM

## 2024-02-01 PROCEDURE — 25010000002 CEFAZOLIN PER 500 MG: Performed by: SURGERY

## 2024-02-01 PROCEDURE — S0260 H&P FOR SURGERY: HCPCS | Performed by: PHYSICIAN ASSISTANT

## 2024-02-01 PROCEDURE — 25010000002 FENTANYL CITRATE (PF) 100 MCG/2ML SOLUTION: Performed by: NURSE ANESTHETIST, CERTIFIED REGISTERED

## 2024-02-01 PROCEDURE — 82310 ASSAY OF CALCIUM: CPT | Performed by: SURGERY

## 2024-02-01 PROCEDURE — 25810000003 LACTATED RINGERS PER 1000 ML: Performed by: SURGERY

## 2024-02-01 PROCEDURE — 88307 TISSUE EXAM BY PATHOLOGIST: CPT | Performed by: SURGERY

## 2024-02-01 PROCEDURE — 25010000002 PHENYLEPHRINE 10 MG/ML SOLUTION: Performed by: NURSE ANESTHETIST, CERTIFIED REGISTERED

## 2024-02-01 PROCEDURE — 25010000002 PROPOFOL 10 MG/ML EMULSION: Performed by: NURSE ANESTHETIST, CERTIFIED REGISTERED

## 2024-02-01 PROCEDURE — 25010000002 ONDANSETRON PER 1 MG: Performed by: NURSE ANESTHETIST, CERTIFIED REGISTERED

## 2024-02-01 PROCEDURE — 25010000002 DEXAMETHASONE PER 1 MG: Performed by: NURSE ANESTHETIST, CERTIFIED REGISTERED

## 2024-02-01 PROCEDURE — 81025 URINE PREGNANCY TEST: CPT | Performed by: SURGERY

## 2024-02-01 PROCEDURE — 83970 ASSAY OF PARATHORMONE: CPT | Performed by: SURGERY

## 2024-02-01 PROCEDURE — 82948 REAGENT STRIP/BLOOD GLUCOSE: CPT

## 2024-02-01 DEVICE — ABSORBABLE HEMOSTAT (OXIDIZED REGENERATED CELLULOSE)
Type: IMPLANTABLE DEVICE | Site: NECK | Status: FUNCTIONAL
Brand: SURGICEL

## 2024-02-01 DEVICE — HORIZON TI SMALL 6 CLIPS/CART
Type: IMPLANTABLE DEVICE | Site: NECK | Status: FUNCTIONAL
Brand: WECK

## 2024-02-01 DEVICE — CLIP LIGAT VASC HORIZON TI MD BLU 6CT: Type: IMPLANTABLE DEVICE | Site: NECK | Status: FUNCTIONAL

## 2024-02-01 RX ORDER — SODIUM CHLORIDE 9 MG/ML
40 INJECTION, SOLUTION INTRAVENOUS AS NEEDED
Status: DISCONTINUED | OUTPATIENT
Start: 2024-02-01 | End: 2024-02-01 | Stop reason: HOSPADM

## 2024-02-01 RX ORDER — DROPERIDOL 2.5 MG/ML
0.62 INJECTION, SOLUTION INTRAMUSCULAR; INTRAVENOUS
Status: DISCONTINUED | OUTPATIENT
Start: 2024-02-01 | End: 2024-02-01 | Stop reason: HOSPADM

## 2024-02-01 RX ORDER — SODIUM CHLORIDE 0.9 % (FLUSH) 0.9 %
10 SYRINGE (ML) INJECTION AS NEEDED
Status: DISCONTINUED | OUTPATIENT
Start: 2024-02-01 | End: 2024-02-01 | Stop reason: HOSPADM

## 2024-02-01 RX ORDER — ALBUTEROL SULFATE 2.5 MG/3ML
2.5 SOLUTION RESPIRATORY (INHALATION) EVERY 4 HOURS PRN
Status: DISCONTINUED | OUTPATIENT
Start: 2024-02-01 | End: 2024-02-02 | Stop reason: HOSPADM

## 2024-02-01 RX ORDER — DEXTROSE MONOHYDRATE 25 G/50ML
25 INJECTION, SOLUTION INTRAVENOUS
Status: DISCONTINUED | OUTPATIENT
Start: 2024-02-01 | End: 2024-02-02 | Stop reason: HOSPADM

## 2024-02-01 RX ORDER — IPRATROPIUM BROMIDE AND ALBUTEROL SULFATE 2.5; .5 MG/3ML; MG/3ML
3 SOLUTION RESPIRATORY (INHALATION) ONCE AS NEEDED
Status: DISCONTINUED | OUTPATIENT
Start: 2024-02-01 | End: 2024-02-01 | Stop reason: HOSPADM

## 2024-02-01 RX ORDER — INSULIN LISPRO 100 [IU]/ML
INJECTION, SOLUTION INTRAVENOUS; SUBCUTANEOUS
Status: CANCELLED | OUTPATIENT
Start: 2024-02-02

## 2024-02-01 RX ORDER — CALCIUM CARBONATE 500 MG/1
2 TABLET, CHEWABLE ORAL 2 TIMES DAILY
Status: DISCONTINUED | OUTPATIENT
Start: 2024-02-01 | End: 2024-02-02

## 2024-02-01 RX ORDER — DEXAMETHASONE SODIUM PHOSPHATE 4 MG/ML
INJECTION, SOLUTION INTRA-ARTICULAR; INTRALESIONAL; INTRAMUSCULAR; INTRAVENOUS; SOFT TISSUE AS NEEDED
Status: DISCONTINUED | OUTPATIENT
Start: 2024-02-01 | End: 2024-02-01 | Stop reason: SURG

## 2024-02-01 RX ORDER — OXYBUTYNIN CHLORIDE 5 MG/1
5 TABLET, EXTENDED RELEASE ORAL DAILY
Status: DISCONTINUED | OUTPATIENT
Start: 2024-02-01 | End: 2024-02-02 | Stop reason: HOSPADM

## 2024-02-01 RX ORDER — DROPERIDOL 2.5 MG/ML
0.62 INJECTION, SOLUTION INTRAMUSCULAR; INTRAVENOUS ONCE AS NEEDED
Status: DISCONTINUED | OUTPATIENT
Start: 2024-02-01 | End: 2024-02-01 | Stop reason: HOSPADM

## 2024-02-01 RX ORDER — ONDANSETRON 4 MG/1
4 TABLET, ORALLY DISINTEGRATING ORAL EVERY 6 HOURS PRN
Status: DISCONTINUED | OUTPATIENT
Start: 2024-02-01 | End: 2024-02-02 | Stop reason: HOSPADM

## 2024-02-01 RX ORDER — LIDOCAINE HYDROCHLORIDE 10 MG/ML
0.5 INJECTION, SOLUTION EPIDURAL; INFILTRATION; INTRACAUDAL; PERINEURAL ONCE AS NEEDED
Status: DISCONTINUED | OUTPATIENT
Start: 2024-02-01 | End: 2024-02-01 | Stop reason: HOSPADM

## 2024-02-01 RX ORDER — IBUPROFEN 600 MG/1
1 TABLET ORAL
Status: DISCONTINUED | OUTPATIENT
Start: 2024-02-01 | End: 2024-02-02 | Stop reason: HOSPADM

## 2024-02-01 RX ORDER — FAMOTIDINE 20 MG/1
20 TABLET, FILM COATED ORAL ONCE
Status: DISCONTINUED | OUTPATIENT
Start: 2024-02-01 | End: 2024-02-01 | Stop reason: HOSPADM

## 2024-02-01 RX ORDER — MIDAZOLAM HYDROCHLORIDE 1 MG/ML
1 INJECTION INTRAMUSCULAR; INTRAVENOUS
Status: DISCONTINUED | OUTPATIENT
Start: 2024-02-01 | End: 2024-02-01 | Stop reason: HOSPADM

## 2024-02-01 RX ORDER — DEXTROSE MONOHYDRATE 25 G/50ML
25 INJECTION, SOLUTION INTRAVENOUS
Status: CANCELLED | OUTPATIENT
Start: 2024-02-01

## 2024-02-01 RX ORDER — NALOXONE HCL 0.4 MG/ML
0.1 VIAL (ML) INJECTION
Status: DISCONTINUED | OUTPATIENT
Start: 2024-02-01 | End: 2024-02-02 | Stop reason: HOSPADM

## 2024-02-01 RX ORDER — SODIUM CHLORIDE 0.9 % (FLUSH) 0.9 %
3-10 SYRINGE (ML) INJECTION AS NEEDED
Status: DISCONTINUED | OUTPATIENT
Start: 2024-02-01 | End: 2024-02-01 | Stop reason: HOSPADM

## 2024-02-01 RX ORDER — INSULIN LISPRO 100 [IU]/ML
3-14 INJECTION, SOLUTION INTRAVENOUS; SUBCUTANEOUS
Status: DISCONTINUED | OUTPATIENT
Start: 2024-02-01 | End: 2024-02-02 | Stop reason: HOSPADM

## 2024-02-01 RX ORDER — NICOTINE POLACRILEX 4 MG
15 LOZENGE BUCCAL
Status: CANCELLED | OUTPATIENT
Start: 2024-02-01

## 2024-02-01 RX ORDER — ONDANSETRON 2 MG/ML
INJECTION INTRAMUSCULAR; INTRAVENOUS AS NEEDED
Status: DISCONTINUED | OUTPATIENT
Start: 2024-02-01 | End: 2024-02-01 | Stop reason: SURG

## 2024-02-01 RX ORDER — PROMETHAZINE HYDROCHLORIDE 25 MG/1
25 SUPPOSITORY RECTAL ONCE AS NEEDED
Status: DISCONTINUED | OUTPATIENT
Start: 2024-02-01 | End: 2024-02-01 | Stop reason: HOSPADM

## 2024-02-01 RX ORDER — SODIUM CHLORIDE 0.9 % (FLUSH) 0.9 %
10 SYRINGE (ML) INJECTION EVERY 12 HOURS SCHEDULED
Status: DISCONTINUED | OUTPATIENT
Start: 2024-02-01 | End: 2024-02-01 | Stop reason: HOSPADM

## 2024-02-01 RX ORDER — PROMETHAZINE HYDROCHLORIDE 25 MG/1
25 TABLET ORAL ONCE AS NEEDED
Status: DISCONTINUED | OUTPATIENT
Start: 2024-02-01 | End: 2024-02-01 | Stop reason: HOSPADM

## 2024-02-01 RX ORDER — PROPOFOL 10 MG/ML
VIAL (ML) INTRAVENOUS AS NEEDED
Status: DISCONTINUED | OUTPATIENT
Start: 2024-02-01 | End: 2024-02-01 | Stop reason: SURG

## 2024-02-01 RX ORDER — IBUPROFEN 600 MG/1
1 TABLET ORAL
Status: CANCELLED | OUTPATIENT
Start: 2024-02-01

## 2024-02-01 RX ORDER — HYDROMORPHONE HYDROCHLORIDE 1 MG/ML
0.5 INJECTION, SOLUTION INTRAMUSCULAR; INTRAVENOUS; SUBCUTANEOUS
Status: DISCONTINUED | OUTPATIENT
Start: 2024-02-01 | End: 2024-02-01 | Stop reason: HOSPADM

## 2024-02-01 RX ORDER — LABETALOL HYDROCHLORIDE 5 MG/ML
5 INJECTION, SOLUTION INTRAVENOUS
Status: DISCONTINUED | OUTPATIENT
Start: 2024-02-01 | End: 2024-02-01 | Stop reason: HOSPADM

## 2024-02-01 RX ORDER — LIDOCAINE HYDROCHLORIDE 10 MG/ML
0.2 INJECTION, SOLUTION INFILTRATION; PERINEURAL ONCE
Status: COMPLETED | OUTPATIENT
Start: 2024-02-01 | End: 2024-02-01

## 2024-02-01 RX ORDER — OXYCODONE HYDROCHLORIDE AND ACETAMINOPHEN 5; 325 MG/1; MG/1
1 TABLET ORAL EVERY 4 HOURS PRN
Status: DISCONTINUED | OUTPATIENT
Start: 2024-02-01 | End: 2024-02-02 | Stop reason: HOSPADM

## 2024-02-01 RX ORDER — CEFAZOLIN SODIUM IN 0.9 % NACL 3 G/100 ML
3000 INTRAVENOUS SOLUTION, PIGGYBACK (ML) INTRAVENOUS ONCE
Status: COMPLETED | OUTPATIENT
Start: 2024-02-01 | End: 2024-02-01

## 2024-02-01 RX ORDER — DOCUSATE SODIUM 100 MG/1
100 CAPSULE, LIQUID FILLED ORAL 2 TIMES DAILY PRN
Status: DISCONTINUED | OUTPATIENT
Start: 2024-02-01 | End: 2024-02-02 | Stop reason: HOSPADM

## 2024-02-01 RX ORDER — ACETAMINOPHEN 325 MG/1
650 TABLET ORAL EVERY 4 HOURS PRN
Status: DISCONTINUED | OUTPATIENT
Start: 2024-02-01 | End: 2024-02-02 | Stop reason: HOSPADM

## 2024-02-01 RX ORDER — SODIUM CHLORIDE, SODIUM LACTATE, POTASSIUM CHLORIDE, CALCIUM CHLORIDE 600; 310; 30; 20 MG/100ML; MG/100ML; MG/100ML; MG/100ML
9 INJECTION, SOLUTION INTRAVENOUS CONTINUOUS
Status: DISCONTINUED | OUTPATIENT
Start: 2024-02-01 | End: 2024-02-02 | Stop reason: HOSPADM

## 2024-02-01 RX ORDER — NICOTINE POLACRILEX 4 MG
15 LOZENGE BUCCAL
Status: DISCONTINUED | OUTPATIENT
Start: 2024-02-01 | End: 2024-02-02 | Stop reason: HOSPADM

## 2024-02-01 RX ORDER — INSULIN LISPRO 100 [IU]/ML
1-200 INJECTION, SOLUTION INTRAVENOUS; SUBCUTANEOUS
Status: DISCONTINUED | OUTPATIENT
Start: 2024-02-01 | End: 2024-02-01

## 2024-02-01 RX ORDER — FAMOTIDINE 20 MG/1
20 TABLET, FILM COATED ORAL 2 TIMES DAILY
Status: DISCONTINUED | OUTPATIENT
Start: 2024-02-01 | End: 2024-02-02 | Stop reason: HOSPADM

## 2024-02-01 RX ORDER — NALOXONE HCL 0.4 MG/ML
0.4 VIAL (ML) INJECTION AS NEEDED
Status: DISCONTINUED | OUTPATIENT
Start: 2024-02-01 | End: 2024-02-01 | Stop reason: HOSPADM

## 2024-02-01 RX ORDER — MEPERIDINE HYDROCHLORIDE 25 MG/ML
12.5 INJECTION INTRAMUSCULAR; INTRAVENOUS; SUBCUTANEOUS
Status: DISCONTINUED | OUTPATIENT
Start: 2024-02-01 | End: 2024-02-01 | Stop reason: HOSPADM

## 2024-02-01 RX ORDER — SUCCINYLCHOLINE/SOD CL,ISO/PF 200MG/10ML
SYRINGE (ML) INTRAVENOUS AS NEEDED
Status: DISCONTINUED | OUTPATIENT
Start: 2024-02-01 | End: 2024-02-01 | Stop reason: SURG

## 2024-02-01 RX ORDER — ONDANSETRON 2 MG/ML
4 INJECTION INTRAMUSCULAR; INTRAVENOUS ONCE AS NEEDED
Status: DISCONTINUED | OUTPATIENT
Start: 2024-02-01 | End: 2024-02-01 | Stop reason: HOSPADM

## 2024-02-01 RX ORDER — SODIUM CHLORIDE 0.9 % (FLUSH) 0.9 %
3 SYRINGE (ML) INJECTION EVERY 12 HOURS SCHEDULED
Status: DISCONTINUED | OUTPATIENT
Start: 2024-02-01 | End: 2024-02-01 | Stop reason: HOSPADM

## 2024-02-01 RX ORDER — INSULIN LISPRO 100 [IU]/ML
1-200 INJECTION, SOLUTION INTRAVENOUS; SUBCUTANEOUS AS NEEDED
Status: DISCONTINUED | OUTPATIENT
Start: 2024-02-01 | End: 2024-02-01

## 2024-02-01 RX ORDER — BUPROPION HYDROCHLORIDE 150 MG/1
150 TABLET, EXTENDED RELEASE ORAL 2 TIMES DAILY
Status: DISCONTINUED | OUTPATIENT
Start: 2024-02-02 | End: 2024-02-02 | Stop reason: HOSPADM

## 2024-02-01 RX ORDER — FENTANYL CITRATE 50 UG/ML
INJECTION, SOLUTION INTRAMUSCULAR; INTRAVENOUS AS NEEDED
Status: DISCONTINUED | OUTPATIENT
Start: 2024-02-01 | End: 2024-02-01 | Stop reason: SURG

## 2024-02-01 RX ORDER — IBUPROFEN 600 MG/1
1 TABLET ORAL
Status: DISCONTINUED | OUTPATIENT
Start: 2024-02-01 | End: 2024-02-01

## 2024-02-01 RX ORDER — MAGNESIUM HYDROXIDE 1200 MG/15ML
LIQUID ORAL AS NEEDED
Status: DISCONTINUED | OUTPATIENT
Start: 2024-02-01 | End: 2024-02-01 | Stop reason: HOSPADM

## 2024-02-01 RX ORDER — SODIUM CHLORIDE, SODIUM LACTATE, POTASSIUM CHLORIDE, CALCIUM CHLORIDE 600; 310; 30; 20 MG/100ML; MG/100ML; MG/100ML; MG/100ML
9 INJECTION, SOLUTION INTRAVENOUS CONTINUOUS
Status: DISCONTINUED | OUTPATIENT
Start: 2024-02-01 | End: 2024-02-01

## 2024-02-01 RX ORDER — LEVOTHYROXINE SODIUM 0.12 MG/1
250 TABLET ORAL
Status: DISCONTINUED | OUTPATIENT
Start: 2024-02-02 | End: 2024-02-02 | Stop reason: HOSPADM

## 2024-02-01 RX ORDER — PHENYLEPHRINE HYDROCHLORIDE 10 MG/ML
INJECTION INTRAVENOUS AS NEEDED
Status: DISCONTINUED | OUTPATIENT
Start: 2024-02-01 | End: 2024-02-01 | Stop reason: SURG

## 2024-02-01 RX ORDER — FAMOTIDINE 10 MG/ML
20 INJECTION, SOLUTION INTRAVENOUS ONCE
Status: DISCONTINUED | OUTPATIENT
Start: 2024-02-01 | End: 2024-02-01

## 2024-02-01 RX ORDER — DEXTROSE MONOHYDRATE 25 G/50ML
10-50 INJECTION, SOLUTION INTRAVENOUS
Status: DISCONTINUED | OUTPATIENT
Start: 2024-02-01 | End: 2024-02-02 | Stop reason: HOSPADM

## 2024-02-01 RX ORDER — FAMOTIDINE 20 MG/1
20 TABLET, FILM COATED ORAL ONCE
Status: COMPLETED | OUTPATIENT
Start: 2024-02-01 | End: 2024-02-01

## 2024-02-01 RX ORDER — HYDROCODONE BITARTRATE AND ACETAMINOPHEN 5; 325 MG/1; MG/1
1 TABLET ORAL ONCE AS NEEDED
Status: DISCONTINUED | OUTPATIENT
Start: 2024-02-01 | End: 2024-02-01 | Stop reason: HOSPADM

## 2024-02-01 RX ORDER — BUPIVACAINE HYDROCHLORIDE AND EPINEPHRINE 5; 5 MG/ML; UG/ML
INJECTION, SOLUTION PERINEURAL AS NEEDED
Status: DISCONTINUED | OUTPATIENT
Start: 2024-02-01 | End: 2024-02-01 | Stop reason: HOSPADM

## 2024-02-01 RX ORDER — FENTANYL CITRATE 50 UG/ML
50 INJECTION, SOLUTION INTRAMUSCULAR; INTRAVENOUS
Status: DISCONTINUED | OUTPATIENT
Start: 2024-02-01 | End: 2024-02-01 | Stop reason: HOSPADM

## 2024-02-01 RX ORDER — EPHEDRINE SULFATE 50 MG/ML
INJECTION INTRAVENOUS AS NEEDED
Status: DISCONTINUED | OUTPATIENT
Start: 2024-02-01 | End: 2024-02-01 | Stop reason: SURG

## 2024-02-01 RX ORDER — ONDANSETRON 2 MG/ML
4 INJECTION INTRAMUSCULAR; INTRAVENOUS EVERY 6 HOURS PRN
Status: DISCONTINUED | OUTPATIENT
Start: 2024-02-01 | End: 2024-02-02 | Stop reason: HOSPADM

## 2024-02-01 RX ORDER — ATORVASTATIN CALCIUM 10 MG/1
10 TABLET, FILM COATED ORAL NIGHTLY
Status: DISCONTINUED | OUTPATIENT
Start: 2024-02-01 | End: 2024-02-02 | Stop reason: HOSPADM

## 2024-02-01 RX ORDER — HYDRALAZINE HYDROCHLORIDE 20 MG/ML
5 INJECTION INTRAMUSCULAR; INTRAVENOUS
Status: DISCONTINUED | OUTPATIENT
Start: 2024-02-01 | End: 2024-02-01 | Stop reason: HOSPADM

## 2024-02-01 RX ORDER — LOSARTAN POTASSIUM 50 MG/1
100 TABLET ORAL DAILY
Status: DISCONTINUED | OUTPATIENT
Start: 2024-02-02 | End: 2024-02-02 | Stop reason: HOSPADM

## 2024-02-01 RX ORDER — ROPINIROLE 1 MG/1
1 TABLET, FILM COATED ORAL NIGHTLY
Status: DISCONTINUED | OUTPATIENT
Start: 2024-02-01 | End: 2024-02-02 | Stop reason: HOSPADM

## 2024-02-01 RX ORDER — CALCITRIOL 0.25 UG/1
0.5 CAPSULE, LIQUID FILLED ORAL EVERY 12 HOURS SCHEDULED
Status: DISCONTINUED | OUTPATIENT
Start: 2024-02-01 | End: 2024-02-02 | Stop reason: HOSPADM

## 2024-02-01 RX ORDER — NICOTINE POLACRILEX 4 MG
15 LOZENGE BUCCAL
Status: DISCONTINUED | OUTPATIENT
Start: 2024-02-01 | End: 2024-02-01

## 2024-02-01 RX ADMIN — FENTANYL CITRATE 25 MCG: 50 INJECTION, SOLUTION INTRAMUSCULAR; INTRAVENOUS at 09:29

## 2024-02-01 RX ADMIN — EPHEDRINE SULFATE 10 MG: 50 INJECTION INTRAVENOUS at 10:59

## 2024-02-01 RX ADMIN — SODIUM CHLORIDE, POTASSIUM CHLORIDE, SODIUM LACTATE AND CALCIUM CHLORIDE: 600; 310; 30; 20 INJECTION, SOLUTION INTRAVENOUS at 11:41

## 2024-02-01 RX ADMIN — PROPOFOL 300 MG: 10 INJECTION, EMULSION INTRAVENOUS at 07:29

## 2024-02-01 RX ADMIN — CALCIUM CARBONATE (ANTACID) CHEW TAB 500 MG 2 TABLET: 500 CHEW TAB at 21:35

## 2024-02-01 RX ADMIN — ONDANSETRON 4 MG: 2 INJECTION INTRAMUSCULAR; INTRAVENOUS at 08:38

## 2024-02-01 RX ADMIN — SODIUM CHLORIDE, POTASSIUM CHLORIDE, SODIUM LACTATE AND CALCIUM CHLORIDE 9 ML/HR: 600; 310; 30; 20 INJECTION, SOLUTION INTRAVENOUS at 06:40

## 2024-02-01 RX ADMIN — PROPOFOL 100 MG: 10 INJECTION, EMULSION INTRAVENOUS at 07:42

## 2024-02-01 RX ADMIN — FAMOTIDINE 20 MG: 20 TABLET, FILM COATED ORAL at 07:15

## 2024-02-01 RX ADMIN — PHENYLEPHRINE HYDROCHLORIDE 200 MCG: 10 INJECTION INTRAVENOUS at 07:49

## 2024-02-01 RX ADMIN — EPHEDRINE SULFATE 10 MG: 50 INJECTION INTRAVENOUS at 09:05

## 2024-02-01 RX ADMIN — ACETAMINOPHEN 650 MG: 325 TABLET ORAL at 16:38

## 2024-02-01 RX ADMIN — CALCITRIOL CAPSULES 0.25 MCG 0.5 MCG: 0.25 CAPSULE ORAL at 21:35

## 2024-02-01 RX ADMIN — FENTANYL CITRATE 100 MCG: 50 INJECTION, SOLUTION INTRAMUSCULAR; INTRAVENOUS at 07:47

## 2024-02-01 RX ADMIN — CEFAZOLIN 3000 MG: 10 INJECTION, POWDER, FOR SOLUTION INTRAVENOUS at 11:34

## 2024-02-01 RX ADMIN — FAMOTIDINE 20 MG: 20 TABLET, FILM COATED ORAL at 21:34

## 2024-02-01 RX ADMIN — Medication 80 MG: at 07:34

## 2024-02-01 RX ADMIN — ATORVASTATIN CALCIUM 10 MG: 10 TABLET, FILM COATED ORAL at 21:34

## 2024-02-01 RX ADMIN — PHENYLEPHRINE HYDROCHLORIDE 200 MCG: 10 INJECTION INTRAVENOUS at 10:56

## 2024-02-01 RX ADMIN — OXYBUTYNIN CHLORIDE 5 MG: 5 TABLET, EXTENDED RELEASE ORAL at 16:36

## 2024-02-01 RX ADMIN — LIDOCAINE HYDROCHLORIDE 0.2 ML: 10 INJECTION, SOLUTION EPIDURAL; INFILTRATION; INTRACAUDAL; PERINEURAL at 06:40

## 2024-02-01 RX ADMIN — EPHEDRINE SULFATE 10 MG: 50 INJECTION INTRAVENOUS at 08:29

## 2024-02-01 RX ADMIN — PROPOFOL 25 MCG/KG/MIN: 10 INJECTION, EMULSION INTRAVENOUS at 08:18

## 2024-02-01 RX ADMIN — Medication 120 MG: at 07:29

## 2024-02-01 RX ADMIN — CEFAZOLIN 3000 MG: 10 INJECTION, POWDER, FOR SOLUTION INTRAVENOUS at 07:34

## 2024-02-01 RX ADMIN — ROPINIROLE HYDROCHLORIDE 1 MG: 1 TABLET, FILM COATED ORAL at 21:35

## 2024-02-01 RX ADMIN — INSULIN LISPRO 5 UNITS: 100 INJECTION, SOLUTION INTRAVENOUS; SUBCUTANEOUS at 21:52

## 2024-02-01 RX ADMIN — DEXAMETHASONE SODIUM PHOSPHATE 8 MG: 4 INJECTION INTRA-ARTICULAR; INTRALESIONAL; INTRAMUSCULAR; INTRAVENOUS; SOFT TISSUE at 08:36

## 2024-02-01 RX ADMIN — FENTANYL CITRATE 25 MCG: 50 INJECTION, SOLUTION INTRAMUSCULAR; INTRAVENOUS at 09:12

## 2024-02-01 RX ADMIN — PHENYLEPHRINE HYDROCHLORIDE 100 MCG: 10 INJECTION INTRAVENOUS at 10:50

## 2024-02-01 RX ADMIN — SODIUM CHLORIDE, POTASSIUM CHLORIDE, SODIUM LACTATE AND CALCIUM CHLORIDE: 600; 310; 30; 20 INJECTION, SOLUTION INTRAVENOUS at 09:41

## 2024-02-01 RX ADMIN — PHENYLEPHRINE HYDROCHLORIDE 100 MCG: 10 INJECTION INTRAVENOUS at 08:30

## 2024-02-01 NOTE — ANESTHESIA PREPROCEDURE EVALUATION
Anesthesia Evaluation     Patient summary reviewed and Nursing notes reviewed   history of anesthetic complications:  PONV               Airway   Mallampati: III  TM distance: >3 FB  Neck ROM: full  Possible difficult intubation and Large neck circumference  Dental - normal exam     Pulmonary - normal exam   (+) asthma,  Cardiovascular - normal exam    (+) hypertension, hyperlipidemia      Neuro/Psych- negative ROS  GI/Hepatic/Renal/Endo    (+) morbid obesity, GERD, hepatitis A, liver disease fatty liver disease, diabetes mellitus, thyroid problem hypothyroidism    ROS Comment: Semaglutide tx    Musculoskeletal (-) negative ROS    Abdominal  - normal exam    Bowel sounds: normal.   Substance History - negative use     OB/GYN negative ob/gyn ROS         Other                      Anesthesia Plan    ASA 3     general     ([facility])  intravenous induction     Anesthetic plan, risks, benefits, and alternatives have been provided, discussed and informed consent has been obtained with: patient.    Plan discussed with CRNA.    CODE STATUS:

## 2024-02-01 NOTE — PLAN OF CARE
Goal Outcome Evaluation:  Plan of Care Reviewed With: patient, spouse        Progress: improving  Outcome Evaluation: VSS on RA. Pt has been up in chair much of shift since arriving to floor from PACU. Patent IV in place, saline locked. Pt has ambulated room and up to toilet. Ice packs kept on incisional area, area is without discoloration nor discharge. Dressing is C/D/I. IS teaching conducted. Glucomander utilized. Continuing POC and reporting as needed.

## 2024-02-01 NOTE — BRIEF OP NOTE
THYROIDECTOMY  Progress Note    Anika Alegria  2/1/2024    Pre-op Diagnosis:   Thyromegaly        Post-Op Diagnosis Codes:     * Thyromegaly [E01.0]    Procedure/CPT® Codes:        Procedure(s):  TOTAL THYROIDECTOMY              Surgeon(s):  Sunday Kraus MD    Anesthesia: General    Staff:   Circulator: Andres Sidhu RN; Laurita Lee RN; Kiran Pinto RN  Scrub Person: Alin Ledbetter; Bossman Stephen RN  Nursing Assistant: Marguerite Hester  Assistant: Yoni Tan PA  Assistant: Yoni Tan PA      Estimated Blood Loss: 100ml    Urine Voided: * No values recorded between 2/1/2024  7:24 AM and 2/1/2024 11:53 AM *    Specimens:                Specimens       ID Source Type Tests Collected By Collected At Frozen?    A Thyroid Tissue TISSUE PATHOLOGY EXAM   Sunday Kraus MD 2/1/24 1030     Description: left thyroid lobe for permanent    This specimen was not marked as sent.    B Thyroid Tissue TISSUE PATHOLOGY EXAM   Sunday Kraus MD 2/1/24 1147     Description: right thyroid for permanent    This specimen was not marked as sent.                  Drains: * No LDAs found *    Findings: Severe thyromegaly approximately 10-15 times the size of a normal thyroid gland removed completely grossly        Complications: None    Assistant: Yoni Tan PA  was responsible for performing the following activities: Retraction, Suction, Suturing, Closing, and Placing Dressing and their skilled assistance was necessary for the success of this case.    Sunday Kraus MD     Date: 2/1/2024  Time: 12:06 EST

## 2024-02-01 NOTE — ANESTHESIA POSTPROCEDURE EVALUATION
Patient: Anika Alegria    Procedure Summary       Date: 02/01/24 Room / Location:  [facility] /  [facility]    Anesthesia Start: 0724 Anesthesia Stop: 1235    Procedure: TOTAL THYROIDECTOMY (Neck) Diagnosis: Thyromegaly    Surgeons: Sunday Kraus MD Provider: Mansoor Albright MD    Anesthesia Type: general ASA Status: 3            Anesthesia Type: general    Vitals  Vitals Value Taken Time   /73 02/01/24 1229   Temp     Pulse 106 02/01/24 1236   Resp     SpO2 97 % 02/01/24 1236   Vitals shown include unfiled device data.        Post Anesthesia Care and Evaluation    Patient location during evaluation: PACU  Patient participation: complete - patient participated  Level of consciousness: sleepy but conscious, awake and responsive to verbal stimuli  Pain score: 0  Pain management: adequate    Airway patency: patent  Anesthetic complications: No anesthetic complications  PONV Status: none  Cardiovascular status: hemodynamically stable and acceptable  Respiratory status: nonlabored ventilation, acceptable, nasal cannula and spontaneous ventilation  Hydration status: acceptable

## 2024-02-01 NOTE — ANESTHESIA PROCEDURE NOTES
Airway  Urgency: elective    Date/Time: 2/1/2024 7:36 AM  Airway not difficult    General Information and Staff    Patient location during procedure: OR  Anesthesiologist: Mansoor Albright MD  CRNA/CAA: Shivani Hendricks CRNA    Indications and Patient Condition  Indications for airway management: airway protection    Preoxygenated: yes  MILS not maintained throughout  Mask difficulty assessment: 2 - vent by mask + OA or adjuvant +/- NMBA    Final Airway Details  Final airway type: endotracheal airway      Successful airway: ETT and NIM tube  Cuffed: yes   Successful intubation technique: video laryngoscopy  Facilitating devices/methods: cricoid pressure and intubating stylet  Endotracheal tube insertion site: oral  Blade: Dhaliwal  Blade size: 4  ETT size (mm): 7.0  Cormack-Lehane Classification: grade IIa - partial view of glottis  Placement verified by: chest auscultation and capnometry   Measured from: lips  ETT/EBT  to lips (cm): 23  Number of attempts at approach: 2  Assessment: lips, teeth, and gum same as pre-op and atraumatic intubation    Additional Comments  Negative epigastric sounds, Breath sound equal bilaterally with symmetric chest rise and fall.  First attempt by Ruthie unsuccessful, so bag/mask/OPA two hand ventilation by Ruthie/Jose Ramon to maintain O2Sats>98%.  Second attempt per Jose Ramon with Grade IIa view and ETT advanced easily.  +BEBS/+ETCO2 confirmed.

## 2024-02-01 NOTE — H&P
"Pre-Op H&P  Anika Alegria  [id number]  1980    Chief complaint: \"I have a goiter\"    HPI:    Patient is a 43 y.o.female who presents with a known goiter for about 20 years.  It is increasing in size.  She has had some compressive symptomatology lately including supine shortness of breath and trouble swallowing.  Denies any change in voice.  She has been on thyroid supplements to try and shrink the goiter.  This has been unsuccessful.  After failing conservative therapy and with compressive symptoms the patient is now admitted for total thyroidectomy.    Review of Systems:  General ROS: negative for chills, fever or skin lesions;  No changes since last office visit.  Neg for recent sick exposure  Cardiovascular ROS: no chest pain or dyspnea on exertion  Respiratory ROS: no cough, shortness of breath, or wheezing    Allergies:   Allergies   Allergen Reactions    Ultram [Tramadol] Swelling    Venlafaxine Other (See Comments)     Nightmares       Home Meds:    No current facility-administered medications on file prior to encounter.     Current Outpatient Medications on File Prior to Encounter   Medication Sig Dispense Refill    Cholecalciferol 50 MCG (2000 UT) tablet Take 1 tablet by mouth Daily.         PMH:   Past Medical History:   Diagnosis Date    Anesthesia complication     HYPOTENSION, LOW BODY TEMP WITH EPIDURAL 2008    Anxiety and depression     Arthritis     GERD (gastroesophageal reflux disease)     Goiter     Hepatitis A     Hypertension     Mild asthma 11/22/2021    PONV (postoperative nausea and vomiting)     Thyroid disease     Type 2 diabetes mellitus with hyperglycemia, without long-term current use of insulin 06/08/2020     PSH:    Past Surgical History:   Procedure Laterality Date    ABDOMINAL SURGERY      LIPOMA REMOVED APPROX 2023    CARPAL TUNNEL RELEASE Right 10/15/2019    Procedure: CARPAL TUNNEL RELEASE;  Surgeon: Rony Cruz MD;  [facility];  Service: Orthopedics    "  SECTION      x2    TUBAL ABDOMINAL LIGATION       Patient denies allergy to contrast dye or latex  Immunization History:  Influenza: No  Pneumococcal: No  Tetanus: Up-to-date    Social History:   Tobacco:   Social History     Tobacco Use   Smoking Status Former    Passive exposure: Past   Smokeless Tobacco Never      Alcohol:     Social History     Substance and Sexual Activity   Alcohol Use No       Vitals:           LMP  (LMP Unknown)     Physical Exam:  General Appearance:    Alert, cooperative, no distress, appears stated age   Head:    Normocephalic, without obvious abnormality, atraumatic  Lungs: Clear to auscultation bilateral bases  Cardiovascular: S1-S2 without rubs murmurs or gallops  Abdomen: Soft, nontender, bowel sounds present throughout.                   Genitalia:    deferred   Extremities:   Extremities normal, atraumatic, no cyanosis or edema   Skin:   Skin color, texture, turgor normal, no rashes or lesions   Neurologic:   Grossly intact   Results Review  LABS:  Lab Results   Component Value Date    WBC 7.04 2024    HGB 14.2 2024    HCT 42.0 2024    MCV 89.4 2024     2024    NEUTROABS 4.10 2022    GLUCOSE 111 (H) 05/15/2023    BUN 8 05/15/2023    CREATININE 0.70 2023    EGFRIFNONA 114 2022     05/15/2023    K 4.1 2024     05/15/2023    CO2 24.3 05/15/2023    MG 1.9 2021    CALCIUM 9.1 05/15/2023    ALBUMIN 3.9 05/15/2023    AST 32 05/15/2023    ALT 36 (H) 05/15/2023    BILITOT 0.4 05/15/2023    INR 1.03 2022       RADIOLOGY:  No radiology results for the last 3 days     I reviewed the patient's new clinical results.    Cancer Staging (if applicable)  Cancer Patient: __ yes __no __unknown; If yes, clinical stage T:__ N:__M:__, stage group or __N/A    Impression: Large, bilateral thyroid goiter  Compressive symptoms  Obesity  Recurrent major depressive disorder  Type 2 diabetes  mellitus  Hypertension    Plan: Total thyroidectomy      KALI Chamberlain   02/01/24   6:40 AM EST

## 2024-02-01 NOTE — OP NOTE
General Surgery Operative Note    THYROIDECTOMY  Procedure Report    Patient Name:  Anika Alegria  YOB: 1980  [id number]     Date of Surgery:  2/1/2024       Pre-op Diagnosis: Thyromegaly    Post-op Diagnosis: Thyromegaly      Procedure(s):  TOTAL THYROIDECTOMY    Surgeon: Sunday Kraus MD    Assistant: Yoni Tan PA     Anesthesia: General         Estimated Blood Loss: 100ml    Complications: None     Implants:    Implant Name Type Inv. Item Serial No.  Lot No. LRB No. Used Action   CLIP LIGAT VASC HORIZON TI MD REBEKA 6CT - [id number] Implant CLIP LIGAT VASC HORIZON TI MD REBEKA 6CT  [employer]  N/A 10 Implanted   HEMOST ABS SURGICEL ORIG 4X8IN STRL - [id number] Implant HEMOST ABS SURGICEL ORIG 4X8IN STRL  [employer]  DIV OF J AND J  N/A 1 Implanted   CLIP LIGAT VASC HORIZON TI SM YEL 6CT - [id number] Implant CLIP LIGAT VASC HORIZON TI SM YEL 6CT  [employer]  N/A 10 Implanted   CLIP LIGAT VASC HORIZON TI MD REBEKA 6CT - [id number] Implant CLIP LIGAT VASC HORIZON TI MD REBEKA 6CT  [employer] [id number] N/A 1 Implanted   CLIP LIGAT VASC HORIZON TI MD REBEKA 6CT - [id number] Implant CLIP LIGAT VASC HORIZON TI MD REBEKA 6CT  [employer] [id number] N/A 2 Implanted   CLIP LIGAT VASC HORIZON TI SM YEL 6CT - [id number] Implant CLIP LIGAT VASC HORIZON TI SM YEL 6CT  [employer] [id number] N/A 3 Implanted   CLIP LIGAT VASC HORIZON TI SM YEL 6CT - [id number] Implant CLIP LIGAT VASC HORIZON TI SM YEL 6CT  [employer] [id number] N/A 1 Implanted   HEMOST ABS SURGICEL ORIG 4X8IN STRL - [id number] Implant HEMOST ABS SURGICEL ORIG 4X8IN STRL  [employer]  DIV OF J AND J [id number] N/A 1 Implanted       Specimen:          Specimens       ID Source Type Tests Collected By Collected At Frozen?    A Thyroid Tissue TISSUE PATHOLOGY EXAM   Sunday Kraus MD 2/1/24 1030     Description: left thyroid lobe for permanent    B Thyroid Tissue TISSUE PATHOLOGY EXAM   Sunday Kraus  MD 2/1/24 1147     Description: right thyroid for permanent                Findings: Severe thyromegaly approximately 10-15 times the size of a normal thyroid gland removed completely grossly; dense fibrous reaction to the strap muscles immediately overlying the thyroid gland    Indications: The patient is a 43-year-old female with a history of severe thyromegaly causing compressive symptoms.  Discussions were made with the patient about various treatment options and the patient was agreeable to a total thyroidectomy.  Informed consent was obtained.    Description of Procedure:     After obtaining informed consent, the patient was taken to the operating room and placed in supine position. After appropriate DVT and antibiotic prophylaxis, general anesthesia was induced with placement of a NIM tube for nerve monitoring. The neck was prepped and draped in standard sterile fashion.    An approximately 10 cm incision was made 2 fingerbreadths superior to the suprasternal notch transversely across the midline neck.  The skin was incised using a 15 blade.  The dermis and subcutaneous tissue were divided using Bovie electrocautery.  The platysma was dissected out and divided using Bovie electrocautery.  Subplatysmal flaps were created superiorly to the thyroid cartilage, inferiorly to the sternal notch, and laterally to the sternocleidomastoid muscles.  The strap muscles were divided in the midline at the median raphae.  Attention was paid to the left neck.  The strap muscles were dissected off the anterior and lateral surfaces of the thyroid lobe using Bovie electrocautery.  Due to the severe thyromegaly the decision was made to divide the left strap muscles.  The anterior jugular vein was ligated on the side using 3-0 silk ties and divided using the harmonic scalpel.  The strap muscles were then divided using the harmonic scalpel.  The posterior most strap muscles were found to be densely adherent to the thyroid gland  with a fibrous reaction.  These were dissected away from the thyroid and divided using harmonic scalpel.  Babcocks were placed on the thyroid lobe and it was retracted anteriorly and medially.  Superior thyroid vessels were ligated with combination of clips and Harmonic.  The superior and inferior parathyroid glands were identified and dissected away from the thyroid lobe with blood supply left intact.  The recurrent laryngeal nerve was identified by both nerve stimulation and visualization.  Soft tissues around this area were dissected using bipolar electrocautery and small vessels ligated with clips.  Once the thyroid was dissected away from the area of the nerve the remainder of the thyroid lobe was dissected off the anterior surface of the trachea using bipolar cautery.  Inferior thyroid vessels were ligated with combination of clips and Harmonic. The thyroid was then transected at the level of the isthmus using the Harmonic device. The specimen was then removed from the body and inspected for parathyroid tissue, and none was noted. It was then sent to pathology as a permanent specimen.    Attention was then paid to the right neck. The strap muscles were dissected off the anterior and lateral surfaces of the thyroid lobe using Bovie electrocautery.  Due to the severe thyromegaly the decision was made to divide the left strap muscles.  The anterior jugular vein was ligated on the side using 3-0 silk ties and divided using the harmonic scalpel.  The strap muscles were then divided using the harmonic scalpel.  The posterior most strap muscles were found to be densely adherent to the thyroid gland with a fibrous reaction.  These were dissected away from the thyroid and divided using harmonic scalpel. Babcocks were placed on the thyroid lobe and it was retracted anteriorly and medially.  Superior thyroid vessels were ligated with combination of clips and Harmonic.  The superior and inferior parathyroid glands were  identified and dissected away from the thyroid lobe with blood supply left intact.  The recurrent laryngeal nerve was identified by both nerve stimulation and visualization.  Soft tissues around this area were dissected using bipolar electrocautery and small vessels ligated with clips.  Once the thyroid was dissected away from the area of the nerve the remainder of the thyroid lobe was dissected off the anterior surface of the trachea using bipolar cautery.  Inferior thyroid vessels were ligated with combination of clips and Harmonic. The specimen was then removed from the body and inspected for parathyroid tissue, and none was noted. It was then sent to pathology as a permanent specimen.    Valsalva maneuver was performed by anesthesia to assess for any further bleeding and any further bleeding was controlled using placement of clips.  After hemostasis was obtained the left and right recurrent laryngeal nerves were grossly intact throughout the entire visualized course and stimulated appropriately with nerve stimulation.  Surgicel was placed in all areas of dissection.  The divided left and right strap muscles were reapproximated using running 3-0 Vicryl.  The strap muscles were then loosely reapproximated at the midline using interrupted 3-0 Vicryl.  The platysma was reapproximated using interrupted 3-0 Vicryl.  The skin was reapproximated using a running subcuticular 4-0 Monocryl.  A dry dressing was placed on top of this.  The patient awoke from anesthesia without complications and was taken to the PACU in stable condition.      Assistant: Yoni Tan PA  was responsible for performing the following activities: Retraction, Suction, Suturing, Closing, and Placing Dressing and their skilled assistance was necessary for the success of this case.    Sunday Kraus MD     Date: 2/1/2024  Time: 13:07 EST

## 2024-02-02 VITALS
BODY MASS INDEX: 47.09 KG/M2 | OXYGEN SATURATION: 94 % | WEIGHT: 293 LBS | HEART RATE: 83 BPM | DIASTOLIC BLOOD PRESSURE: 74 MMHG | RESPIRATION RATE: 16 BRPM | SYSTOLIC BLOOD PRESSURE: 124 MMHG | HEIGHT: 66 IN | TEMPERATURE: 97.9 F

## 2024-02-02 LAB
CALCIUM SPEC-SCNC: 8 MG/DL (ref 8.6–10.5)
CYTO UR: NORMAL
GLUCOSE BLDC GLUCOMTR-MCNC: 120 MG/DL (ref 70–130)
LAB AP CASE REPORT: NORMAL
LAB AP CLINICAL INFORMATION: NORMAL
PATH REPORT.FINAL DX SPEC: NORMAL
PATH REPORT.GROSS SPEC: NORMAL

## 2024-02-02 PROCEDURE — 82948 REAGENT STRIP/BLOOD GLUCOSE: CPT

## 2024-02-02 PROCEDURE — 82310 ASSAY OF CALCIUM: CPT | Performed by: SURGERY

## 2024-02-02 RX ORDER — CALCIUM CARBONATE 500 MG/1
2 TABLET, CHEWABLE ORAL 3 TIMES DAILY
Status: DISCONTINUED | OUTPATIENT
Start: 2024-02-02 | End: 2024-02-02 | Stop reason: HOSPADM

## 2024-02-02 RX ORDER — PSEUDOEPHEDRINE HCL 30 MG
100 TABLET ORAL 2 TIMES DAILY PRN
Qty: 30 CAPSULE | Refills: 0 | Status: SHIPPED | OUTPATIENT
Start: 2024-02-02

## 2024-02-02 RX ORDER — CALCITRIOL 0.5 UG/1
0.5 CAPSULE, LIQUID FILLED ORAL EVERY 12 HOURS SCHEDULED
Qty: 60 CAPSULE | Refills: 2 | Status: SHIPPED | OUTPATIENT
Start: 2024-02-02 | End: 2024-05-02

## 2024-02-02 RX ORDER — CALCIUM CARBONATE 500 MG/1
2 TABLET, CHEWABLE ORAL 3 TIMES DAILY
Qty: 180 TABLET | Refills: 2 | Status: SHIPPED | OUTPATIENT
Start: 2024-02-02 | End: 2024-05-02

## 2024-02-02 RX ORDER — LEVOTHYROXINE SODIUM 0.12 MG/1
250 TABLET ORAL
Qty: 60 TABLET | Refills: 2 | Status: SHIPPED | OUTPATIENT
Start: 2024-02-03 | End: 2024-05-03

## 2024-02-02 RX ORDER — OXYCODONE HYDROCHLORIDE AND ACETAMINOPHEN 5; 325 MG/1; MG/1
1 TABLET ORAL EVERY 4 HOURS PRN
Qty: 10 TABLET | Refills: 0 | Status: SHIPPED | OUTPATIENT
Start: 2024-02-02 | End: 2024-02-11

## 2024-02-02 RX ADMIN — DULOXETINE HYDROCHLORIDE 90 MG: 60 CAPSULE, DELAYED RELEASE ORAL at 08:54

## 2024-02-02 RX ADMIN — BUPROPION HYDROCHLORIDE 150 MG: 150 TABLET, EXTENDED RELEASE ORAL at 08:54

## 2024-02-02 RX ADMIN — FAMOTIDINE 20 MG: 20 TABLET, FILM COATED ORAL at 08:54

## 2024-02-02 RX ADMIN — LEVOTHYROXINE SODIUM 250 MCG: 125 TABLET ORAL at 05:31

## 2024-02-02 RX ADMIN — CALCIUM CARBONATE (ANTACID) CHEW TAB 500 MG 2 TABLET: 500 CHEW TAB at 08:54

## 2024-02-02 RX ADMIN — LOSARTAN POTASSIUM 100 MG: 50 TABLET, FILM COATED ORAL at 08:53

## 2024-02-02 RX ADMIN — OXYBUTYNIN CHLORIDE 5 MG: 5 TABLET, EXTENDED RELEASE ORAL at 08:54

## 2024-02-02 NOTE — PLAN OF CARE
Goal Outcome Evaluation:                                            D/C per provider. IV removed. D/C instructions given to include meds (indications and side effects); activity and s/s of post-op infection. Transported by wheelchair to car by nursing staff.

## 2024-02-02 NOTE — PLAN OF CARE
VSS.  Incision CDI  Blood glucose controlled.  Ambulating.  Voiding.  Denies need for prn medications thus far.

## 2024-02-02 NOTE — CASE MANAGEMENT/SOCIAL WORK
Case Management Discharge Note      Final Note: No discharge needs at this time. Plan is home with family transporting.         Selected Continued Care - Admitted Since 2/1/2024       Destination    No services have been selected for the patient.                Durable Medical Equipment    No services have been selected for the patient.                Dialysis/Infusion    No services have been selected for the patient.                Home Medical Care    No services have been selected for the patient.                Therapy    No services have been selected for the patient.                Community Resources    No services have been selected for the patient.                Community & DME    No services have been selected for the patient.                         Final Discharge Disposition Code: 01 - home or self-care

## 2024-02-19 DIAGNOSIS — E89.0 POSTOPERATIVE HYPOTHYROIDISM: Primary | ICD-10-CM

## 2024-02-26 ENCOUNTER — LAB (OUTPATIENT)
Dept: FAMILY MEDICINE CLINIC | Facility: CLINIC | Age: 44
End: 2024-02-26
Payer: COMMERCIAL

## 2024-02-26 DIAGNOSIS — E89.0 POSTOPERATIVE HYPOTHYROIDISM: ICD-10-CM

## 2024-02-26 PROCEDURE — 83970 ASSAY OF PARATHORMONE: CPT | Performed by: SURGERY

## 2024-02-26 PROCEDURE — 80053 COMPREHEN METABOLIC PANEL: CPT | Performed by: PHYSICIAN ASSISTANT

## 2024-02-26 PROCEDURE — 36415 COLL VENOUS BLD VENIPUNCTURE: CPT | Performed by: PHYSICIAN ASSISTANT

## 2024-02-26 PROCEDURE — 84443 ASSAY THYROID STIM HORMONE: CPT | Performed by: SURGERY

## 2024-02-26 PROCEDURE — 84439 ASSAY OF FREE THYROXINE: CPT | Performed by: PHYSICIAN ASSISTANT

## 2024-02-26 PROCEDURE — 84443 ASSAY THYROID STIM HORMONE: CPT | Performed by: PHYSICIAN ASSISTANT

## 2024-02-27 LAB
ALBUMIN SERPL-MCNC: 3.8 G/DL (ref 3.5–5.2)
ALBUMIN/GLOB SERPL: 1.3 G/DL
ALP SERPL-CCNC: 77 U/L (ref 39–117)
ALT SERPL W P-5'-P-CCNC: 24 U/L (ref 1–33)
ANION GAP SERPL CALCULATED.3IONS-SCNC: 13 MMOL/L (ref 5–15)
AST SERPL-CCNC: 24 U/L (ref 1–32)
BILIRUB SERPL-MCNC: 0.5 MG/DL (ref 0–1.2)
BUN SERPL-MCNC: 10 MG/DL (ref 6–20)
BUN/CREAT SERPL: 13.2 (ref 7–25)
CALCIUM SPEC-SCNC: 8.5 MG/DL (ref 8.6–10.5)
CHLORIDE SERPL-SCNC: 105 MMOL/L (ref 98–107)
CO2 SERPL-SCNC: 24 MMOL/L (ref 22–29)
CREAT SERPL-MCNC: 0.76 MG/DL (ref 0.57–1)
EGFRCR SERPLBLD CKD-EPI 2021: 99.2 ML/MIN/1.73
GLOBULIN UR ELPH-MCNC: 3 GM/DL
GLUCOSE SERPL-MCNC: 98 MG/DL (ref 65–99)
POTASSIUM SERPL-SCNC: 4.3 MMOL/L (ref 3.5–5.2)
PROT SERPL-MCNC: 6.8 G/DL (ref 6–8.5)
PTH-INTACT SERPL-MCNC: 18.2 PG/ML (ref 15–65)
SODIUM SERPL-SCNC: 142 MMOL/L (ref 136–145)
T4 FREE SERPL-MCNC: 2.54 NG/DL (ref 0.93–1.7)
TSH SERPL DL<=0.05 MIU/L-ACNC: 0.01 UIU/ML (ref 0.27–4.2)
TSH SERPL DL<=0.05 MIU/L-ACNC: 0.01 UIU/ML (ref 0.27–4.2)

## 2024-02-29 ENCOUNTER — OFFICE VISIT (OUTPATIENT)
Dept: ENDOCRINOLOGY | Facility: CLINIC | Age: 44
End: 2024-02-29
Payer: COMMERCIAL

## 2024-02-29 VITALS
SYSTOLIC BLOOD PRESSURE: 138 MMHG | DIASTOLIC BLOOD PRESSURE: 86 MMHG | HEART RATE: 74 BPM | HEIGHT: 66 IN | WEIGHT: 293 LBS | BODY MASS INDEX: 47.09 KG/M2 | OXYGEN SATURATION: 96 %

## 2024-02-29 DIAGNOSIS — E89.0 POSTOPERATIVE HYPOTHYROIDISM: Primary | ICD-10-CM

## 2024-02-29 RX ORDER — CALCITRIOL 0.5 UG/1
0.5 CAPSULE, LIQUID FILLED ORAL EVERY 12 HOURS SCHEDULED
Qty: 60 CAPSULE | Refills: 2 | Status: SHIPPED | OUTPATIENT
Start: 2024-02-29 | End: 2024-05-29

## 2024-02-29 NOTE — ASSESSMENT & PLAN NOTE
-Clinically euthyroid.  -Continue with T4 200 mcg QD.  Will continue to monitor regularly to regulate medication to optimal goal.  -Continue with current doses of calctriol and calcium carb.  -Follow-up in 2 months.

## 2024-02-29 NOTE — PROGRESS NOTES
Chief Complaint   Patient presents with    Post-op        Referring Provider  No ref. provider found     HPI   Anika Alegria is a 43 y.o. female had concerns including Post-op.   Postoperative Hypothyroidism.    She had a total thyroidectomy on 2024 by Dr Kraus.  She has been seen by Dr Diehl in [place] in the past.  She is doing well. 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 She had labs recently at Dr Kraus's office and he adjusted her medication from 250 to 200 mcg QD, yesterday. She is currently taking 200 mcg QD. She is taking this regularly without missing doses.  She denies any changes to her neck.  She is not taking any conflicting medication.  She is also currently taking Calcitriol 0.5 mg BID and Calcium Carbonate 1000 mg TID.     The following portions of the patient's history were reviewed and updated as appropriate: allergies, current medications, past family history, past medical history, past social history, past surgical history, and problem list.    Past Medical History:   Diagnosis Date    Anesthesia complication     HYPOTENSION, LOW BODY TEMP WITH EPIDURAL     Anxiety and depression     Arthritis     GERD (gastroesophageal reflux disease)     Goiter     Hepatitis A     3 years ago    Hypertension     Mild asthma 2021    PONV (postoperative nausea and vomiting)     Thyroid disease     Type 2 diabetes mellitus with hyperglycemia, without long-term current use of insulin 2020     Past Surgical History:   Procedure Laterality Date    ABDOMINAL SURGERY      LIPOMA REMOVED APPROX     CARPAL TUNNEL RELEASE Right 10/15/2019    Procedure: CARPAL TUNNEL RELEASE;  Surgeon: Rony Cruz MD;  Location: [facility];  Service: Orthopedics     SECTION      x2    THYROIDECTOMY N/A 2024    Procedure: TOTAL THYROIDECTOMY;  Surgeon: Sunday Kraus MD;  Location: [facility];  Service: General;  Laterality: N/A;    TUBAL  ABDOMINAL LIGATION        Family History   Problem Relation Age of Onset    Diabetes Mother     Hypertension Mother     No Known Problems Father     Gout Sister     Osteoarthritis Maternal Grandmother     Osteoporosis Maternal Grandmother     Heart disease Maternal Grandmother     Diabetes Maternal Grandmother     Hypertension Maternal Grandmother     Heart disease Maternal Grandfather     Diabetes Maternal Grandfather     Hypertension Maternal Grandfather     Breast cancer Neg Hx       Social History     Socioeconomic History    Marital status:      Spouse name: bea    Number of children: 2    Years of education: 12   Tobacco Use    Smoking status: Former     Passive exposure: Past    Smokeless tobacco: Never   Vaping Use    Vaping Use: Never used   Substance and Sexual Activity    Alcohol use: No    Drug use: No    Sexual activity: Defer      Allergies   Allergen Reactions    Ultram [Tramadol] Swelling    Venlafaxine Other (See Comments)     Nightmares      Current Outpatient Medications on File Prior to Visit   Medication Sig Dispense Refill    atorvastatin (LIPITOR) 10 MG tablet Take 1 tablet by mouth Every Night. 90 tablet 3    buPROPion SR (WELLBUTRIN SR) 150 MG 12 hr tablet Take 1 tablet by mouth 2 (Two) Times a Day. 180 tablet 3    calcium carbonate (TUMS) 500 MG chewable tablet Chew 2 tablets 3 (Three) Times a Day for 90 days. 180 tablet 2    cetirizine (zyrTEC) 10 MG tablet Take 1 tablet by mouth Daily. 90 tablet 3    Cholecalciferol 50 MCG (2000 UT) tablet Take 1 tablet by mouth Daily.      dapagliflozin Propanediol (Farxiga) 10 MG tablet Take 10 mg by mouth Every Morning. 90 tablet 3    Diclofenac Sodium (VOLTAREN) 1 % gel gel Apply  topically to the appropriate area as directed 4 (Four) Times a Day As Needed (knee pain). (Patient taking differently: Apply 4 g topically to the appropriate area as directed 4 (Four) Times a Day As Needed (knee pain).) 300 g 5    docusate sodium 100 MG capsule  Take 1 capsule by mouth 2 (Two) Times a Day As Needed for Constipation. 30 capsule 0    DULoxetine (CYMBALTA) 30 MG capsule Take 3 capsules by mouth Daily. 90 capsule 3    DULoxetine (CYMBALTA) 60 MG capsule Take 1 capsule by mouth Daily. 90 capsule 3    fluticasone (FLONASE) 50 MCG/ACT nasal spray 2 sprays into the nostril(s) as directed by provider Daily. 48 g 3    ibuprofen (ADVIL,MOTRIN) 800 MG tablet Take 1 tablet by mouth Every 8 (Eight) Hours. 90 tablet 2    ketoconazole (NIZORAL) 2 % shampoo Apply  topically to the appropriate area as directed 2 (Two) Times a Week. (Patient taking differently: Apply 1 application  topically to the appropriate area as directed 2 (Two) Times a Week.) 120 mL 3    levothyroxine (SYNTHROID, LEVOTHROID) 125 MCG tablet Take 2 tablets by mouth Every Morning for 90 days. 60 tablet 2    losartan (COZAAR) 100 MG tablet Take 1 tablet by mouth Daily. 90 tablet 3    montelukast (SINGULAIR) 10 MG tablet Take 1 tablet by mouth Every Night. 90 tablet 3    oxybutynin XL (DITROPAN-XL) 5 MG 24 hr tablet Take 1 tablet by mouth Daily. 90 tablet 3    pantoprazole (PROTONIX) 40 MG EC tablet Take 1 tablet by mouth Daily. 90 tablet 3    rOPINIRole (REQUIP) 1 MG tablet Take 1 tablet by mouth Every Night. 90 tablet 3    spironolactone (ALDACTONE) 25 MG tablet Take 1 tablet by mouth Daily. 90 tablet 3    Tirzepatide (Mounjaro) 7.5 MG/0.5ML solution pen-injector pen Inject 0.5 mL under the skin into the appropriate area as directed Every 7 (Seven) Days. (Patient taking differently: Inject 0.5 mL under the skin into the appropriate area as directed Every 7 (Seven) Days. PATIENT TAKES FOR DIABETES MANAGEMENT.) 2 mL 3    Ventolin  (90 Base) MCG/ACT inhaler Inhale 2 puffs Every 4 (Four) Hours As Needed for Wheezing. 54 g 3    vitamin D (ERGOCALCIFEROL) 1.25 MG (16429 UT) capsule capsule Take 1 capsule by mouth Every 7 (Seven) Days. 15 capsule 3    [DISCONTINUED] calcitriol (ROCALTROL) 0.5 MCG capsule  "Take 1 capsule by mouth Every 12 (Twelve) Hours for 90 days. 60 capsule 2     No current facility-administered medications on file prior to visit.     Review of Systems   Constitutional: Negative.    Eyes: Negative.    Endocrine: Negative.    Skin: Negative.    Psychiatric/Behavioral: Negative.     All other systems reviewed and are negative.    /86 (BP Location: Right arm, Patient Position: Sitting, Cuff Size: Adult)   Pulse 74   Ht 167.6 cm (66\")   Wt (!) 147 kg (324 lb 9.6 oz)   LMP  (LMP Unknown)   SpO2 96%   BMI 52.39 kg/m²      Physical Exam  Vitals reviewed.   Constitutional:       Appearance: Normal appearance.   Eyes:      Extraocular Movements: Extraocular movements intact.   Cardiovascular:      Rate and Rhythm: Normal rate.   Pulmonary:      Effort: Pulmonary effort is normal.   Skin:     General: Skin is warm.   Neurological:      General: No focal deficit present.      Mental Status: She is alert and oriented to person, place, and time.   Psychiatric:         Mood and Affect: Mood normal.         Behavior: Behavior normal.         Thought Content: Thought content normal.         Judgment: Judgment normal.       Labs/Imaging  CMP  Lab Results   Component Value Date    GLUCOSE 98 02/26/2024    BUN 10 02/26/2024    CREATININE 0.76 02/26/2024    EGFRIFNONA 114 01/04/2022    BCR 13.2 02/26/2024    K 4.3 02/26/2024    CO2 24.0 02/26/2024    CALCIUM 8.5 (L) 02/26/2024    ALBUMIN 3.8 02/26/2024    AST 24 02/26/2024    ALT 24 02/26/2024        CBC w/DIFF   Lab Results   Component Value Date    WBC 7.04 01/25/2024    RBC 4.70 01/25/2024    HGB 14.2 01/25/2024    HCT 42.0 01/25/2024    MCV 89.4 01/25/2024    MCH 30.2 01/25/2024    MCHC 33.8 01/25/2024    RDW 12.9 01/25/2024    RDWSD 42.2 01/25/2024    MPV 9.5 01/25/2024     01/25/2024    NEUTRORELPCT 50.7 07/06/2022    LYMPHORELPCT 37.6 07/06/2022    MONORELPCT 8.8 07/06/2022    EOSRELPCT 1.9 07/06/2022    BASORELPCT 0.6 07/06/2022    AUTOIGPER " "0.4 07/06/2022    NEUTROABS 4.10 07/06/2022    LYMPHSABS 3.04 07/06/2022    MONOSABS 0.71 07/06/2022    EOSABS 0.15 07/06/2022    BASOSABS 0.05 07/06/2022    AUTOIGNUM 0.03 07/06/2022    NRBC 0.0 07/06/2022       TSH  Lab Results   Component Value Date    TSH 0.007 (L) 02/26/2024    TSH 0.006 (L) 02/26/2024    TSH 0.535 09/20/2023       T4  Lab Results   Component Value Date    FREET4 2.54 (H) 02/26/2024    FREET4 0.97 12/16/2022    FREET4 1.05 01/04/2022     No results found for: \"E7STQMZ\"    T3  Lab Results   Component Value Date    T3FREE 3.70 09/18/2018     No results found for: \"L8CSPWL\"    TRAb  No results found for: \"TSURCPAB\"    TPO  No results found for: \"THYROIDAB\"    No valid procedures specified.    Assessment and Plan    Diagnoses and all orders for this visit:    1. Postoperative hypothyroidism (Primary)  Assessment & Plan:  -Clinically euthyroid.  -Continue with T4 200 mcg QD.  Will continue to monitor regularly to regulate medication to optimal goal.  -Continue with current doses of calctriol and calcium carb.  -Follow-up in 2 months.      Other orders  -     calcitriol (ROCALTROL) 0.5 MCG capsule; Take 1 capsule by mouth Every 12 (Twelve) Hours for 90 days.  Dispense: 60 capsule; Refill: 2         Return in about 2 months (around 4/29/2024) for Follow-up appointment. The patient was instructed to contact the clinic with any interval questions or concerns.      This document has been electronically signed by NIKKO Singh  February 29, 2024 13:28 EST   Endocrinology    Please note that portions of this document were completed with a voice recognition program. Efforts were made to edit the dictations, but occasionally words are mis-transcribed.   "

## 2024-03-01 ENCOUNTER — TELEPHONE (OUTPATIENT)
Dept: ENDOCRINOLOGY | Facility: CLINIC | Age: 44
End: 2024-03-01
Payer: COMMERCIAL

## 2024-03-01 NOTE — TELEPHONE ENCOUNTER
[facility] IS CALLING NEEDING CLARIFICATION ON PATIENTS CALCITRIOL PRESCRIPTION. THERE IS A THERAPY DUPLICATION WITH VITAMIN D. PHONE NUMBER [phone]

## 2024-03-04 NOTE — TELEPHONE ENCOUNTER
Let her know that she needs to stop the Vitamin D and continue with the calcitriol.  Please notify the pharmacy as well.

## 2024-03-18 ENCOUNTER — OFFICE VISIT (OUTPATIENT)
Dept: FAMILY MEDICINE CLINIC | Facility: CLINIC | Age: 44
End: 2024-03-18
Payer: COMMERCIAL

## 2024-03-18 VITALS
HEIGHT: 66 IN | TEMPERATURE: 97.6 F | HEART RATE: 91 BPM | OXYGEN SATURATION: 96 % | BODY MASS INDEX: 47.09 KG/M2 | WEIGHT: 293 LBS

## 2024-03-18 DIAGNOSIS — I10 ESSENTIAL HYPERTENSION: Chronic | ICD-10-CM

## 2024-03-18 DIAGNOSIS — E89.0 POSTOPERATIVE HYPOTHYROIDISM: Chronic | ICD-10-CM

## 2024-03-18 DIAGNOSIS — L03.211: Primary | ICD-10-CM

## 2024-03-18 PROCEDURE — 1159F MED LIST DOCD IN RCRD: CPT | Performed by: PHYSICIAN ASSISTANT

## 2024-03-18 PROCEDURE — 1160F RVW MEDS BY RX/DR IN RCRD: CPT | Performed by: PHYSICIAN ASSISTANT

## 2024-03-18 PROCEDURE — 99214 OFFICE O/P EST MOD 30 MIN: CPT | Performed by: PHYSICIAN ASSISTANT

## 2024-03-18 RX ORDER — AMOXICILLIN AND CLAVULANATE POTASSIUM 875; 125 MG/1; MG/1
1 TABLET, FILM COATED ORAL 2 TIMES DAILY
Qty: 20 TABLET | Refills: 0 | Status: SHIPPED | OUTPATIENT
Start: 2024-03-18

## 2024-03-18 RX ORDER — FLUCONAZOLE 150 MG/1
150 TABLET ORAL DAILY
Qty: 3 TABLET | Refills: 0 | Status: SHIPPED | OUTPATIENT
Start: 2024-03-18

## 2024-03-18 NOTE — PROGRESS NOTES
"Chief Complaint -swollen jaw    History of Present Illness -     Anika Alegria is a 43 y.o. female.     Swelling gel-  Patient complains of a swelling on her right jaw near the ear that has gotten harder since yesterday.  Onset 2 days.  She did have dental work done on the right side front upper teeth earlier this week.  She reports no problem with the area dental work was performed.    Hypertension-  Likely elevated today due to pain and acute jaw issue.  Patient reports blood pressure at home is less than 130/80 consistently with the use of losartan and spironolactone    Hypothyroidism-  Stable with Synthroid 125 mcg daily.  Patient recently had thyroidectomy due to a large goiter.  She is doing well postop with routine healing in the neck.      The following portions of the patient's history were reviewed and updated as appropriate: allergies, current medications, past family history, past medical history, past social history, past surgical history and problem list.        Objective  Vital signs:  Pulse 91   Temp 97.6 °F (36.4 °C)   Ht 167.6 cm (65.98\")   Wt (!) 149 kg (329 lb)   LMP  (LMP Unknown)   SpO2 96%   BMI 53.13 kg/m²     Physical Exam  Vitals and nursing note reviewed.   Constitutional:       General: She is not in acute distress.     Appearance: Normal appearance. She is well-developed. She is obese. She is not diaphoretic.   HENT:      Head: Normocephalic and atraumatic.      Right Ear: Tympanic membrane, ear canal and external ear normal.      Left Ear: Tympanic membrane, ear canal and external ear normal.      Nose: Nose normal.      Mouth/Throat:      Mouth: Mucous membranes are moist.      Pharynx: No oropharyngeal exudate or posterior oropharyngeal erythema.      Comments: Tender hard flesh-colored soft tissue noted right jaw and preauricular area  Eyes:      Extraocular Movements: Extraocular movements intact.      Conjunctiva/sclera: Conjunctivae normal.   Neck:      Thyroid: No " thyromegaly.   Cardiovascular:      Rate and Rhythm: Normal rate and regular rhythm.      Heart sounds: Normal heart sounds. No murmur heard.  Pulmonary:      Effort: Pulmonary effort is normal. No respiratory distress.      Breath sounds: Normal breath sounds. No wheezing or rales.   Musculoskeletal:         General: No tenderness.      Cervical back: Normal range of motion and neck supple.   Lymphadenopathy:      Cervical: No cervical adenopathy.   Skin:     General: Skin is warm and dry.      Findings: No rash.   Neurological:      Mental Status: She is alert and oriented to person, place, and time.   Psychiatric:         Mood and Affect: Mood normal.         Behavior: Behavior normal.         Thought Content: Thought content normal.         The following data was reviewed by Rosa Quigley PA-C:         Lab Results   Component Value Date    BUN 10 02/26/2024    CREATININE 0.76 02/26/2024    EGFR 99.2 02/26/2024    ALT 24 02/26/2024    AST 24 02/26/2024    WBC 7.04 01/25/2024    HGB 14.2 01/25/2024    HCT 42.0 01/25/2024     01/25/2024    CHOL 163 12/16/2022    TRIG 151 (H) 12/16/2022    HDL 43 12/16/2022    LDL 94 12/16/2022    TSH 0.007 (L) 02/26/2024    HGBA1C 5.5 12/04/2023           Assessment & Plan     Diagnoses and all orders for this visit:    1. Cellulitis of right jaw (Primary)  -     amoxicillin-clavulanate (AUGMENTIN) 875-125 MG per tablet; Take 1 tablet by mouth 2 (Two) Times a Day.  Dispense: 20 tablet; Refill: 0    2. Essential hypertension  Comments:  Likely elevated secondary to acute pain and issue with child  Continue losartan and spironolactone    3. Postoperative hypothyroidism  Comments:  Continue Synthroid 125 mcg daily  Continue to monitor thyroid function    Other orders  -     fluconazole (Diflucan) 150 MG tablet; Take 1 tablet by mouth Daily.  Dispense: 3 tablet; Refill: 0                   Patient was given instructions and counseling regarding his condition or for health  maintenance advice. Please see specific information pulled into the AVS if appropriate      This document has been electronically signed by:  Rosa Quigley PA-C

## 2024-04-18 ENCOUNTER — OFFICE VISIT (OUTPATIENT)
Dept: FAMILY MEDICINE CLINIC | Facility: CLINIC | Age: 44
End: 2024-04-18
Payer: COMMERCIAL

## 2024-04-18 VITALS
HEART RATE: 80 BPM | TEMPERATURE: 97.8 F | SYSTOLIC BLOOD PRESSURE: 134 MMHG | HEIGHT: 66 IN | DIASTOLIC BLOOD PRESSURE: 84 MMHG | WEIGHT: 293 LBS | OXYGEN SATURATION: 98 % | BODY MASS INDEX: 47.09 KG/M2

## 2024-04-18 DIAGNOSIS — M25.562 CHRONIC PAIN OF LEFT KNEE: Primary | Chronic | ICD-10-CM

## 2024-04-18 DIAGNOSIS — F33.1 MODERATE EPISODE OF RECURRENT MAJOR DEPRESSIVE DISORDER: Chronic | ICD-10-CM

## 2024-04-18 DIAGNOSIS — G89.29 CHRONIC PAIN OF LEFT KNEE: Primary | Chronic | ICD-10-CM

## 2024-04-18 DIAGNOSIS — E11.65 TYPE 2 DIABETES MELLITUS WITH HYPERGLYCEMIA, WITHOUT LONG-TERM CURRENT USE OF INSULIN: Chronic | ICD-10-CM

## 2024-04-18 DIAGNOSIS — I10 ESSENTIAL HYPERTENSION: Chronic | ICD-10-CM

## 2024-04-18 RX ORDER — LEVOTHYROXINE SODIUM 0.2 MG/1
200 TABLET ORAL EVERY MORNING
COMMUNITY
Start: 2024-03-27

## 2024-04-18 RX ORDER — DULOXETIN HYDROCHLORIDE 30 MG/1
90 CAPSULE, DELAYED RELEASE ORAL DAILY
Qty: 90 CAPSULE | Refills: 5 | Status: SHIPPED | OUTPATIENT
Start: 2024-04-18

## 2024-04-22 NOTE — PROGRESS NOTES
"Chief Complaint -knee pain    History of Present Illness -     Anika Alegria is a 43 y.o. female.     Knee pain-  Patient complains of intermittent severe achy pain in the left knee for several months.  No known specific injury.    Depression-  Stable with Wellbutrin and Cymbalta.  She denies any hallucinations, SI or HI    Diabetes mellitus-  Stable with most recent hemoglobin A1c of 5.5.  Patient is currently using Farxiga and Mounjaro along with a low-carb diet    Hypertension-  Controlled with spironolactone and losartan      The following portions of the patient's history were reviewed and updated as appropriate: allergies, current medications, past family history, past medical history, past social history, past surgical history and problem list.        Objective  Vital signs:  /84 Comment: RECHECKED WITH LARGE CUFF  Pulse 80   Temp 97.8 °F (36.6 °C) (Temporal)   Ht 167.6 cm (65.98\")   Wt (!) 150 kg (331 lb)   LMP  (LMP Unknown)   SpO2 98%   BMI 53.45 kg/m²     Physical Exam  Vitals and nursing note reviewed.   Constitutional:       Appearance: Normal appearance. She is well-developed. She is obese.   Eyes:      Extraocular Movements: Extraocular movements intact.      Conjunctiva/sclera: Conjunctivae normal.   Cardiovascular:      Rate and Rhythm: Normal rate and regular rhythm.      Heart sounds: Normal heart sounds. No murmur heard.  Pulmonary:      Effort: Pulmonary effort is normal. No respiratory distress.      Breath sounds: Normal breath sounds. No wheezing.   Musculoskeletal:         General: No tenderness.   Skin:     General: Skin is warm and dry.      Findings: No rash.   Neurological:      Mental Status: She is alert and oriented to person, place, and time.   Psychiatric:         Mood and Affect: Mood normal.         Behavior: Behavior normal.         Thought Content: Thought content normal.         The following data was reviewed by Rosa Quigley PA-C:         Lab Results   Component " Value Date    BUN 10 02/26/2024    CREATININE 0.76 02/26/2024    EGFR 99.2 02/26/2024    ALT 24 02/26/2024    AST 24 02/26/2024    WBC 7.04 01/25/2024    HGB 14.2 01/25/2024    HCT 42.0 01/25/2024     01/25/2024    CHOL 163 12/16/2022    TRIG 151 (H) 12/16/2022    HDL 43 12/16/2022    LDL 94 12/16/2022    TSH 0.007 (L) 02/26/2024    HGBA1C 5.5 12/04/2023           Assessment & Plan     Diagnoses and all orders for this visit:    1. Chronic pain of left knee (Primary)  Comments:  Left knee intra-articular injection performed today  Advised activity as tolerated  Orders:  -     XR Knee 3 View Left; Future    2. Moderate episode of recurrent major depressive disorder  Comments:  Continue Cymbalta and Wellbutrin  Advised conservative measures for dealing with depression  Orders:  -     DULoxetine (CYMBALTA) 30 MG capsule; Take 3 capsules by mouth Daily.  Dispense: 90 capsule; Refill: 5    3. Type 2 diabetes mellitus with hyperglycemia, without long-term current use of insulin  Comments:  Continue Farxiga and Mounjaro  Encouraged to continue low-carb diabetic diet  Orders:  -     Tirzepatide (Mounjaro) 7.5 MG/0.5ML solution pen-injector pen; Inject 0.5 mL under the skin into the appropriate area as directed Every 7 (Seven) Days. PATIENT TAKES FOR DIABETES MANAGEMENT.  Dispense: 2 mL; Refill: 3    4. Essential hypertension  Comments:  Continue spironolactone and losartan      Procedure: Left knee intra-articular injection  Provider: Rosa Quigley PA-C  Indication: Left knee pain likely secondary to osteoarthritis  Timeout was taken to verify correct patient procedure and location  Description: The left knee was prepped and draped in sterile fashion.  An intra-articular injection was given using 3 cc 1% lidocaine, 1 cc of triamcinolone and 1 cc of Depo-Medrol.  Pressure was held and sterile dressing was placed.  No complications  Estimated blood loss: Minimal  Patient tolerated the procedure well    Lidocaine 1% 10  mg/mL  NDC number 14986-408-13  Lot #[id number]  Expiration 2/26    Triamcinolone 40 mg/mL  NDC number [address]-4  Lot number [id number]  Expiration 7/2025    Depo-Medrol 80 mg/mL  NDC number 009-0306-02  Lot #[id number]  Expiration 5/2025             Patient was given instructions and counseling regarding his condition or for health maintenance advice. Please see specific information pulled into the AVS if appropriate      This document has been electronically signed by:  Rosa Quigley PA-C

## 2024-05-06 ENCOUNTER — OFFICE VISIT (OUTPATIENT)
Dept: ENDOCRINOLOGY | Facility: CLINIC | Age: 44
End: 2024-05-06
Payer: COMMERCIAL

## 2024-05-06 VITALS
BODY MASS INDEX: 47.09 KG/M2 | DIASTOLIC BLOOD PRESSURE: 82 MMHG | HEART RATE: 68 BPM | SYSTOLIC BLOOD PRESSURE: 132 MMHG | WEIGHT: 293 LBS | OXYGEN SATURATION: 95 % | HEIGHT: 66 IN

## 2024-05-06 DIAGNOSIS — E89.0 POSTOPERATIVE HYPOTHYROIDISM: Primary | ICD-10-CM

## 2024-05-06 DIAGNOSIS — G44.89 OTHER HEADACHE SYNDROME: ICD-10-CM

## 2024-05-06 LAB
CALCIUM SPEC-SCNC: 8.5 MG/DL (ref 8.6–10.5)
TSH SERPL DL<=0.05 MIU/L-ACNC: 0.12 UIU/ML (ref 0.27–4.2)

## 2024-05-06 PROCEDURE — 1159F MED LIST DOCD IN RCRD: CPT | Performed by: NURSE PRACTITIONER

## 2024-05-06 PROCEDURE — 82310 ASSAY OF CALCIUM: CPT | Performed by: NURSE PRACTITIONER

## 2024-05-06 PROCEDURE — 3079F DIAST BP 80-89 MM HG: CPT | Performed by: NURSE PRACTITIONER

## 2024-05-06 PROCEDURE — 1160F RVW MEDS BY RX/DR IN RCRD: CPT | Performed by: NURSE PRACTITIONER

## 2024-05-06 PROCEDURE — 99213 OFFICE O/P EST LOW 20 MIN: CPT | Performed by: NURSE PRACTITIONER

## 2024-05-06 PROCEDURE — 3075F SYST BP GE 130 - 139MM HG: CPT | Performed by: NURSE PRACTITIONER

## 2024-05-06 PROCEDURE — 84146 ASSAY OF PROLACTIN: CPT | Performed by: NURSE PRACTITIONER

## 2024-05-06 PROCEDURE — 84443 ASSAY THYROID STIM HORMONE: CPT | Performed by: NURSE PRACTITIONER

## 2024-05-06 RX ORDER — ACYCLOVIR 400 MG/1
1 TABLET ORAL TAKE AS DIRECTED
Qty: 1 EACH | Refills: 0 | Status: SHIPPED | OUTPATIENT
Start: 2024-05-06

## 2024-05-06 RX ORDER — ACYCLOVIR 400 MG/1
1 TABLET ORAL
Qty: 3 EACH | Refills: 5 | Status: SHIPPED | OUTPATIENT
Start: 2024-05-06

## 2024-05-07 LAB — PROLACTIN SERPL-MCNC: 3.96 NG/ML (ref 4.79–23.3)

## 2024-05-21 ENCOUNTER — TELEPHONE (OUTPATIENT)
Dept: FAMILY MEDICINE CLINIC | Facility: CLINIC | Age: 44
End: 2024-05-21

## 2024-05-21 NOTE — TELEPHONE ENCOUNTER
Caller: [facility] [address] [place] [address] - [phone]  - [phone]    Relationship: Pharmacy    Best call back number: [phone]     Which medication are you concerned about: DULoxetine (CYMBALTA) 30 MG capsule     What are your concerns: PHARMACY IS CALLING AND WOULD LIKE TO VERIFY WHAT STRENGTH THE PATIENT IS TAKING OF THIS MEDICATION.

## 2024-05-23 RX ORDER — DULOXETIN HYDROCHLORIDE 60 MG/1
60 CAPSULE, DELAYED RELEASE ORAL DAILY
Qty: 30 CAPSULE | Refills: 5 | Status: SHIPPED | OUTPATIENT
Start: 2024-05-23

## 2024-05-23 RX ORDER — DULOXETIN HYDROCHLORIDE 30 MG/1
30 CAPSULE, DELAYED RELEASE ORAL DAILY
Qty: 30 CAPSULE | Refills: 5 | Status: SHIPPED | OUTPATIENT
Start: 2024-05-23

## 2024-05-27 DIAGNOSIS — E03.0 CONGENITAL HYPOTHYROIDISM WITH DIFFUSE GOITER: ICD-10-CM

## 2024-05-28 RX ORDER — LEVOTHYROXINE SODIUM 0.1 MG/1
100 TABLET ORAL DAILY
Qty: 90 TABLET | Refills: 0 | Status: SHIPPED | OUTPATIENT
Start: 2024-05-28 | End: 2024-05-30

## 2024-05-29 ENCOUNTER — TELEPHONE (OUTPATIENT)
Dept: FAMILY MEDICINE CLINIC | Facility: CLINIC | Age: 44
End: 2024-05-29

## 2024-05-29 NOTE — TELEPHONE ENCOUNTER
Pharmacy Name: [facility] [address] [place] [address] [phone] [facility] [phone]          Pharmacy representative name: CAROLYN     Pharmacy representative phone number: [phone]    What medication are you calling in regards to: LEVOTHYROXINE     What question does the pharmacy have: THE PHARMACY NEEDS TO KNOW IF THE PATIENT IS TAKING 100 MCG  MCG     Who is the provider that prescribed the medication: DESTINI MUÑOZ

## 2024-05-30 ENCOUNTER — OFFICE VISIT (OUTPATIENT)
Dept: FAMILY MEDICINE CLINIC | Facility: CLINIC | Age: 44
End: 2024-05-30
Payer: COMMERCIAL

## 2024-05-30 VITALS
TEMPERATURE: 98.2 F | SYSTOLIC BLOOD PRESSURE: 108 MMHG | WEIGHT: 293 LBS | HEIGHT: 66 IN | OXYGEN SATURATION: 97 % | DIASTOLIC BLOOD PRESSURE: 78 MMHG | HEART RATE: 76 BPM | BODY MASS INDEX: 47.09 KG/M2

## 2024-05-30 DIAGNOSIS — E89.0 POSTOPERATIVE HYPOTHYROIDISM: ICD-10-CM

## 2024-05-30 DIAGNOSIS — E89.0 POSTOPERATIVE HYPOTHYROIDISM: Primary | ICD-10-CM

## 2024-05-30 DIAGNOSIS — M15.9 PRIMARY OSTEOARTHRITIS INVOLVING MULTIPLE JOINTS: Chronic | ICD-10-CM

## 2024-05-30 DIAGNOSIS — E11.65 TYPE 2 DIABETES MELLITUS WITH HYPERGLYCEMIA, WITHOUT LONG-TERM CURRENT USE OF INSULIN: Primary | Chronic | ICD-10-CM

## 2024-05-30 DIAGNOSIS — F33.1 MODERATE EPISODE OF RECURRENT MAJOR DEPRESSIVE DISORDER: ICD-10-CM

## 2024-05-30 DIAGNOSIS — H65.01 NON-RECURRENT ACUTE SEROUS OTITIS MEDIA OF RIGHT EAR: ICD-10-CM

## 2024-05-30 PROCEDURE — 3078F DIAST BP <80 MM HG: CPT | Performed by: PHYSICIAN ASSISTANT

## 2024-05-30 PROCEDURE — 99214 OFFICE O/P EST MOD 30 MIN: CPT | Performed by: PHYSICIAN ASSISTANT

## 2024-05-30 PROCEDURE — 1159F MED LIST DOCD IN RCRD: CPT | Performed by: PHYSICIAN ASSISTANT

## 2024-05-30 PROCEDURE — 1160F RVW MEDS BY RX/DR IN RCRD: CPT | Performed by: PHYSICIAN ASSISTANT

## 2024-05-30 PROCEDURE — 3074F SYST BP LT 130 MM HG: CPT | Performed by: PHYSICIAN ASSISTANT

## 2024-05-30 PROCEDURE — 1125F AMNT PAIN NOTED PAIN PRSNT: CPT | Performed by: PHYSICIAN ASSISTANT

## 2024-05-30 RX ORDER — LEVOTHYROXINE SODIUM 0.2 MG/1
200 TABLET ORAL EVERY MORNING
Qty: 90 TABLET | Refills: 3 | Status: SHIPPED | OUTPATIENT
Start: 2024-05-30

## 2024-05-30 RX ORDER — DULOXETIN HYDROCHLORIDE 30 MG/1
30 CAPSULE, DELAYED RELEASE ORAL DAILY
Qty: 90 CAPSULE | Refills: 3 | Status: SHIPPED | OUTPATIENT
Start: 2024-05-30

## 2024-05-30 RX ORDER — LEVOTHYROXINE SODIUM 0.2 MG/1
200 TABLET ORAL EVERY MORNING
Qty: 30 TABLET | Refills: 5 | Status: SHIPPED | OUTPATIENT
Start: 2024-05-30 | End: 2024-05-30 | Stop reason: SDUPTHER

## 2024-05-30 RX ORDER — CEFDINIR 300 MG/1
600 CAPSULE ORAL DAILY
Qty: 20 CAPSULE | Refills: 0 | Status: SHIPPED | OUTPATIENT
Start: 2024-05-30 | End: 2024-06-09

## 2024-05-30 RX ORDER — DULOXETIN HYDROCHLORIDE 60 MG/1
60 CAPSULE, DELAYED RELEASE ORAL DAILY
Qty: 90 CAPSULE | Refills: 3 | Status: SHIPPED | OUTPATIENT
Start: 2024-05-30

## 2024-05-30 NOTE — PROGRESS NOTES
"Chief Complaint -diabetes    History of Present Illness -     Anika Alegria is a 43 y.o. female.     Diabetes-  Controlled with Farxiga and Mounjaro along with dietary modification.  She does report some appetite suppression and has lost 6 pounds in the last 5 weeks.    Osteoarthritis-  Stable with Voltaren gel and activity as tolerated    Depression-  Controlled with Cymbalta 90 mg daily.  She denies any hallucination, SI or HI    Hypothyroidism-  Stable 200 mcg daily    Earache-  She complains of intermittent moderate sharp pains in her left ear.  Onset 5 days.       The following portions of the patient's history were reviewed and updated as appropriate: allergies, current medications, past family history, past medical history, past social history, past surgical history and problem list.        Objective  Vital signs:  /78   Pulse 76   Temp 98.2 °F (36.8 °C) (Temporal)   Ht 167.6 cm (65.98\")   Wt (!) 148 kg (325 lb 3.2 oz)   LMP  (LMP Unknown)   SpO2 97%   BMI 52.51 kg/m²     Physical Exam  Vitals and nursing note reviewed.   Constitutional:       Appearance: Normal appearance. She is well-developed. She is obese.   Eyes:      Extraocular Movements: Extraocular movements intact.      Conjunctiva/sclera: Conjunctivae normal.   Cardiovascular:      Rate and Rhythm: Normal rate and regular rhythm.      Heart sounds: Normal heart sounds. No murmur heard.  Pulmonary:      Effort: Pulmonary effort is normal. No respiratory distress.      Breath sounds: Normal breath sounds. No wheezing.   Musculoskeletal:         General: No tenderness.   Skin:     General: Skin is warm and dry.      Findings: No rash.   Neurological:      Mental Status: She is alert and oriented to person, place, and time.   Psychiatric:         Mood and Affect: Mood normal.         Behavior: Behavior normal.         Thought Content: Thought content normal.         The following data was reviewed by Rosa Quigley PA-C:         Lab Results "   Component Value Date    BUN 10 02/26/2024    CREATININE 0.76 02/26/2024    EGFR 99.2 02/26/2024    ALT 24 02/26/2024    AST 24 02/26/2024    WBC 7.04 01/25/2024    HGB 14.2 01/25/2024    HCT 42.0 01/25/2024     01/25/2024    CHOL 163 12/16/2022    TRIG 151 (H) 12/16/2022    HDL 43 12/16/2022    LDL 94 12/16/2022    TSH 0.118 (L) 05/06/2024    HGBA1C 5.5 12/04/2023           Assessment & Plan     Diagnoses and all orders for this visit:    1. Type 2 diabetes mellitus with hyperglycemia, without long-term current use of insulin (Primary)  Comments:  Continue Farxiga and Mounjaro  Encouraged to continue low-carb diabetic diet  Orders:  -     Tirzepatide (Mounjaro) 7.5 MG/0.5ML solution pen-injector pen; Inject 0.5 mL under the skin into the appropriate area as directed Every 7 (Seven) Days. PATIENT TAKES FOR DIABETES MANAGEMENT.  Dispense: 2 mL; Refill: 3  -     Comprehensive Metabolic Panel; Future  -     Lipid Panel; Future  -     Hemoglobin A1c; Future  -     MicroAlbumin, Urine, Random - Urine, Clean Catch; Future  -     CBC & Differential; Future    2. Primary osteoarthritis involving multiple joints  Comments:  Continue Voltaren gel as needed  Advised activity as tolerated and daily stretching  Orders:  -     Diclofenac Sodium (VOLTAREN) 1 % gel gel; Apply 4 g topically to the appropriate area as directed 4 (Four) Times a Day As Needed (knee pain).  Dispense: 350 g; Refill: 3  -     CBC & Differential; Future    3. Moderate episode of recurrent major depressive disorder  -     DULoxetine (CYMBALTA) 30 MG capsule; Take 1 capsule by mouth Daily.  Dispense: 90 capsule; Refill: 3  -     DULoxetine (CYMBALTA) 60 MG capsule; Take 1 capsule by mouth Daily.  Dispense: 90 capsule; Refill: 3  -     CBC & Differential; Future    4. Postoperative hypothyroidism  -     levothyroxine (SYNTHROID, LEVOTHROID) 200 MCG tablet; Take 1 tablet by mouth Every Morning.  Dispense: 90 tablet; Refill: 3  -     TSH; Future  -      T4, Free; Future    5. Non-recurrent acute serous otitis media of right ear  -     cefdinir (OMNICEF) 300 MG capsule; Take 2 capsules by mouth Daily for 10 days.  Dispense: 20 capsule; Refill: 0        Class 3 Severe Obesity (BMI >=40). Obesity-related health conditions include the following: hypertension, diabetes mellitus, dyslipidemias, GERD, and osteoarthritis. Obesity is improving with treatment. BMI is is above average; BMI management plan is completed. We discussed portion control and increasing exercise.          Patient was given instructions and counseling regarding his condition or for health maintenance advice. Please see specific information pulled into the AVS if appropriate      This document has been electronically signed by:  Rosa Quigley PA-C

## 2024-05-30 NOTE — PATIENT INSTRUCTIONS
Carbohydrate Counting for Diabetes Mellitus, Adult  Carbohydrate counting is a method of keeping track of how many carbohydrates you eat. Eating carbohydrates increases the amount of sugar (glucose) in the blood. Counting how many carbohydrates you eat improves how well you manage your blood glucose. This, in turn, helps you manage your diabetes.  Carbohydrates are measured in grams (g) per serving. It is important to know how many carbohydrates (in grams or by serving size) you can have in each meal. This is different for every person. A dietitian can help you make a meal plan and calculate how many carbohydrates you should have at each meal and snack.  What foods contain carbohydrates?  Carbohydrates are found in the following foods:  Grains, such as breads and cereals.  Dried beans and soy products.  Starchy vegetables, such as potatoes, peas, and corn.  Fruit and fruit juices.  Milk and yogurt.  Sweets and snack foods, such as cake, cookies, candy, chips, and soft drinks.  How do I count carbohydrates in foods?  There are two ways to count carbohydrates in food. You can read food labels or learn standard serving sizes of foods. You can use either of these methods or a combination of both.  Using the Nutrition Facts label  The Nutrition Facts list is included on the labels of almost all packaged foods and beverages in the [place]. It includes:  The serving size.  Information about nutrients in each serving, including the grams of carbohydrate per serving.  To use the Nutrition Facts, decide how many servings you will have. Then, multiply the number of servings by the number of carbohydrates per serving. The resulting number is the total grams of carbohydrates that you will be having.  Learning the standard serving sizes of foods  When you eat carbohydrate foods that are not packaged or do not include Nutrition Facts on the label, you need to measure the servings in order to count the grams of  carbohydrates.  Measure the foods that you will eat with a food scale or measuring cup, if needed.  Decide how many standard-size servings you will eat.  Multiply the number of servings by 15. For foods that contain carbohydrates, one serving equals 15 g of carbohydrates.  For example, if you eat 2 cups or 10 oz (300 g) of strawberries, you will have eaten 2 servings and 30 g of carbohydrates (2 servings x 15 g = 30 g).  For foods that have more than one food mixed, such as soups and casseroles, you must count the carbohydrates in each food that is included.  The following list contains standard serving sizes of common carbohydrate-rich foods. Each of these servings has about 15 g of carbohydrates:  1 slice of bread.  1 six-inch (15 cm) tortilla.  ? cup or 2 oz (53 g) cooked rice or pasta.  ½ cup or 3 oz (85 g) cooked or canned, drained and rinsed beans or lentils.  ½ cup or 3 oz (85 g) starchy vegetable, such as peas, corn, or squash.  ½ cup or 4 oz (120 g) hot cereal.  ½ cup or 3 oz (85 g) boiled or mashed potatoes, or ¼ or 3 oz (85 g) of a large baked potato.  ½ cup or 4 fl oz (118 mL) fruit juice.  1 cup or 8 fl oz (237 mL) milk.  1 small or 4 oz (106 g) apple.  ½ or 2 oz (63 g) of a medium banana.  1 cup or 5 oz (150 g) strawberries.  3 cups or 1 oz (28.3 g) popped popcorn.  What is an example of carbohydrate counting?  To calculate the grams of carbohydrates in this sample meal, follow the steps shown below.  Sample meal  3 oz (85 g) chicken breast.  ? cup or 4 oz (106 g) brown rice.  ½ cup or 3 oz (85 g) corn.  1 cup or 8 fl oz (237 mL) milk.  1 cup or 5 oz (150 g) strawberries with sugar-free whipped topping.  Carbohydrate calculation  Identify the foods that contain carbohydrates:  Rice.  Corn.  Milk.  Strawberries.  Calculate how many servings you have of each food:  2 servings rice.  1 serving corn.  1 serving milk.  1 serving strawberries.  Multiply each number of servings by 15  servings rice x 15  g = 30 g.  1 serving corn x 15 g = 15 g.  1 serving milk x 15 g = 15 g.  1 serving strawberries x 15 g = 15 g.  Add together all of the amounts to find the total grams of carbohydrates eaten:  30 g + 15 g + 15 g + 15 g = 75 g of carbohydrates total.  What are tips for following this plan?  Shopping  Develop a meal plan and then make a shopping list.  Buy fresh and frozen vegetables, fresh and frozen fruit, dairy, eggs, beans, lentils, and whole grains.  Look at food labels. Choose foods that have more fiber and less sugar.  Avoid processed foods and foods with added sugars.  Meal planning  Aim to have the same number of grams of carbohydrates at each meal and for each snack time.  Plan to have regular, balanced meals and snacks.  Where to find more information  American Diabetes Association: diabetes.org  Centers for Disease Control and Prevention: cdc.gov  Academy of Nutrition and Dietetics: eatright.org  Association of Diabetes Care & Education Specialists: diabeteseducator.org  Summary  Carbohydrate counting is a method of keeping track of how many carbohydrates you eat.  Eating carbohydrates increases the amount of sugar (glucose) in your blood.  Counting how many carbohydrates you eat improves how well you manage your blood glucose. This helps you manage your diabetes.  A dietitian can help you make a meal plan and calculate how many carbohydrates you should have at each meal and snack.  This information is not intended to replace advice given to you by your health care provider. Make sure you discuss any questions you have with your health care provider.  Document Revised: 07/21/2021 Document Reviewed: 07/21/2021  Elsevier Patient Education © 2024 [employer]

## 2024-06-10 DIAGNOSIS — M15.9 PRIMARY OSTEOARTHRITIS INVOLVING MULTIPLE JOINTS: Chronic | ICD-10-CM

## 2024-07-07 DIAGNOSIS — M15.9 PRIMARY OSTEOARTHRITIS INVOLVING MULTIPLE JOINTS: ICD-10-CM

## 2024-07-08 RX ORDER — IBUPROFEN 800 MG/1
800 TABLET ORAL EVERY 8 HOURS
Qty: 270 TABLET | Refills: 0 | OUTPATIENT
Start: 2024-07-08

## 2024-07-11 ENCOUNTER — OFFICE VISIT (OUTPATIENT)
Dept: FAMILY MEDICINE CLINIC | Facility: CLINIC | Age: 44
End: 2024-07-11
Payer: COMMERCIAL

## 2024-07-11 VITALS
SYSTOLIC BLOOD PRESSURE: 144 MMHG | TEMPERATURE: 97.8 F | WEIGHT: 293 LBS | BODY MASS INDEX: 47.09 KG/M2 | HEART RATE: 72 BPM | DIASTOLIC BLOOD PRESSURE: 80 MMHG | HEIGHT: 66 IN | OXYGEN SATURATION: 98 %

## 2024-07-11 DIAGNOSIS — F41.1 GAD (GENERALIZED ANXIETY DISORDER): Primary | Chronic | ICD-10-CM

## 2024-07-11 DIAGNOSIS — E89.0 POSTOPERATIVE HYPOTHYROIDISM: Chronic | ICD-10-CM

## 2024-07-11 DIAGNOSIS — F32.9 TREATMENT-RESISTANT DEPRESSION: Chronic | ICD-10-CM

## 2024-07-11 DIAGNOSIS — I10 ESSENTIAL HYPERTENSION: Chronic | ICD-10-CM

## 2024-07-11 DIAGNOSIS — Z51.81 ENCOUNTER FOR THERAPEUTIC DRUG MONITORING: ICD-10-CM

## 2024-07-11 DIAGNOSIS — Z79.899 ENCOUNTER FOR LONG-TERM (CURRENT) USE OF OTHER MEDICATIONS: ICD-10-CM

## 2024-07-11 PROCEDURE — 1125F AMNT PAIN NOTED PAIN PRSNT: CPT | Performed by: PHYSICIAN ASSISTANT

## 2024-07-11 PROCEDURE — 1159F MED LIST DOCD IN RCRD: CPT | Performed by: PHYSICIAN ASSISTANT

## 2024-07-11 PROCEDURE — 1160F RVW MEDS BY RX/DR IN RCRD: CPT | Performed by: PHYSICIAN ASSISTANT

## 2024-07-11 PROCEDURE — 3079F DIAST BP 80-89 MM HG: CPT | Performed by: PHYSICIAN ASSISTANT

## 2024-07-11 PROCEDURE — 3077F SYST BP >= 140 MM HG: CPT | Performed by: PHYSICIAN ASSISTANT

## 2024-07-11 PROCEDURE — 99214 OFFICE O/P EST MOD 30 MIN: CPT | Performed by: PHYSICIAN ASSISTANT

## 2024-07-11 RX ORDER — CLONAZEPAM 0.5 MG/1
0.5 TABLET ORAL 3 TIMES DAILY PRN
Qty: 30 TABLET | Refills: 0 | Status: SHIPPED | OUTPATIENT
Start: 2024-07-11

## 2024-07-12 PROBLEM — F32.9 TREATMENT-RESISTANT DEPRESSION: Status: ACTIVE | Noted: 2024-07-12

## 2024-07-12 PROBLEM — F41.1 GAD (GENERALIZED ANXIETY DISORDER): Status: ACTIVE | Noted: 2024-07-12

## 2024-07-12 NOTE — PROGRESS NOTES
"Chief Complaint -anxiety    History of Present Illness -     Anika Alegria is a 44 y.o. female.     Anxiety-  Patient complains of overwhelming anxiety with increased stressors in her life.  Her mother passed away last month.  She has had other family issues that have increased her level of stress.  Patient currently taking Wellbutrin 150 mg twice daily and Cymbalta 90 mg daily.  She feels episodes of panic and being overwhelmed despite being on these medications.  She denies any hallucinations, SI or HI.    Depression-  Not at goal with Wellbutrin and Cymbalta.  Patient complains of anhedonia and feelings of being overwhelmed    Hypertension-  Stable with losartan with patient reporting home blood pressure less than 130/80 when she is not anxious or upset.  Blood pressure likely elevated today as patient is very anxious.    Hypothyroidism-  Stable with Synthroid 200 mcg daily        The following portions of the patient's history were reviewed and updated as appropriate: allergies, current medications, past family history, past medical history, past social history, past surgical history and problem list.        Objective  Vital signs:  /80   Pulse 72   Temp 97.8 °F (36.6 °C) (Temporal)   Ht 167.6 cm (65.98\")   Wt (!) 146 kg (321 lb)   LMP  (LMP Unknown)   SpO2 98%   BMI 51.84 kg/m²     Physical Exam  Vitals and nursing note reviewed.   Constitutional:       Appearance: Normal appearance. She is well-developed. She is obese.   Eyes:      Extraocular Movements: Extraocular movements intact.      Conjunctiva/sclera: Conjunctivae normal.   Cardiovascular:      Rate and Rhythm: Normal rate and regular rhythm.      Heart sounds: Normal heart sounds. No murmur heard.  Pulmonary:      Effort: Pulmonary effort is normal. No respiratory distress.      Breath sounds: Normal breath sounds. No wheezing.   Musculoskeletal:         General: No tenderness.   Skin:     General: Skin is warm and dry.      Findings: No " rash.   Neurological:      Mental Status: She is alert and oriented to person, place, and time.   Psychiatric:         Behavior: Behavior normal.         Thought Content: Thought content normal.      Comments: Mood today is anxious and concerned         The following data was reviewed by Rosa Quigley PA-C:         Lab Results   Component Value Date    BUN 10 02/26/2024    CREATININE 0.76 02/26/2024    EGFR 99.2 02/26/2024    ALT 24 02/26/2024    AST 24 02/26/2024    WBC 7.04 01/25/2024    HGB 14.2 01/25/2024    HCT 42.0 01/25/2024     01/25/2024    CHOL 163 12/16/2022    TRIG 151 (H) 12/16/2022    HDL 43 12/16/2022    LDL 94 12/16/2022    TSH 0.118 (L) 05/06/2024    HGBA1C 5.5 12/04/2023           Assessment & Plan     Diagnoses and all orders for this visit:    1. FLORI (generalized anxiety disorder) (Primary)  Comments:  Start Klonopin as needed for panic attacks and overwhelming anxiety  Start Vraylar  Continue Wellbutrin and Cymbalta  Orders:  -     clonazePAM (KlonoPIN) 0.5 MG tablet; Take 1 tablet by mouth 3 (Three) Times a Day As Needed for Anxiety.  Dispense: 30 tablet; Refill: 0    2. Treatment-resistant depression  Comments:  Start Vraylar  Continue Wellbutrin and Cymbalta  Orders:  -     Cariprazine HCl (Vraylar) 1.5 MG capsule capsule; Take 1 capsule by mouth Daily.  Dispense: 42 capsule; Refill: 0    3. Encounter for therapeutic drug monitoring  -     Urine Drug Screen - Urine, Clean Catch; Future    4. Encounter for long-term (current) use of other medications  -     Urine Drug Screen - Urine, Clean Catch; Future    5. Essential hypertension  Comments:  Continue losartan  Advise daily BP and pulse monitoring    6. Postoperative hypothyroidism  Comments:  Continue Synthroid 200 mcg daily                   Patient was given instructions and counseling regarding his condition or for health maintenance advice. Please see specific information pulled into the AVS if appropriate      This document has  been electronically signed by:  Rosa Quigley PA-C

## 2024-07-12 NOTE — PATIENT INSTRUCTIONS
Carbohydrate Counting for Diabetes Mellitus, Adult  Carbohydrate counting is a method of keeping track of how many carbohydrates you eat. Eating carbohydrates increases the amount of sugar (glucose) in the blood. Counting how many carbohydrates you eat improves how well you manage your blood glucose. This, in turn, helps you manage your diabetes.  Carbohydrates are measured in grams (g) per serving. It is important to know how many carbohydrates (in grams or by serving size) you can have in each meal. This is different for every person. A dietitian can help you make a meal plan and calculate how many carbohydrates you should have at each meal and snack.  What foods contain carbohydrates?  Carbohydrates are found in the following foods:  Grains, such as breads and cereals.  Dried beans and soy products.  Starchy vegetables, such as potatoes, peas, and corn.  Fruit and fruit juices.  Milk and yogurt.  Sweets and snack foods, such as cake, cookies, candy, chips, and soft drinks.  How do I count carbohydrates in foods?  There are two ways to count carbohydrates in food. You can read food labels or learn standard serving sizes of foods. You can use either of these methods or a combination of both.  Using the Nutrition Facts label  The Nutrition Facts list is included on the labels of almost all packaged foods and beverages in the [place]. It includes:  The serving size.  Information about nutrients in each serving, including the grams of carbohydrate per serving.  To use the Nutrition Facts, decide how many servings you will have. Then, multiply the number of servings by the number of carbohydrates per serving. The resulting number is the total grams of carbohydrates that you will be having.  Learning the standard serving sizes of foods  When you eat carbohydrate foods that are not packaged or do not include Nutrition Facts on the label, you need to measure the servings in order to count the grams of  carbohydrates.  Measure the foods that you will eat with a food scale or measuring cup, if needed.  Decide how many standard-size servings you will eat.  Multiply the number of servings by 15. For foods that contain carbohydrates, one serving equals 15 g of carbohydrates.  For example, if you eat 2 cups or 10 oz (300 g) of strawberries, you will have eaten 2 servings and 30 g of carbohydrates (2 servings x 15 g = 30 g).  For foods that have more than one food mixed, such as soups and casseroles, you must count the carbohydrates in each food that is included.  The following list contains standard serving sizes of common carbohydrate-rich foods. Each of these servings has about 15 g of carbohydrates:  1 slice of bread.  1 six-inch (15 cm) tortilla.  ? cup or 2 oz (53 g) cooked rice or pasta.  ½ cup or 3 oz (85 g) cooked or canned, drained and rinsed beans or lentils.  ½ cup or 3 oz (85 g) starchy vegetable, such as peas, corn, or squash.  ½ cup or 4 oz (120 g) hot cereal.  ½ cup or 3 oz (85 g) boiled or mashed potatoes, or ¼ or 3 oz (85 g) of a large baked potato.  ½ cup or 4 fl oz (118 mL) fruit juice.  1 cup or 8 fl oz (237 mL) milk.  1 small or 4 oz (106 g) apple.  ½ or 2 oz (63 g) of a medium banana.  1 cup or 5 oz (150 g) strawberries.  3 cups or 1 oz (28.3 g) popped popcorn.  What is an example of carbohydrate counting?  To calculate the grams of carbohydrates in this sample meal, follow the steps shown below.  Sample meal  3 oz (85 g) chicken breast.  ? cup or 4 oz (106 g) brown rice.  ½ cup or 3 oz (85 g) corn.  1 cup or 8 fl oz (237 mL) milk.  1 cup or 5 oz (150 g) strawberries with sugar-free whipped topping.  Carbohydrate calculation  Identify the foods that contain carbohydrates:  Rice.  Corn.  Milk.  Strawberries.  Calculate how many servings you have of each food:  2 servings rice.  1 serving corn.  1 serving milk.  1 serving strawberries.  Multiply each number of servings by 15  servings rice x 15  g = 30 g.  1 serving corn x 15 g = 15 g.  1 serving milk x 15 g = 15 g.  1 serving strawberries x 15 g = 15 g.  Add together all of the amounts to find the total grams of carbohydrates eaten:  30 g + 15 g + 15 g + 15 g = 75 g of carbohydrates total.  What are tips for following this plan?  Shopping  Develop a meal plan and then make a shopping list.  Buy fresh and frozen vegetables, fresh and frozen fruit, dairy, eggs, beans, lentils, and whole grains.  Look at food labels. Choose foods that have more fiber and less sugar.  Avoid processed foods and foods with added sugars.  Meal planning  Aim to have the same number of grams of carbohydrates at each meal and for each snack time.  Plan to have regular, balanced meals and snacks.  Where to find more information  American Diabetes Association: diabetes.org  Centers for Disease Control and Prevention: cdc.gov  Academy of Nutrition and Dietetics: eatright.org  Association of Diabetes Care & Education Specialists: diabeteseducator.org  Summary  Carbohydrate counting is a method of keeping track of how many carbohydrates you eat.  Eating carbohydrates increases the amount of sugar (glucose) in your blood.  Counting how many carbohydrates you eat improves how well you manage your blood glucose. This helps you manage your diabetes.  A dietitian can help you make a meal plan and calculate how many carbohydrates you should have at each meal and snack.  This information is not intended to replace advice given to you by your health care provider. Make sure you discuss any questions you have with your health care provider.  Document Revised: 07/21/2021 Document Reviewed: 07/21/2021  Elsevier Patient Education © 2024 [employer]

## 2024-07-18 DIAGNOSIS — M15.9 PRIMARY OSTEOARTHRITIS INVOLVING MULTIPLE JOINTS: ICD-10-CM

## 2024-07-18 RX ORDER — IBUPROFEN 800 MG/1
800 TABLET ORAL EVERY 8 HOURS
Qty: 90 TABLET | Refills: 2 | Status: SHIPPED | OUTPATIENT
Start: 2024-07-18

## 2024-07-23 ENCOUNTER — OFFICE VISIT (OUTPATIENT)
Dept: FAMILY MEDICINE CLINIC | Facility: CLINIC | Age: 44
End: 2024-07-23
Payer: COMMERCIAL

## 2024-07-23 VITALS
WEIGHT: 293 LBS | OXYGEN SATURATION: 97 % | BODY MASS INDEX: 47.09 KG/M2 | HEART RATE: 63 BPM | DIASTOLIC BLOOD PRESSURE: 80 MMHG | TEMPERATURE: 98.7 F | SYSTOLIC BLOOD PRESSURE: 140 MMHG | HEIGHT: 66 IN

## 2024-07-23 DIAGNOSIS — H65.02 NON-RECURRENT ACUTE SEROUS OTITIS MEDIA OF LEFT EAR: Primary | ICD-10-CM

## 2024-07-23 PROCEDURE — 1159F MED LIST DOCD IN RCRD: CPT | Performed by: PHYSICIAN ASSISTANT

## 2024-07-23 PROCEDURE — 3077F SYST BP >= 140 MM HG: CPT | Performed by: PHYSICIAN ASSISTANT

## 2024-07-23 PROCEDURE — 3079F DIAST BP 80-89 MM HG: CPT | Performed by: PHYSICIAN ASSISTANT

## 2024-07-23 PROCEDURE — 1125F AMNT PAIN NOTED PAIN PRSNT: CPT | Performed by: PHYSICIAN ASSISTANT

## 2024-07-23 PROCEDURE — 99213 OFFICE O/P EST LOW 20 MIN: CPT | Performed by: PHYSICIAN ASSISTANT

## 2024-07-23 PROCEDURE — 1160F RVW MEDS BY RX/DR IN RCRD: CPT | Performed by: PHYSICIAN ASSISTANT

## 2024-07-23 RX ORDER — CEFDINIR 300 MG/1
600 CAPSULE ORAL DAILY
Qty: 20 CAPSULE | Refills: 0 | Status: SHIPPED | OUTPATIENT
Start: 2024-07-23 | End: 2024-08-02

## 2024-07-23 NOTE — PROGRESS NOTES
"Chief Complaint -ear ache    History of Present Illness -     Anika Alegria is a 44 y.o. female.     Earache-  She complains of left-sided earache sore throat and nasal congestion that began 4 days ago.  Minimal relief with Tylenol ibuprofen or Claritin.      The following portions of the patient's history were reviewed and updated as appropriate: allergies, current medications, past family history, past medical history, past social history, past surgical history and problem list.        Objective  Vital signs:  /80   Pulse 63   Temp 98.7 °F (37.1 °C) (Temporal)   Ht 167.6 cm (65.98\")   Wt (!) 149 kg (329 lb)   LMP  (LMP Unknown)   SpO2 97%   BMI 53.13 kg/m²     Physical Exam  Vitals and nursing note reviewed.   Constitutional:       General: She is not in acute distress.     Appearance: Normal appearance. She is well-developed. She is not diaphoretic.   HENT:      Head: Normocephalic and atraumatic.      Right Ear: Tympanic membrane, ear canal and external ear normal.      Left Ear: Ear canal and external ear normal.      Ears:      Comments: Left TM erythematous with clear fluid behind     Nose: Nose normal.      Mouth/Throat:      Mouth: Mucous membranes are moist.      Pharynx: No oropharyngeal exudate or posterior oropharyngeal erythema.   Neck:      Thyroid: No thyromegaly.   Cardiovascular:      Rate and Rhythm: Normal rate and regular rhythm.      Heart sounds: Normal heart sounds. No murmur heard.  Pulmonary:      Effort: Pulmonary effort is normal.      Breath sounds: Normal breath sounds. No wheezing or rales.   Musculoskeletal:      Cervical back: Normal range of motion and neck supple.   Lymphadenopathy:      Cervical: No cervical adenopathy.   Skin:     General: Skin is warm and dry.      Findings: No rash.   Neurological:      Mental Status: She is alert and oriented to person, place, and time.   Psychiatric:         Mood and Affect: Mood normal.         Behavior: Behavior normal.         " Thought Content: Thought content normal.         The following data was reviewed by Rosa Quigley PA-C:         Lab Results   Component Value Date    BUN 10 02/26/2024    CREATININE 0.76 02/26/2024    EGFR 99.2 02/26/2024    ALT 24 02/26/2024    AST 24 02/26/2024    WBC 7.04 01/25/2024    HGB 14.2 01/25/2024    HCT 42.0 01/25/2024     01/25/2024    CHOL 163 12/16/2022    TRIG 151 (H) 12/16/2022    HDL 43 12/16/2022    LDL 94 12/16/2022    TSH 0.118 (L) 05/06/2024    HGBA1C 5.5 12/04/2023           Assessment & Plan     Diagnoses and all orders for this visit:    1. Non-recurrent acute serous otitis media of left ear (Primary)  -     cefdinir (OMNICEF) 300 MG capsule; Take 2 capsules by mouth Daily for 10 days.  Dispense: 20 capsule; Refill: 0                   Patient was given instructions and counseling regarding his condition or for health maintenance advice. Please see specific information pulled into the AVS if appropriate      This document has been electronically signed by:  Rosa Quigley PA-C

## 2024-07-29 ENCOUNTER — OFFICE VISIT (OUTPATIENT)
Dept: FAMILY MEDICINE CLINIC | Facility: CLINIC | Age: 44
End: 2024-07-29
Payer: COMMERCIAL

## 2024-07-29 VITALS
HEART RATE: 77 BPM | BODY MASS INDEX: 47.09 KG/M2 | OXYGEN SATURATION: 97 % | TEMPERATURE: 98.6 F | SYSTOLIC BLOOD PRESSURE: 134 MMHG | WEIGHT: 293 LBS | HEIGHT: 66 IN | DIASTOLIC BLOOD PRESSURE: 84 MMHG

## 2024-07-29 DIAGNOSIS — I10 ESSENTIAL HYPERTENSION: Chronic | ICD-10-CM

## 2024-07-29 DIAGNOSIS — M75.51 ACUTE BURSITIS OF RIGHT SHOULDER: Primary | ICD-10-CM

## 2024-07-29 DIAGNOSIS — E89.0 POSTOPERATIVE HYPOTHYROIDISM: Chronic | ICD-10-CM

## 2024-07-29 DIAGNOSIS — Z12.11 SCREENING FOR COLON CANCER: ICD-10-CM

## 2024-07-29 PROCEDURE — 99213 OFFICE O/P EST LOW 20 MIN: CPT | Performed by: PHYSICIAN ASSISTANT

## 2024-07-29 PROCEDURE — 1125F AMNT PAIN NOTED PAIN PRSNT: CPT | Performed by: PHYSICIAN ASSISTANT

## 2024-07-29 PROCEDURE — 3075F SYST BP GE 130 - 139MM HG: CPT | Performed by: PHYSICIAN ASSISTANT

## 2024-07-29 PROCEDURE — 1159F MED LIST DOCD IN RCRD: CPT | Performed by: PHYSICIAN ASSISTANT

## 2024-07-29 PROCEDURE — 3079F DIAST BP 80-89 MM HG: CPT | Performed by: PHYSICIAN ASSISTANT

## 2024-07-29 PROCEDURE — 20610 DRAIN/INJ JOINT/BURSA W/O US: CPT | Performed by: PHYSICIAN ASSISTANT

## 2024-07-29 PROCEDURE — 1160F RVW MEDS BY RX/DR IN RCRD: CPT | Performed by: PHYSICIAN ASSISTANT

## 2024-07-29 NOTE — PROGRESS NOTES
"Chief Complaint -right shoulder pain    History of Present Illness -     Anika Alegria is a 44 y.o. female.     Right shoulder pain-  Patient complains of pain in the right shoulder that is worse with abduction.  Onset 4 days.  No known specific injury.  Minimal relief with diclofenac gel.    Hypertension-  Controlled with spironolactone and losartan    Hypothyroidism-  Stable Synthroid 200 mcg daily      The following portions of the patient's history were reviewed and updated as appropriate: allergies, current medications, past family history, past medical history, past social history, past surgical history and problem list.        Objective  Vital signs:  /84   Pulse 77   Temp 98.6 °F (37 °C) (Temporal)   Ht 167.6 cm (65.98\")   Wt (!) 149 kg (328 lb)   LMP  (LMP Unknown)   SpO2 97%   BMI 52.97 kg/m²     Physical Exam  Vitals and nursing note reviewed.   Constitutional:       Appearance: Normal appearance. She is well-developed. She is obese.   Eyes:      Extraocular Movements: Extraocular movements intact.      Conjunctiva/sclera: Conjunctivae normal.   Cardiovascular:      Rate and Rhythm: Normal rate and regular rhythm.      Heart sounds: Normal heart sounds. No murmur heard.  Pulmonary:      Effort: Pulmonary effort is normal. No respiratory distress.      Breath sounds: Normal breath sounds. No wheezing.   Musculoskeletal:         General: Tenderness present.      Comments: Pain reproducible with abduction greater than 80% right shoulder.   Skin:     General: Skin is warm and dry.      Findings: No rash.   Neurological:      Mental Status: She is alert and oriented to person, place, and time.   Psychiatric:         Mood and Affect: Mood normal.         Behavior: Behavior normal.         Thought Content: Thought content normal.         The following data was reviewed by Rosa Quigley PA-C:         Lab Results   Component Value Date    BUN 10 02/26/2024    CREATININE 0.76 02/26/2024    EGFR 99.2 " 02/26/2024    ALT 24 02/26/2024    AST 24 02/26/2024    WBC 7.04 01/25/2024    HGB 14.2 01/25/2024    HCT 42.0 01/25/2024     01/25/2024    CHOL 163 12/16/2022    TRIG 151 (H) 12/16/2022    HDL 43 12/16/2022    LDL 94 12/16/2022    TSH 0.118 (L) 05/06/2024    HGBA1C 5.5 12/04/2023           Assessment & Plan     Diagnoses and all orders for this visit:    1. Acute bursitis of right shoulder (Primary)  Comments:  Right shoulder intra-articular injection performed today    2. Screening for colon cancer  -     Cologuard - Stool, Per Rectum; Future    3. Essential hypertension  Comments:  Continue spironolactone and losartan  Advise daily BP and pulse monitoring    4. Postoperative hypothyroidism  Comments:  Continue Synthroid 200 mcg daily      Procedure: Right shoulder intra-articular injection  Provider: Rosa Quigley PA-C  Indication: Right shoulder bursitis  Timeout was taken to verify correct patient procedure and location  Description: The right shoulder was prepped and draped in sterile fashion.  An intra-articular injection was given using 3 cc 1% lidocaine 1 cc of triamcinolone and 1 cc of Depo-Medrol.  Pressure was held and a sterile dressing was placed.  No complication  Estimated blood loss: Minimal  Patient tolerated the procedure well.    Lidocaine 1% 10 mg/mL  NDC number 0894-5076-41  Lot number [id number]  Expiration 10/2025    Depo-Medrol 80 mg/mL  NDC number 7178-4271-16  Lot number [id number]  Expiration 12/2025    Triamcinolone 40 mg/mL  NDC number 4871-5199-58  Lot #[id number]  Expiration 1/26             Patient was given instructions and counseling regarding his condition or for health maintenance advice. Please see specific information pulled into the AVS if appropriate      This document has been electronically signed by:  Rosa Quigley PA-C

## 2024-08-05 RX ORDER — ACYCLOVIR 400 MG/1
TABLET ORAL SEE ADMIN INSTRUCTIONS
Qty: 1 EACH | Refills: 0 | Status: SHIPPED | OUTPATIENT
Start: 2024-08-05

## 2024-08-08 ENCOUNTER — OFFICE VISIT (OUTPATIENT)
Dept: FAMILY MEDICINE CLINIC | Facility: CLINIC | Age: 44
End: 2024-08-08
Payer: COMMERCIAL

## 2024-08-08 VITALS
DIASTOLIC BLOOD PRESSURE: 84 MMHG | BODY MASS INDEX: 47.09 KG/M2 | HEIGHT: 66 IN | TEMPERATURE: 98.6 F | HEART RATE: 68 BPM | WEIGHT: 293 LBS | SYSTOLIC BLOOD PRESSURE: 130 MMHG | OXYGEN SATURATION: 96 %

## 2024-08-08 DIAGNOSIS — R11.0 NAUSEA: ICD-10-CM

## 2024-08-08 DIAGNOSIS — I10 ESSENTIAL HYPERTENSION: Chronic | ICD-10-CM

## 2024-08-08 DIAGNOSIS — F32.9 TREATMENT-RESISTANT DEPRESSION: Chronic | ICD-10-CM

## 2024-08-08 DIAGNOSIS — E11.65 TYPE 2 DIABETES MELLITUS WITH HYPERGLYCEMIA, WITHOUT LONG-TERM CURRENT USE OF INSULIN: Primary | Chronic | ICD-10-CM

## 2024-08-08 PROCEDURE — 3075F SYST BP GE 130 - 139MM HG: CPT | Performed by: PHYSICIAN ASSISTANT

## 2024-08-08 PROCEDURE — 3079F DIAST BP 80-89 MM HG: CPT | Performed by: PHYSICIAN ASSISTANT

## 2024-08-08 PROCEDURE — 1160F RVW MEDS BY RX/DR IN RCRD: CPT | Performed by: PHYSICIAN ASSISTANT

## 2024-08-08 PROCEDURE — 99214 OFFICE O/P EST MOD 30 MIN: CPT | Performed by: PHYSICIAN ASSISTANT

## 2024-08-08 PROCEDURE — 1159F MED LIST DOCD IN RCRD: CPT | Performed by: PHYSICIAN ASSISTANT

## 2024-08-08 PROCEDURE — 1125F AMNT PAIN NOTED PAIN PRSNT: CPT | Performed by: PHYSICIAN ASSISTANT

## 2024-08-08 RX ORDER — ONDANSETRON 4 MG/1
4 TABLET, ORALLY DISINTEGRATING ORAL EVERY 8 HOURS PRN
Qty: 30 TABLET | Refills: 0 | Status: SHIPPED | OUTPATIENT
Start: 2024-08-08

## 2024-08-08 NOTE — PATIENT INSTRUCTIONS
Carbohydrate Counting for Diabetes Mellitus, Adult  Carbohydrate counting is a method of keeping track of how many carbohydrates you eat. Eating carbohydrates increases the amount of sugar (glucose) in the blood. Counting how many carbohydrates you eat improves how well you manage your blood glucose. This, in turn, helps you manage your diabetes.  Carbohydrates are measured in grams (g) per serving. It is important to know how many carbohydrates (in grams or by serving size) you can have in each meal. This is different for every person. A dietitian can help you make a meal plan and calculate how many carbohydrates you should have at each meal and snack.  What foods contain carbohydrates?  Carbohydrates are found in the following foods:  Grains, such as breads and cereals.  Dried beans and soy products.  Starchy vegetables, such as potatoes, peas, and corn.  Fruit and fruit juices.  Milk and yogurt.  Sweets and snack foods, such as cake, cookies, candy, chips, and soft drinks.  How do I count carbohydrates in foods?  There are two ways to count carbohydrates in food. You can read food labels or learn standard serving sizes of foods. You can use either of these methods or a combination of both.  Using the Nutrition Facts label  The Nutrition Facts list is included on the labels of almost all packaged foods and beverages in the [place]. It includes:  The serving size.  Information about nutrients in each serving, including the grams of carbohydrate per serving.  To use the Nutrition Facts, decide how many servings you will have. Then, multiply the number of servings by the number of carbohydrates per serving. The resulting number is the total grams of carbohydrates that you will be having.  Learning the standard serving sizes of foods  When you eat carbohydrate foods that are not packaged or do not include Nutrition Facts on the label, you need to measure the servings in order to count the grams of  carbohydrates.  Measure the foods that you will eat with a food scale or measuring cup, if needed.  Decide how many standard-size servings you will eat.  Multiply the number of servings by 15. For foods that contain carbohydrates, one serving equals 15 g of carbohydrates.  For example, if you eat 2 cups or 10 oz (300 g) of strawberries, you will have eaten 2 servings and 30 g of carbohydrates (2 servings x 15 g = 30 g).  For foods that have more than one food mixed, such as soups and casseroles, you must count the carbohydrates in each food that is included.  The following list contains standard serving sizes of common carbohydrate-rich foods. Each of these servings has about 15 g of carbohydrates:  1 slice of bread.  1 six-inch (15 cm) tortilla.  ? cup or 2 oz (53 g) cooked rice or pasta.  ½ cup or 3 oz (85 g) cooked or canned, drained and rinsed beans or lentils.  ½ cup or 3 oz (85 g) starchy vegetable, such as peas, corn, or squash.  ½ cup or 4 oz (120 g) hot cereal.  ½ cup or 3 oz (85 g) boiled or mashed potatoes, or ¼ or 3 oz (85 g) of a large baked potato.  ½ cup or 4 fl oz (118 mL) fruit juice.  1 cup or 8 fl oz (237 mL) milk.  1 small or 4 oz (106 g) apple.  ½ or 2 oz (63 g) of a medium banana.  1 cup or 5 oz (150 g) strawberries.  3 cups or 1 oz (28.3 g) popped popcorn.  What is an example of carbohydrate counting?  To calculate the grams of carbohydrates in this sample meal, follow the steps shown below.  Sample meal  3 oz (85 g) chicken breast.  ? cup or 4 oz (106 g) brown rice.  ½ cup or 3 oz (85 g) corn.  1 cup or 8 fl oz (237 mL) milk.  1 cup or 5 oz (150 g) strawberries with sugar-free whipped topping.  Carbohydrate calculation  Identify the foods that contain carbohydrates:  Rice.  Corn.  Milk.  Strawberries.  Calculate how many servings you have of each food:  2 servings rice.  1 serving corn.  1 serving milk.  1 serving strawberries.  Multiply each number of servings by 15  servings rice x 15  g = 30 g.  1 serving corn x 15 g = 15 g.  1 serving milk x 15 g = 15 g.  1 serving strawberries x 15 g = 15 g.  Add together all of the amounts to find the total grams of carbohydrates eaten:  30 g + 15 g + 15 g + 15 g = 75 g of carbohydrates total.  What are tips for following this plan?  Shopping  Develop a meal plan and then make a shopping list.  Buy fresh and frozen vegetables, fresh and frozen fruit, dairy, eggs, beans, lentils, and whole grains.  Look at food labels. Choose foods that have more fiber and less sugar.  Avoid processed foods and foods with added sugars.  Meal planning  Aim to have the same number of grams of carbohydrates at each meal and for each snack time.  Plan to have regular, balanced meals and snacks.  Where to find more information  American Diabetes Association: diabetes.org  Centers for Disease Control and Prevention: cdc.gov  Academy of Nutrition and Dietetics: eatright.org  Association of Diabetes Care & Education Specialists: diabeteseducator.org  Summary  Carbohydrate counting is a method of keeping track of how many carbohydrates you eat.  Eating carbohydrates increases the amount of sugar (glucose) in your blood.  Counting how many carbohydrates you eat improves how well you manage your blood glucose. This helps you manage your diabetes.  A dietitian can help you make a meal plan and calculate how many carbohydrates you should have at each meal and snack.  This information is not intended to replace advice given to you by your health care provider. Make sure you discuss any questions you have with your health care provider.  Document Revised: 07/21/2021 Document Reviewed: 07/21/2021  Elsevier Patient Education © 2024 [employer]

## 2024-08-08 NOTE — PROGRESS NOTES
"Chief Complaint -diabetes    History of Present Illness -     Anika Alegria is a 44 y.o. female.     Diabetes-  She is doing well with glucose control but states that she does not have any appetite suppression with the use of Mounjaro 7.5 mg weekly.  Most recent hemoglobin A1c was 5.5.    Depression-  Stable with Vraylar, Wellbutrin and Cymbalta.  She denies any hallucinations, SI or HI    Nausea-  She complains of intermittent nausea.  She denies any vomiting or diarrhea.    Hypertension-  Stable with losartan      The following portions of the patient's history were reviewed and updated as appropriate: allergies, current medications, past family history, past medical history, past social history, past surgical history and problem list.        Objective  Vital signs:  /84   Pulse 68   Temp 98.6 °F (37 °C) (Temporal)   Ht 167.6 cm (65.98\")   Wt (!) 149 kg (329 lb)   LMP  (LMP Unknown)   SpO2 96%   BMI 53.13 kg/m²     Physical Exam  Vitals and nursing note reviewed.   Constitutional:       Appearance: Normal appearance. She is well-developed. She is obese.   Eyes:      Extraocular Movements: Extraocular movements intact.      Conjunctiva/sclera: Conjunctivae normal.   Cardiovascular:      Rate and Rhythm: Normal rate and regular rhythm.      Heart sounds: Normal heart sounds. No murmur heard.  Pulmonary:      Effort: Pulmonary effort is normal. No respiratory distress.      Breath sounds: Normal breath sounds. No wheezing.   Musculoskeletal:         General: No tenderness.   Skin:     General: Skin is warm and dry.      Findings: No rash.   Neurological:      Mental Status: She is alert and oriented to person, place, and time.   Psychiatric:         Mood and Affect: Mood normal.         Behavior: Behavior normal.         Thought Content: Thought content normal.         The following data was reviewed by Rosa Quigley PA-C:         Lab Results   Component Value Date    BUN 10 02/26/2024    CREATININE 0.76 " 02/26/2024    EGFR 99.2 02/26/2024    ALT 24 02/26/2024    AST 24 02/26/2024    WBC 7.04 01/25/2024    HGB 14.2 01/25/2024    HCT 42.0 01/25/2024     01/25/2024    CHOL 163 12/16/2022    TRIG 151 (H) 12/16/2022    HDL 43 12/16/2022    LDL 94 12/16/2022    TSH 0.118 (L) 05/06/2024    HGBA1C 5.5 12/04/2023           Assessment & Plan     Diagnoses and all orders for this visit:    1. Type 2 diabetes mellitus with hyperglycemia, without long-term current use of insulin (Primary)  Comments:  Increase Mounjaro 10 mg weekly  Advised a low carbohydrate diabetic diet  Orders:  -     Tirzepatide (MOUNJARO) 10 MG/0.5ML solution pen-injector pen; Inject 0.5 mL under the skin into the appropriate area as directed 1 (One) Time Per Week.  Dispense: 2 mL; Refill: 3    2. Treatment-resistant depression  Comments:  Continue Vraylar, Wellbutrin and Cymbalta  Orders:  -     Cariprazine HCl (Vraylar) 1.5 MG capsule capsule; Take 1 capsule by mouth Daily.  Dispense: 90 capsule; Refill: 3    3. Nausea  -     ondansetron ODT (ZOFRAN-ODT) 4 MG disintegrating tablet; Place 1 tablet on the tongue Every 8 (Eight) Hours As Needed for Nausea or Vomiting.  Dispense: 30 tablet; Refill: 0    4. Essential hypertension  Comments:  Continue losartan  Advise daily BP and pulse monitoring                   Patient was given instructions and counseling regarding his condition or for health maintenance advice. Please see specific information pulled into the AVS if appropriate      This document has been electronically signed by:  Rosa Quigley PA-C

## 2024-09-05 ENCOUNTER — OFFICE VISIT (OUTPATIENT)
Dept: FAMILY MEDICINE CLINIC | Facility: CLINIC | Age: 44
End: 2024-09-05
Payer: COMMERCIAL

## 2024-09-05 VITALS
WEIGHT: 293 LBS | DIASTOLIC BLOOD PRESSURE: 80 MMHG | SYSTOLIC BLOOD PRESSURE: 140 MMHG | HEART RATE: 73 BPM | HEIGHT: 66 IN | BODY MASS INDEX: 47.09 KG/M2 | OXYGEN SATURATION: 97 % | TEMPERATURE: 97.9 F

## 2024-09-05 DIAGNOSIS — E11.65 TYPE 2 DIABETES MELLITUS WITH HYPERGLYCEMIA, WITHOUT LONG-TERM CURRENT USE OF INSULIN: ICD-10-CM

## 2024-09-05 DIAGNOSIS — R10.13 EPIGASTRIC PAIN: Primary | ICD-10-CM

## 2024-09-05 DIAGNOSIS — M15.9 PRIMARY OSTEOARTHRITIS INVOLVING MULTIPLE JOINTS: Chronic | ICD-10-CM

## 2024-09-05 DIAGNOSIS — F33.1 MODERATE EPISODE OF RECURRENT MAJOR DEPRESSIVE DISORDER: Chronic | ICD-10-CM

## 2024-09-05 DIAGNOSIS — E55.9 VITAMIN D DEFICIENCY: ICD-10-CM

## 2024-09-05 DIAGNOSIS — E89.0 POSTOPERATIVE HYPOTHYROIDISM: Chronic | ICD-10-CM

## 2024-09-05 LAB
25(OH)D3 SERPL-MCNC: 23.1 NG/ML (ref 30–100)
ALBUMIN SERPL-MCNC: 4 G/DL (ref 3.5–5.2)
ALBUMIN/GLOB SERPL: 1.1 G/DL
ALP SERPL-CCNC: 76 U/L (ref 39–117)
ALT SERPL W P-5'-P-CCNC: 24 U/L (ref 1–33)
AMYLASE SERPL-CCNC: 43 U/L (ref 28–100)
ANION GAP SERPL CALCULATED.3IONS-SCNC: 11.7 MMOL/L (ref 5–15)
AST SERPL-CCNC: 22 U/L (ref 1–32)
BASOPHILS # BLD AUTO: 0.04 10*3/MM3 (ref 0–0.2)
BASOPHILS NFR BLD AUTO: 0.5 % (ref 0–1.5)
BILIRUB SERPL-MCNC: 0.7 MG/DL (ref 0–1.2)
BUN SERPL-MCNC: 11 MG/DL (ref 6–20)
BUN/CREAT SERPL: 15.7 (ref 7–25)
CALCIUM SPEC-SCNC: 9.4 MG/DL (ref 8.6–10.5)
CHLORIDE SERPL-SCNC: 103 MMOL/L (ref 98–107)
CHOLEST SERPL-MCNC: 172 MG/DL (ref 0–200)
CO2 SERPL-SCNC: 23.3 MMOL/L (ref 22–29)
CREAT SERPL-MCNC: 0.7 MG/DL (ref 0.57–1)
DEPRECATED RDW RBC AUTO: 45.2 FL (ref 37–54)
EGFRCR SERPLBLD CKD-EPI 2021: 108.8 ML/MIN/1.73
EOSINOPHIL # BLD AUTO: 0.09 10*3/MM3 (ref 0–0.4)
EOSINOPHIL NFR BLD AUTO: 1.1 % (ref 0.3–6.2)
ERYTHROCYTE [DISTWIDTH] IN BLOOD BY AUTOMATED COUNT: 13.7 % (ref 12.3–15.4)
GLOBULIN UR ELPH-MCNC: 3.5 GM/DL
GLUCOSE SERPL-MCNC: 89 MG/DL (ref 65–99)
HBA1C MFR BLD: 5.7 % (ref 4.8–5.6)
HCT VFR BLD AUTO: 43.2 % (ref 34–46.6)
HDLC SERPL-MCNC: 49 MG/DL (ref 40–60)
HGB BLD-MCNC: 14.7 G/DL (ref 12–15.9)
IMM GRANULOCYTES # BLD AUTO: 0.03 10*3/MM3 (ref 0–0.05)
IMM GRANULOCYTES NFR BLD AUTO: 0.4 % (ref 0–0.5)
LDLC SERPL CALC-MCNC: 100 MG/DL (ref 0–100)
LDLC/HDLC SERPL: 1.99 {RATIO}
LIPASE SERPL-CCNC: 34 U/L (ref 13–60)
LYMPHOCYTES # BLD AUTO: 2.87 10*3/MM3 (ref 0.7–3.1)
LYMPHOCYTES NFR BLD AUTO: 35 % (ref 19.6–45.3)
MCH RBC QN AUTO: 30.8 PG (ref 26.6–33)
MCHC RBC AUTO-ENTMCNC: 34 G/DL (ref 31.5–35.7)
MCV RBC AUTO: 90.6 FL (ref 79–97)
MONOCYTES # BLD AUTO: 0.72 10*3/MM3 (ref 0.1–0.9)
MONOCYTES NFR BLD AUTO: 8.8 % (ref 5–12)
NEUTROPHILS NFR BLD AUTO: 4.45 10*3/MM3 (ref 1.7–7)
NEUTROPHILS NFR BLD AUTO: 54.2 % (ref 42.7–76)
NRBC BLD AUTO-RTO: 0 /100 WBC (ref 0–0.2)
PLATELET # BLD AUTO: 188 10*3/MM3 (ref 140–450)
PMV BLD AUTO: 10.1 FL (ref 6–12)
POTASSIUM SERPL-SCNC: 4.2 MMOL/L (ref 3.5–5.2)
PROT SERPL-MCNC: 7.5 G/DL (ref 6–8.5)
RBC # BLD AUTO: 4.77 10*6/MM3 (ref 3.77–5.28)
SODIUM SERPL-SCNC: 138 MMOL/L (ref 136–145)
T4 FREE SERPL-MCNC: 2.06 NG/DL (ref 0.92–1.68)
TRIGL SERPL-MCNC: 128 MG/DL (ref 0–150)
TSH SERPL DL<=0.05 MIU/L-ACNC: 0.03 UIU/ML (ref 0.27–4.2)
VLDLC SERPL-MCNC: 23 MG/DL (ref 5–40)
WBC NRBC COR # BLD AUTO: 8.2 10*3/MM3 (ref 3.4–10.8)

## 2024-09-05 PROCEDURE — 84439 ASSAY OF FREE THYROXINE: CPT | Performed by: PHYSICIAN ASSISTANT

## 2024-09-05 PROCEDURE — 80050 GENERAL HEALTH PANEL: CPT | Performed by: PHYSICIAN ASSISTANT

## 2024-09-05 PROCEDURE — 82306 VITAMIN D 25 HYDROXY: CPT | Performed by: PHYSICIAN ASSISTANT

## 2024-09-05 PROCEDURE — 99214 OFFICE O/P EST MOD 30 MIN: CPT | Performed by: PHYSICIAN ASSISTANT

## 2024-09-05 PROCEDURE — 36415 COLL VENOUS BLD VENIPUNCTURE: CPT | Performed by: PHYSICIAN ASSISTANT

## 2024-09-05 PROCEDURE — 1125F AMNT PAIN NOTED PAIN PRSNT: CPT | Performed by: PHYSICIAN ASSISTANT

## 2024-09-05 PROCEDURE — 83690 ASSAY OF LIPASE: CPT | Performed by: PHYSICIAN ASSISTANT

## 2024-09-05 PROCEDURE — 1159F MED LIST DOCD IN RCRD: CPT | Performed by: PHYSICIAN ASSISTANT

## 2024-09-05 PROCEDURE — 3079F DIAST BP 80-89 MM HG: CPT | Performed by: PHYSICIAN ASSISTANT

## 2024-09-05 PROCEDURE — 83036 HEMOGLOBIN GLYCOSYLATED A1C: CPT | Performed by: PHYSICIAN ASSISTANT

## 2024-09-05 PROCEDURE — 3077F SYST BP >= 140 MM HG: CPT | Performed by: PHYSICIAN ASSISTANT

## 2024-09-05 PROCEDURE — 1160F RVW MEDS BY RX/DR IN RCRD: CPT | Performed by: PHYSICIAN ASSISTANT

## 2024-09-05 PROCEDURE — 80061 LIPID PANEL: CPT | Performed by: PHYSICIAN ASSISTANT

## 2024-09-05 PROCEDURE — 82150 ASSAY OF AMYLASE: CPT | Performed by: PHYSICIAN ASSISTANT

## 2024-09-06 DIAGNOSIS — E89.0 POSTOPERATIVE HYPOTHYROIDISM: ICD-10-CM

## 2024-09-06 DIAGNOSIS — E55.9 VITAMIN D DEFICIENCY: Primary | ICD-10-CM

## 2024-09-06 DIAGNOSIS — R10.13 EPIGASTRIC PAIN: ICD-10-CM

## 2024-09-06 RX ORDER — LEVOTHYROXINE SODIUM 150 UG/1
150 TABLET ORAL DAILY
Qty: 90 TABLET | Refills: 1 | Status: SHIPPED | OUTPATIENT
Start: 2024-09-06

## 2024-09-06 RX ORDER — ERGOCALCIFEROL 1.25 MG/1
50000 CAPSULE, LIQUID FILLED ORAL WEEKLY
Qty: 5 CAPSULE | Refills: 5 | Status: SHIPPED | OUTPATIENT
Start: 2024-09-06 | End: 2024-09-06

## 2024-09-06 RX ORDER — SUCRALFATE 1 G/1
1 TABLET ORAL 4 TIMES DAILY
Qty: 120 TABLET | Refills: 0 | Status: SHIPPED | OUTPATIENT
Start: 2024-09-06

## 2024-09-06 NOTE — PROGRESS NOTES
Okay.  Advised her to continue the calcitriol and not take vitamin D.  She can confirm this information with her endocrinologist at her next visit.

## 2024-10-09 ENCOUNTER — TELEPHONE (OUTPATIENT)
Dept: FAMILY MEDICINE CLINIC | Facility: CLINIC | Age: 44
End: 2024-10-09
Payer: COMMERCIAL

## 2024-10-09 NOTE — TELEPHONE ENCOUNTER
Spoke with José Miguel at [facility] about patients mounjaro she states that patients insurance has  and they can not do any appeals or anything with this claim. Called the patient to ask if she has new insurance she says that it terminated due to her not turning in paperwork. Told patient that when she gets her insurance worked out to let us know and we will reapply for PA for her medication.

## 2024-10-15 ENCOUNTER — TELEPHONE (OUTPATIENT)
Dept: ENDOCRINOLOGY | Facility: CLINIC | Age: 44
End: 2024-10-15

## 2024-10-15 DIAGNOSIS — E89.0 POSTOPERATIVE HYPOTHYROIDISM: ICD-10-CM

## 2024-10-15 RX ORDER — LEVOTHYROXINE SODIUM 137 UG/1
137 TABLET ORAL DAILY
Qty: 90 TABLET | Refills: 1 | Status: SHIPPED | OUTPATIENT
Start: 2024-10-15

## 2024-10-15 NOTE — TELEPHONE ENCOUNTER
Caller: Anika Alegria    Relationship to patient: Self    Best call back number:     [phone] (Mobile)       Patient is needing: PTS PCP WANTS HER TO LOWER THE DOSAGE ON HER levothyroxine (Synthroid) 150 MCG tablet  FOR HER THYROID, PT SAID SHE DOESN'T WANT TO CHANGE ANYTHING UNLESS TINY SAYS SO

## 2024-10-15 NOTE — TELEPHONE ENCOUNTER
Your thyroid labs indicate that you are currently taking too much thyroid medication.  WE need to reduce this from 150 mcg daily to 137 mcg daily.  I will send 137 mcg dose to the pharmacy to  and start taking.

## 2024-10-31 ENCOUNTER — TELEPHONE (OUTPATIENT)
Dept: FAMILY MEDICINE CLINIC | Facility: CLINIC | Age: 44
End: 2024-10-31
Payer: COMMERCIAL

## 2024-10-31 NOTE — TELEPHONE ENCOUNTER
PA received a partial approval. Plan has a quantity limit of 2mL per 28 days.       ----- Message from Gabby COON sent at 10/29/2024  9:01 AM EDT -----    ----- Message -----  From: Shayla Delgadillo RegSched Rep  Sent: 10/29/2024   8:52 AM EDT  To: Gabby Johnson MA; #    Anika called requesting we try to run a PA on her MOUNJARO now that her insurance is active again     Please call Anika with the outcome

## 2024-12-12 DIAGNOSIS — E11.65 TYPE 2 DIABETES MELLITUS WITH HYPERGLYCEMIA, WITHOUT LONG-TERM CURRENT USE OF INSULIN: Primary | ICD-10-CM

## 2025-01-29 ENCOUNTER — PRIOR AUTHORIZATION (OUTPATIENT)
Dept: FAMILY MEDICINE CLINIC | Facility: CLINIC | Age: 45
End: 2025-01-29
Payer: COMMERCIAL

## 2025-01-30 ENCOUNTER — TELEPHONE (OUTPATIENT)
Dept: FAMILY MEDICINE CLINIC | Facility: CLINIC | Age: 45
End: 2025-01-30
Payer: COMMERCIAL

## 2025-01-30 DIAGNOSIS — G44.89 OTHER HEADACHE SYNDROME: Primary | ICD-10-CM

## 2025-01-30 RX ORDER — SUMATRIPTAN SUCCINATE 100 MG/1
100 TABLET ORAL
Qty: 9 TABLET | Refills: 5 | Status: SHIPPED | OUTPATIENT
Start: 2025-01-30

## 2025-01-30 NOTE — TELEPHONE ENCOUNTER
Pt is aware of this information.       ----- Message from Rosa Quigley sent at 1/30/2025  9:38 AM EST -----  Inform the patient that Nurtec is not covered by her insurance.  I sent a prescription for sumatriptan hand for her to try (Imitrex) which is required before a PA can be ran.

## 2025-02-08 DIAGNOSIS — E11.65 TYPE 2 DIABETES MELLITUS WITH HYPERGLYCEMIA, WITHOUT LONG-TERM CURRENT USE OF INSULIN: Chronic | ICD-10-CM

## 2025-02-10 DIAGNOSIS — E11.65 TYPE 2 DIABETES MELLITUS WITH HYPERGLYCEMIA, WITHOUT LONG-TERM CURRENT USE OF INSULIN: Chronic | ICD-10-CM

## 2025-02-10 RX ORDER — TIRZEPATIDE 10 MG/.5ML
INJECTION, SOLUTION SUBCUTANEOUS
Qty: 4 ML | Refills: 0 | Status: SHIPPED | OUTPATIENT
Start: 2025-02-10 | End: 2025-02-10 | Stop reason: SDUPTHER

## 2025-02-10 RX ORDER — CALCITRIOL 0.5 UG/1
0.5 CAPSULE, LIQUID FILLED ORAL EVERY 12 HOURS SCHEDULED
Qty: 60 CAPSULE | Refills: 5 | Status: SHIPPED | OUTPATIENT
Start: 2025-02-10

## 2025-02-10 RX ORDER — TIRZEPATIDE 10 MG/.5ML
10 INJECTION, SOLUTION SUBCUTANEOUS
Qty: 4 ML | Refills: 5 | Status: SHIPPED | OUTPATIENT
Start: 2025-02-10

## 2025-02-10 RX ORDER — CALCITRIOL 0.5 UG/1
0.5 CAPSULE, LIQUID FILLED ORAL EVERY 12 HOURS SCHEDULED
Qty: 60 CAPSULE | Refills: 2 | Status: SHIPPED | OUTPATIENT
Start: 2025-02-10 | End: 2025-02-10 | Stop reason: SDUPTHER

## 2025-03-17 ENCOUNTER — OFFICE VISIT (OUTPATIENT)
Dept: ENDOCRINOLOGY | Facility: CLINIC | Age: 45
End: 2025-03-17
Payer: COMMERCIAL

## 2025-03-17 VITALS
DIASTOLIC BLOOD PRESSURE: 81 MMHG | HEART RATE: 69 BPM | BODY MASS INDEX: 55.52 KG/M2 | WEIGHT: 293 LBS | SYSTOLIC BLOOD PRESSURE: 144 MMHG | OXYGEN SATURATION: 95 %

## 2025-03-17 DIAGNOSIS — E89.0 POSTOPERATIVE HYPOTHYROIDISM: ICD-10-CM

## 2025-03-17 DIAGNOSIS — E11.65 TYPE 2 DIABETES MELLITUS WITH HYPERGLYCEMIA, WITHOUT LONG-TERM CURRENT USE OF INSULIN: ICD-10-CM

## 2025-03-17 DIAGNOSIS — N91.2 AMENORRHEA: Primary | ICD-10-CM

## 2025-03-17 LAB
ALBUMIN SERPL-MCNC: 4.2 G/DL (ref 3.5–5.2)
ALBUMIN UR-MCNC: <1.2 MG/DL
ALBUMIN/GLOB SERPL: 1.2 G/DL
ALP SERPL-CCNC: 91 U/L (ref 39–117)
ALT SERPL W P-5'-P-CCNC: 33 U/L (ref 1–33)
ANION GAP SERPL CALCULATED.3IONS-SCNC: 11.2 MMOL/L (ref 5–15)
AST SERPL-CCNC: 36 U/L (ref 1–32)
BILIRUB SERPL-MCNC: 0.5 MG/DL (ref 0–1.2)
BUN SERPL-MCNC: 8 MG/DL (ref 6–20)
BUN/CREAT SERPL: 11.1 (ref 7–25)
CALCIUM SPEC-SCNC: 8.8 MG/DL (ref 8.6–10.5)
CHLORIDE SERPL-SCNC: 100 MMOL/L (ref 98–107)
CHOLEST SERPL-MCNC: 215 MG/DL (ref 0–200)
CO2 SERPL-SCNC: 26.8 MMOL/L (ref 22–29)
CREAT SERPL-MCNC: 0.72 MG/DL (ref 0.57–1)
CREAT UR-MCNC: 24.1 MG/DL
EGFRCR SERPLBLD CKD-EPI 2021: 105.2 ML/MIN/1.73
ESTRADIOL SERPL HS-MCNC: 50.5 PG/ML
EXPIRATION DATE: ABNORMAL
EXPIRATION DATE: NORMAL
FSH SERPL-ACNC: 16.3 MIU/ML
GLOBULIN UR ELPH-MCNC: 3.4 GM/DL
GLUCOSE BLDC GLUCOMTR-MCNC: 128 MG/DL (ref 70–130)
GLUCOSE SERPL-MCNC: 118 MG/DL (ref 65–99)
HBA1C MFR BLD: 6.1 % (ref 4.5–5.7)
HDLC SERPL-MCNC: 53 MG/DL (ref 40–60)
LDLC SERPL CALC-MCNC: 128 MG/DL (ref 0–100)
LDLC/HDLC SERPL: 2.33 {RATIO}
LH SERPL-ACNC: 15.8 MIU/ML
Lab: ABNORMAL
Lab: NORMAL
MICROALBUMIN/CREAT UR: NORMAL MG/G{CREAT}
POTASSIUM SERPL-SCNC: 3.7 MMOL/L (ref 3.5–5.2)
PROGEST SERPL-MCNC: 0.12 NG/ML
PROLACTIN SERPL-MCNC: 8.69 NG/ML (ref 4.79–23.3)
PROT SERPL-MCNC: 7.6 G/DL (ref 6–8.5)
PTH-INTACT SERPL-MCNC: 30.4 PG/ML (ref 15–65)
SODIUM SERPL-SCNC: 138 MMOL/L (ref 136–145)
T4 FREE SERPL-MCNC: 1.47 NG/DL (ref 0.92–1.68)
T4 FREE SERPL-MCNC: 1.49 NG/DL (ref 0.92–1.68)
TRIGL SERPL-MCNC: 192 MG/DL (ref 0–150)
TSH SERPL DL<=0.05 MIU/L-ACNC: 9.79 UIU/ML (ref 0.27–4.2)
TSH SERPL DL<=0.05 MIU/L-ACNC: 9.87 UIU/ML (ref 0.27–4.2)
VLDLC SERPL-MCNC: 34 MG/DL (ref 5–40)

## 2025-03-17 PROCEDURE — 82570 ASSAY OF URINE CREATININE: CPT | Performed by: NURSE PRACTITIONER

## 2025-03-17 PROCEDURE — 82670 ASSAY OF TOTAL ESTRADIOL: CPT | Performed by: NURSE PRACTITIONER

## 2025-03-17 PROCEDURE — 80061 LIPID PANEL: CPT | Performed by: NURSE PRACTITIONER

## 2025-03-17 PROCEDURE — 84146 ASSAY OF PROLACTIN: CPT | Performed by: NURSE PRACTITIONER

## 2025-03-17 PROCEDURE — 84402 ASSAY OF FREE TESTOSTERONE: CPT | Performed by: NURSE PRACTITIONER

## 2025-03-17 PROCEDURE — 80053 COMPREHEN METABOLIC PANEL: CPT | Performed by: NURSE PRACTITIONER

## 2025-03-17 PROCEDURE — 83002 ASSAY OF GONADOTROPIN (LH): CPT | Performed by: NURSE PRACTITIONER

## 2025-03-17 PROCEDURE — 83970 ASSAY OF PARATHORMONE: CPT | Performed by: NURSE PRACTITIONER

## 2025-03-17 PROCEDURE — 84443 ASSAY THYROID STIM HORMONE: CPT | Performed by: NURSE PRACTITIONER

## 2025-03-17 PROCEDURE — 82043 UR ALBUMIN QUANTITATIVE: CPT | Performed by: NURSE PRACTITIONER

## 2025-03-17 PROCEDURE — 83001 ASSAY OF GONADOTROPIN (FSH): CPT | Performed by: NURSE PRACTITIONER

## 2025-03-17 PROCEDURE — 84403 ASSAY OF TOTAL TESTOSTERONE: CPT | Performed by: NURSE PRACTITIONER

## 2025-03-17 PROCEDURE — 84144 ASSAY OF PROGESTERONE: CPT | Performed by: NURSE PRACTITIONER

## 2025-03-17 PROCEDURE — 84439 ASSAY OF FREE THYROXINE: CPT | Performed by: NURSE PRACTITIONER

## 2025-03-17 NOTE — PROGRESS NOTES
Chief Complaint   Patient presents with    Diabetes     F/u     Thyroid Problem     F/u        Referring Provider  No ref. provider found     FLACO Alegria is a 44 y.o. female had concerns including Diabetes (F/u ) and Thyroid Problem (F/u).   Postoperative Hypothyroidism.    She is currently taking T4 137 mcg QD. She is taking this regularly and not missing doses.  She denies any conflicting medication and denies any changes to her neck or compressive signs or symptoms.  She is also taking calcitriol 0.5 mg BID and Calc Carb 1000 mg TID.  She reports that she thinks that she has some female hormone dysfunction as she has not had a menstrual cycle in 12 years.  She also has decreased libido and reports vaginal dryness and mild itching ocassionally.     She is taking Metformin 500 mg BID, Mounjaro 10 mg weekly.  She is taking it regularly and denies any adverse side effects to the medication.  She denies any hypos or overt hyper's.    History:  She had a total thyroidectomy on 2/1/2024 by Dr Kraus.  She has been seen by Dr Diehl in [place] in the past.  She is doing well. 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 She had labs recently at Dr Kraus's office and he adjusted her medication from 250 to 200 mcg QD, yesterday. She is currently taking 200 mcg QD. She is taking this regularly without missing doses.  She denies any changes to her neck.  She is not taking any conflicting medication.  She is also currently taking Calcitriol 0.5 mg BID and Calcium Carbonate 1000 mg TID.     The following portions of the patient's history were reviewed and updated as appropriate: allergies, current medications, past family history, past medical history, past social history, past surgical history, and problem list.    Past Medical History:   Diagnosis Date    Anesthesia complication     HYPOTENSION, LOW BODY TEMP WITH EPIDURAL 2008    Anxiety and depression     Arthritis      GERD (gastroesophageal reflux disease)     Gestational diabetes 2005    Goiter     Hepatitis A     3 years ago    Hypertension     Mild asthma 2021    Obesity     PONV (postoperative nausea and vomiting)     Thyroid disease     Type 2 diabetes mellitus with hyperglycemia, without long-term current use of insulin 2020    Visual impairment     Vitamin D deficiency A few years ago     Past Surgical History:   Procedure Laterality Date    ABDOMINAL SURGERY      LIPOMA REMOVED APPROX     CARPAL TUNNEL RELEASE Right 10/15/2019    Procedure: CARPAL TUNNEL RELEASE;  Surgeon: Rony Crzu MD;  Location:  [facility];  Service: Orthopedics     SECTION      x2    THYROIDECTOMY N/A 2024    Procedure: TOTAL THYROIDECTOMY;  Surgeon: Sunday Kraus MD;  Location:  [facility];  Service: General;  Laterality: N/A;    TUBAL ABDOMINAL LIGATION        Family History   Problem Relation Age of Onset    Diabetes Mother     Hypertension Mother     Thyroid disease Mother     No Known Problems Father     Gout Sister     Osteoarthritis Maternal Grandmother     Osteoporosis Maternal Grandmother     Heart disease Maternal Grandmother     Diabetes Maternal Grandmother     Hypertension Maternal Grandmother     Heart disease Maternal Grandfather     Diabetes Maternal Grandfather     Hypertension Maternal Grandfather     Breast cancer Neg Hx       Social History     Socioeconomic History    Marital status:      Spouse name: bea    Number of children: 2    Years of education: 12   Tobacco Use    Smoking status: Never     Passive exposure: Never    Smokeless tobacco: Never   Vaping Use    Vaping status: Never Used   Substance and Sexual Activity    Alcohol use: No    Drug use: No    Sexual activity: Defer      Allergies   Allergen Reactions    Ultram [Tramadol] Swelling    Venlafaxine Other (See Comments)     Nightmares      Current Outpatient Medications on File Prior to Visit   Medication Sig  Dispense Refill    atorvastatin (LIPITOR) 10 MG tablet Take 1 tablet by mouth Every Night. 90 tablet 3    Blood Glucose Monitoring Suppl misc Use 1 each Daily. 1 each 0    buPROPion SR (WELLBUTRIN SR) 150 MG 12 hr tablet Take 1 tablet by mouth 2 (Two) Times a Day. 180 tablet 3    calcitriol (ROCALTROL) 0.5 MCG capsule Take 1 capsule by mouth Every 12 (Twelve) Hours. 60 capsule 5    Cariprazine HCl (Vraylar) 1.5 MG capsule capsule Take 1 capsule by mouth Daily. 90 capsule 3    cetirizine (zyrTEC) 10 MG tablet Take 1 tablet by mouth Daily. 90 tablet 3    clonazePAM (KlonoPIN) 0.5 MG tablet Take 1 tablet by mouth 3 (Three) Times a Day As Needed for Anxiety. 30 tablet 0    dapagliflozin Propanediol (Farxiga) 10 MG tablet Take 10 mg by mouth Every Morning. 90 tablet 3    Diclofenac Sodium (VOLTAREN) 1 % gel gel Apply 4 g topically to the appropriate area as directed 4 (Four) Times a Day As Needed (knee pain). 350 g 3    DULoxetine (CYMBALTA) 30 MG capsule Take 1 capsule by mouth Daily. 90 capsule 3    DULoxetine (CYMBALTA) 60 MG capsule Take 1 capsule by mouth Daily. 90 capsule 3    fluticasone (FLONASE) 50 MCG/ACT nasal spray 2 sprays into the nostril(s) as directed by provider Daily. 48 g 3    glucose blood test strip 1 each by Other route 3 times a day. Use as instructed 100 each 11    ibuprofen (ADVIL,MOTRIN) 800 MG tablet Take 1 tablet by mouth Every 8 (Eight) Hours. 90 tablet 2    Lancets 30G misc Use 1 each 3 times a day. 100 each 11    losartan (COZAAR) 100 MG tablet Take 1 tablet by mouth Daily. 90 tablet 3    metFORMIN (GLUCOPHAGE) 500 MG tablet Take 1 tablet by mouth 2 (Two) Times a Day With Meals.      montelukast (SINGULAIR) 10 MG tablet Take 1 tablet by mouth Every Night. 90 tablet 3    ondansetron ODT (ZOFRAN-ODT) 4 MG disintegrating tablet Place 1 tablet on the tongue Every 8 (Eight) Hours As Needed for Nausea or Vomiting. 30 tablet 0    oxybutynin XL (DITROPAN-XL) 5 MG 24 hr tablet Take 1 tablet by mouth  Daily. 90 tablet 3    pantoprazole (PROTONIX) 40 MG EC tablet Take 1 tablet by mouth Daily. 90 tablet 3    rOPINIRole (REQUIP) 1 MG tablet Take 1 tablet by mouth Every Night. 90 tablet 3    spironolactone (ALDACTONE) 25 MG tablet Take 1 tablet by mouth Daily. 90 tablet 3    sucralfate (Carafate) 1 g tablet Take 1 tablet by mouth 4 (Four) Times a Day. 120 tablet 0    SUMAtriptan (IMITREX) 100 MG tablet Take 1 tablet by mouth Every 2 (Two) Hours As Needed for Migraine (Maximum 2 doses every 24 hours). 9 tablet 5    Tirzepatide (Mounjaro) 10 MG/0.5ML solution auto-injector Inject 10 mg under the skin into the appropriate area as directed Every 7 (Seven) Days. 4 mL 5    Ventolin  (90 Base) MCG/ACT inhaler Inhale 2 puffs Every 4 (Four) Hours As Needed for Wheezing. 54 g 3    Continuous Glucose  (Dexcom G7 ) device USE AS DIRECTED (Patient not taking: Reported on 3/17/2025) 1 each 0    Continuous Glucose Sensor (Dexcom G7 Sensor) misc Use 1 each Every 10 (Ten) Days. (Patient not taking: Reported on 3/17/2025) 3 each 5     No current facility-administered medications on file prior to visit.     Review of Systems   Constitutional: Negative.    Eyes: Negative.    Endocrine: Negative.    Skin: Negative.    Neurological:  Positive for headache.   Psychiatric/Behavioral: Negative.     All other systems reviewed and are negative.    /81 (BP Location: Right arm, Patient Position: Sitting, Cuff Size: Adult)   Pulse 69   Wt (!) 156 kg (343 lb 12.8 oz)   LMP  (LMP Unknown)   SpO2 95%   Breastfeeding No   BMI 55.52 kg/m²      Physical Exam  Vitals reviewed.   Constitutional:       Appearance: Normal appearance.   Eyes:      Extraocular Movements: Extraocular movements intact.   Cardiovascular:      Rate and Rhythm: Normal rate.   Pulmonary:      Effort: Pulmonary effort is normal.   Skin:     General: Skin is warm.   Neurological:      General: No focal deficit present.      Mental Status: She is  "alert and oriented to person, place, and time.   Psychiatric:         Mood and Affect: Mood normal.         Behavior: Behavior normal.         Thought Content: Thought content normal.         Judgment: Judgment normal.       Labs/Imaging  CMP  Lab Results   Component Value Date    GLUCOSE 118 (H) 03/17/2025    BUN 8 03/17/2025    CREATININE 0.72 03/17/2025    EGFRIFNONA 114 01/04/2022    BCR 11.1 03/17/2025    K 3.7 03/17/2025    CO2 26.8 03/17/2025    CALCIUM 8.8 03/17/2025    ALBUMIN 4.2 03/17/2025    AST 36 (H) 03/17/2025    ALT 33 03/17/2025        CBC w/DIFF   Lab Results   Component Value Date    WBC 8.20 09/05/2024    RBC 4.77 09/05/2024    HGB 14.7 09/05/2024    HCT 43.2 09/05/2024    MCV 90.6 09/05/2024    MCH 30.8 09/05/2024    MCHC 34.0 09/05/2024    RDW 13.7 09/05/2024    RDWSD 45.2 09/05/2024    MPV 10.1 09/05/2024     09/05/2024    NEUTRORELPCT 54.2 09/05/2024    LYMPHORELPCT 35.0 09/05/2024    MONORELPCT 8.8 09/05/2024    EOSRELPCT 1.1 09/05/2024    BASORELPCT 0.5 09/05/2024    AUTOIGPER 0.4 09/05/2024    NEUTROABS 4.45 09/05/2024    LYMPHSABS 2.87 09/05/2024    MONOSABS 0.72 09/05/2024    EOSABS 0.09 09/05/2024    BASOSABS 0.04 09/05/2024    AUTOIGNUM 0.03 09/05/2024    NRBC 0.0 09/05/2024       TSH  Lab Results   Component Value Date    TSH 9.870 (H) 03/17/2025    TSH 9.790 (H) 03/17/2025    TSH 0.031 (L) 09/05/2024       T4  Lab Results   Component Value Date    FREET4 1.47 03/17/2025    FREET4 1.49 03/17/2025    FREET4 2.06 (H) 09/05/2024     No results found for: \"G5ZNDGD\"    Lab Results   Component Value Date    HGBA1C 6.1 (A) 03/17/2025     T3  Lab Results   Component Value Date    T3FREE 3.70 09/18/2018     No results found for: \"M0DRMIJ\"    TRAb  No results found for: \"TSURCPAB\"    TPO  No results found for: \"THYROIDAB\"    No valid procedures specified.    Assessment and Plan    Diagnoses and all orders for this visit:    1. Amenorrhea (Primary)  Assessment & Plan:  Will obtain labs to " determine if hormone dysfunction present and will treat and follow based on results.    Orders:  -     FSH & LH  -     Prolactin  -     Testosterone, Free / Tot Equilib  -     Estradiol  -     Progesterone    2. Type 2 diabetes mellitus with hyperglycemia, without long-term current use of insulin  Assessment & Plan:  -Diabetes is at goal with A1c 6.1.  -Discussed dietary and exercise guidelines with patient.  -Discussed the importance of yearly eye exams.  -Discussed the importance of checking BG's regularly.    -Continue Metformin 500 mg BID.  -Continue Mounjaro 10 mg weekly. Patient has no personal history of pancreatitis, no family history of MEN syndrome or medullary thyroid cancer. Possible side effects including nausea, bloating, other GI upset and rarely pancreatitis were discussed. She was advised to call the office with any symptoms or concerns.   -Discussed Glucagon use and appropriate timing for this.   -S/S hypoglycemia reviewed with Rule of 15's advised.  -Follow-up in 3 months.    Orders:  -     POC Glycosylated Hemoglobin (Hb A1C)  -     POC Glucose, Blood  -     Microalbumin / Creatinine Urine Ratio - Urine, Clean Catch  -     TSH  -     T4, free  -     Lipid Panel    3. Postoperative hypothyroidism  Assessment & Plan:  -Clinically euthyroid.  -TFT's and calcium levels today with adjustment as indicated.  -Reminded of proper administration including taking 7 pills per week on an empty stomach with no missed doses, waiting 30-60 minutes prior to other medications or food.  -Follow-up in 3 months.    Orders:  -     Comprehensive Metabolic Panel  -     TSH  -     T4, free  -     PTH, Intact           Return in about 3 months (around 6/17/2025) for Follow-up appointment. The patient was instructed to contact the clinic with any interval questions or concerns.      This document has been electronically signed by NIKKO Singh  March 20, 2025 13:37 EDT   Endocrinology    Please note that portions  of this document were completed with a voice recognition program. Efforts were made to edit the dictations, but occasionally words are mis-transcribed.

## 2025-03-18 RX ORDER — FENOFIBRATE 145 MG/1
145 TABLET, COATED ORAL DAILY
Qty: 90 TABLET | Refills: 1 | Status: SHIPPED | OUTPATIENT
Start: 2025-03-18 | End: 2026-03-18

## 2025-03-20 PROBLEM — N91.2 AMENORRHEA: Status: ACTIVE | Noted: 2025-03-20

## 2025-03-20 NOTE — ASSESSMENT & PLAN NOTE
-Diabetes is at goal with A1c 6.1.  -Discussed dietary and exercise guidelines with patient.  -Discussed the importance of yearly eye exams.  -Discussed the importance of checking BG's regularly.    -Continue Metformin 500 mg BID.  -Continue Mounjaro 10 mg weekly. Patient has no personal history of pancreatitis, no family history of MEN syndrome or medullary thyroid cancer. Possible side effects including nausea, bloating, other GI upset and rarely pancreatitis were discussed. She was advised to call the office with any symptoms or concerns.   -Discussed Glucagon use and appropriate timing for this.   -S/S hypoglycemia reviewed with Rule of 15's advised.  -Follow-up in 3 months.

## 2025-03-20 NOTE — ASSESSMENT & PLAN NOTE
Will obtain labs to determine if hormone dysfunction present and will treat and follow based on results.

## 2025-03-20 NOTE — ASSESSMENT & PLAN NOTE
-Clinically euthyroid.  -TFT's and calcium levels today with adjustment as indicated.  -Reminded of proper administration including taking 7 pills per week on an empty stomach with no missed doses, waiting 30-60 minutes prior to other medications or food.  -Follow-up in 3 months.

## 2025-03-25 ENCOUNTER — OFFICE VISIT (OUTPATIENT)
Dept: FAMILY MEDICINE CLINIC | Facility: CLINIC | Age: 45
End: 2025-03-25
Payer: COMMERCIAL

## 2025-03-25 VITALS
HEART RATE: 83 BPM | TEMPERATURE: 97.4 F | OXYGEN SATURATION: 97 % | DIASTOLIC BLOOD PRESSURE: 80 MMHG | SYSTOLIC BLOOD PRESSURE: 134 MMHG | WEIGHT: 293 LBS | RESPIRATION RATE: 14 BRPM | HEIGHT: 66 IN | BODY MASS INDEX: 47.09 KG/M2

## 2025-03-25 DIAGNOSIS — I10 ESSENTIAL HYPERTENSION: Chronic | ICD-10-CM

## 2025-03-25 DIAGNOSIS — J01.00 ACUTE MAXILLARY SINUSITIS, RECURRENCE NOT SPECIFIED: Primary | ICD-10-CM

## 2025-03-25 DIAGNOSIS — J30.1 CHRONIC SEASONAL ALLERGIC RHINITIS DUE TO POLLEN: Chronic | ICD-10-CM

## 2025-03-25 DIAGNOSIS — E11.65 TYPE 2 DIABETES MELLITUS WITH HYPERGLYCEMIA, WITHOUT LONG-TERM CURRENT USE OF INSULIN: Chronic | ICD-10-CM

## 2025-03-25 PROCEDURE — 3079F DIAST BP 80-89 MM HG: CPT | Performed by: NURSE PRACTITIONER

## 2025-03-25 PROCEDURE — 1159F MED LIST DOCD IN RCRD: CPT | Performed by: NURSE PRACTITIONER

## 2025-03-25 PROCEDURE — 1160F RVW MEDS BY RX/DR IN RCRD: CPT | Performed by: NURSE PRACTITIONER

## 2025-03-25 PROCEDURE — 3075F SYST BP GE 130 - 139MM HG: CPT | Performed by: NURSE PRACTITIONER

## 2025-03-25 PROCEDURE — 1125F AMNT PAIN NOTED PAIN PRSNT: CPT | Performed by: NURSE PRACTITIONER

## 2025-03-25 PROCEDURE — 3044F HG A1C LEVEL LT 7.0%: CPT | Performed by: NURSE PRACTITIONER

## 2025-03-25 PROCEDURE — 99214 OFFICE O/P EST MOD 30 MIN: CPT | Performed by: NURSE PRACTITIONER

## 2025-03-25 RX ORDER — FLUCONAZOLE 150 MG/1
150 TABLET ORAL DAILY
Qty: 2 TABLET | Refills: 0 | Status: SHIPPED | OUTPATIENT
Start: 2025-03-25

## 2025-03-25 RX ORDER — LEVOCETIRIZINE DIHYDROCHLORIDE 5 MG/1
5 TABLET, FILM COATED ORAL EVERY EVENING
Qty: 30 TABLET | Refills: 0 | Status: SHIPPED | OUTPATIENT
Start: 2025-03-25

## 2025-03-25 RX ORDER — AZELASTINE 1 MG/ML
SPRAY, METERED NASAL
Qty: 1 EACH | Refills: 0 | Status: SHIPPED | OUTPATIENT
Start: 2025-03-25

## 2025-03-25 RX ORDER — METHYLPREDNISOLONE 4 MG/1
TABLET ORAL
Qty: 21 TABLET | Refills: 0 | Status: SHIPPED | OUTPATIENT
Start: 2025-03-25

## 2025-03-25 NOTE — PROGRESS NOTES
"      History of Present Illness  Anika Alegria is a 44 y.o. female who presents to [facility] today complaining of upper respiratory symptoms which started within the week and worsening.  She has been diagnosed with chronic allergic rhinitis and is on a daily regimen of cetirizine, Flonase nasal spray and montelukast..     Upper Respiratory Symptoms   This is a recurrent problem. The current episode started in the past 7 days. There has been no fever. Associated symptoms include congestion, ear pain, rhinorrhea and sinus pain. Pertinent negatives include no chest pain, coughing, nausea, rash, sore throat, vomiting or wheezing. She has tried antihistamine for the symptoms.   T  he following portions of the patient's history were reviewed and updated as appropriate: allergies, current medications, past family history, past medical history, past social history, past surgical history and problem list.    Review of Systems   Constitutional:  Positive for chills and fatigue. Negative for activity change, appetite change and fever.   HENT:  Positive for congestion, ear pain, postnasal drip, rhinorrhea, sinus pressure and sinus pain. Negative for sore throat and trouble swallowing.    Eyes:  Negative for pain, discharge, redness and itching.   Respiratory:  Negative for cough, shortness of breath and wheezing.    Cardiovascular:  Negative for chest pain, palpitations and leg swelling.   Gastrointestinal:  Negative for nausea and vomiting.   Musculoskeletal:  Positive for arthralgias.   Skin:  Negative for color change and rash.   Allergic/Immunologic: Positive for environmental allergies.   Hematological:  Negative for adenopathy.     Vital signs:  /80 (BP Location: Right arm, Patient Position: Sitting, Cuff Size: Adult)   Pulse 83   Temp 97.4 °F (36.3 °C) (Temporal)   Resp 14   Ht 167.6 cm (65.98\")   Wt (!) 157 kg (347 lb)   LMP  (LMP Unknown)   SpO2 97%   BMI 56.03 kg/m² "     Physical Exam  Vitals and nursing note reviewed.   Constitutional:       General: She is not in acute distress.     Appearance: She is well-developed.   HENT:      Head: Normocephalic.      Right Ear: Swelling and tenderness present. A middle ear effusion is present. No mastoid tenderness. Tympanic membrane is bulging. Tympanic membrane is not erythematous.      Left Ear: Tenderness present. No swelling. A middle ear effusion is present. No mastoid tenderness. Tympanic membrane is bulging. Tympanic membrane is not erythematous.      Nose: Congestion and rhinorrhea present.      Right Turbinates: Swollen.      Left Turbinates: Swollen.      Right Sinus: Maxillary sinus tenderness present. No frontal sinus tenderness.      Left Sinus: Maxillary sinus tenderness present. No frontal sinus tenderness.      Mouth/Throat:      Mouth: Mucous membranes are moist.      Pharynx: No oropharyngeal exudate.   Eyes:      General: No scleral icterus.        Right eye: No discharge.         Left eye: No discharge.      Conjunctiva/sclera: Conjunctivae normal.   Cardiovascular:      Rate and Rhythm: Normal rate and regular rhythm.      Heart sounds: Normal heart sounds. No murmur heard.     No friction rub.   Pulmonary:      Effort: Pulmonary effort is normal. No respiratory distress.      Breath sounds: Normal breath sounds. No decreased breath sounds, wheezing, rhonchi or rales.   Musculoskeletal:         General: No tenderness.      Cervical back: Neck supple.   Lymphadenopathy:      Head:      Right side of head: No tonsillar or preauricular adenopathy.      Left side of head: No tonsillar or preauricular adenopathy.      Cervical: No cervical adenopathy.   Skin:     General: Skin is warm and dry.      Capillary Refill: Capillary refill takes less than 2 seconds.   Neurological:      Mental Status: She is alert and oriented to person, place, and time.   Psychiatric:         Mood and Affect: Mood and affect normal.          Speech: Speech normal.         Behavior: Behavior is cooperative.         Thought Content: Thought content normal.       Assessment & Plan     Diagnoses and all orders for this visit:    1. Acute maxillary sinusitis, recurrence not specified (Primary)  Comments:  Will start Augmentin 1 twice a day for 10 days with food.  Counseled regarding supportive care measures.  Orders:  -     amoxicillin-clavulanate (AUGMENTIN) 875-125 MG per tablet; Take 1 tablet by mouth Every 12 (Twelve) Hours for 10 days.  Dispense: 20 tablet; Refill: 0    2. Chronic seasonal allergic rhinitis due to pollen  Comments:  Will change Flonase nasal spray to Astelin nasal spray and cetirizine to  levocetirizine  Orders:  -     azelastine (ASTELIN) 0.1 % nasal spray; 2 sprays both nostrils twice daily as needed  Dispense: 1 each; Refill: 0  -     levocetirizine (XYZAL) 5 MG tablet; Take 1 tablet by mouth Every Evening.  Dispense: 30 tablet; Refill: 0  -     methylPREDNISolone (MEDROL) 4 MG dose pack; Take as directed on package instructions.  Dispense: 21 tablet; Refill: 0    3. Essential hypertension  Comments:  Initial blood pressure was elevated at 150/90.  Recheck 134/80.  Continue losartan    4. Type 2 diabetes mellitus with hyperglycemia, without long-term current use of insulin  Comments:  Cautioned her if she does start the Medrol Dosepak to closely monitor her blood sugars due to side effect of steroids on her glycemic control    Other orders  -     fluconazole (Diflucan) 150 MG tablet; Take 1 tablet by mouth Daily.  Dispense: 2 tablet; Refill: 0      Follow Up If symptoms worsen or do not improve    Findings and recommendations discussed with Anika. Reviewed treatment options.  Counseled regarding supportive care measures.  Signs and symptoms of concern reviewed and if occur to seek further evaluation or if symptoms worsen or do not improve.  She was provided a Medrol Dosepak that she could start if her symptoms had not improved within  48 hours.  Lifestyle modifications reinforced including nutrition and activity recommendations.  Anika was given instructions and counseling regarding her condition or for health maintenance advice. Please see specific information pulled into the AVS if appropriate.    This document has been electronically signed by:

## 2025-03-27 LAB
TESTOST FREE MFR SERPL: 1.97 % (ref 0.5–2.8)
TESTOST FREE SERPL-MCNC: 2.01 NG/DL (ref 0.1–0.85)
TESTOST SERPL-MCNC: 102 NG/DL (ref 4–50)

## 2025-04-11 ENCOUNTER — OFFICE VISIT (OUTPATIENT)
Dept: FAMILY MEDICINE CLINIC | Facility: CLINIC | Age: 45
End: 2025-04-11
Payer: COMMERCIAL

## 2025-04-11 VITALS
OXYGEN SATURATION: 99 % | WEIGHT: 293 LBS | HEIGHT: 66 IN | BODY MASS INDEX: 47.09 KG/M2 | HEART RATE: 98 BPM | SYSTOLIC BLOOD PRESSURE: 142 MMHG | TEMPERATURE: 97.8 F | DIASTOLIC BLOOD PRESSURE: 86 MMHG

## 2025-04-11 DIAGNOSIS — M17.12 PRIMARY OSTEOARTHRITIS OF LEFT KNEE: Primary | Chronic | ICD-10-CM

## 2025-04-11 DIAGNOSIS — E89.0 POSTOPERATIVE HYPOTHYROIDISM: Chronic | ICD-10-CM

## 2025-04-11 DIAGNOSIS — E66.01 MORBID OBESITY: Chronic | ICD-10-CM

## 2025-04-11 DIAGNOSIS — I10 ESSENTIAL HYPERTENSION: Chronic | ICD-10-CM

## 2025-04-11 NOTE — PROGRESS NOTES
Follow-up Visit         Patient: Anika Alegria  YOB: 1980  Date of Encounter: 10/28/2019      Chief  Complaint:   Chief Complaint   Patient presents with   • Right Wrist - Post-op, Carpal Tunnel, Follow-up     10/15/19 (13d)     Rony Cruz MD      CARPAL TUNNEL RELEASE - Right             HPI:  Anika Alegria, 39 y.o. female turns in follow-up carpal tunnel release right hand 13 days out and doing well sensation has improved pain is also improving.    Medical History:  Patient Active Problem List   Diagnosis   • Moderate episode of recurrent major depressive disorder (CMS/HCC)   • Gastroesophageal reflux disease without esophagitis   • PCOS (polycystic ovarian syndrome)   • Essential hypertension   • Chronic seasonal allergic rhinitis due to pollen   • Goiter   • NAFL (nonalcoholic fatty liver)   • Prediabetes   • Mixed hyperlipidemia   • Carpal tunnel syndrome of right wrist     Past Medical History:   Diagnosis Date   • Anxiety and depression    • GERD (gastroesophageal reflux disease)    • Goiter    • Goiter    • Hepatitis A    • Hypertension    • PONV (postoperative nausea and vomiting)    • Thyroid disease        Social History:  Social History     Socioeconomic History   • Marital status:      Spouse name: bea   • Number of children: 2   • Years of education: 12   • Highest education level: Not on file   Occupational History   • Occupation: unemployed   Social Needs   • Financial resource strain: Not hard at all   • Food insecurity:     Worry: Never true     Inability: Never true   • Transportation needs:     Medical: No     Non-medical: No   Tobacco Use   • Smoking status: Former Smoker   • Smokeless tobacco: Never Used   Substance and Sexual Activity   • Alcohol use: No   • Drug use: No   • Sexual activity: Defer   Lifestyle   • Physical activity:     Days per week: 0 days     Minutes per session: 0 min   • Stress: Only a little   Relationships   • Social  connections:     Talks on phone: More than three times a week     Gets together: More than three times a week     Attends Voodoo service: More than 4 times per year     Active member of club or organization: Yes     Attends meetings of clubs or organizations: 1 to 4 times per year     Relationship status:        Surgical History:  Past Surgical History:   Procedure Laterality Date   • ABDOMINAL SURGERY     • CARPAL TUNNEL RELEASE Right 10/15/2019    Procedure: CARPAL TUNNEL RELEASE;  Surgeon: Rony Cruz MD;  Location[facility];  Service: Orthopedics   •  SECTION      x2   • TUBAL ABDOMINAL LIGATION         Examination:   Right hand evaluation reveals incision intact without surrounding erythema    Assessment & Plan:   39 y.o. female well following carpal tunnel release right hand she will follow-up in 8 weeks she will limit her activity until then.         Diagnosis Plan   1. Postop check               Cc:  Rosa Quigley PA              This document has been electronically signed by Rony Cruz MD   2019 11:29 AM   Hold chemical VTE prophylaxis in the setting of GIB  DVT PPX: SCDs  GI PPX: PPI DVT PPX: SCDs, ambulatory  GI PPX: tolerating PO

## 2025-04-11 NOTE — PROGRESS NOTES
"Chief Complaint -knee pain    History of Present Illness -     Anika Alegria is a 44 y.o. female.     Knee pain-  Patient complains of moderate to severe achy pain in her left knee due to osteoarthritis.  No known specific injury.  Worse in the past 2 weeks.  Worse with recent weather changes.  Minimal relief with ibuprofen.    Morbid obesity-  Not at goal as patient's current BMI is 55.  We discussed her weight and relation to increased stress on her joints.    Hypertension-  Likely elevated today due to acute pain.  Patient reports blood pressure at home is usually less than 130/80 with the use of losartan 100 mg daily, spironolactone, and Farxiga    Hypothyroidism-  Stable with levothyroxine 175 mcg daily      The following portions of the patient's history were reviewed and updated as appropriate: allergies, current medications, past family history, past medical history, past social history, past surgical history and problem list.        Objective  Vital signs:  /86 (BP Location: Left arm, Cuff Size: Large Adult)   Pulse 98   Temp 97.8 °F (36.6 °C) (Temporal)   Ht 167.6 cm (65.98\")   Wt (!) 155 kg (341 lb)   LMP  (LMP Unknown)   SpO2 99%   BMI 55.07 kg/m²     Physical Exam  Vitals and nursing note reviewed.   Constitutional:       Appearance: Normal appearance. She is well-developed. She is obese.   Eyes:      Extraocular Movements: Extraocular movements intact.      Conjunctiva/sclera: Conjunctivae normal.   Cardiovascular:      Rate and Rhythm: Normal rate and regular rhythm.      Heart sounds: Normal heart sounds. No murmur heard.  Pulmonary:      Effort: Pulmonary effort is normal. No respiratory distress.      Breath sounds: Normal breath sounds. No wheezing.   Musculoskeletal:         General: Tenderness present.      Comments: Coarse crepitus noted with manipulation/flexion and extension of the left knee joint.  Minimal swelling without erythema or contusions appreciated.   Skin:     General: " Skin is warm and dry.      Findings: No rash.   Neurological:      Mental Status: She is alert and oriented to person, place, and time.   Psychiatric:         Mood and Affect: Mood normal.         Behavior: Behavior normal.         Thought Content: Thought content normal.         The following data was reviewed by Rosa Quigley PA-C:         Lab Results   Component Value Date    BUN 8 03/17/2025    CREATININE 0.72 03/17/2025    EGFR 105.2 03/17/2025    ALT 33 03/17/2025    AST 36 (H) 03/17/2025    WBC 8.20 09/05/2024    HGB 14.7 09/05/2024    HCT 43.2 09/05/2024     09/05/2024    CHOL 215 (H) 03/17/2025    TRIG 192 (H) 03/17/2025    HDL 53 03/17/2025     (H) 03/17/2025    TSH 9.870 (H) 03/17/2025    TSH 9.790 (H) 03/17/2025    HGBA1C 6.1 (A) 03/17/2025           Assessment & Plan     Diagnoses and all orders for this visit:    1. Primary osteoarthritis of left knee (Primary)  Comments:  Left knee intra-articular injection performed today    2. Morbid obesity  Comments:  Encouraged patient to lose weight with a low carbohydrate diet.  Advised activity as tolerated    3. Essential hypertension  Comments:  Likely elevated today due to acute pain  Plan to treat underlying pain  Continue losartan, spironolactone, and Farxiga    4. Postoperative hypothyroidism  Comments:  Continue levothyroxine 175 mcg daily      Procedure: Left knee intra-articular injection  Provider: Rosa Quigley PA-C  Indication: Left knee pain due to osteoarthritis  Timeout was taken to verify correct patient procedure and location  Description: The left knee was prepped and draped in sterile fashion.  An intra-articular injection was given into the left knee joint using 3 cc 1% lidocaine, 1 cc of triamcinolone and 1 cc of Depo-Medrol.  Pressure was held and sterile dressing was placed.  No complications  Estimated blood loss: Minimal  Patient tolerated the procedure well    1% lidocaine 10 mg/mL  NDC number 35580-790-51  Lot #[id number]  [id number]  Expiration August 2026    Triamcinolone 40 mg/mL  NDC number 6565-4174-32  Lot #[id number]  Expiration May 2026    Depo-Medrol 40 mg/mL  NDC number 9036-7080-00  Lot number [id number]  Expiration 6/2025               Patient was given instructions and counseling regarding his condition or for health maintenance advice. Please see specific information pulled into the AVS if appropriate      This document has been electronically signed by:  Rosa Quigley PA-C

## 2025-04-11 NOTE — PATIENT INSTRUCTIONS
Fall Prevention in the Home, Adult  Falls can cause injuries and can happen to people of all ages. There are many things you can do to make your home safer and to help prevent falls.  What actions can I take to prevent falls?  General information  Use good lighting in all rooms. Make sure to:  Replace any light bulbs that burn out.  Turn on the lights in dark areas and use night-lights.  Keep items that you use often in easy-to-reach places. Lower the shelves around your home if needed.  Move furniture so that there are clear paths around it.  Do not use throw rugs or other things on the floor that can make you trip.  If any of your floors are uneven, fix them.  Add color or contrast paint or tape to clearly buddy and help you see:  Grab bars or handrails.  First and last steps of staircases.  Where the edge of each step is.  If you use a ladder or stepladder:  Make sure that it is fully opened. Do not climb a closed ladder.  Make sure the sides of the ladder are locked in place.  Have someone hold the ladder while you use it.  Know where your pets are as you move through your home.  What can I do in the bathroom?         Keep the floor dry. Clean up any water on the floor right away.  Remove soap buildup in the bathtub or shower. Buildup makes bathtubs and showers slippery.  Use non-skid mats or decals on the floor of the bathtub or shower.  Attach bath mats securely with double-sided, non-slip rug tape.  If you need to sit down in the shower, use a non-slip stool.  Install grab bars by the toilet and in the bathtub and shower. Do not use towel bars as grab bars.  What can I do in the bedroom?  Make sure that you have a light by your bed that is easy to reach.  Do not use any sheets or blankets on your bed that hang to the floor.  Have a firm chair or bench with side arms that you can use for support when you get dressed.  What can I do in the kitchen?  Clean up any spills right away.  If you need to reach something  above you, use a step stool with a grab bar.  Keep electrical cords out of the way.  Do not use floor polish or wax that makes floors slippery.  What can I do with my stairs?  Do not leave anything on the stairs.  Make sure that you have a light switch at the top and the bottom of the stairs.  Make sure that there are handrails on both sides of the stairs. Fix handrails that are broken or loose.  Install non-slip stair treads on all your stairs if they do not have carpet.  Avoid having throw rugs at the top or bottom of the stairs.  Choose a carpet that does not hide the edge of the steps on the stairs. Make sure that the carpet is firmly attached to the stairs. Fix carpet that is loose or worn.  What can I do on the outside of my home?  Use bright outdoor lighting.  Fix the edges of walkways and driveways and fix any cracks. Clear paths of anything that can make you trip, such as tools or rocks.  Add color or contrast paint or tape to clearly buddy and help you see anything that might make you trip as you walk through a door, such as a raised step or threshold.  Trim any bushes or trees on paths to your home.  Check to see if handrails are loose or broken and that both sides of all steps have handrails. Install guardrails along the edges of any raised decks and porches.  Have leaves, snow, or ice cleared regularly. Use sand, salt, or ice melter on paths if you live where there is ice and snow during the winter.  Clean up any spills in your garage right away. This includes grease or oil spills.  What other actions can I take?  Review your medicines with your doctor. Some medicines can cause dizziness or changes in blood pressure, which increase your risk of falling.  Wear shoes that:  Have a low heel. Do not wear high heels.  Have rubber bottoms and are closed at the toe.  Feel good on your feet and fit well.  Use tools that help you move around if needed. These include:  Canes.  Walkers.  Scooters.  Crutches.  Ask  your doctor what else you can do to help prevent falls. This may include seeing a physical therapist to learn to do exercises to move better and get stronger.  Where to find more information  Centers for Disease Control and Prevention, [place]: cdc.gov  National [place] on Aging: toan.nih.gov  [place] on Aging: toan.nih.gov  Contact a doctor if:  You are afraid of falling at home.  You feel weak, drowsy, or dizzy at home.  You fall at home.  Get help right away if you:  Lose consciousness or have trouble moving after a fall.  Have a fall that causes a head injury.  These symptoms may be an emergency. Get help right away. Call 911.  Do not wait to see if the symptoms will go away.  Do not drive yourself to the hospital.  This information is not intended to replace advice given to you by your health care provider. Make sure you discuss any questions you have with your health care provider.  Document Revised: 08/21/2023 Document Reviewed: 08/21/2023  [employer] Patient Education © 2024 [employer]Carbohydrate Counting for Diabetes Mellitus, Adult  Carbohydrate counting is a method of keeping track of how many carbohydrates you eat. Eating carbohydrates increases the amount of sugar (glucose) in the blood. Counting how many carbohydrates you eat improves how well you manage your blood glucose. This, in turn, helps you manage your diabetes.  Carbohydrates are measured in grams (g) per serving. It is important to know how many carbohydrates (in grams or by serving size) you can have in each meal. This is different for every person. A dietitian can help you make a meal plan and calculate how many carbohydrates you should have at each meal and snack.  What foods contain carbohydrates?  Carbohydrates are found in the following foods:  Grains, such as breads and cereals.  Dried beans and soy products.  Starchy vegetables, such as potatoes, peas, and corn.  Fruit and fruit juices.  Milk and yogurt.  Sweets and snack  foods, such as cake, cookies, candy, chips, and soft drinks.  How do I count carbohydrates in foods?  There are two ways to count carbohydrates in food. You can read food labels or learn standard serving sizes of foods. You can use either of these methods or a combination of both.  Using the Nutrition Facts label  The Nutrition Facts list is included on the labels of almost all packaged foods and beverages in the [place]. It includes:  The serving size.  Information about nutrients in each serving, including the grams of carbohydrate per serving.  To use the Nutrition Facts, decide how many servings you will have. Then, multiply the number of servings by the number of carbohydrates per serving. The resulting number is the total grams of carbohydrates that you will be having.  Learning the standard serving sizes of foods  When you eat carbohydrate foods that are not packaged or do not include Nutrition Facts on the label, you need to measure the servings in order to count the grams of carbohydrates.  Measure the foods that you will eat with a food scale or measuring cup, if needed.  Decide how many standard-size servings you will eat.  Multiply the number of servings by 15. For foods that contain carbohydrates, one serving equals 15 g of carbohydrates.  For example, if you eat 2 cups or 10 oz (300 g) of strawberries, you will have eaten 2 servings and 30 g of carbohydrates (2 servings x 15 g = 30 g).  For foods that have more than one food mixed, such as soups and casseroles, you must count the carbohydrates in each food that is included.  The following list contains standard serving sizes of common carbohydrate-rich foods. Each of these servings has about 15 g of carbohydrates:  1 slice of bread.  1 six-inch (15 cm) tortilla.  ? cup or 2 oz (53 g) cooked rice or pasta.  ½ cup or 3 oz (85 g) cooked or canned, drained and rinsed beans or lentils.  ½ cup or 3 oz (85 g) starchy vegetable, such as peas, corn, or  squash.  ½ cup or 4 oz (120 g) hot cereal.  ½ cup or 3 oz (85 g) boiled or mashed potatoes, or ¼ or 3 oz (85 g) of a large baked potato.  ½ cup or 4 fl oz (118 mL) fruit juice.  1 cup or 8 fl oz (237 mL) milk.  1 small or 4 oz (106 g) apple.  ½ or 2 oz (63 g) of a medium banana.  1 cup or 5 oz (150 g) strawberries.  3 cups or 1 oz (28.3 g) popped popcorn.  What is an example of carbohydrate counting?  To calculate the grams of carbohydrates in this sample meal, follow the steps shown below.  Sample meal  3 oz (85 g) chicken breast.  ? cup or 4 oz (106 g) brown rice.  ½ cup or 3 oz (85 g) corn.  1 cup or 8 fl oz (237 mL) milk.  1 cup or 5 oz (150 g) strawberries with sugar-free whipped topping.  Carbohydrate calculation  Identify the foods that contain carbohydrates:  Rice.  Corn.  Milk.  Strawberries.  Calculate how many servings you have of each food:  2 servings rice.  1 serving corn.  1 serving milk.  1 serving strawberries.  Multiply each number of servings by 15  servings rice x 15 g = 30 g.  1 serving corn x 15 g = 15 g.  1 serving milk x 15 g = 15 g.  1 serving strawberries x 15 g = 15 g.  Add together all of the amounts to find the total grams of carbohydrates eaten:  30 g + 15 g + 15 g + 15 g = 75 g of carbohydrates total.  What are tips for following this plan?  Shopping  Develop a meal plan and then make a shopping list.  Buy fresh and frozen vegetables, fresh and frozen fruit, dairy, eggs, beans, lentils, and whole grains.  Look at food labels. Choose foods that have more fiber and less sugar.  Avoid processed foods and foods with added sugars.  Meal planning  Aim to have the same number of grams of carbohydrates at each meal and for each snack time.  Plan to have regular, balanced meals and snacks.  Where to find more information  American Diabetes Association: diabetes.org  Centers for Disease Control and Prevention: cdc.gov  Academy of Nutrition and Dietetics: eatright.org  Association of  Diabetes Care & Education Specialists: diabeteseducator.org  Summary  Carbohydrate counting is a method of keeping track of how many carbohydrates you eat.  Eating carbohydrates increases the amount of sugar (glucose) in your blood.  Counting how many carbohydrates you eat improves how well you manage your blood glucose. This helps you manage your diabetes.  A dietitian can help you make a meal plan and calculate how many carbohydrates you should have at each meal and snack.  This information is not intended to replace advice given to you by your health care provider. Make sure you discuss any questions you have with your health care provider.  Document Revised: 07/20/2021 Document Reviewed: 07/21/2021  [employer] Patient Education © 2024 [employer]Fat and Cholesterol Restricted Eating Plan  Getting too much fat and cholesterol in your diet may cause health problems. Choosing the right foods helps keep your fat and cholesterol at normal levels. This can keep you from getting certain diseases.  Your doctor may recommend an eating plan that includes:  Total fat: ______% or less of total calories a day. This is ______g of fat a day.  Saturated fat: ______% or less of total calories a day. This is ______g of saturated fat a day.  Cholesterol: less than _________mg a day.  Fiber: ______g a day.  What are tips for following this plan?  General tips  Work with your doctor to lose weight if you need to.  Avoid:  Foods with added sugar.  Fried foods.  Foods with trans fat or partially hydrogenated oils. This includes some margarines and baked goods.  If you drink alcohol:  Limit how much you have to:  0-1 drink a day for women who are not pregnant.  0-2 drinks a day for men.  Know how much alcohol is in a drink. In the [place] one drink equals one 12 oz bottle of beer (355 mL), one 5 oz glass of wine (148 mL), or one 1½ oz glass of hard liquor (44 mL).  Reading food labels  Check food labels for:  Trans fats.  Partially  "hydrogenated oils.  Saturated fat (g) in each serving.  Cholesterol (mg) in each serving.  Fiber (g) in each serving.  Choose foods with healthy fats, such as:  Monounsaturated fats and polyunsaturated fats. These include olive and canola oil, flaxseeds, walnuts, almonds, and seeds.  Omega-3 fats. These are found in certain fish, flaxseed oil, and ground flaxseeds.  Choose grain products that have whole grains. Look for the word \"whole\" as the first word in the ingredient list.  Cooking  Cook foods using low-fat methods. These include baking, boiling, grilling, and broiling.  Eat more home-cooked foods. Eat at restaurants and buffets less often. Eat less fast food.  Avoid cooking using saturated fats, such as butter, cream, palm oil, palm kernel oil, and coconut oil.  Meal planning    At meals, divide your plate into four equal parts:  Fill one-half of your plate with vegetables, green salads, and fruit.  Fill one-fourth of your plate with whole grains.  Fill one-fourth of your plate with low-fat (lean) protein foods.  Eat fish that is high in omega-3 fats at least two times a week. This includes mackerel, tuna, sardines, and salmon.  Eat foods that are high in fiber, such as whole grains, beans, apples, pears, berries, broccoli, carrots, peas, and barley.  What foods should I eat?  Fruits  All fresh, canned (in natural juice), or frozen fruits.  Vegetables  Fresh or frozen vegetables (raw, steamed, roasted, or grilled). Green salads.  Grains  Whole grains, such as whole wheat or whole grain breads, crackers, cereals, and pasta. Unsweetened oatmeal, bulgur, barley, quinoa, or brown rice. Corn or whole wheat flour tortillas.  Meats and other protein foods  Ground beef (85% or leaner), grass-fed beef, or beef trimmed of fat. Skinless chicken or turkey. Ground chicken or turkey. Pork trimmed of fat. All fish and seafood. Egg whites. Dried beans, peas, or lentils. Unsalted nuts or seeds. Unsalted canned beans. Nut " butters without added sugar or oil.  Dairy  Low-fat or nonfat dairy products, such as skim or 1% milk, 2% or reduced-fat cheeses, low-fat and fat-free ricotta or cottage cheese, or plain low-fat and nonfat yogurt.  Fats and oils  Tub margarine without trans fats. Light or reduced-fat mayonnaise and salad dressings. Avocado. Olive, canola, sesame, or safflower oils.  The items listed above may not be a complete list of foods and beverages you can eat. Contact a dietitian for more information.  What foods should I avoid?  Fruits  Canned fruit in heavy syrup. Fruit in cream or butter sauce. Fried fruit.  Vegetables  Vegetables cooked in cheese, cream, or butter sauce. Fried vegetables.  Grains  White bread. White pasta. White rice. Cornbread. Bagels, pastries, and croissants. Crackers and snack foods that contain trans fat and hydrogenated oils.  Meats and other protein foods  Fatty cuts of meat. Ribs, chicken wings, klein, sausage, bologna, salami, chitterlings, fatback, hot dogs, bratwurst, and packaged lunch meats. Liver and organ meats. Whole eggs and egg yolks. Chicken and turkey with skin. Fried meat.  Dairy  Whole or 2% milk, cream, half-and-half, and cream cheese. Whole milk cheeses. Whole-fat or sweetened yogurt. Full-fat cheeses. Nondairy creamers and whipped toppings. Processed cheese, cheese spreads, and cheese curds.  Fats and oils  Butter, stick margarine, lard, shortening, ghee, or klein fat. Coconut, palm kernel, and palm oils.  Beverages  Alcohol. Sugar-sweetened drinks such as sodas, lemonade, and fruit drinks.  Sweets and desserts  Corn syrup, sugars, honey, and molasses. Candy. Jam and jelly. Syrup. Sweetened cereals. Cookies, pies, cakes, donuts, muffins, and ice cream.  The items listed above may not be a complete list of foods and beverages you should avoid. Contact a dietitian for more information.  Summary  Choosing the right foods helps keep your fat and cholesterol at normal levels. This can  keep you from getting certain diseases.  At meals, fill one-half of your plate with vegetables, green salads, and fruits.  Eat high fiber foods, like whole grains, beans, apples, pears, berries, carrots, peas, and barley.  Limit added sugar, saturated fats, alcohol, and fried foods.  This information is not intended to replace advice given to you by your health care provider. Make sure you discuss any questions you have with your health care provider.  Document Revised: 04/29/2022 Document Reviewed: 04/29/2022  [employer] Patient Education © 2024 [employer]Joint Steroid Injection: What to Expect  A joint steroid injection is a procedure to relieve swelling and pain in a joint. Steroids are medicines that reduce irritation and swelling, also called inflammation. Your health care provider will use a syringe and a needle to inject a steroid medicine into a painful and inflamed joint. A pain-relieving medicine may be injected along with the steroid.  Joints that are often treated with steroid injections include the knee, shoulder, hip, and spine. These injections may also be used in the elbow, ankle, and joints of the hands or feet.  Joint steroid injections may be repeated, but having them too often can damage a joint or the skin over the joint. You should not have joint steroid injections less than 6 weeks apart or more than 4 times a year.  Tell a health care provider about:  Any allergies you have.  All medicines you take. These include vitamins, herbs, eye drops, and creams.  Any problems you or family members have had with anesthesia.  Any bleeding problems you have.  Any surgeries you've had.  Any medical conditions you have.  Whether you're pregnant or may be pregnant.  What are the risks?  Your provider will talk with you about risks. These may include:  Infection.  Bleeding.  Allergies to medicines.  Damage to the joint or tissues around the joint.  Thinning of skin or loss of skin color over the  joint.  Temporary flushing of the face or chest.  Temporary increase in pain.  Temporary increase in blood sugar.  Failure to relieve inflammation or pain.  What happens before the treatment?  Medicines  Ask about changing or stopping:  Any medicines you take.  Any vitamins, herbs, or supplements you take.  Do not take aspirin or ibuprofen unless you're told to.  General instructions  Plan to have a responsible adult drive you home from the hospital or clinic. You won't be allowed to drive.  If you have diabetes or pre-diabetes:  Talk with your provider about how to manage your blood sugar, also called blood glucose. Steroid medicine can make your blood sugar go up and stay high for a few days.  Your provider can make a plan to make sure your blood sugar level stays under control.  What happens during the treatment?    Your provider will position you for the injection and locate the injection site over your joint.  The skin over the joint will be cleaned with a soap that kills germs.  Your provider may:  Spray a numbing solution, called a topical anesthetic, over the injection site.  Inject a local anesthetic under the skin above your joint.  The needle will be placed through your skin into your joint.  Your provider may use imaging such as ultrasound or fluoroscopy to guide the needle to the right spot for the injection.  If imaging is used, a special dye called a contrast dye may be injected to check that the needle is in the correct location.  The steroid medicine will be injected into your joint.  Anesthetic may be injected along with the steroid. This may be a medicine that relieves pain for:  A short time. This is called a short-acting anesthetic.  A longer time. This is called a long-acting anesthetic.  The needle will be removed, and a bandage may be placed over the injection site.  These steps may vary. Ask what you can expect.  What can I expect after the treatment?  It's normal to feel slight flushing for  a few days after the injection.  After the treatment, it's common to have an increase in joint pain after the anesthetic has worn off. This may happen about an hour after a short-acting anesthetic or about 8 hours after a longer-acting anesthetic.  You should begin to feel relief from joint pain and swelling within 3-7 days, sometimes sooner. Call your provider if you don't begin to feel relief after 7 days.  Follow these instructions at home:  Injection site care  Take care of your injection site as told.  Check your injection site every day for signs of infection. Check for:  More redness, swelling, or pain.  Fluid or blood.  More warmth.  Pus or a bad smell.  Activity  Rest as told.  Ask what things are safe for you to do at home. Ask when you can go back to work or school.  Ask when it's safe to drive.  Do joint exercises as told.  Do not take baths, swim, or use a hot tub for 24 hours after your injection. Ask if you can take showers.  Managing pain, stiffness, and swelling    Use ice or an ice pack as told.  Place a towel between your skin and the ice.  Leave the ice on for 20 minutes, 2-3 times a day.  If your skin turns red, take off the ice right away to prevent skin damage. The risk of damage is higher if you can't feel pain, heat, or cold.  Raise your joint above the level of your heart while you're sitting or lying down. Use pillows as needed.  General instructions  Take your medicines only as told.  Do not smoke, vape, or use nicotine or tobacco. Doing this can slow down healing.  If you have diabetes or pre-diabetes:  Check your blood sugar often.  Take medicine to control your blood sugar levels as told by your provider.  Keep all follow-up visits. Schedule a follow-up visit in 2-3 weeks to check your body's response to the injection.  Contact a health care provider if you have:  Chills or a fever.  Any signs of infection at your injection site.  Increased pain or swelling.  This information is not  intended to replace advice given to you by your health care provider. Make sure you discuss any questions you have with your health care provider.  Document Revised: 06/22/2024 Document Reviewed: 06/22/2024  [employer] Patient Education © 2024 [employer].

## 2025-04-14 DIAGNOSIS — R11.0 NAUSEA: ICD-10-CM

## 2025-04-14 DIAGNOSIS — F33.1 MODERATE EPISODE OF RECURRENT MAJOR DEPRESSIVE DISORDER: Chronic | ICD-10-CM

## 2025-04-14 RX ORDER — ONDANSETRON 4 MG/1
TABLET, ORALLY DISINTEGRATING ORAL
Qty: 30 TABLET | Refills: 0 | Status: SHIPPED | OUTPATIENT
Start: 2025-04-14

## 2025-04-14 RX ORDER — BUPROPION HYDROCHLORIDE 150 MG/1
150 TABLET, EXTENDED RELEASE ORAL 2 TIMES DAILY
Qty: 180 TABLET | Refills: 0 | Status: SHIPPED | OUTPATIENT
Start: 2025-04-14

## 2025-04-18 DIAGNOSIS — J30.1 CHRONIC SEASONAL ALLERGIC RHINITIS DUE TO POLLEN: Chronic | ICD-10-CM

## 2025-04-18 RX ORDER — LEVOCETIRIZINE DIHYDROCHLORIDE 5 MG/1
5 TABLET, FILM COATED ORAL EVERY EVENING
Qty: 30 TABLET | Refills: 0 | Status: SHIPPED | OUTPATIENT
Start: 2025-04-18

## 2025-05-09 ENCOUNTER — TELEPHONE (OUTPATIENT)
Dept: FAMILY MEDICINE CLINIC | Facility: CLINIC | Age: 45
End: 2025-05-09
Payer: COMMERCIAL

## 2025-05-09 RX ORDER — CEPHALEXIN 500 MG/1
500 CAPSULE ORAL 3 TIMES DAILY
Qty: 30 CAPSULE | Refills: 0 | Status: SHIPPED | OUTPATIENT
Start: 2025-05-09

## 2025-05-09 NOTE — TELEPHONE ENCOUNTER
Called and spoke with patient to advise her to keep the areas cleaned with antibacterial soap and wash twice anam PCP sent in keflex to her pharmacy. Patient was advised to make an appointment for Monday if symptoms persist. Patient is aware and understanding.

## 2025-05-14 ENCOUNTER — PRIOR AUTHORIZATION (OUTPATIENT)
Dept: FAMILY MEDICINE CLINIC | Facility: CLINIC | Age: 45
End: 2025-05-14
Payer: COMMERCIAL

## 2025-05-14 NOTE — TELEPHONE ENCOUNTER
PA Denied for (Key: [id number])  Mounjaro 10MG/0.5ML auto-injectors, due to only trying and failing one glp-1, Patients needs to try and fail 2 different medications per [employer]

## 2025-05-16 DIAGNOSIS — J30.1 CHRONIC SEASONAL ALLERGIC RHINITIS DUE TO POLLEN: Chronic | ICD-10-CM

## 2025-05-16 RX ORDER — LEVOCETIRIZINE DIHYDROCHLORIDE 5 MG/1
5 TABLET, FILM COATED ORAL EVERY EVENING
Qty: 30 TABLET | Refills: 5 | Status: SHIPPED | OUTPATIENT
Start: 2025-05-16

## 2025-06-09 ENCOUNTER — TELEPHONE (OUTPATIENT)
Dept: FAMILY MEDICINE CLINIC | Facility: CLINIC | Age: 45
End: 2025-06-09
Payer: COMMERCIAL

## 2025-06-09 DIAGNOSIS — E11.65 TYPE 2 DIABETES MELLITUS WITH HYPERGLYCEMIA, WITHOUT LONG-TERM CURRENT USE OF INSULIN: Primary | ICD-10-CM

## 2025-06-09 NOTE — TELEPHONE ENCOUNTER
Pt is aware of this information.     ----- Message from Rosa Quigley sent at 6/9/2025  9:46 AM EDT -----  Inform patient KALI Zimmerman denied  She must fail to preferred medications.  She has already failed Ozempic  Start Trulicity (prescription sent to the pharmacy for at least a 3-month trial unless she has side effects or GI intolerance.

## 2025-06-09 NOTE — TELEPHONE ENCOUNTER
Caller: Anika Alegria    Relationship: Self    Best call back number: [phone]     What is the best time to reach you: ANY    Who are you requesting to speak with (clinical staff, provider,  specific staff member): NURSE    Do you know the name of the person who called: PATIENT    What was the call regarding:  PATIENT DID NOT KNOW ANYTHING ABOUT TRULICITY BEING CALLED IN.     Is it okay if the provider responds through MyChart: PHONE CALL PLEASE

## 2025-07-11 ENCOUNTER — OFFICE VISIT (OUTPATIENT)
Dept: FAMILY MEDICINE CLINIC | Facility: CLINIC | Age: 45
End: 2025-07-11
Payer: COMMERCIAL

## 2025-07-11 VITALS
DIASTOLIC BLOOD PRESSURE: 94 MMHG | HEIGHT: 66 IN | WEIGHT: 293 LBS | SYSTOLIC BLOOD PRESSURE: 150 MMHG | TEMPERATURE: 98 F | HEART RATE: 76 BPM | OXYGEN SATURATION: 95 % | BODY MASS INDEX: 47.09 KG/M2

## 2025-07-11 DIAGNOSIS — I10 ESSENTIAL HYPERTENSION: Chronic | ICD-10-CM

## 2025-07-11 DIAGNOSIS — Z12.31 ENCOUNTER FOR SCREENING MAMMOGRAM FOR MALIGNANT NEOPLASM OF BREAST: ICD-10-CM

## 2025-07-11 DIAGNOSIS — E78.2 MIXED HYPERLIPIDEMIA: Chronic | ICD-10-CM

## 2025-07-11 DIAGNOSIS — Z12.11 SCREENING FOR COLON CANCER: ICD-10-CM

## 2025-07-11 DIAGNOSIS — Z28.21 PNEUMOCOCCAL VACCINATION DECLINED BY PATIENT: ICD-10-CM

## 2025-07-11 DIAGNOSIS — E11.65 TYPE 2 DIABETES MELLITUS WITH HYPERGLYCEMIA, WITHOUT LONG-TERM CURRENT USE OF INSULIN: Primary | Chronic | ICD-10-CM

## 2025-07-11 LAB
EXPIRATION DATE: ABNORMAL
HBA1C MFR BLD: 6.1 % (ref 4.5–5.7)
Lab: ABNORMAL

## 2025-07-11 NOTE — PATIENT INSTRUCTIONS
"Fat and Cholesterol Restricted Eating Plan  Getting too much fat and cholesterol in your diet may cause health problems. Choosing the right foods helps keep your fat and cholesterol at normal levels. This can keep you from getting certain diseases.  Your doctor may recommend an eating plan that includes:  Total fat: ______% or less of total calories a day. This is ______g of fat a day.  Saturated fat: ______% or less of total calories a day. This is ______g of saturated fat a day.  Cholesterol: less than _________mg a day.  Fiber: ______g a day.  What are tips for following this plan?  General tips  Work with your doctor to lose weight if you need to.  Avoid:  Foods with added sugar.  Fried foods.  Foods with trans fat or partially hydrogenated oils. This includes some margarines and baked goods.  If you drink alcohol:  Limit how much you have to:  0-1 drink a day for women who are not pregnant.  0-2 drinks a day for men.  Know how much alcohol is in a drink. In the [place] one drink equals one 12 oz bottle of beer (355 mL), one 5 oz glass of wine (148 mL), or one 1½ oz glass of hard liquor (44 mL).  Reading food labels  Check food labels for:  Trans fats.  Partially hydrogenated oils.  Saturated fat (g) in each serving.  Cholesterol (mg) in each serving.  Fiber (g) in each serving.  Choose foods with healthy fats, such as:  Monounsaturated fats and polyunsaturated fats. These include olive and canola oil, flaxseeds, walnuts, almonds, and seeds.  Omega-3 fats. These are found in certain fish, flaxseed oil, and ground flaxseeds.  Choose grain products that have whole grains. Look for the word \"whole\" as the first word in the ingredient list.  Cooking  Cook foods using low-fat methods. These include baking, boiling, grilling, and broiling.  Eat more home-cooked foods. Eat at restaurants and buffets less often. Eat less fast food.  Avoid cooking using saturated fats, such as butter, cream, palm oil, palm kernel oil, and " coconut oil.  Meal planning    At meals, divide your plate into four equal parts:  Fill one-half of your plate with vegetables, green salads, and fruit.  Fill one-fourth of your plate with whole grains.  Fill one-fourth of your plate with low-fat (lean) protein foods.  Eat fish that is high in omega-3 fats at least two times a week. This includes mackerel, tuna, sardines, and salmon.  Eat foods that are high in fiber, such as whole grains, beans, apples, pears, berries, broccoli, carrots, peas, and barley.  What foods should I eat?  Fruits  All fresh, canned (in natural juice), or frozen fruits.  Vegetables  Fresh or frozen vegetables (raw, steamed, roasted, or grilled). Green salads.  Grains  Whole grains, such as whole wheat or whole grain breads, crackers, cereals, and pasta. Unsweetened oatmeal, bulgur, barley, quinoa, or brown rice. Corn or whole wheat flour tortillas.  Meats and other protein foods  Ground beef (85% or leaner), grass-fed beef, or beef trimmed of fat. Skinless chicken or turkey. Ground chicken or turkey. Pork trimmed of fat. All fish and seafood. Egg whites. Dried beans, peas, or lentils. Unsalted nuts or seeds. Unsalted canned beans. Nut butters without added sugar or oil.  Dairy  Low-fat or nonfat dairy products, such as skim or 1% milk, 2% or reduced-fat cheeses, low-fat and fat-free ricotta or cottage cheese, or plain low-fat and nonfat yogurt.  Fats and oils  Tub margarine without trans fats. Light or reduced-fat mayonnaise and salad dressings. Avocado. Olive, canola, sesame, or safflower oils.  The items listed above may not be a complete list of foods and beverages you can eat. Contact a dietitian for more information.  What foods should I avoid?  Fruits  Canned fruit in heavy syrup. Fruit in cream or butter sauce. Fried fruit.  Vegetables  Vegetables cooked in cheese, cream, or butter sauce. Fried vegetables.  Grains  White bread. White pasta. White rice. Cornbread. Bagels, pastries,  and croissants. Crackers and snack foods that contain trans fat and hydrogenated oils.  Meats and other protein foods  Fatty cuts of meat. Ribs, chicken wings, klein, sausage, bologna, salami, chitterlings, fatback, hot dogs, bratwurst, and packaged lunch meats. Liver and organ meats. Whole eggs and egg yolks. Chicken and turkey with skin. Fried meat.  Dairy  Whole or 2% milk, cream, half-and-half, and cream cheese. Whole milk cheeses. Whole-fat or sweetened yogurt. Full-fat cheeses. Nondairy creamers and whipped toppings. Processed cheese, cheese spreads, and cheese curds.  Fats and oils  Butter, stick margarine, lard, shortening, ghee, or klein fat. Coconut, palm kernel, and palm oils.  Beverages  Alcohol. Sugar-sweetened drinks such as sodas, lemonade, and fruit drinks.  Sweets and desserts  Corn syrup, sugars, honey, and molasses. Candy. Jam and jelly. Syrup. Sweetened cereals. Cookies, pies, cakes, donuts, muffins, and ice cream.  The items listed above may not be a complete list of foods and beverages you should avoid. Contact a dietitian for more information.  Summary  Choosing the right foods helps keep your fat and cholesterol at normal levels. This can keep you from getting certain diseases.  At meals, fill one-half of your plate with vegetables, green salads, and fruits.  Eat high fiber foods, like whole grains, beans, apples, pears, berries, carrots, peas, and barley.  Limit added sugar, saturated fats, alcohol, and fried foods.  This information is not intended to replace advice given to you by your health care provider. Make sure you discuss any questions you have with your health care provider.  Document Revised: 04/29/2022 Document Reviewed: 04/29/2022  Elsevier Patient Education © 2024 [employer]Carbohydrate Counting for Diabetes Mellitus, Adult  Carbohydrate counting is a method of keeping track of how many carbohydrates you eat. Eating carbohydrates increases the amount of sugar (glucose) in  the blood. Counting how many carbohydrates you eat improves how well you manage your blood glucose. This, in turn, helps you manage your diabetes.  Carbohydrates are measured in grams (g) per serving. It is important to know how many carbohydrates (in grams or by serving size) you can have in each meal. This is different for every person. A dietitian can help you make a meal plan and calculate how many carbohydrates you should have at each meal and snack.  What foods contain carbohydrates?  Carbohydrates are found in the following foods:  Grains, such as breads and cereals.  Dried beans and soy products.  Starchy vegetables, such as potatoes, peas, and corn.  Fruit and fruit juices.  Milk and yogurt.  Sweets and snack foods, such as cake, cookies, candy, chips, and soft drinks.  How do I count carbohydrates in foods?  There are two ways to count carbohydrates in food. You can read food labels or learn standard serving sizes of foods. You can use either of these methods or a combination of both.  Using the Nutrition Facts label  The Nutrition Facts list is included on the labels of almost all packaged foods and beverages in the [place]. It includes:  The serving size.  Information about nutrients in each serving, including the grams of carbohydrate per serving.  To use the Nutrition Facts, decide how many servings you will have. Then, multiply the number of servings by the number of carbohydrates per serving. The resulting number is the total grams of carbohydrates that you will be having.  Learning the standard serving sizes of foods  When you eat carbohydrate foods that are not packaged or do not include Nutrition Facts on the label, you need to measure the servings in order to count the grams of carbohydrates.  Measure the foods that you will eat with a food scale or measuring cup, if needed.  Decide how many standard-size servings you will eat.  Multiply the number of servings by 15. For foods that contain  carbohydrates, one serving equals 15 g of carbohydrates.  For example, if you eat 2 cups or 10 oz (300 g) of strawberries, you will have eaten 2 servings and 30 g of carbohydrates (2 servings x 15 g = 30 g).  For foods that have more than one food mixed, such as soups and casseroles, you must count the carbohydrates in each food that is included.  The following list contains standard serving sizes of common carbohydrate-rich foods. Each of these servings has about 15 g of carbohydrates:  1 slice of bread.  1 six-inch (15 cm) tortilla.  ? cup or 2 oz (53 g) cooked rice or pasta.  ½ cup or 3 oz (85 g) cooked or canned, drained and rinsed beans or lentils.  ½ cup or 3 oz (85 g) starchy vegetable, such as peas, corn, or squash.  ½ cup or 4 oz (120 g) hot cereal.  ½ cup or 3 oz (85 g) boiled or mashed potatoes, or ¼ or 3 oz (85 g) of a large baked potato.  ½ cup or 4 fl oz (118 mL) fruit juice.  1 cup or 8 fl oz (237 mL) milk.  1 small or 4 oz (106 g) apple.  ½ or 2 oz (63 g) of a medium banana.  1 cup or 5 oz (150 g) strawberries.  3 cups or 1 oz (28.3 g) popped popcorn.  What is an example of carbohydrate counting?  To calculate the grams of carbohydrates in this sample meal, follow the steps shown below.  Sample meal  3 oz (85 g) chicken breast.  ? cup or 4 oz (106 g) brown rice.  ½ cup or 3 oz (85 g) corn.  1 cup or 8 fl oz (237 mL) milk.  1 cup or 5 oz (150 g) strawberries with sugar-free whipped topping.  Carbohydrate calculation  Identify the foods that contain carbohydrates:  Rice.  Corn.  Milk.  Strawberries.  Calculate how many servings you have of each food:  2 servings rice.  1 serving corn.  1 serving milk.  1 serving strawberries.  Multiply each number of servings by 15  servings rice x 15 g = 30 g.  1 serving corn x 15 g = 15 g.  1 serving milk x 15 g = 15 g.  1 serving strawberries x 15 g = 15 g.  Add together all of the amounts to find the total grams of carbohydrates eaten:  30 g + 15 g + 15 g + 15  g = 75 g of carbohydrates total.  What are tips for following this plan?  Shopping  Develop a meal plan and then make a shopping list.  Buy fresh and frozen vegetables, fresh and frozen fruit, dairy, eggs, beans, lentils, and whole grains.  Look at food labels. Choose foods that have more fiber and less sugar.  Avoid processed foods and foods with added sugars.  Meal planning  Aim to have the same number of grams of carbohydrates at each meal and for each snack time.  Plan to have regular, balanced meals and snacks.  Where to find more information  American Diabetes Association: diabetes.org  Centers for Disease Control and Prevention: cdc.gov  Academy of Nutrition and Dietetics: eatright.org  Association of Diabetes Care & Education Specialists: diabeteseducator.org  Summary  Carbohydrate counting is a method of keeping track of how many carbohydrates you eat.  Eating carbohydrates increases the amount of sugar (glucose) in your blood.  Counting how many carbohydrates you eat improves how well you manage your blood glucose. This helps you manage your diabetes.  A dietitian can help you make a meal plan and calculate how many carbohydrates you should have at each meal and snack.  This information is not intended to replace advice given to you by your health care provider. Make sure you discuss any questions you have with your health care provider.  Document Revised: 07/20/2021 Document Reviewed: 07/21/2021  Elsevier Patient Education © 2024 [employer] Inc[employer]

## 2025-07-11 NOTE — PROGRESS NOTES
"Chief Complaint -diabetes    History of Present Illness -     Anika Alegria is a 45 y.o. female.     Diabetes mellitus-  Patient reports increased appetite and has gained 5 pounds since starting Trulicity.  She failed semaglutide in the past due to GI intolerance.  She was previously on Mounjaro and tolerated it well but needed to try Trulicity prior to insurance approval.    Hypertension-  Stable at home but patient states that her back is hurting her today due to muscular issues.  She reports home blood pressure usually runs less than 130/80 consistently    Hyperlipidemia-  Stable with atorvastatin and low-cholesterol diet      The following portions of the patient's history were reviewed and updated as appropriate: allergies, current medications, past family history, past medical history, past social history, past surgical history and problem list.        Objective  Vital signs:  /94 (BP Location: Right arm, Patient Position: Sitting, Cuff Size: Adult)   Pulse 76   Temp 98 °F (36.7 °C) (Temporal)   Ht 167.6 cm (65.98\")   Wt (!) 157 kg (346 lb)   LMP  (LMP Unknown)   SpO2 95%   BMI 55.88 kg/m²     Physical Exam  Vitals and nursing note reviewed.   Constitutional:       Appearance: Normal appearance. She is well-developed. She is obese.   Eyes:      Extraocular Movements: Extraocular movements intact.      Conjunctiva/sclera: Conjunctivae normal.   Cardiovascular:      Rate and Rhythm: Normal rate and regular rhythm.      Heart sounds: Normal heart sounds. No murmur heard.  Pulmonary:      Effort: Pulmonary effort is normal. No respiratory distress.      Breath sounds: Normal breath sounds. No wheezing.   Musculoskeletal:         General: No tenderness.   Skin:     General: Skin is warm and dry.      Findings: No rash.   Neurological:      Mental Status: She is alert and oriented to person, place, and time.   Psychiatric:         Mood and Affect: Mood normal.         Behavior: Behavior normal.         " Thought Content: Thought content normal.         The following data was reviewed by Rosa Quigley PA-C:         Lab Results   Component Value Date    BUN 8 03/17/2025    CREATININE 0.72 03/17/2025    EGFR 105.2 03/17/2025    ALT 33 03/17/2025    AST 36 (H) 03/17/2025    WBC 8.20 09/05/2024    HGB 14.7 09/05/2024    HCT 43.2 09/05/2024     09/05/2024    CHOL 215 (H) 03/17/2025    TRIG 192 (H) 03/17/2025    HDL 53 03/17/2025     (H) 03/17/2025    TSH 9.870 (H) 03/17/2025    TSH 9.790 (H) 03/17/2025    HGBA1C 6.1 (A) 07/11/2025           Assessment & Plan     Diagnoses and all orders for this visit:    1. Type 2 diabetes mellitus with hyperglycemia, without long-term current use of insulin (Primary)  Comments:  Will try to PA Mounjaro  Failed semaglutide due to GI intolerance  Failed Trulicity due to weight gain  Advised a low-carb diabetic diet  Orders:  -     POC Glycosylated Hemoglobin (Hb A1C)  -     Tirzepatide 2.5 MG/0.5ML solution auto-injector; Inject 2.5 mg under the skin into the appropriate area as directed 1 (One) Time Per Week.  Dispense: 2 mL; Refill: 2    2. Screening for colon cancer  -     Cologuard - Stool, Per Rectum; Future    3. Encounter for screening mammogram for malignant neoplasm of breast  -     Mammo Screening Digital Tomosynthesis Bilateral With CAD; Future    4. Pneumococcal vaccination declined by patient    5. Essential hypertension  Comments:  Continue losartan    6. Mixed hyperlipidemia  Comments:  Continue atorvastatin  Advised low-cholesterol diet        Class 3 Severe Obesity (BMI >=40). Obesity-related health conditions include the following: hypertension, diabetes mellitus, dyslipidemias, and GERD. Obesity is unchanged. BMI is is above average; BMI management plan is completed. We discussed portion control and increasing exercise.          Patient was given instructions and counseling regarding his condition or for health maintenance advice. Please see specific  information pulled into the AVS if appropriate      This document has been electronically signed by:  Rosa Quigley PA-C

## 2025-07-12 DIAGNOSIS — F33.1 MODERATE EPISODE OF RECURRENT MAJOR DEPRESSIVE DISORDER: Chronic | ICD-10-CM

## 2025-07-14 ENCOUNTER — TELEPHONE (OUTPATIENT)
Dept: FAMILY MEDICINE CLINIC | Facility: CLINIC | Age: 45
End: 2025-07-14
Payer: COMMERCIAL

## 2025-07-14 RX ORDER — BUPROPION HYDROCHLORIDE 150 MG/1
150 TABLET, EXTENDED RELEASE ORAL 2 TIMES DAILY
Qty: 180 TABLET | Refills: 0 | Status: SHIPPED | OUTPATIENT
Start: 2025-07-14

## 2025-07-14 NOTE — TELEPHONE ENCOUNTER
Spoke with patient in regards to her Mammogram being scheduled for July 18 th @9:40 am [facility], Patient was informed to arrive 15 minutes prior to appointment for registration and to not wear any lotions, deodorants or perfumes. Patient is aware and understanding.

## 2025-07-16 DIAGNOSIS — N39.41 URGE INCONTINENCE OF URINE: ICD-10-CM

## 2025-07-16 DIAGNOSIS — K21.9 GASTROESOPHAGEAL REFLUX DISEASE WITHOUT ESOPHAGITIS: ICD-10-CM

## 2025-07-16 DIAGNOSIS — F33.1 MODERATE EPISODE OF RECURRENT MAJOR DEPRESSIVE DISORDER: ICD-10-CM

## 2025-07-16 DIAGNOSIS — I10 ESSENTIAL HYPERTENSION: Chronic | ICD-10-CM

## 2025-07-17 RX ORDER — OXYBUTYNIN CHLORIDE 5 MG/1
5 TABLET, EXTENDED RELEASE ORAL DAILY
Qty: 90 TABLET | Refills: 0 | Status: SHIPPED | OUTPATIENT
Start: 2025-07-17

## 2025-07-17 RX ORDER — LOSARTAN POTASSIUM 100 MG/1
100 TABLET ORAL DAILY
Qty: 90 TABLET | Refills: 0 | Status: SHIPPED | OUTPATIENT
Start: 2025-07-17

## 2025-07-17 RX ORDER — DULOXETIN HYDROCHLORIDE 60 MG/1
60 CAPSULE, DELAYED RELEASE ORAL DAILY
Qty: 90 CAPSULE | Refills: 0 | Status: SHIPPED | OUTPATIENT
Start: 2025-07-17

## 2025-07-17 RX ORDER — PANTOPRAZOLE SODIUM 40 MG/1
40 TABLET, DELAYED RELEASE ORAL DAILY
Qty: 90 TABLET | Refills: 0 | Status: SHIPPED | OUTPATIENT
Start: 2025-07-17

## 2025-08-05 ENCOUNTER — HOSPITAL ENCOUNTER (OUTPATIENT)
Facility: HOSPITAL | Age: 45
Discharge: HOME | End: 2025-08-05
Admitting: PHYSICIAN ASSISTANT
Payer: COMMERCIAL

## 2025-08-05 ENCOUNTER — TELEPHONE (OUTPATIENT)
Dept: FAMILY MEDICINE CLINIC | Facility: CLINIC | Age: 45
End: 2025-08-05
Payer: COMMERCIAL

## 2025-08-05 DIAGNOSIS — Z12.31 ENCOUNTER FOR SCREENING MAMMOGRAM FOR MALIGNANT NEOPLASM OF BREAST: ICD-10-CM

## 2025-08-05 PROCEDURE — 77063 BREAST TOMOSYNTHESIS BI: CPT | Performed by: RADIOLOGY

## 2025-08-05 PROCEDURE — 77067 SCR MAMMO BI INCL CAD: CPT

## 2025-08-05 PROCEDURE — 77063 BREAST TOMOSYNTHESIS BI: CPT

## 2025-08-05 PROCEDURE — 77067 SCR MAMMO BI INCL CAD: CPT | Performed by: RADIOLOGY

## 2025-08-14 ENCOUNTER — OFFICE VISIT (OUTPATIENT)
Dept: ENDOCRINOLOGY | Facility: CLINIC | Age: 45
End: 2025-08-14
Payer: COMMERCIAL

## 2025-08-14 VITALS
HEART RATE: 72 BPM | WEIGHT: 293 LBS | DIASTOLIC BLOOD PRESSURE: 83 MMHG | OXYGEN SATURATION: 93 % | SYSTOLIC BLOOD PRESSURE: 154 MMHG | BODY MASS INDEX: 55.88 KG/M2

## 2025-08-14 DIAGNOSIS — E11.65 TYPE 2 DIABETES MELLITUS WITH HYPERGLYCEMIA, WITHOUT LONG-TERM CURRENT USE OF INSULIN: ICD-10-CM

## 2025-08-14 DIAGNOSIS — E89.0 POSTOPERATIVE HYPOTHYROIDISM: Primary | ICD-10-CM

## 2025-08-14 LAB
CALCIUM SPEC-SCNC: 8.4 MG/DL (ref 8.6–10.5)
T4 FREE SERPL-MCNC: 1.27 NG/DL (ref 0.92–1.68)
TSH SERPL DL<=0.05 MIU/L-ACNC: 7.06 UIU/ML (ref 0.27–4.2)

## 2025-08-14 PROCEDURE — 82310 ASSAY OF CALCIUM: CPT | Performed by: NURSE PRACTITIONER

## 2025-08-14 PROCEDURE — 84439 ASSAY OF FREE THYROXINE: CPT | Performed by: NURSE PRACTITIONER

## 2025-08-14 PROCEDURE — 84443 ASSAY THYROID STIM HORMONE: CPT | Performed by: NURSE PRACTITIONER

## 2025-08-14 RX ORDER — TIRZEPATIDE 5 MG/.5ML
5 INJECTION, SOLUTION SUBCUTANEOUS WEEKLY
Qty: 2 ML | Refills: 2 | Status: SHIPPED | OUTPATIENT
Start: 2025-08-14

## 2025-08-14 RX ORDER — ACYCLOVIR 400 MG/1
TABLET ORAL
Qty: 9 EACH | Refills: 0 | Status: SHIPPED | OUTPATIENT
Start: 2025-08-14

## 2025-08-14 RX ORDER — ACYCLOVIR 400 MG/1
TABLET ORAL SEE ADMIN INSTRUCTIONS
Qty: 1 EACH | Refills: 0 | Status: SHIPPED | OUTPATIENT
Start: 2025-08-14

## 2025-08-22 ENCOUNTER — TELEPHONE (OUTPATIENT)
Dept: FAMILY MEDICINE CLINIC | Facility: CLINIC | Age: 45
End: 2025-08-22

## (undated) DEVICE — BNDG ELAS COFLEX LF2 SLF ADHR 2IN 5YD LF TN

## (undated) DEVICE — DRSNG SURESITE WNDW 4X4.5

## (undated) DEVICE — SPNG GZ STRL 2S 4X4 12PLY

## (undated) DEVICE — CUFF TOURNI XPRESAIR/ADH LEG STD 24X4IN

## (undated) DEVICE — HOLDER: Brand: DEROYAL

## (undated) DEVICE — APPL CHLORAPREP W/TINT 26ML ORNG

## (undated) DEVICE — BLANKT WARM LOWR/BDY 100X120CM

## (undated) DEVICE — ANTIBACTERIAL UNDYED BRAIDED (POLYGLACTIN 910), SYNTHETIC ABSORBABLE SUTURE: Brand: COATED VICRYL

## (undated) DEVICE — PAD GRND REM POLYHESIVE A/ DISP

## (undated) DEVICE — BNDG ELAS CO-FLEX SLF ADHR 4IN5YD LF STRL

## (undated) DEVICE — ELECTRD BLD EZ CLN MOD XLNG 2.75IN

## (undated) DEVICE — SUT SILK 3/0 TIES 18IN A184H

## (undated) DEVICE — APPL CHLORAPREP 26ML CLR

## (undated) DEVICE — GLV SURG BIOGEL LTX PF 7

## (undated) DEVICE — ANESTHESIA CIRCUIT ADULT-LF: Brand: MEDLINE INDUSTRIES, INC.

## (undated) DEVICE — PK EXTREM UPPR 70

## (undated) DEVICE — DISPOSABLE BIPOLAR FORCEPS 4" (10.2CM) JEWELERS, STRAIGHT 0.4MM TIP AND 12 FT. (3.6M) CABLE: Brand: KIRWAN

## (undated) DEVICE — ELECTRODE 8227410 PAIRED 2 CH SET ROHS

## (undated) DEVICE — GAUZE,SPONGE,4"X4",16PLY,XRAY,STRL,LF: Brand: MEDLINE

## (undated) DEVICE — UNDERGLV SURG BIOGEL INDICATOR LF PF 7.5

## (undated) DEVICE — MARKER,SKIN,W/RULER,DUAL,STOP: Brand: MEDLINE

## (undated) DEVICE — HDRST PAD EA/20PC

## (undated) DEVICE — PK MINOR SPLT 10

## (undated) DEVICE — TBG PENCL TELESCP MEGADYNE SMOKE EVAC 10FT

## (undated) DEVICE — INTENDED USE FOR SURGICAL MARKING ON INTACT SKIN, ALSO PROVIDES A PERMANENT METHOD OF IDENTIFYING OBJECTS IN THE OPERATING ROOM: Brand: WRITESITE® REGULAR TIP SKIN MARKER

## (undated) DEVICE — PATIENT RETURN ELECTRODE, SINGLE-USE, CONTACT QUALITY MONITORING, ADULT, WITH 9FT CORD, FOR PATIENTS WEIGING OVER 33LBS. (15KG): Brand: MEGADYNE

## (undated) DEVICE — SUT MNCRYL PLS ANTIB UD 4/0 PS2 18IN

## (undated) DEVICE — GLV SURG PREMIERPRO MIC LTX PF SZ7.5 BRN

## (undated) DEVICE — SUT SILK 2/0 TIES 18IN A185H

## (undated) DEVICE — PCH INST SURG INVISISHIELD 2PCKT

## (undated) DEVICE — TRAP FLD MINIVAC MEGADYNE 100ML

## (undated) DEVICE — DISH PETRI 3.5IN MD STRL LF

## (undated) DEVICE — DRSNG WND GZ CURAD OIL EMULSION 3X3IN STRL

## (undated) DEVICE — BNDG ELAS MATRX V/CLS 2INX5YD LF

## (undated) DEVICE — DRAPE,INSTRUMENT,MAGNETIC,10X16: Brand: MEDLINE

## (undated) DEVICE — HARMONIC FOCUS SHEARS 9CM LENGTH + ADAPTIVE TISSUE TECHNOLOGY FOR USE WITH BLUE HAND PIECE ONLY: Brand: HARMONIC FOCUS

## (undated) DEVICE — TOWEL,OR,DSP,ST,BLUE,STD,4/PK,20PK/CS: Brand: MEDLINE

## (undated) DEVICE — SUT ETHLN 3/0 FS1 663G

## (undated) DEVICE — BNDG ELAS MATRX V/CLS 3INX5YD LF